# Patient Record
Sex: FEMALE | Race: WHITE | NOT HISPANIC OR LATINO | Employment: OTHER | URBAN - METROPOLITAN AREA
[De-identification: names, ages, dates, MRNs, and addresses within clinical notes are randomized per-mention and may not be internally consistent; named-entity substitution may affect disease eponyms.]

---

## 2017-01-19 ENCOUNTER — HOSPITAL ENCOUNTER (OUTPATIENT)
Dept: MAMMOGRAPHY | Facility: CLINIC | Age: 57
Discharge: HOME | End: 2017-01-19
Attending: FAMILY MEDICINE | Admitting: FAMILY MEDICINE
Payer: COMMERCIAL

## 2017-01-19 ENCOUNTER — OFFICE VISIT (OUTPATIENT)
Dept: FAMILY MEDICINE | Facility: CLINIC | Age: 57
End: 2017-01-19
Payer: COMMERCIAL

## 2017-01-19 VITALS
WEIGHT: 143 LBS | OXYGEN SATURATION: 100 % | SYSTOLIC BLOOD PRESSURE: 118 MMHG | TEMPERATURE: 96.9 F | HEIGHT: 61 IN | DIASTOLIC BLOOD PRESSURE: 65 MMHG | BODY MASS INDEX: 27 KG/M2 | HEART RATE: 100 BPM

## 2017-01-19 DIAGNOSIS — Z12.11 COLON CANCER SCREENING: Primary | ICD-10-CM

## 2017-01-19 DIAGNOSIS — Z12.31 ENCOUNTER FOR SCREENING MAMMOGRAM FOR BREAST CANCER: ICD-10-CM

## 2017-01-19 DIAGNOSIS — M54.2 CERVICALGIA: ICD-10-CM

## 2017-01-19 DIAGNOSIS — S09.90XD: ICD-10-CM

## 2017-01-19 DIAGNOSIS — F41.1 GENERALIZED ANXIETY DISORDER: ICD-10-CM

## 2017-01-19 DIAGNOSIS — J45.30 MILD PERSISTENT ASTHMA, UNCOMPLICATED: ICD-10-CM

## 2017-01-19 PROCEDURE — 99214 OFFICE O/P EST MOD 30 MIN: CPT | Performed by: FAMILY MEDICINE

## 2017-01-19 PROCEDURE — 77063 BREAST TOMOSYNTHESIS BI: CPT

## 2017-01-19 RX ORDER — PAROXETINE 20 MG/1
20 TABLET, FILM COATED ORAL AT BEDTIME
Qty: 90 TABLET | Refills: 3 | Status: SHIPPED | OUTPATIENT
Start: 2017-01-19 | End: 2018-01-04

## 2017-01-19 RX ORDER — FLUTICASONE PROPIONATE 50 MCG
1-2 SPRAY, SUSPENSION (ML) NASAL DAILY
Qty: 1 BOTTLE | Refills: 3 | Status: SHIPPED | OUTPATIENT
Start: 2017-01-19 | End: 2018-12-11

## 2017-01-19 RX ORDER — AMITRIPTYLINE HYDROCHLORIDE 10 MG/1
TABLET ORAL
Qty: 252 TABLET | Refills: 3 | Status: CANCELLED | OUTPATIENT
Start: 2017-01-19

## 2017-01-19 RX ORDER — CYCLOBENZAPRINE HCL 10 MG
10 TABLET ORAL
Qty: 14 TABLET | Refills: 1 | Status: SHIPPED | OUTPATIENT
Start: 2017-01-19 | End: 2018-03-08

## 2017-01-19 RX ORDER — IPRATROPIUM BROMIDE AND ALBUTEROL SULFATE 2.5; .5 MG/3ML; MG/3ML
3 SOLUTION RESPIRATORY (INHALATION) EVERY 6 HOURS PRN
Qty: 1 BOX | Refills: 2 | Status: SHIPPED | OUTPATIENT
Start: 2017-01-19 | End: 2018-12-03

## 2017-01-19 RX ORDER — ALBUTEROL SULFATE 90 UG/1
2 AEROSOL, METERED RESPIRATORY (INHALATION) EVERY 6 HOURS PRN
Qty: 2 INHALER | Refills: 6 | Status: SHIPPED | OUTPATIENT
Start: 2017-01-19 | End: 2018-12-03

## 2017-01-19 RX ORDER — AMITRIPTYLINE HYDROCHLORIDE 10 MG/1
30 TABLET ORAL AT BEDTIME
Qty: 90 TABLET | Refills: 6 | Status: SHIPPED | OUTPATIENT
Start: 2017-01-19 | End: 2017-09-30

## 2017-01-19 NOTE — NURSING NOTE
"Chief Complaint   Patient presents with     ER F/U     Head Injury     and neck injury       Initial /65 mmHg  Pulse 100  Temp(Src) 96.9  F (36.1  C) (Tympanic)  Ht 5' 1\" (1.549 m)  Wt 143 lb (64.864 kg)  BMI 27.03 kg/m2  SpO2 100%  LMP 10/17/2008 Estimated body mass index is 27.03 kg/(m^2) as calculated from the following:    Height as of this encounter: 5' 1\" (1.549 m).    Weight as of this encounter: 143 lb (64.864 kg).  BP completed using cuff size: regular  "

## 2017-01-19 NOTE — PROGRESS NOTES
SUBJECTIVE:                                                    Brittany Barajas is a 56 year old female who presents to clinic today for the following health issues:      ED/UC Followup:    Facility:  [facility] [place]  Date of visit: 12/28/16  Reason for visit: patient fell hitting her head   Current Status: still having dull ache on head and pinched nerves  in neck and wavy vision      Concern - f/u after fall hitting her head      Onset: 12-26-16     Description:   Patient fell hitting her head, has had trouble with vision head and neck pain     Intensity: moderate    Progression of Symptoms: slightly improving    Accompanying Signs & Symptoms:  Injury from a fall       Previous history of similar problem:   no    Precipitating factors:   Worsened by: vision head and neck  Are worse with movement      Alleviating factors:  Improved by: patient has been using warm pack        Therapies Tried and outcome: Patient was seen in the ER they did Ct of neck and x-ray of pelvis       Medication Followup of all medication listed in      Taking Medication as prescribed: yes    Side Effects:  None    Medication Helping Symptoms:  yes       Depression and Anxiety Follow-Up    Status since last visit: Improved with medication    Other associated symptoms:None    Complicating factors:     Significant life event: No     Current substance abuse: None    PHQ-9 SCORE 11/30/2015 12/9/2015 10/4/2016   Total Score - - -   Total Score 11 11 16     CLAY-7 SCORE 9/23/2015 12/9/2015 10/4/2016   Total Score - - -   Total Score 3 10 13        PHQ-9  English      PHQ-9   Any Language     GAD7       Asthma Follow-Up    Was ACT completed today?    Yes    ACT Total Scores 1/19/2017   ACT TOTAL SCORE -   ASTHMA ER VISITS -   ASTHMA HOSPITALIZATIONS -   ACT TOTAL SCORE (Goal Greater than or Equal to 20) 20   In the past 12 months, how many times did you visit the emergency room for your asthma without being admitted to the hospital? 0   In  the past 12 months, how many times were you hospitalized overnight because of your asthma? 0     Patient is a 56 yr old female here for a follow up from an ER visit. She was seen for a fall and head injury. CT of her neck was negative for fracture. She still has some mild concussion symptoms. She reports some mild headaches and blurry vision. Patient is also requesting refills on her medication     Problem list and histories reviewed & adjusted, as indicated.  Additional history: as documented    Patient Active Problem List   Diagnosis     Mitral valve disorder     Allergic rhinitis     Convulsions (H)     Esophageal reflux     Mild major depression (H)     Plantar fascial fibromatosis     Other affections of shoulder region, not elsewhere classified     Neoplasm of digestive system     Anxiety state     Breast cancer (H)     CARDIOVASCULAR SCREENING; LDL GOAL LESS THAN 160     Transaminase or LDH elevation     H/O: hysterectomy     Colon polyp     Drug reaction     Mild persistent asthma     Urgency incontinence     Advanced directives, counseling/discussion     Past Surgical History   Procedure Laterality Date     Surgical history of -   10/7/1998     Right parotidectomy-mass     Surgical history of -        Laparoscopy     Surgical history of -   10/1996     Fibroid removal     Leep tx, cervical  10/06     CECILIO 1     Colonoscopy  1/28/2011     COLONOSCOPY performed by ELOY BRIONES at [facility]     Colonoscopy  1/20/2012     Procedure:COLONOSCOPY; Surgeon:ELOY BRIONES; Location:[facility]     Inject epidural lumbar  3/9/2012     Procedure:INJECT EPIDURAL LUMBAR; JAMIL Renteria; Surgeon:GENERIC ANESTHESIA PROVIDER; Location:[facility]     Inject epidural lumbar  5/25/2012     Procedure:INJECT EPIDURAL LUMBAR; Evans; Surgeon:GENERIC ANESTHESIA PROVIDER; Location:[facility]     Hysterectomy, pap no longer indicated  12/1/2008     for fibroids       Social History   Substance Use Topics     Smoking status: Never  Smoker      Smokeless tobacco: Never Used     Alcohol Use: Yes      Comment: rarely     Family History   Problem Relation Age of Onset     C.A.D. Mother      DIABETES Mother      Hypertension Mother      CEREBROVASCULAR DISEASE Mother      Breast Cancer Mother      Allergies Mother      Arthritis Mother      CANCER Mother      Eye Disorder Mother      GASTROINTESTINAL DISEASE Mother      Gynecology Mother      Lipids Mother      HEART DISEASE Mother      OSTEOPOROSIS Mother      Obesity Mother      Thyroid Disease Mother      Respiratory Mother      Alcohol/Drug Father      CANCER Father      Alcohol/Drug Brother      Musculoskeletal Disorder Brother      spina bifida-recent dx     C.A.D. Brother      Thyroid Disease Sister      Lipids Sister      Gynecology Sister      fibroids/ s/p hysterectomy     Other - See Comments Sister      hysterectomy     Thyroid Disease Brother      HEART DISEASE Brother      DOUBLE BYPASS     Lipids Brother      Thyroid Disease Sister      Lipids Sister      Other - See Comments Sister      hysterectomy     GASTROINTESTINAL DISEASE Sister      liver function off     Thyroid Disease Sister      Thyroid Disease Sister      HEART DISEASE Sister      CANCER Other      thyroid cancer     Cancer - colorectal No family hx of          Current Outpatient Prescriptions   Medication Sig Dispense Refill     PARoxetine (PAXIL) 20 MG tablet Take 1 tablet (20 mg) by mouth At Bedtime 90 tablet 3     albuterol (PROAIR HFA/PROVENTIL HFA/VENTOLIN HFA) 108 (90 BASE) MCG/ACT Inhaler Inhale 2 puffs into the lungs every 6 hours as needed for shortness of breath / dyspnea 2 Inhaler 6     fluticasone (FLONASE) 50 MCG/ACT spray Spray 1-2 sprays into both nostrils daily 1 Bottle 3     ipratropium - albuterol 0.5 mg/2.5 mg/3 mL (DUONEB) 0.5-2.5 (3) MG/3ML neb solution Take 1 vial (3 mLs) by nebulization every 6 hours as needed for shortness of breath / dyspnea 1 Box 2     cyclobenzaprine (FLEXERIL) 10 MG tablet  Take 1 tablet (10 mg) by mouth nightly as needed for muscle spasms 14 tablet 1     amitriptyline (ELAVIL) 10 MG tablet Take 3 tablets (30 mg) by mouth At Bedtime 90 tablet 6     amitriptyline (ELAVIL) 10 MG tablet 3 tabs at bedtime.  Plan to increase dose to 50-75 mg at bedtime after 1 month 90 tablet 0     fluticasone (FLOVENT HFA) 110 MCG/ACT Inhaler Inhale 2 puffs into the lungs 2 times daily (Patient taking differently: Inhale 2 puffs into the lungs daily ) 1 Inhaler 3     omeprazole (PRILOSEC) 10 MG capsule Take 2 capsules (20 mg) by mouth daily Take by mouth 30-60 minutes before a meal. 60 capsule 3     Bioflavonoid Products (BIOFLEX PO) Take 1 tablet by mouth daily.       ferrous sulfate (SLOW FE) 142 (45 FE) MG TBCR Take 142 mg by mouth every 48 hours.       aspirin 81 MG tablet Take 1 tablet by mouth daily.       Multiple Vitamin (MULTIVITAMIN OR) Take 1 tablet by mouth daily.       B Complex-C (SUPER B COMPLEX PO) Take 1 tablet by mouth daily.       ascorbic acid (VITAMIN C) 500 MG tablet Take 500 mg by mouth daily.       Calcium-Vitamin D-Vitamin K (CALCIUM + D + K PO) Take 1 capsule by mouth daily.       IBUPROFEN PO Take 800 mg by mouth daily as needed for moderate pain       Acetaminophen (TYLENOL PO) Take 500 mg by mouth daily as needed for mild pain or fever       oxyCODONE-acetaminophen (PERCOCET) 5-325 MG per tablet Take 1-2 tablets by mouth every 4 hours as needed for pain 12 tablet 0     [DISCONTINUED] PARoxetine (PAXIL) 20 MG tablet Take 1 tablet (20 mg) by mouth At Bedtime 90 tablet 3     [DISCONTINUED] albuterol (PROAIR HFA, PROVENTIL HFA, VENTOLIN HFA) 108 (90 BASE) MCG/ACT inhaler Inhale 2 puffs into the lungs every 6 hours as needed for shortness of breath / dyspnea 2 Inhaler 6     [DISCONTINUED] ipratropium - albuterol 0.5 mg/2.5 mg/3 mL (DUONEB) 0.5-2.5 (3) MG/3ML nebulization Take 1 vial (3 mLs) by nebulization every 6 hours as needed for shortness of breath / dyspnea 1 Box 2  "    Allergies   Allergen Reactions     Taxotere Other (See Comments)     Chest tightness, black out     BP Readings from Last 3 Encounters:   01/19/17 118/65   12/28/16 140/89   10/04/16 115/59    Wt Readings from Last 3 Encounters:   01/19/17 143 lb (64.864 kg)   10/04/16 143 lb (64.864 kg)   07/14/16 140 lb (63.504 kg)                  Labs reviewed in EPIC  Problem list, Medication list, Allergies, and Medical/Social/Surgical histories reviewed in [facility] and updated as appropriate.    ROS:  Constitutional, HEENT, cardiovascular, pulmonary, gi and gu systems are negative, except as otherwise noted.    OBJECTIVE:                                                    /65 mmHg  Pulse 100  Temp(Src) 96.9  F (36.1  C) (Tympanic)  Ht 5' 1\" (1.549 m)  Wt 143 lb (64.864 kg)  BMI 27.03 kg/m2  SpO2 100%  LMP 10/17/2008  Body mass index is 27.03 kg/(m^2).  GENERAL: healthy, alert and no distress  EYES: Eyes grossly normal to inspection, PERRL and conjunctivae and sclerae normal  HENT: ear canals and TM's normal, nose and mouth without ulcers or lesions  NECK: no adenopathy, no asymmetry, masses, or scars and thyroid normal to palpation  RESP: lungs clear to auscultation - no rales, rhonchi or wheezes  CV: regular rate and rhythm, normal S1 S2, no S3 or S4, no murmur, click or rub, no peripheral edema and peripheral pulses strong  MS: normal range of motion and no tenderness to palpation     Diagnostic Test Results:  none      ASSESSMENT/PLAN:                                                          ICD-10-CM    1. Generalized anxiety disorder F41.1 PARoxetine (PAXIL) 20 MG tablet     amitriptyline (ELAVIL) 10 MG tablet   2. Mild persistent asthma, uncomplicated J45.30 albuterol (PROAIR HFA/PROVENTIL HFA/VENTOLIN HFA) 108 (90 BASE) MCG/ACT Inhaler     fluticasone (FLONASE) 50 MCG/ACT spray     ipratropium - albuterol 0.5 mg/2.5 mg/3 mL (DUONEB) 0.5-2.5 (3) MG/3ML neb solution   3. Cervicalgia M54.2 cyclobenzaprine " (FLEXERIL) 10 MG tablet   4. Encounter for screening mammogram for breast cancer Z12.31 *MA Screening Digital Bilateral   5. Head injury, acute, without loss of consciousness, subsequent encounter S09.90XD        FUTURE APPOINTMENTS:       - Follow-up visit as needed.    Alejandrina Duran MD  [facility]

## 2017-01-19 NOTE — MR AVS SNAPSHOT
After Visit Summary   1/19/2017    Brittany Barajas    MRN: [id number]           Patient Information     Date Of Birth          1960        Visit Information        Provider Department      1/19/2017 10:00 AM Alejandrina Duran MD [facility]        Today's Diagnoses     Anxiety    -  1     Generalized anxiety disorder         Mild persistent asthma, uncomplicated         Cervicalgia         Encounter for screening mammogram for breast cancer            Follow-ups after your visit        Future tests that were ordered for you today     Open Future Orders        Priority Expected Expires Ordered    *MA Screening Digital Bilateral Routine  1/19/2018 1/19/2017            Who to contact     If you have questions or need follow up information about today's clinic visit or your schedule please contact [facility] directly at [phone].  Normal or non-critical lab and imaging results will be communicated to you by MyChart, letter or phone within 4 business days after the clinic has received the results. If you do not hear from us within 7 days, please contact the clinic through MyChart or phone. If you have a critical or abnormal lab result, we will notify you by phone as soon as possible.  Submit refill requests through [facility] or call your pharmacy and they will forward the refill request to us. Please allow 3 business days for your refill to be completed.          Additional Information About Your Visit        MyChart Information     [employer] gives you secure access to your electronic health record. If you see a primary care provider, you can also send messages to your care team and make appointments. If you have questions, please call your primary care clinic.  If you do not have a primary care provider, please call [phone] and they will assist you.        Care EveryWhere ID     This is your Care EveryWhere ID. This could be used by other organizations to  "access your [facility] medical records  [id number]        Your Vitals Were     Pulse Temperature Height BMI (Body Mass Index) Pulse Oximetry Last Period    100 96.9  F (36.1  C) (Tympanic) 5' 1\" (1.549 m) 27.03 kg/m2 100% 10/17/2008       Blood Pressure from Last 3 Encounters:   01/19/17 118/65   12/28/16 140/89   10/04/16 115/59    Weight from Last 3 Encounters:   01/19/17 143 lb (64.864 kg)   10/04/16 143 lb (64.864 kg)   07/14/16 140 lb (63.504 kg)                 Today's Medication Changes          These changes are accurate as of: 1/19/17 10:25 AM.  If you have any questions, ask your nurse or doctor.               Start taking these medicines.        Dose/Directions    cyclobenzaprine 10 MG tablet   Commonly known as:  FLEXERIL   Used for:  Cervicalgia   Started by:  Alejandrina Duran MD        Dose:  10 mg   Take 1 tablet (10 mg) by mouth nightly as needed for muscle spasms   Quantity:  14 tablet   Refills:  1         These medicines have changed or have updated prescriptions.        Dose/Directions    * amitriptyline 10 MG tablet   Commonly known as:  ELAVIL   This may have changed:  Another medication with the same name was added. Make sure you understand how and when to take each.   Used for:  Anxiety   Changed by:  Alejandrina Duran MD        3 tabs at bedtime.  Plan to increase dose to 50-75 mg at bedtime after 1 month   Quantity:  90 tablet   Refills:  0       * amitriptyline 10 MG tablet   Commonly known as:  ELAVIL   This may have changed:  You were already taking a medication with the same name, and this prescription was added. Make sure you understand how and when to take each.   Used for:  Generalized anxiety disorder   Changed by:  Alejandrina Duran MD        Dose:  30 mg   Take 3 tablets (30 mg) by mouth At Bedtime   Quantity:  90 tablet   Refills:  6       fluticasone 110 MCG/ACT Inhaler   Commonly known as:  FLOVENT HFA   This may have changed:  when to take this "   Used for:  Mild persistent asthma        Dose:  2 puff   Inhale 2 puffs into the lungs 2 times daily   Quantity:  1 Inhaler   Refills:  3       * Notice:  This list has 2 medication(s) that are the same as other medications prescribed for you. Read the directions carefully, and ask your doctor or other care provider to review them with you.         Where to get your medicines      These medications were sent to [facility] [address] [facility]  [address] [place] [address]     Phone:  [phone]    - albuterol 108 (90 BASE) MCG/ACT Inhaler  - amitriptyline 10 MG tablet  - cyclobenzaprine 10 MG tablet  - fluticasone 50 MCG/ACT spray  - ipratropium - albuterol 0.5 mg/2.5 mg/3 mL 0.5-2.5 (3) MG/3ML neb solution  - PARoxetine 20 MG tablet             Primary Care Provider Office Phone # Fax #    Alejandrina Duran [phone]       [facility] [address] [facility] [address]        Thank you!     Thank you for choosing [facility]  for your care. Our goal is always to provide you with excellent care. Hearing back from our patients is one way we can continue to improve our services. Please take a few minutes to complete the written survey that you may receive in the mail after your visit with us. Thank you!             Your Updated Medication List - Protect others around you: Learn how to safely use, store and throw away your medicines at www.disposemymeds.org.          This list is accurate as of: 1/19/17 10:25 AM.  Always use your most recent med list.                   Brand Name Dispense Instructions for use    albuterol 108 (90 BASE) MCG/ACT Inhaler    PROAIR HFA/PROVENTIL HFA/VENTOLIN HFA    2 Inhaler    Inhale 2 puffs into the lungs every 6 hours as needed for shortness of breath / dyspnea       * amitriptyline 10 MG tablet    ELAVIL    90 tablet    3 tabs at bedtime.  Plan to increase dose to 50-75 mg at bedtime after 1  month       * amitriptyline 10 MG tablet    ELAVIL    90 tablet    Take 3 tablets (30 mg) by mouth At Bedtime       ascorbic acid 500 MG tablet    VITAMIN C     Take 500 mg by mouth daily.       aspirin 81 MG tablet      Take 1 tablet by mouth daily.       BIOFLEX PO      Take 1 tablet by mouth daily.       CALCIUM + D + K PO      Take 1 capsule by mouth daily.       cyclobenzaprine 10 MG tablet    FLEXERIL    14 tablet    Take 1 tablet (10 mg) by mouth nightly as needed for muscle spasms       fluticasone 110 MCG/ACT Inhaler    FLOVENT HFA    1 Inhaler    Inhale 2 puffs into the lungs 2 times daily       fluticasone 50 MCG/ACT spray    FLONASE    1 Bottle    Spray 1-2 sprays into both nostrils daily       IBUPROFEN PO      Take 800 mg by mouth daily as needed for moderate pain       ipratropium - albuterol 0.5 mg/2.5 mg/3 mL 0.5-2.5 (3) MG/3ML neb solution    DUONEB    1 Box    Take 1 vial (3 mLs) by nebulization every 6 hours as needed for shortness of breath / dyspnea       MULTIVITAMIN PO      Take 1 tablet by mouth daily.       omeprazole 10 MG CR capsule    priLOSEC    60 capsule    Take 2 capsules (20 mg) by mouth daily Take by mouth 30-60 minutes before a meal.       oxyCODONE-acetaminophen 5-325 MG per tablet    PERCOCET    12 tablet    Take 1-2 tablets by mouth every 4 hours as needed for pain       PARoxetine 20 MG tablet    PAXIL    90 tablet    Take 1 tablet (20 mg) by mouth At Bedtime       SLOW  (45 FE) MG Tbcr   Generic drug:  ferrous sulfate      Take 142 mg by mouth every 48 hours.       SUPER B COMPLEX PO      Take 1 tablet by mouth daily.       TYLENOL PO      Take 500 mg by mouth daily as needed for mild pain or fever       * Notice:  This list has 2 medication(s) that are the same as other medications prescribed for you. Read the directions carefully, and ask your doctor or other care provider to review them with you.

## 2017-01-20 ASSESSMENT — ASTHMA QUESTIONNAIRES: ACT_TOTALSCORE: 20

## 2017-02-02 ENCOUNTER — HOSPITAL ENCOUNTER (OUTPATIENT)
Dept: PHYSICAL THERAPY | Facility: CLINIC | Age: 57
Setting detail: THERAPIES SERIES
End: 2017-02-02
Attending: FAMILY MEDICINE
Payer: COMMERCIAL

## 2017-02-02 DIAGNOSIS — M54.2 CERVICALGIA: ICD-10-CM

## 2017-02-02 DIAGNOSIS — S06.0X0D CONCUSSION WITHOUT LOSS OF CONSCIOUSNESS, SUBSEQUENT ENCOUNTER: Primary | ICD-10-CM

## 2017-02-02 PROCEDURE — 40000718 ZZHC STATISTIC PT DEPARTMENT ORTHO VISIT: Performed by: PHYSICAL THERAPIST

## 2017-02-02 PROCEDURE — 97012 MECHANICAL TRACTION THERAPY: CPT | Mod: GP | Performed by: PHYSICAL THERAPIST

## 2017-02-02 PROCEDURE — 97162 PT EVAL MOD COMPLEX 30 MIN: CPT | Mod: GP | Performed by: PHYSICAL THERAPIST

## 2017-02-02 PROCEDURE — 97140 MANUAL THERAPY 1/> REGIONS: CPT | Mod: GP | Performed by: PHYSICAL THERAPIST

## 2017-02-02 NOTE — PROGRESS NOTES
"  Date/Exercise  2/2/17         UBE          SC ROM \"No, Yes, I Dont know\"  x 5 throughout day         Cat backs  C Rotation          Lev, UTrap, Scalene stretch          Doorway stretch          TBand Sh Retraction, Latis                                                                               "

## 2017-02-02 NOTE — PROGRESS NOTES
02/02/17 0800   General Information   Type of Visit Initial OP Ortho PT Evaluation   Start of Care Date 02/02/17   Referring Physician Renee    Patient/Family Goals Statement Knit more freqyently, P w/ keyboarding at work, Less freqyent HA's.    Orders Evaluate and Treat   Insurance Type [employer]   Insurance Comments/Visits Authorized Auth'd    Medical Diagnosis Neck Pain    Surgical/Medical history reviewed (Fx'd R foot, Asthma, Depre, Heart, OA, Seizures when young )   Precautions/Limitations no known precautions/limitations   Special Instructions Discussed possibly seeing concussion therapists for further work up.    General Information Comments Flexeral    Body Part(s)   Body Part(s) Cervical Spine   Presentation and Etiology   Pertinent history of current problem (include personal factors and/or comorbidities that impact the POC) Fell day after [place], landed w/bag of rock salt on chest. Hit L Lat/post head. Still hurts inside. Waited a few days to see if it would get better, but went to ER on 12/28/16. X'Ray and CT scan, Has become more stiff at base of L skull and into L arm.  Saw MD in January and was sent for PT. Sleeps on L side.    Impairments A. Pain;E. Decreased flexibility;G. Impaired balance;J. Burning;N. Headaches;O. Blurred vision;Q. Dizziness   Functional Limitations perform required work activities   Symptom Location Stiff at base of L skull and into L arm. P from L shoulder down into the arm anteriorly to elbow, Burning and sharp Pain into fingers # 1,2,3. Sometimes wake up with this sharp pain.    How/Where did it occur With a fall   Onset date of current episode/exacerbation 12/26/16   Chronicity New   Pain rating (0-10 point scale) (2-7/10 )   Pain quality B. Dull;C. Aching;D. Burning;E. Shooting   Frequency of pain/symptoms C. With activity   Pain/symptoms are: The same all the time   Pain/symptoms exacerbated by G. Certain positions;H. Overhead reach;L. Work tasks   Pain/symptoms  "eased by C. Rest;G. Heat   Progression of symptoms since onset: Improved   Current / Previous Interventions   Diagnostic Tests: X-ray;CT scan   X-ray Results (Scoliosis, )   CT Results (Degenerative changes of C.Spine. )   Prior Level of Function   Functional Level Prior Comment Independent w/ADL''s   Current Level of Function   Current Community Support Other  (Alone )   Patient role/employment history A. Employed  (LPN )   [facility]   Home/community accessibility Stairs no problem    Fall Risk Screen   Fall screen completed by PT   Per patient - Fall 2 or more times in past year? Yes   Per patient - Fall with injury in past year? Yes   Timed Up and Go score (seconds) 11   Is patient a fall risk? No   System Outcome Measures   Outcome Measures (NDI  28)   Functional Scales   Functional Scales Patient Specific Functional Scale   Patient Specific Functional Scale Q1:;Q2:;Q3:   Q1: Knitting 3.5/5    Q2: Keyboarding at work 3.5/5    Q3: HA's 2x/week.   Cervical Spine   Observation No acute distress.    Integumentary  Normal    Posture Head forward, Dowager's, Flat thoracic spine.    Cervical Flexion ROM 1\"    Cervical Extension ROM 50% w/ Pinch R subocc area, upper cervical.    Cervical Right Side Bending ROM 37   Cervical Left Side Bending ROM 27 w/ contralateral pulling    Cervical Right Rotation ROM 55   Cervical Left Rotation ROM 57 w/ pinch under L subocc area.    Shoulder Abd (C5) Strength L 4+   Elbow Flexion (C5, C6) Strength L 4+   Upper Trapezius Flexibility L>R Tight    Levator Scapula Flexibility L>R Tight    Scalene Flexibility Tight b    Alar Ligament Test Negative    Transverse Ligament Test Negative    Spurling Test L Post Quadrant get P near L nape of neck.    Cervical Distraction Test Relief, Comp w/ extension was worst for central neck pain, w/ flex/neutral had L Upper Back P.    Cervical Rotation/Lateral Flexion Test L 1st Rib tight.    Segmental Mobility-Thoracic L 3rd Rib " POsterior.    Dermatome/Sensory Testing L Thumb and inner lower arm decreased.    Biceps Reflexes Normal    Brachioradialis Reflexes Normal    Triceps Reflexes Decreased L    Thoracic Outlet Syndrome TOS Tests  (Pressure over scalenes-Hand sx's; Pressure -r Pec minor-Elbo)   Planned Therapy Interventions   Planned Therapy Interventions Comment MT, STM, Jt mobs, MET, Develop HEP.  May benefit from referral to concussion therapist.    Planned Modality Interventions   Planned Modality Interventions Comments C.Tx, E.Stim, US.    Clinical Impression   Criteria for Skilled Therapeutic Interventions Met yes, treatment indicated   PT Diagnosis Radicular sx's, MF Pain, Thoracic dysfunctions.    Influenced by the following impairments Pain, Radicular sx's, stiffness, HA's, Blurred vision, Dizziness.    Functional limitations due to impairments Sx's w/ knitting, keyboarding, reaching at work, reaching overhead, HH duties.    Clinical Presentation Evolving/Changing   Clinical Presentation Rationale NDI, CROM, Weakness, Decreased sensation, Hx of Depression, Spinal arthritis per imaging.    Clinical Decision Making (Complexity) Moderate complexity   Therapy Frequency 2 times/Week   Predicted Duration of Therapy Intervention (days/wks) 1 month, then reassess, 9 visits.    Risk & Benefits of therapy have been explained Yes   Patient, Family & other staff in agreement with plan of care Yes   Clinical Impression Comments Radicular sx's, MF Pain, Thoracic dysfunctions.    Education Assessment   Preferred Learning Style Listening;Demonstration;Pictures/video   Barriers to Learning Cognitive  (d/t Concussion. )   ORTHO GOALS   PT Ortho Eval Goals 1;2;3;4;5   Ortho Goal 1   Goal Identifier 1   Goal Description STG:  Pt able to knit more frequently and 2/5 on OPtimal Scale.    Target Date 03/03/17   Ortho Goal 2   Goal Identifier 2   Goal Description STG: Pt able to do ekyboarding at work in standing position 2/5 on Optimal Scale.     Target Date 03/03/17   Ortho Goal 3   Goal Identifier 3   Goal Description STG: Pt will report decrease in HA's to once/week or less.    Target Date 03/03/17   Ortho Goal 4   Goal Identifier 4   Goal Description STG:  Pt able to read as much as she wants w/slight P.    Target Date 03/09/17   Ortho Goal 5   Goal Identifier 5   Goal Description LTG: Pt will be independent w/ HEP and Self cares to manage P.    Target Date 04/07/17   Total Evaluation Time   Total Evaluation Time 65 Total (Eval x 35, MT x 5, Tx x 15, MT x 15)    Lizeth Cruz, PT, MTC (#[id number])  [facility]           [phone]  Fax          [phone]  Appt #      [phone]

## 2017-04-05 ENCOUNTER — HOSPITAL ENCOUNTER (EMERGENCY)
Facility: CLINIC | Age: 57
Discharge: HOME | End: 2017-04-05
Attending: PHYSICIAN ASSISTANT | Admitting: PHYSICIAN ASSISTANT
Payer: COMMERCIAL

## 2017-04-05 VITALS
RESPIRATION RATE: 18 BRPM | SYSTOLIC BLOOD PRESSURE: 130 MMHG | DIASTOLIC BLOOD PRESSURE: 83 MMHG | OXYGEN SATURATION: 99 % | TEMPERATURE: 98.5 F

## 2017-04-05 DIAGNOSIS — R82.81 PYURIA: ICD-10-CM

## 2017-04-05 LAB
ALBUMIN UR-MCNC: 10 MG/DL
APPEARANCE UR: ABNORMAL
BACTERIA #/AREA URNS HPF: ABNORMAL /HPF
BILIRUB UR QL STRIP: NEGATIVE
COLOR UR AUTO: YELLOW
GLUCOSE UR STRIP-MCNC: NEGATIVE MG/DL
HGB UR QL STRIP: ABNORMAL
KETONES UR STRIP-MCNC: NEGATIVE MG/DL
LEUKOCYTE ESTERASE UR QL STRIP: ABNORMAL
MUCOUS THREADS #/AREA URNS LPF: PRESENT /LPF
NITRATE UR QL: POSITIVE
PH UR STRIP: 8 PH (ref 5–7)
RBC #/AREA URNS AUTO: 16 /HPF (ref 0–2)
SP GR UR STRIP: 1.01 (ref 1–1.03)
SQUAMOUS #/AREA URNS AUTO: 3 /HPF (ref 0–1)
URN SPEC COLLECT METH UR: ABNORMAL
UROBILINOGEN UR STRIP-MCNC: NORMAL MG/DL (ref 0–2)
WBC #/AREA URNS AUTO: 182 /HPF (ref 0–2)
WBC CLUMPS #/AREA URNS HPF: PRESENT /HPF

## 2017-04-05 PROCEDURE — 99213 OFFICE O/P EST LOW 20 MIN: CPT | Mod: Z6 | Performed by: PHYSICIAN ASSISTANT

## 2017-04-05 PROCEDURE — 99213 OFFICE O/P EST LOW 20 MIN: CPT

## 2017-04-05 PROCEDURE — 81001 URINALYSIS AUTO W/SCOPE: CPT | Performed by: EMERGENCY MEDICINE

## 2017-04-05 RX ORDER — PHENAZOPYRIDINE HYDROCHLORIDE 100 MG/1
200 TABLET, FILM COATED ORAL 3 TIMES DAILY PRN
Qty: 12 TABLET | Refills: 0 | Status: SHIPPED | OUTPATIENT
Start: 2017-04-05 | End: 2017-04-07

## 2017-04-05 RX ORDER — CEPHALEXIN 500 MG/1
500 CAPSULE ORAL 3 TIMES DAILY
Qty: 30 CAPSULE | Refills: 0 | Status: SHIPPED | OUTPATIENT
Start: 2017-04-05 | End: 2017-04-15

## 2017-04-05 NOTE — DISCHARGE INSTRUCTIONS
There might be a possible kidney infection given the vague flank pain complaints, so I will cover for a kidney infection as well as a UTI.  Return if worse.  Sunil Oconnor PA-C

## 2017-04-05 NOTE — ED PROVIDER NOTES
History     Chief Complaint   Patient presents with     Dysuria     HPI  Brittany Barajas is a 56 year old female who is here with urinary frequency, urgency, and dysuria.  The onset has been 2 days.  She has associated lower abdominal pain.  The patient describes discolored urine and almost pyuria, but not hematuria.  She has some aches and pains otherwise in the bilateral flank but not clearly in the CVA.  She does not feel that her kidneys are tender.  She describes no obvious retention symptoms.     I have reviewed the Medications, Allergies, Past Medical and Surgical History, and Social History in the Epic system.    Review of Systems  ROS:  General:  No night sweats reported  ENT: No epistaxis  Skin: No petechiae  Psychiatric: Not combative  : No hematuria reported    Physical Exam   BP: 130/83  Heart Rate: 93  Temp: 98.5  F (36.9  C)  Resp: 18  SpO2: 99 %  Physical Exam  Gen: 56 year old female appears stated age  Eyes: Equal and round  Head: NC, AT, GCS 15  ENT: Canal and nares patent  Extremity: MAEW  Skin: Warm and dry  Back: No CVAT  Psych: Mood and affect normal  Neuro: CN II-XII grossly in tact  Abd: soft NTND BS+    ED Course     ED Course     Procedures        Ddx: UTI, cystitis, unlikely urolithiasis, unlikely acute abdomen given the exam         Labs Ordered and Resulted from Time of ED Arrival Up to the Time of Departure from the ED   ROUTINE UA WITH MICROSCOPIC - Abnormal; Notable for the following:        Result Value    Blood Urine Trace (*)     pH Urine 8.0 (*)     Protein Albumin Urine 10 (*)     Nitrite Urine Positive (*)     Leukocyte Esterase Urine Large (*)     WBC Urine 182 (*)     RBC Urine 16 (*)     WBC Clumps Present (*)     Bacteria Urine Many (*)     Squamous Epithelial /HPF Urine 3 (*)     Mucous Urine Present (*)     All other components within normal limits     UTI given pyuria    Assessments & Plan (with Medical Decision Making)     I have reviewed the nursing  notes.    I have reviewed the findings, diagnosis, plan and need for follow up with the patient.  We had a long discussion that she may need to return for a hsu bag if retention occurs  We discussed that I covered for pyelo and she should check urine culture.    New Prescriptions    CEPHALEXIN (KEFLEX) 500 MG CAPSULE    Take 1 capsule (500 mg) by mouth 3 times daily for 10 days    PHENAZOPYRIDINE (PYRIDIUM) 100 MG TABLET    Take 2 tablets (200 mg) by mouth 3 times daily as needed for urinary tract discomfort       Final diagnoses:   Pyuria - with possible pyelonephritis suspected       4/5/2017   [facility] EMERGENCY DEPARTMENT     Bari Oconnor PA-C  04/05/17 1255       Bari Oconnor PA-C  04/05/17 1258       Bari Oconnor PA-C  04/05/17 1550       Bari Oconnor PA-C  04/05/17 1551    4/6 I called to check on Brittany and was unable to leave a message     Bari Oconnor PA-C  04/06/17 1017

## 2017-04-05 NOTE — ED AVS SNAPSHOT
[facility]    [address] [address]    Phone:  [phone]    Fax:  [phone]                                       Brittany Barajas   MRN: [id number]    Department:  [facility]   Date of Visit:  4/5/2017           After Visit Summary Signature Page     I have received my discharge instructions, and my questions have been answered. I have discussed any challenges I see with this plan with the nurse or doctor.    ..........................................................................................................................................  Patient/Patient Representative Signature      ..........................................................................................................................................  Patient Representative Print Name and Relationship to Patient    ..................................................               ................................................  Date                                            Time    ..........................................................................................................................................  Reviewed by Signature/Title    ...................................................              ..............................................  Date                                                            Time

## 2017-04-05 NOTE — ED AVS SNAPSHOT
[facility]    [address] [address]    Phone:  [phone]    Fax:  [phone]                                       Brittany Barajas   MRN: [id number]    Department:  [facility]   Date of Visit:  4/5/2017           Patient Information     Date Of Birth          1960        Your diagnoses for this visit were:     Pyuria with possible pyelonephritis suspected       You were seen by Bari Oconnor PA-C.      Follow-up Information     Call Alejandrina Duran MD.    Specialty:  Family Practice    Why:  you should call your doctor to check and make sure our antibiotic selection works agains the urine culture    Contact information:    [facility]  [address] [address]  [phone]          Discharge Instructions       There might be a possible kidney infection given the vague flank pain complaints, so I will cover for a kidney infection as well as a UTI.  Return if worse.  Sunil Oconnor PA-C    24 Hour Appointment Hotline       To make an appointment at any [facility], call [phone] ([phone]). If you don't have a family doctor or clinic, we will help you find one. [facility] are conveniently located to serve the needs of you and your family.             Review of your medicines      START taking        Dose / Directions Last dose taken    cephALEXin 500 MG capsule   Commonly known as:  KEFLEX   Dose:  500 mg   Quantity:  30 capsule        Take 1 capsule (500 mg) by mouth 3 times daily for 10 days   Refills:  0        phenazopyridine 100 MG tablet   Commonly known as:  PYRIDIUM   Dose:  200 mg   Quantity:  12 tablet        Take 2 tablets (200 mg) by mouth 3 times daily as needed for urinary tract discomfort   Refills:  0          Our records show that you are taking the medicines listed below. If these are incorrect, please call your family doctor or clinic.        Dose /  Directions Last dose taken    albuterol 108 (90 BASE) MCG/ACT Inhaler   Commonly known as:  PROAIR HFA/PROVENTIL HFA/VENTOLIN HFA   Dose:  2 puff   Quantity:  2 Inhaler        Inhale 2 puffs into the lungs every 6 hours as needed for shortness of breath / dyspnea   Refills:  6        * amitriptyline 10 MG tablet   Commonly known as:  ELAVIL   Quantity:  90 tablet        3 tabs at bedtime.  Plan to increase dose to 50-75 mg at bedtime after 1 month   Refills:  0        * amitriptyline 10 MG tablet   Commonly known as:  ELAVIL   Dose:  30 mg   Quantity:  90 tablet        Take 3 tablets (30 mg) by mouth At Bedtime   Refills:  6        ascorbic acid 500 MG tablet   Commonly known as:  VITAMIN C   Dose:  500 mg        Take 500 mg by mouth daily.   Refills:  0        aspirin 81 MG tablet   Dose:  1 tablet        Take 1 tablet by mouth daily.   Refills:  0        BIOFLEX PO   Dose:  1 tablet        Take 1 tablet by mouth daily.   Refills:  0        CALCIUM + D + K PO   Dose:  1 capsule        Take 1 capsule by mouth daily.   Refills:  0        cyclobenzaprine 10 MG tablet   Commonly known as:  FLEXERIL   Dose:  10 mg   Quantity:  14 tablet        Take 1 tablet (10 mg) by mouth nightly as needed for muscle spasms   Refills:  1        fluticasone 110 MCG/ACT Inhaler   Commonly known as:  FLOVENT HFA   Dose:  2 puff   Quantity:  1 Inhaler        Inhale 2 puffs into the lungs 2 times daily   Refills:  3        fluticasone 50 MCG/ACT spray   Commonly known as:  FLONASE   Dose:  1-2 spray   Quantity:  1 Bottle        Spray 1-2 sprays into both nostrils daily   Refills:  3        IBUPROFEN PO   Dose:  800 mg        Take 800 mg by mouth daily as needed for moderate pain   Refills:  0        ipratropium - albuterol 0.5 mg/2.5 mg/3 mL 0.5-2.5 (3) MG/3ML neb solution   Commonly known as:  DUONEB   Dose:  3 mL   Quantity:  1 Box        Take 1 vial (3 mLs) by nebulization every 6 hours as needed for shortness of breath / dyspnea    Refills:  2        MULTIVITAMIN PO   Dose:  1 tablet        Take 1 tablet by mouth daily.   Refills:  0        omeprazole 10 MG CR capsule   Commonly known as:  priLOSEC   Dose:  20 mg   Quantity:  60 capsule        Take 2 capsules (20 mg) by mouth daily Take by mouth 30-60 minutes before a meal.   Refills:  3        oxyCODONE-acetaminophen 5-325 MG per tablet   Commonly known as:  PERCOCET   Dose:  1-2 tablet   Quantity:  12 tablet        Take 1-2 tablets by mouth every 4 hours as needed for pain   Refills:  0        PARoxetine 20 MG tablet   Commonly known as:  PAXIL   Dose:  20 mg   Quantity:  90 tablet        Take 1 tablet (20 mg) by mouth At Bedtime   Refills:  3        SLOW  (45 FE) MG Tbcr   Dose:  142 mg   Generic drug:  ferrous sulfate        Take 142 mg by mouth every 48 hours.   Refills:  0        SUPER B COMPLEX PO   Dose:  1 tablet        Take 1 tablet by mouth daily.   Refills:  0        TYLENOL PO   Dose:  500 mg        Take 500 mg by mouth daily as needed for mild pain or fever   Refills:  0        * Notice:  This list has 2 medication(s) that are the same as other medications prescribed for you. Read the directions carefully, and ask your doctor or other care provider to review them with you.            Prescriptions were sent or printed at these locations (2 Prescriptions)                   Other Prescriptions                Printed at Department/Unit printer (2 of 2)         cephALEXin (KEFLEX) 500 MG capsule               phenazopyridine (PYRIDIUM) 100 MG tablet                Procedures and tests performed during your visit     UA with Microscopic      Orders Needing Specimen Collection     None      Pending Results     No orders found from 4/3/2017 to 4/6/2017.            Pending Culture Results     No orders found from 4/3/2017 to 4/6/2017.            Test Results From Your Hospital Stay        4/5/2017 12:41 PM      Component Results     Component Value Ref Range & Units Status     Color Urine Yellow  Final    Appearance Urine Slightly Cloudy  Final    Glucose Urine Negative NEG mg/dL Final    Bilirubin Urine Negative NEG Final    Ketones Urine Negative NEG mg/dL Final    Specific Gravity Urine 1.010 1.003 - 1.035 Final    Blood Urine Trace (A) NEG Final    pH Urine 8.0 (H) 5.0 - 7.0 pH Final    Protein Albumin Urine 10 (A) NEG mg/dL Final    Urobilinogen mg/dL Normal 0.0 - 2.0 mg/dL Final    Nitrite Urine Positive (A) NEG Final    Leukocyte Esterase Urine Large (A) NEG Final    Source Midstream Urine  Final    WBC Urine 182 (H) 0 - 2 /HPF Final    RBC Urine 16 (H) 0 - 2 /HPF Final    WBC Clumps Present (A) NEG /HPF Final    Bacteria Urine Many (A) NEG /HPF Final    Squamous Epithelial /HPF Urine 3 (H) 0 - 1 /HPF Final    Mucous Urine Present (A) NEG /LPF Final                Thank you for choosing [facility]       Thank you for choosing [facility] for your care. Our goal is always to provide you with excellent care. Hearing back from our patients is one way we can continue to improve our services. Please take a few minutes to complete the written survey that you may receive in the mail after you visit with us. Thank you!        [facility] Information     [facility] gives you secure access to your electronic health record. If you see a primary care provider, you can also send messages to your care team and make appointments. If you have questions, please call your primary care clinic.  If you do not have a primary care provider, please call [phone] and they will assist you.        Care EveryWhere ID     This is your Care EveryWhere ID. This could be used by other organizations to access your [facility] medical records  [id number]        After Visit Summary       This is your record. Keep this with you and show to your community pharmacist(s) and doctor(s) at your next visit.

## 2017-04-07 NOTE — ADDENDUM NOTE
Encounter addended by: Lizeth Cruz, PT on: 4/7/2017  9:31 AM<BR>     Actions taken: Sign clinical note, Episode resolved

## 2017-04-07 NOTE — PROGRESS NOTES
"Outpatient Physical Therapy Discharge Note     Patient: Brittany Barajas  : 1960    Beginning/End Dates of Reporting Period:  17 to 17 when initially seen. D/C written on 2017.  Total # of Rx sessions: 1    Referring Provider: [place]     Therapy Diagnosis: Neck Pain      Client Self Report: Stiff base of L skull and into L arm. P from L shoulder down into the arm anteriorly to elbow, burning and sharp P into fingers L 1,2,3. Sometimes wake up with this sharp pain.  P Scale: 2-7/10     Objective Measurements:  Objective Measure: NDI   Details: 28    Objective Measure: CROM   Details: Felx 1\", Ext 50% w/ Pinch R subocc; SB R 37, L 27 w/ contral pull; Rot R 55, L 57 w/ pinch L subocc area  .   Objective Measure: MMT:  Details: L Sh Abd 4+, Elbow Flexion 4+.     Objective Measure: Spec tests  Details: L Post !uad +Pressure over scalenes produces Hand sx's, L Pec minor elbow sx's     Objective Measure: Alignment  Details: L 1st Rib elevated, L 3rd Rib Post.      Goals:  Goal Identifier 1   Goal Description STG:  Pt able to knit more frequently and 2/5 on OPtimal Scale.    Target Date 17   Date Met      Progress:     Goal Identifier 2   Goal Description STG: Pt able to do ekyboarding at work in standing position 2/5 on Optimal Scale.    Target Date 17   Date Met      Progress:     Goal Identifier 3   Goal Description STG: Pt will report decrease in HA's to once/week or less.    Target Date 17   Date Met      Progress:     Goal Identifier 4   Goal Description STG:  Pt able to read as much as she wants w/slight P.    Target Date 17   Date Met      Progress:     Goal Identifier 5   Goal Description LTG: Pt will be independent w/ HEP and Self cares to manage P.    Target Date 17   Date Met      Progress:     Progress Toward Goals:   Not assessed this period.    Plan:  Discharge from therapy.    Discharge:    Reason for Discharge: Patient has failed to schedule further " appointments.    Equipment Issued:      Discharge Plan: Patient to continue home program. Pt to follow up w/MD as appropriate.   Lizeth Cruz, PT, [facility] (#[id number])  [facility]           [phone]  Fax          [phone]  Appt #      [phone]

## 2017-04-10 ENCOUNTER — APPOINTMENT (OUTPATIENT)
Dept: GENERAL RADIOLOGY | Facility: CLINIC | Age: 57
End: 2017-04-10
Attending: PHYSICIAN ASSISTANT
Payer: COMMERCIAL

## 2017-04-10 ENCOUNTER — HOSPITAL ENCOUNTER (EMERGENCY)
Facility: CLINIC | Age: 57
Discharge: HOME | End: 2017-04-10
Attending: PHYSICIAN ASSISTANT | Admitting: PHYSICIAN ASSISTANT
Payer: COMMERCIAL

## 2017-04-10 VITALS
OXYGEN SATURATION: 96 % | SYSTOLIC BLOOD PRESSURE: 143 MMHG | HEIGHT: 61 IN | TEMPERATURE: 98.4 F | RESPIRATION RATE: 16 BRPM | DIASTOLIC BLOOD PRESSURE: 73 MMHG | BODY MASS INDEX: 27 KG/M2 | HEART RATE: 87 BPM | WEIGHT: 143 LBS

## 2017-04-10 DIAGNOSIS — R05.9 COUGH: ICD-10-CM

## 2017-04-10 DIAGNOSIS — J10.1 INFLUENZA A: ICD-10-CM

## 2017-04-10 LAB
FLUAV+FLUBV AG SPEC QL: ABNORMAL
FLUAV+FLUBV AG SPEC QL: POSITIVE
SPECIMEN SOURCE: ABNORMAL

## 2017-04-10 PROCEDURE — 87804 INFLUENZA ASSAY W/OPTIC: CPT | Mod: 91 | Performed by: PHYSICIAN ASSISTANT

## 2017-04-10 PROCEDURE — 71020 XR CHEST 2 VW: CPT

## 2017-04-10 PROCEDURE — 99214 OFFICE O/P EST MOD 30 MIN: CPT

## 2017-04-10 PROCEDURE — 99213 OFFICE O/P EST LOW 20 MIN: CPT | Performed by: PHYSICIAN ASSISTANT

## 2017-04-10 RX ORDER — PREDNISONE 20 MG/1
20 TABLET ORAL 2 TIMES DAILY
Qty: 10 TABLET | Refills: 0 | Status: SHIPPED | OUTPATIENT
Start: 2017-04-10 | End: 2017-04-15

## 2017-04-10 RX ORDER — OSELTAMIVIR PHOSPHATE 75 MG/1
75 CAPSULE ORAL 2 TIMES DAILY
Qty: 10 CAPSULE | Refills: 0 | Status: SHIPPED | OUTPATIENT
Start: 2017-04-10 | End: 2017-04-15

## 2017-04-10 RX ORDER — BENZONATATE 200 MG/1
200 CAPSULE ORAL 3 TIMES DAILY PRN
Qty: 21 CAPSULE | Refills: 0 | Status: SHIPPED | OUTPATIENT
Start: 2017-04-10 | End: 2018-03-08

## 2017-04-10 NOTE — ED PROVIDER NOTES
"  History     Chief Complaint   Patient presents with     Cough     WANTS TO BE CHECKED FOR INFLUENZA     HPI    Problem list, Medication list, Allergies, and Medical/Social/Surgical histories reviewed in [facility] and updated as appropriate.    Brittany Barajas is a 56 year old female who presents to the clinic today with a chief complaint of cough  and wheezing. for 2 day(s).  Her cough is described as nonproductive, slightly productive.    The patient's symptoms are mild and worsening.  Associated symptoms include congestion, fever, rhinorrhea, malaise and myalgias. The patient's symptoms are exacerbated by lying down  Patient has been using inhaler  to improve symptoms.  Patient states she has been exposed to influenza at work recently. Patient states she has been more prone to lung issues since her radiation and chemo treatments for breast CA years ago.     Review of Systems     CONSTITUTIONAL:POSITIVE  for fever Tmax 100.8F, malaise and myalgias  INTEGUMENTARY/SKIN: NEGATIVE for worrisome rashes, moles or lesions  ENT/MOUTH: POSITIVE for rhinorrhea-clear  RESP:POSITIVE for cough-non productive, wheezing   CV: NEGATIVE for chest pain, palpitations or peripheral edema  GI: NEGATIVE for nausea, abdominal pain, heartburn, or change in bowel habits      Physical Exam   BP: 143/73  Pulse: 87  Temp: 98.4  F (36.9  C)  Resp: 16  Height: 154.9 cm (5' 1\")  Weight: 64.9 kg (143 lb)  SpO2: 96 %  Physical Exam     /73  Pulse 87  Temp 98.4  F (36.9  C)  Resp 16  Ht 1.549 m (5' 1\")  Wt 64.9 kg (143 lb)  LMP 10/17/2008  SpO2 96%  BMI 27.02 kg/m2  GENERAL APPEARANCE: healthy, alert and no distress  EYES: EOMI,  PERRL, conjunctiva clear  HENT: ear canals and TM's normal.  Nose and mouth without ulcers, erythema or lesions  NECK: supple, nontender, no lymphadenopathy  RESP: transmitted sounds right middle and right lower lobes. No retractions, rhonchi, or accessory muscle use.   CV: regular rates and rhythm, normal " S1 S2, no murmur noted  ABDOMEN:  soft, nontender, no HSM or masses and bowel sounds normal  NEURO: Normal strength and tone, sensory exam grossly normal,  normal speech and mentation  SKIN: no suspicious lesions or rashes    No results found for this or any previous visit (from the past 24 hour(s)).    X-RAY: normal chest X-ray    ED Course     ED Course     Procedures            Critical Care time:  none               Labs Ordered and Resulted from Time of ED Arrival Up to the Time of Departure from the ED   INFLUENZA A/B ANTIGEN - Abnormal; Notable for the following:        Result Value    Influenza A Positive (*)     All other components within normal limits       Assessments & Plan (with Medical Decision Making)     I have reviewed the nursing notes.    I have reviewed the findings, diagnosis, plan and need for follow up with the patient.    New Prescriptions    BENZONATATE (TESSALON) 200 MG CAPSULE    Take 1 capsule (200 mg) by mouth 3 times daily as needed for cough    OSELTAMIVIR (TAMIFLU) 75 MG CAPSULE    Take 1 capsule (75 mg) by mouth 2 times daily for 5 days    PREDNISONE (DELTASONE) 20 MG TABLET    Take 1 tablet (20 mg) by mouth 2 times daily for 5 days       Final diagnoses:   Influenza A   Cough       4/10/2017   [facility] EMERGENCY DEPARTMENT     Dior Hidalgo PA-C  04/10/17 [id number]

## 2017-04-10 NOTE — ED AVS SNAPSHOT
[facility] Emergency Department    [address] [address]    Phone:  [phone]    Fax:  [phone]                                       Brittany Barajas   MRN: [id number]    Department:  [facility] Emergency Department   Date of Visit:  4/10/2017           Patient Information     Date Of Birth          1960        Your diagnoses for this visit were:     Influenza A     Cough        You were seen by Dior Hidalgo PA-C.      Follow-up Information     Please follow up.    Why:  As needed, If symptoms worsen        Discharge Instructions       Use Medication as directed    Hydrate with fluids, rest, cool humidifier.  May use acetaminophen, ibuprofen prn for fevers.    Contagious until fever free for 24 hrs.     For your Cough   The Buckwheat Honey Dose: Given   hour Prior to Bedtime (as long as no allergies)   For children age under 1 year -Do not use due to botulism risk     2-5 years -  tsp (2.5 mL)    6-11 years -1 tsp (5 mL)    12-18 years -2 tsp (10 mL)     Guaifenesin     Adult dose -Not to exceed 2.4 g (2400mg) per day    Child age 6-12 years -100 mg every 4 hr, not to exceed 1.2 g (1200mg) per day     Pediatric, 2-6 years -50 mg every 4 hr as needed, not to exceed 600 mg per day      Cough medications is not recommended in children under 2 years.  With use of cough medications have combination medications be aware of products in the cough medication you are using to avoid overdose    Go to Emergency Room if sx worsen or change, Shortness of breath, chest pain, persistent fevers, or painful breathing occur.     Patient voiced understanding of instructions given.              24 Hour Appointment Hotline       To make an appointment at any [facility], call [phone] ([phone]). If you don't have a family doctor or clinic, we will help you find one. [facility] clinics are conveniently located to serve the needs of you and your family.              Review of your medicines      START taking        Dose / Directions Last dose taken    benzonatate 200 MG capsule   Commonly known as:  TESSALON   Dose:  200 mg   Quantity:  21 capsule        Take 1 capsule (200 mg) by mouth 3 times daily as needed for cough   Refills:  0        oseltamivir 75 MG capsule   Commonly known as:  TAMIFLU   Dose:  75 mg   Quantity:  10 capsule        Take 1 capsule (75 mg) by mouth 2 times daily for 5 days   Refills:  0        predniSONE 20 MG tablet   Commonly known as:  DELTASONE   Dose:  20 mg   Quantity:  10 tablet        Take 1 tablet (20 mg) by mouth 2 times daily for 5 days   Refills:  0          Our records show that you are taking the medicines listed below. If these are incorrect, please call your family doctor or clinic.        Dose / Directions Last dose taken    albuterol 108 (90 BASE) MCG/ACT Inhaler   Commonly known as:  PROAIR HFA/PROVENTIL HFA/VENTOLIN HFA   Dose:  2 puff   Quantity:  2 Inhaler        Inhale 2 puffs into the lungs every 6 hours as needed for shortness of breath / dyspnea   Refills:  6        * amitriptyline 10 MG tablet   Commonly known as:  ELAVIL   Quantity:  90 tablet        3 tabs at bedtime.  Plan to increase dose to 50-75 mg at bedtime after 1 month   Refills:  0        * amitriptyline 10 MG tablet   Commonly known as:  ELAVIL   Dose:  30 mg   Quantity:  90 tablet        Take 3 tablets (30 mg) by mouth At Bedtime   Refills:  6        ascorbic acid 500 MG tablet   Commonly known as:  VITAMIN C   Dose:  500 mg        Take 500 mg by mouth daily.   Refills:  0        aspirin 81 MG tablet   Dose:  1 tablet        Take 1 tablet by mouth daily.   Refills:  0        BIOFLEX PO   Dose:  1 tablet        Take 1 tablet by mouth daily.   Refills:  0        CALCIUM + D + K PO   Dose:  1 capsule        Take 1 capsule by mouth daily.   Refills:  0        cephALEXin 500 MG capsule   Commonly known as:  KEFLEX   Dose:  500 mg   Quantity:  30 capsule        Take 1  capsule (500 mg) by mouth 3 times daily for 10 days   Refills:  0        cyclobenzaprine 10 MG tablet   Commonly known as:  FLEXERIL   Dose:  10 mg   Quantity:  14 tablet        Take 1 tablet (10 mg) by mouth nightly as needed for muscle spasms   Refills:  1        fluticasone 110 MCG/ACT Inhaler   Commonly known as:  FLOVENT HFA   Dose:  2 puff   Quantity:  1 Inhaler        Inhale 2 puffs into the lungs 2 times daily   Refills:  3        fluticasone 50 MCG/ACT spray   Commonly known as:  FLONASE   Dose:  1-2 spray   Quantity:  1 Bottle        Spray 1-2 sprays into both nostrils daily   Refills:  3        IBUPROFEN PO   Dose:  800 mg        Take 800 mg by mouth daily as needed for moderate pain   Refills:  0        ipratropium - albuterol 0.5 mg/2.5 mg/3 mL 0.5-2.5 (3) MG/3ML neb solution   Commonly known as:  DUONEB   Dose:  3 mL   Quantity:  1 Box        Take 1 vial (3 mLs) by nebulization every 6 hours as needed for shortness of breath / dyspnea   Refills:  2        MULTIVITAMIN PO   Dose:  1 tablet        Take 1 tablet by mouth daily.   Refills:  0        omeprazole 10 MG CR capsule   Commonly known as:  priLOSEC   Dose:  20 mg   Quantity:  60 capsule        Take 2 capsules (20 mg) by mouth daily Take by mouth 30-60 minutes before a meal.   Refills:  3        oxyCODONE-acetaminophen 5-325 MG per tablet   Commonly known as:  PERCOCET   Dose:  1-2 tablet   Quantity:  12 tablet        Take 1-2 tablets by mouth every 4 hours as needed for pain   Refills:  0        PARoxetine 20 MG tablet   Commonly known as:  PAXIL   Dose:  20 mg   Quantity:  90 tablet        Take 1 tablet (20 mg) by mouth At Bedtime   Refills:  3        SLOW  (45 FE) MG Tbcr   Dose:  142 mg   Generic drug:  ferrous sulfate        Take 142 mg by mouth every 48 hours.   Refills:  0        SUPER B COMPLEX PO   Dose:  1 tablet        Take 1 tablet by mouth daily.   Refills:  0        TYLENOL PO   Dose:  500 mg        Take 500 mg by mouth daily as  needed for mild pain or fever   Refills:  0        * Notice:  This list has 2 medication(s) that are the same as other medications prescribed for you. Read the directions carefully, and ask your doctor or other care provider to review them with you.            Prescriptions were sent or printed at these locations (3 Prescriptions)                   [facility] [place], [place] - [address]   [address] [address]    Telephone:  [phone]   Fax:  [phone]   Hours:                  E-Prescribed (3 of 3)         predniSONE (DELTASONE) 20 MG tablet               oseltamivir (TAMIFLU) 75 MG capsule               benzonatate (TESSALON) 200 MG capsule                Procedures and tests performed during your visit     Chest XR,  PA & LAT    Influenza A/B antigen      Orders Needing Specimen Collection     None      Pending Results     No orders found from 4/8/2017 to 4/11/2017.            Pending Culture Results     No orders found from 4/8/2017 to 4/11/2017.            Test Results From Your Hospital Stay        4/10/2017  3:53 PM      Narrative     XR CHEST 2 VW 4/10/2017 3:47 PM    COMPARISON: 11/5/2015    HISTORY: Cough, fever        Impression     IMPRESSION: Cardiac silhouette and pulmonary vasculature are within  normal limits. No focal airspace disease, pleural effusion or  pneumothorax.    ABILIO SEGURA         4/10/2017  4:04 PM      Component Results     Component Value Ref Range & Units Status    Influenza A/B Agn Specimen Nasal  Final    Influenza A Positive (A) NEG Final    Influenza B  NEG Final    Negative   Test results must be correlated with clinical data. If necessary, results   should be confirmed by a molecular assay or viral culture.                  Thank you for choosing [facility]       Thank you for choosing [facility] for your care. Our goal is always to provide you with excellent care. Hearing back from our patients is one way we can continue to improve our  services. Please take a few minutes to complete the written survey that you may receive in the mail after you visit with us. Thank you!        [facility] Information     [facility] gives you secure access to your electronic health record. If you see a primary care provider, you can also send messages to your care team and make appointments. If you have questions, please call your primary care clinic.  If you do not have a primary care provider, please call [phone] and they will assist you.        Care EveryWhere ID     This is your Care EveryWhere ID. This could be used by other organizations to access your [facility] medical records  [id number]        After Visit Summary       This is your record. Keep this with you and show to your community pharmacist(s) and doctor(s) at your next visit.

## 2017-04-10 NOTE — LETTER
[facility] EMERGENCY DEPARTMENT  [address] [address]  [phone]  Dept: [phone]      4/10/2017    Re: Brittany Sandoval Vivianabrent      TO WHOM IT MAY CONCERN:    Brittany Barajas  was seen on 4/10/17.  Please excuse her from work until she is fever free for 24 hours due to illness.    Cordially    Dior Hidalgo PA-C   [facility] EMERGENCY DEPARTMENT

## 2017-04-10 NOTE — DISCHARGE INSTRUCTIONS
Use Medication as directed    Hydrate with fluids, rest, cool humidifier.  May use acetaminophen, ibuprofen prn for fevers.    Contagious until fever free for 24 hrs.     For your Cough   The Buckwheat Honey Dose: Given   hour Prior to Bedtime (as long as no allergies)   For children age under 1 year -Do not use due to botulism risk     2-5 years -  tsp (2.5 mL)    6-11 years -1 tsp (5 mL)    12-18 years -2 tsp (10 mL)     Guaifenesin     Adult dose -Not to exceed 2.4 g (2400mg) per day    Child age 6-12 years -100 mg every 4 hr, not to exceed 1.2 g (1200mg) per day     Pediatric, 2-6 years -50 mg every 4 hr as needed, not to exceed 600 mg per day      Cough medications is not recommended in children under 2 years.  With use of cough medications have combination medications be aware of products in the cough medication you are using to avoid overdose    Go to Emergency Room if sx worsen or change, Shortness of breath, chest pain, persistent fevers, or painful breathing occur.     Patient voiced understanding of instructions given.

## 2017-04-10 NOTE — ED AVS SNAPSHOT
[facility] Emergency Department    [address] [address]    Phone:  [phone]    Fax:  [phone]                                       Brittany Barajas   MRN: [id number]    Department:  [facility] Emergency Department   Date of Visit:  4/10/2017           After Visit Summary Signature Page     I have received my discharge instructions, and my questions have been answered. I have discussed any challenges I see with this plan with the nurse or doctor.    ..........................................................................................................................................  Patient/Patient Representative Signature      ..........................................................................................................................................  Patient Representative Print Name and Relationship to Patient    ..................................................               ................................................  Date                                            Time    ..........................................................................................................................................  Reviewed by Signature/Title    ...................................................              ..............................................  Date                                                            Time

## 2017-04-17 ENCOUNTER — APPOINTMENT (OUTPATIENT)
Dept: GENERAL RADIOLOGY | Facility: CLINIC | Age: 57
End: 2017-04-17
Attending: NURSE PRACTITIONER
Payer: COMMERCIAL

## 2017-04-17 ENCOUNTER — HOSPITAL ENCOUNTER (EMERGENCY)
Facility: CLINIC | Age: 57
Discharge: HOME | End: 2017-04-17
Attending: NURSE PRACTITIONER | Admitting: NURSE PRACTITIONER
Payer: COMMERCIAL

## 2017-04-17 VITALS
BODY MASS INDEX: 27.02 KG/M2 | WEIGHT: 143 LBS | RESPIRATION RATE: 18 BRPM | TEMPERATURE: 98.6 F | DIASTOLIC BLOOD PRESSURE: 70 MMHG | SYSTOLIC BLOOD PRESSURE: 122 MMHG | OXYGEN SATURATION: 99 %

## 2017-04-17 DIAGNOSIS — J10.1 INFLUENZA A: ICD-10-CM

## 2017-04-17 DIAGNOSIS — R50.9 FEVER, UNSPECIFIED: ICD-10-CM

## 2017-04-17 PROCEDURE — 99213 OFFICE O/P EST LOW 20 MIN: CPT

## 2017-04-17 PROCEDURE — 71020 XR CHEST 2 VW: CPT

## 2017-04-17 PROCEDURE — 99213 OFFICE O/P EST LOW 20 MIN: CPT | Performed by: NURSE PRACTITIONER

## 2017-04-17 NOTE — LETTER
[facility] EMERGENCY DEPARTMENT  [address] [address]  [phone]          April 17, 2017    RE:  Brittany Barajas                                                                                                                                                       [address] [address]            To whom it may concern:    Brittany Barajas was under my professional care today in Urgent Care. Please excuse her from work today.    Sincerely,         EDEL Mcfadden CNP

## 2017-04-17 NOTE — ED AVS SNAPSHOT
[facility] Emergency Department    [address] [address]    Phone:  [phone]    Fax:  [phone]                                       Brittany Barajas   MRN: [id number]    Department:  [facility] Emergency Department   Date of Visit:  4/17/2017           After Visit Summary Signature Page     I have received my discharge instructions, and my questions have been answered. I have discussed any challenges I see with this plan with the nurse or doctor.    ..........................................................................................................................................  Patient/Patient Representative Signature      ..........................................................................................................................................  Patient Representative Print Name and Relationship to Patient    ..................................................               ................................................  Date                                            Time    ..........................................................................................................................................  Reviewed by Signature/Title    ...................................................              ..............................................  Date                                                            Time

## 2017-04-17 NOTE — ED AVS SNAPSHOT
[facility] Emergency Department    [address] [address]    Phone:  [phone]    Fax:  [phone]                                       Brittany Barajas   MRN: [id number]    Department:  [facility] Emergency Department   Date of Visit:  4/17/2017           Patient Information     Date Of Birth          1960        Your diagnoses for this visit were:     Influenza A     Fever, unspecified        You were seen by Yadira Douglas APRN CNP.      Follow-up Information     Follow up with Alejandrina Duran MD.    Specialty:  Family Practice    Why:  If symptoms worsen    Contact information:    [facility]  [address] [address]  [phone]          Discharge Instructions       Discharge Instructions  Influenza    You were diagnosed today with influenza or influenza like illness.  Influenza is a respiratory illness caused by influenza A or B viruses.  Influenza causes fever, headache, muscle aches, and fatigue.  These symptoms start one to four days after you have been around a person with this illness.  People with influenza commonly have a dry cough, sore throat, and a runny nose.   Influenza is spread through sneezing and coughing and can live on surfaces for several days.  It is usually contagious for 5 days but in some cases up to 10 days and often affects several family members. If you have a family member who is less than 2 years old, older than 65 years old, pregnant or has a serious medical condition, they should be seen right away by a physician to decide if they should take preventative medications.      Return to the Emergency Department if:    You have trouble breathing.    You develop pain in your chest.    You have signs of being dehydrated, such as dizziness or unable to urinate at least three times daily.    You feel confused.    You cannot stop vomiting or you cannot drink enough fluids.    In children, you should  seek help if the child has any of the above or if child:    Has blue or purplish skin color.    Is so irritable that he or she does not want to be held.    Does not have tears when crying (in infants) or does not urinate at least three times daily.    Does not wake up easily.    Follow-up with your doctor if you are not improving after 5 days.    What can I do to help myself?    Rest.    Fluids -- Drink hydrating solutions such as Gatorade  or Pedialyte  as often as you can. If you are drinking enough, you should pass urine at least every eight hours.    Tylenol  (acetaminophen) and Advil  (ibuprofen) can relieve fever, headache, and muscle aches. Do not give aspirin to children under 18 years old.     Antiviral treatment -- Antiviral medicines do not make the flu symptoms go away immediately.  They have only been shown to make the symptoms go away 12 to 24 hours sooner than they would without treatment.       Antibiotics -- Antibiotics are NOT useful for treating viral illnesses such as influenza. Antibiotics should only be used if there is a bacterial complication of the flu such as bacterial pneumonia, ear infection, or sinusitis.    Because you were diagnosed with a flu like illness you are very contagious.  This means you cannot work, attend school or  for at least 24 hours or until you no longer have a fever.  If you were given a prescription for medicine here today, be sure to read all of the information (including the package insert) that comes with your prescription.  This will include important information about the medicine, its side effects, and any warnings that you need to know about.  The pharmacist who fills the prescription can provide more information and answer questions you may have about the medicine.  If you have questions or concerns that the pharmacist cannot address, please call or return to the Emergency Department.     Remember that you can always come back to the Emergency  Department if you are not able to see your regular doctor in the amount of time listed above, if you get any new symptoms, or if there is anything that worries you.        24 Hour Appointment Hotline       To make an appointment at any [facility], call [phone] ([phone]). If you don't have a family doctor or clinic, we will help you find one. [facility] clinics are conveniently located to serve the needs of you and your family.             Review of your medicines      Our records show that you are taking the medicines listed below. If these are incorrect, please call your family doctor or clinic.        Dose / Directions Last dose taken    albuterol 108 (90 BASE) MCG/ACT Inhaler   Commonly known as:  PROAIR HFA/PROVENTIL HFA/VENTOLIN HFA   Dose:  2 puff   Quantity:  2 Inhaler        Inhale 2 puffs into the lungs every 6 hours as needed for shortness of breath / dyspnea   Refills:  6        * amitriptyline 10 MG tablet   Commonly known as:  ELAVIL   Quantity:  90 tablet        3 tabs at bedtime.  Plan to increase dose to 50-75 mg at bedtime after 1 month   Refills:  0        * amitriptyline 10 MG tablet   Commonly known as:  ELAVIL   Dose:  30 mg   Quantity:  90 tablet        Take 3 tablets (30 mg) by mouth At Bedtime   Refills:  6        ascorbic acid 500 MG tablet   Commonly known as:  VITAMIN C   Dose:  500 mg        Take 500 mg by mouth daily.   Refills:  0        aspirin 81 MG tablet   Dose:  1 tablet        Take 1 tablet by mouth daily.   Refills:  0        benzonatate 200 MG capsule   Commonly known as:  TESSALON   Dose:  200 mg   Quantity:  21 capsule        Take 1 capsule (200 mg) by mouth 3 times daily as needed for cough   Refills:  0        BIOFLEX PO   Dose:  1 tablet        Take 1 tablet by mouth daily.   Refills:  0        CALCIUM + D + K PO   Dose:  1 capsule        Take 1 capsule by mouth daily.   Refills:  0        cyclobenzaprine 10 MG tablet   Commonly known as:  FLEXERIL   Dose:   10 mg   Quantity:  14 tablet        Take 1 tablet (10 mg) by mouth nightly as needed for muscle spasms   Refills:  1        fluticasone 110 MCG/ACT Inhaler   Commonly known as:  FLOVENT HFA   Dose:  2 puff   Quantity:  1 Inhaler        Inhale 2 puffs into the lungs 2 times daily   Refills:  3        fluticasone 50 MCG/ACT spray   Commonly known as:  FLONASE   Dose:  1-2 spray   Quantity:  1 Bottle        Spray 1-2 sprays into both nostrils daily   Refills:  3        IBUPROFEN PO   Dose:  800 mg        Take 800 mg by mouth daily as needed for moderate pain   Refills:  0        ipratropium - albuterol 0.5 mg/2.5 mg/3 mL 0.5-2.5 (3) MG/3ML neb solution   Commonly known as:  DUONEB   Dose:  3 mL   Quantity:  1 Box        Take 1 vial (3 mLs) by nebulization every 6 hours as needed for shortness of breath / dyspnea   Refills:  2        MULTIVITAMIN PO   Dose:  1 tablet        Take 1 tablet by mouth daily.   Refills:  0        omeprazole 10 MG CR capsule   Commonly known as:  priLOSEC   Dose:  20 mg   Quantity:  60 capsule        Take 2 capsules (20 mg) by mouth daily Take by mouth 30-60 minutes before a meal.   Refills:  3        oxyCODONE-acetaminophen 5-325 MG per tablet   Commonly known as:  PERCOCET   Dose:  1-2 tablet   Quantity:  12 tablet        Take 1-2 tablets by mouth every 4 hours as needed for pain   Refills:  0        PARoxetine 20 MG tablet   Commonly known as:  PAXIL   Dose:  20 mg   Quantity:  90 tablet        Take 1 tablet (20 mg) by mouth At Bedtime   Refills:  3        SLOW  (45 FE) MG Tbcr   Dose:  142 mg   Generic drug:  ferrous sulfate        Take 142 mg by mouth every 48 hours.   Refills:  0        SUPER B COMPLEX PO   Dose:  1 tablet        Take 1 tablet by mouth daily.   Refills:  0        TYLENOL PO   Dose:  500 mg        Take 500 mg by mouth daily as needed for mild pain or fever   Refills:  0        * Notice:  This list has 2 medication(s) that are the same as other medications prescribed  for you. Read the directions carefully, and ask your doctor or other care provider to review them with you.            Procedures and tests performed during your visit     XR Chest 2 Views      Orders Needing Specimen Collection     None      Pending Results     No orders found from 4/15/2017 to 4/18/2017.            Pending Culture Results     No orders found from 4/15/2017 to 4/18/2017.            Test Results From Your Hospital Stay        4/17/2017  4:08 PM      Narrative     XR CHEST 2 VW 4/17/2017 3:52 PM    HISTORY: Fever. Cough.    COMPARISON: Several prior studies, the most recent one being dated  4/10/2017.        Impression     IMPRESSION: 2 views of the chest show no acute cardiopulmonary disease  and no significant interval change.     MENG GAN MD                Thank you for choosing [facility]       Thank you for choosing [facility] for your care. Our goal is always to provide you with excellent care. Hearing back from our patients is one way we can continue to improve our services. Please take a few minutes to complete the written survey that you may receive in the mail after you visit with us. Thank you!        [employer] Information     [employer] gives you secure access to your electronic health record. If you see a primary care provider, you can also send messages to your care team and make appointments. If you have questions, please call your primary care clinic.  If you do not have a primary care provider, please call [phone] and they will assist you.        Care EveryWhere ID     This is your Care EveryWhere ID. This could be used by other organizations to access your [facility] medical records  [id number]        After Visit Summary       This is your record. Keep this with you and show to your community pharmacist(s) and doctor(s) at your next visit.

## 2017-04-17 NOTE — ED PROVIDER NOTES
History     Chief Complaint   Patient presents with     Fever     DX with influenza A a week ago, completed Tamiflu last Monday and this am had fever again and body aches      HPI  Brittany Barajas is a 56 year old female with history of mitral valve disorder, allergic rhinitis, convulsions, esophageal reflux, depression, anxiety, and mild persistent asthma who presents to urgent care for evaluation of cough and fever.  Patient was diagnosed with influenza A one week ago and treated with 5 days of Tamiflu.  Fever had resolved over the weekend but returned this morning.  She also complains of right sided back pain with coughing.  Patient Active Problem List   Diagnosis     Mitral valve disorder     Allergic rhinitis     Convulsions (H)     Esophageal reflux     Mild major depression (H)     Plantar fascial fibromatosis     Other affections of shoulder region, not elsewhere classified     Neoplasm of digestive system     Anxiety state     Breast cancer (H)     CARDIOVASCULAR SCREENING; LDL GOAL LESS THAN 160     Transaminase or LDH elevation     H/O: hysterectomy     Colon polyp     Drug reaction     Mild persistent asthma     Urgency incontinence     Advanced directives, counseling/discussion       No current facility-administered medications on file prior to encounter.   Current Outpatient Prescriptions on File Prior to Encounter:  benzonatate (TESSALON) 200 MG capsule Take 1 capsule (200 mg) by mouth 3 times daily as needed for cough   PARoxetine (PAXIL) 20 MG tablet Take 1 tablet (20 mg) by mouth At Bedtime   albuterol (PROAIR HFA/PROVENTIL HFA/VENTOLIN HFA) 108 (90 BASE) MCG/ACT Inhaler Inhale 2 puffs into the lungs every 6 hours as needed for shortness of breath / dyspnea   fluticasone (FLONASE) 50 MCG/ACT spray Spray 1-2 sprays into both nostrils daily   ipratropium - albuterol 0.5 mg/2.5 mg/3 mL (DUONEB) 0.5-2.5 (3) MG/3ML neb solution Take 1 vial (3 mLs) by nebulization every 6 hours as needed for  shortness of breath / dyspnea   cyclobenzaprine (FLEXERIL) 10 MG tablet Take 1 tablet (10 mg) by mouth nightly as needed for muscle spasms   amitriptyline (ELAVIL) 10 MG tablet Take 3 tablets (30 mg) by mouth At Bedtime   IBUPROFEN PO Take 800 mg by mouth daily as needed for moderate pain   Acetaminophen (TYLENOL PO) Take 500 mg by mouth daily as needed for mild pain or fever   oxyCODONE-acetaminophen (PERCOCET) 5-325 MG per tablet Take 1-2 tablets by mouth every 4 hours as needed for pain   amitriptyline (ELAVIL) 10 MG tablet 3 tabs at bedtime.  Plan to increase dose to 50-75 mg at bedtime after 1 month   fluticasone (FLOVENT HFA) 110 MCG/ACT Inhaler Inhale 2 puffs into the lungs 2 times daily (Patient taking differently: Inhale 2 puffs into the lungs daily )   omeprazole (PRILOSEC) 10 MG capsule Take 2 capsules (20 mg) by mouth daily Take by mouth 30-60 minutes before a meal.   Bioflavonoid Products (BIOFLEX PO) Take 1 tablet by mouth daily.   ferrous sulfate (SLOW FE) 142 (45 FE) MG TBCR Take 142 mg by mouth every 48 hours.   aspirin 81 MG tablet Take 1 tablet by mouth daily.   Multiple Vitamin (MULTIVITAMIN OR) Take 1 tablet by mouth daily.   B Complex-C (SUPER B COMPLEX PO) Take 1 tablet by mouth daily.   ascorbic acid (VITAMIN C) 500 MG tablet Take 500 mg by mouth daily.   Calcium-Vitamin D-Vitamin K (CALCIUM + D + K PO) Take 1 capsule by mouth daily.         I have reviewed the Medications, Allergies, Past Medical and Surgical History, and Social History in the Epic system.    Review of Systems  As mentioned above in the history present illness. All other systems were reviewed and are negative.    Physical Exam   BP: 122/70  Heart Rate: 94  Temp: 98.6  F (37  C)  Resp: 18  Weight: 64.9 kg (143 lb)  SpO2: 99 %  Physical Exam    GENERAL APPEARANCE: healthy, alert and no distress  EYES: EOMI,  PERRL, conjunctiva clear  HENT: ear canals and TM's normal.  Nose and mouth without ulcers, erythema or lesions  NECK:  supple, nontender, no lymphadenopathy  RESP: lungs clear to auscultation - no rales, rhonchi or wheezes  CV: regular rates and rhythm, normal S1 S2, no murmur noted    ED Course     ED Course     Procedures         Results for orders placed or performed during the hospital encounter of 04/17/17 (from the past 48 hour(s))   XR Chest 2 Views    Narrative    XR CHEST 2 VW 4/17/2017 3:52 PM    HISTORY: Fever. Cough.    COMPARISON: Several prior studies, the most recent one being dated  4/10/2017.      Impression    IMPRESSION: 2 views of the chest show no acute cardiopulmonary disease  and no significant interval change.     MENG GAN MD       Labs Ordered and Resulted from Time of ED Arrival Up to the Time of Departure from the ED - No data to display    Assessments & Plan (with Medical Decision Making)   -X-ray negative for pneumonia  -Symptomatic treatment discussed  -Return to the emergency department for chest pain, shortness of breath, or worse in any way.    I have reviewed the nursing notes.    I have reviewed the findings, diagnosis, plan and need for follow up with the patient.    New Prescriptions    No medications on file       Final diagnoses:   Influenza A   Fever, unspecified       4/17/2017   [facility] EMERGENCY DEPARTMENT     Yadira Douglas APRN CNP  04/17/17 8301

## 2017-04-21 ENCOUNTER — HOSPITAL ENCOUNTER (EMERGENCY)
Facility: CLINIC | Age: 57
Discharge: HOME | End: 2017-04-21
Attending: FAMILY MEDICINE | Admitting: FAMILY MEDICINE
Payer: COMMERCIAL

## 2017-04-21 VITALS
RESPIRATION RATE: 20 BRPM | HEART RATE: 87 BPM | OXYGEN SATURATION: 100 % | DIASTOLIC BLOOD PRESSURE: 81 MMHG | TEMPERATURE: 98.4 F | SYSTOLIC BLOOD PRESSURE: 124 MMHG | WEIGHT: 143 LBS | BODY MASS INDEX: 27 KG/M2 | HEIGHT: 61 IN

## 2017-04-21 DIAGNOSIS — J18.9 PNEUMONIA OF RIGHT LOWER LOBE DUE TO INFECTIOUS ORGANISM: ICD-10-CM

## 2017-04-21 PROCEDURE — 99213 OFFICE O/P EST LOW 20 MIN: CPT | Performed by: FAMILY MEDICINE

## 2017-04-21 PROCEDURE — 99212 OFFICE O/P EST SF 10 MIN: CPT

## 2017-04-21 ASSESSMENT — ENCOUNTER SYMPTOMS
SHORTNESS OF BREATH: 1
ACTIVITY CHANGE: 0
COUGH: 1
CHEST TIGHTNESS: 1
DIAPHORESIS: 0
STRIDOR: 0
FEVER: 0
FATIGUE: 1
CHILLS: 0
WHEEZING: 0

## 2017-04-21 NOTE — DISCHARGE INSTRUCTIONS
Pneumonia (Adult)  Pneumonia is an infection deep within the lungs. It is in the small air sacs (alveoli). Pneumonia may be caused by a virus or bacteria. Pneumonia caused by bacteria is usually treated with an antibiotic. Severe cases may need to be treated in the hospital. Milder cases can be treated at home. Symptoms usually start to get better during the first 2 days of treatment.    Home care  Follow these guidelines when caring for yourself at home:    Rest at home for the first 2 to 3 days, or until you feel stronger. Don t let yourself get overly tired when you go back to your activities.    Stay away from cigarette smoke - yours or other people s.    You may use acetaminophen or ibuprofen to control fever or pain, unless another medicine was prescribed. If you have chronic liver or kidney disease, talk with your health care provider before using these medicines. Also talk with your provider if you ve had a stomach ulcer or GI bleeding. Don t give aspirin to anyone younger than 18 years of age who is ill with a fever. It may cause severe liver damage.    Your appetite may be poor, so a light diet is fine.    Drink 6 to 8 glasses of fluids every day to make sure you are getting enough fluids. Beverages can include water, sport drinks, sodas without caffeine, juices, tea, or soup. Fluids will help loosen secretions in the lung. This will make it easier for you to cough up the phlegm (sputum). If you also have heart or kidney disease, check with your health care provider before you drink extra fluids.    Take antibiotic medicine prescribed until it is all gone, even if you are feeling better after a few days.  Follow-up care  Follow up with your health care provider in the next 2 to 3 days, or as advised. This is to be sure the medicine is helping you get better.  If you are 65 or older, you should get a pneumococcal vaccine and a yearly flu (influenza) shot. You should also get these vaccines if you have  chronic lung disease like asthma, emphysema, or COPD. Ask your provider about this.  When to seek medical advice  Call your health care provider right away if any of these occur:    You don t get better within the first 48 hours of treatment    Shortness of breath gets worse    Rapid breathing (more than 25 breaths per minute)    Coughing up blood    Chest pain gets worse with breathing    Fever of 102 F (38 C) or higher that doesn t get better with fever medicine    Weakness, dizziness, or fainting that gets worse    Thirst or dry mouth that gets worse    Sinus pain, headache, or a stiff neck    Chest pain not caused by coughing    8569-0231 [employer]. [address] [place], [place] [id number]. All rights reserved. This information is not intended as a substitute for professional medical care. Always follow your healthcare professional's instructions.      ALSO - NO NO DAIRY!!!!!!!!!!!!!!!!!!!!!!!!!!! [place] milk, cheese, yogurt, replace all cows milk with alternative. No alcohol due to breast cancer history.   TAKE PROBIOTICS DAILY - 30 Billion units with at least 20 different types, replenish good bacteria in the gut

## 2017-04-21 NOTE — ED AVS SNAPSHOT
[facility] Emergency Department    [address] [facility] [address]    Phone:  [phone]    Fax:  [phone]                                       Brittany Barajas   MRN: [id number]    Department:  [facility] Emergency Department   Date of Visit:  4/21/2017           Patient Information     Date Of Birth          1960        Your diagnoses for this visit were:     Pneumonia of right lower lobe due to infectious organism        You were seen by Deanna Irving MD.      Follow-up Information     Follow up with Alejandrina Duran MD In 1 week.    Specialty:  Family Practice    Why:  As needed    Contact information:    [facility]  [address] [facility] [address]  [phone]          Discharge Instructions         Pneumonia (Adult)  Pneumonia is an infection deep within the lungs. It is in the small air sacs (alveoli). Pneumonia may be caused by a virus or bacteria. Pneumonia caused by bacteria is usually treated with an antibiotic. Severe cases may need to be treated in the hospital. Milder cases can be treated at home. Symptoms usually start to get better during the first 2 days of treatment.    Home care  Follow these guidelines when caring for yourself at home:    Rest at home for the first 2 to 3 days, or until you feel stronger. Don t let yourself get overly tired when you go back to your activities.    Stay away from cigarette smoke - yours or other people s.    You may use acetaminophen or ibuprofen to control fever or pain, unless another medicine was prescribed. If you have chronic liver or kidney disease, talk with your health care provider before using these medicines. Also talk with your provider if you ve had a stomach ulcer or GI bleeding. Don t give aspirin to anyone younger than 18 years of age who is ill with a fever. It may cause severe liver damage.    Your appetite may be poor, so a light diet is fine.    Drink 6 to 8 glasses of  fluids every day to make sure you are getting enough fluids. Beverages can include water, sport drinks, sodas without caffeine, juices, tea, or soup. Fluids will help loosen secretions in the lung. This will make it easier for you to cough up the phlegm (sputum). If you also have heart or kidney disease, check with your health care provider before you drink extra fluids.    Take antibiotic medicine prescribed until it is all gone, even if you are feeling better after a few days.  Follow-up care  Follow up with your health care provider in the next 2 to 3 days, or as advised. This is to be sure the medicine is helping you get better.  If you are 65 or older, you should get a pneumococcal vaccine and a yearly flu (influenza) shot. You should also get these vaccines if you have chronic lung disease like asthma, emphysema, or COPD. Ask your provider about this.  When to seek medical advice  Call your health care provider right away if any of these occur:    You don t get better within the first 48 hours of treatment    Shortness of breath gets worse    Rapid breathing (more than 25 breaths per minute)    Coughing up blood    Chest pain gets worse with breathing    Fever of 102 F (38 C) or higher that doesn t get better with fever medicine    Weakness, dizziness, or fainting that gets worse    Thirst or dry mouth that gets worse    Sinus pain, headache, or a stiff neck    Chest pain not caused by coughing    1500-9218 [employer]. [address] [place] [address]. All rights reserved. This information is not intended as a substitute for professional medical care. Always follow your healthcare professional's instructions.      ALSO - NO NO DAIRY!!!!!!!!!!!!!!!!!!!!!!!!!!! [place] milk, cheese, yogurt, replace all cows milk with alternative. No alcohol due to breast cancer history.   TAKE PROBIOTICS DAILY - 30 Billion units with at least 20 different types, replenish good bacteria in the gut       24  Hour Appointment Hotline       To make an appointment at any [facility] [place], call [phone] ([phone]). If you don't have a family doctor or clinic, we will help you find one. [facility] clinics are conveniently located to serve the needs of you and your family.             Review of your medicines      START taking        Dose / Directions Last dose taken    amoxicillin-clavulanate 875-125 MG per tablet   Commonly known as:  AUGMENTIN   Dose:  1 tablet   Quantity:  14 tablet        Take 1 tablet by mouth 2 times daily for 7 days   Refills:  0          Our records show that you are taking the medicines listed below. If these are incorrect, please call your family doctor or clinic.        Dose / Directions Last dose taken    albuterol 108 (90 BASE) MCG/ACT Inhaler   Commonly known as:  PROAIR HFA/PROVENTIL HFA/VENTOLIN HFA   Dose:  2 puff   Quantity:  2 Inhaler        Inhale 2 puffs into the lungs every 6 hours as needed for shortness of breath / dyspnea   Refills:  6        * amitriptyline 10 MG tablet   Commonly known as:  ELAVIL   Quantity:  90 tablet        3 tabs at bedtime.  Plan to increase dose to 50-75 mg at bedtime after 1 month   Refills:  0        * amitriptyline 10 MG tablet   Commonly known as:  ELAVIL   Dose:  30 mg   Quantity:  90 tablet        Take 3 tablets (30 mg) by mouth At Bedtime   Refills:  6        ascorbic acid 500 MG tablet   Commonly known as:  VITAMIN C   Dose:  500 mg        Take 500 mg by mouth daily.   Refills:  0        aspirin 81 MG tablet   Dose:  1 tablet        Take 1 tablet by mouth daily.   Refills:  0        benzonatate 200 MG capsule   Commonly known as:  TESSALON   Dose:  200 mg   Quantity:  21 capsule        Take 1 capsule (200 mg) by mouth 3 times daily as needed for cough   Refills:  0        BIOFLEX PO   Dose:  1 tablet        Take 1 tablet by mouth daily.   Refills:  0        CALCIUM + D + K PO   Dose:  1 capsule        Take 1 capsule by mouth daily.   Refills:   0        cyclobenzaprine 10 MG tablet   Commonly known as:  FLEXERIL   Dose:  10 mg   Quantity:  14 tablet        Take 1 tablet (10 mg) by mouth nightly as needed for muscle spasms   Refills:  1        fluticasone 110 MCG/ACT Inhaler   Commonly known as:  FLOVENT HFA   Dose:  2 puff   Quantity:  1 Inhaler        Inhale 2 puffs into the lungs 2 times daily   Refills:  3        fluticasone 50 MCG/ACT spray   Commonly known as:  FLONASE   Dose:  1-2 spray   Quantity:  1 Bottle        Spray 1-2 sprays into both nostrils daily   Refills:  3        IBUPROFEN PO   Dose:  800 mg        Take 800 mg by mouth daily as needed for moderate pain   Refills:  0        ipratropium - albuterol 0.5 mg/2.5 mg/3 mL 0.5-2.5 (3) MG/3ML neb solution   Commonly known as:  DUONEB   Dose:  3 mL   Quantity:  1 Box        Take 1 vial (3 mLs) by nebulization every 6 hours as needed for shortness of breath / dyspnea   Refills:  2        MULTIVITAMIN PO   Dose:  1 tablet        Take 1 tablet by mouth daily.   Refills:  0        omeprazole 10 MG CR capsule   Commonly known as:  priLOSEC   Dose:  20 mg   Quantity:  60 capsule        Take 2 capsules (20 mg) by mouth daily Take by mouth 30-60 minutes before a meal.   Refills:  3        oxyCODONE-acetaminophen 5-325 MG per tablet   Commonly known as:  PERCOCET   Dose:  1-2 tablet   Quantity:  12 tablet        Take 1-2 tablets by mouth every 4 hours as needed for pain   Refills:  0        PARoxetine 20 MG tablet   Commonly known as:  PAXIL   Dose:  20 mg   Quantity:  90 tablet        Take 1 tablet (20 mg) by mouth At Bedtime   Refills:  3        SLOW  (45 FE) MG Tbcr   Dose:  142 mg   Generic drug:  ferrous sulfate        Take 142 mg by mouth every 48 hours.   Refills:  0        SUPER B COMPLEX PO   Dose:  1 tablet        Take 1 tablet by mouth daily.   Refills:  0        TYLENOL PO   Dose:  500 mg        Take 500 mg by mouth daily as needed for mild pain or fever   Refills:  0        * Notice:   This list has 2 medication(s) that are the same as other medications prescribed for you. Read the directions carefully, and ask your doctor or other care provider to review them with you.            Prescriptions were sent or printed at these locations (1 Prescription)                   [facility] - [place], [place] - [address] [facility] [address]   [address] [facility] [address]    Telephone:  [phone]   Fax:  [phone]   Hours:                  E-Prescribed (1 of 1)         amoxicillin-clavulanate (AUGMENTIN) 875-125 MG per tablet                Orders Needing Specimen Collection     None      Pending Results     No orders found from 4/19/2017 to 4/22/2017.            Pending Culture Results     No orders found from 4/19/2017 to 4/22/2017.            Test Results From Your Hospital Stay               Thank you for choosing [facility]       Thank you for choosing [facility] for your care. Our goal is always to provide you with excellent care. Hearing back from our patients is one way we can continue to improve our services. Please take a few minutes to complete the written survey that you may receive in the mail after you visit with us. Thank you!        [employer] Information     [employer] gives you secure access to your electronic health record. If you see a primary care provider, you can also send messages to your care team and make appointments. If you have questions, please call your primary care clinic.  If you do not have a primary care provider, please call [phone] and they will assist you.        Care EveryWhere ID     This is your Care EveryWhere ID. This could be used by other organizations to access your [facility] medical records  [id number]        After Visit Summary       This is your record. Keep this with you and show to your community pharmacist(s) and doctor(s) at your next visit.

## 2017-04-21 NOTE — ED AVS SNAPSHOT
[facility] Emergency Department    [address] [facility] [address]    Phone:  [phone]    Fax:  [phone]                                       Brittany Barajas   MRN: [id number]    Department:  [facility] Emergency Department   Date of Visit:  4/21/2017           After Visit Summary Signature Page     I have received my discharge instructions, and my questions have been answered. I have discussed any challenges I see with this plan with the nurse or doctor.    ..........................................................................................................................................  Patient/Patient Representative Signature      ..........................................................................................................................................  Patient Representative Print Name and Relationship to Patient    ..................................................               ................................................  Date                                            Time    ..........................................................................................................................................  Reviewed by Signature/Title    ...................................................              ..............................................  Date                                                            Time

## 2017-04-21 NOTE — ED PROVIDER NOTES
"  History     Chief Complaint   Patient presents with     Cough     had influenza, now concerned with possible pneumonia     The history is provided by the patient.     Brittany Barajas is a 56 year old female who Patient presents to the clinic today with a persistent, productive yellow and green without blood,  cough for 2 weeks.  Associated symptoms include chest pain, hoarseness and malaise.  The patient's symptoms are improving.  The patient's symptoms are exacerbated by moving around better with rest but unable to rest due to cough at night.  Patient has been using albuterol nebs and her usual asthma meds to improve symptoms. No improvement. Had influenza 2 weeks ago, came back one week ago with fever and cough, negative chest xray and told to continue symptomatic care. NOT BETTER       I have reviewed the Medications, Allergies, Past Medical and Surgical History, and Social History in the Epic system.    Review of Systems   Constitutional: Positive for fatigue. Negative for activity change, chills, diaphoresis and fever.   HENT: Negative for congestion.    Respiratory: Positive for cough, chest tightness and shortness of breath. Negative for wheezing and stridor.         Musculoskeletal chest pain on the right        Physical Exam   BP: 124/81  Pulse: 87  Heart Rate: 87  Temp: 98.4  F (36.9  C)  Resp: 20  Height: 154.9 cm (5' 1\")  Weight: 64.9 kg (143 lb)  SpO2: 100 %  Physical Exam   Constitutional: She appears well-developed and well-nourished.   HENT:   Right Ear: External ear normal.   Left Ear: External ear normal.   Mouth/Throat: No oropharyngeal exudate.   Neck: Neck supple.   Cardiovascular: Normal rate and regular rhythm.    Pulmonary/Chest: Effort normal and breath sounds normal. No respiratory distress. She has no wheezes. She has no rales. She exhibits no tenderness.   Psychiatric: She has a normal mood and affect. Her behavior is normal. Judgment and thought content normal.       ED Course "     ED Course     Procedures             Critical Care time:  none               Labs Ordered and Resulted from Time of ED Arrival Up to the Time of Departure from the ED - No data to display    Assessments & Plan (with Medical Decision Making)     I have reviewed the nursing notes.    I have reviewed the findings, diagnosis, plan and need for follow up with the patient.    New Prescriptions    AMOXICILLIN-CLAVULANATE (AUGMENTIN) 875-125 MG PER TABLET    Take 1 tablet by mouth 2 times daily for 7 days       Final diagnoses:   Pneumonia of right lower lobe due to infectious organism    Likely secondary to recent influenza, history of asthma, failing outpatient care and 2 weeks of symptoms  Discussed options and wishes to try a course of antibiotics   See AVS     4/21/2017   [facility] EMERGENCY DEPARTMENT     Deanna Irving MD  04/21/17 7318

## 2017-04-24 ENCOUNTER — TELEPHONE (OUTPATIENT)
Dept: FAMILY MEDICINE | Facility: CLINIC | Age: 57
End: 2017-04-24

## 2017-04-24 NOTE — TELEPHONE ENCOUNTER
"I called her, \"I need a note, even one that says I was seen, I haven't had a fever for three days now. \"    \"I can't stay home, I have no more sick time left and I am on the verge of having to leave here, so I need a note, \"    Dr Duran, she says if you have an opening later she would come in to see you today (I dont' see openings) otherwise, she needs a note for her employer.   What to recommend?   I did start a letter you can edit and print/ sign if ok, thanks,   Leonie Alva RNC    "

## 2017-04-24 NOTE — LETTER
[facility]  [address] [facility] [address]  Phone: [phone]    April 24, 2017    Brittany Barajas  [address] [address]          To Whom It May Concern:   Fax number [phone]    Brittany is a patient of mine. She has been seen in our emergency department on 4/10/17, 4/17/17 and 4/21/17 for influenza A and a resulting pneumonia.   She reports she has been afebrile now for three days.     Please allow her to return to work.       Sincerely,      Alejandrina Duran MD

## 2017-04-24 NOTE — TELEPHONE ENCOUNTER
Reason for Call:  Other letter     Detailed comments: pt is wanting a letter to return to work after urgent care visit   Fax:[phone]  Attn:Brittany   Pt knows that since she was not seen here in clinic the letter would be very generic   Seen in urgent care on 4/21/17     Phone Number Patient can be reached at: Other phone number:  [phone]    Best Time: any     Can we leave a detailed message on this number? YES    Call taken on 4/24/2017 at 10:32 AM by Fabby Tse

## 2017-04-24 NOTE — TELEPHONE ENCOUNTER
"Letter faxed to number below. Tried to notify pt, but her voicemail says \"I have reached a number where the voice mail box has not been set up\".    [facility] [place]    "

## 2017-04-29 ENCOUNTER — HOSPITAL ENCOUNTER (EMERGENCY)
Facility: CLINIC | Age: 57
Discharge: HOME | End: 2017-04-29
Attending: FAMILY MEDICINE | Admitting: FAMILY MEDICINE
Payer: COMMERCIAL

## 2017-04-29 VITALS
SYSTOLIC BLOOD PRESSURE: 155 MMHG | OXYGEN SATURATION: 97 % | RESPIRATION RATE: 20 BRPM | DIASTOLIC BLOOD PRESSURE: 82 MMHG | TEMPERATURE: 98.7 F

## 2017-04-29 DIAGNOSIS — J45.901 ASTHMA EXACERBATION: ICD-10-CM

## 2017-04-29 PROCEDURE — 99213 OFFICE O/P EST LOW 20 MIN: CPT | Performed by: FAMILY MEDICINE

## 2017-04-29 PROCEDURE — 99213 OFFICE O/P EST LOW 20 MIN: CPT

## 2017-04-29 RX ORDER — PREDNISONE 20 MG/1
TABLET ORAL
Qty: 14 TABLET | Refills: 0 | Status: SHIPPED | OUTPATIENT
Start: 2017-04-29 | End: 2018-03-08

## 2017-04-29 RX ORDER — CODEINE PHOSPHATE AND GUAIFENESIN 10; 100 MG/5ML; MG/5ML
2-3 SOLUTION ORAL EVERY 4 HOURS PRN
Qty: 240 ML | Refills: 1 | Status: SHIPPED | OUTPATIENT
Start: 2017-04-29 | End: 2018-03-08

## 2017-04-29 NOTE — ED PROVIDER NOTES
History     Chief Complaint   Patient presents with     Cough     Pt having asthma episodes during the night.  Coughing for 8-10 weeks.  Pt feels like asthma is under control, needs a note for missing work today, possibly something to help with cough.     HPI  Brittany Barajas is a 56 year old female who has persistent asthma SX.    She has been treated for asthma, pneumonia. She has had 2 neg CXR. Now has persistent cough and SOB. She is on 2 inhalers. Recently completed Augmentin.    Patient Active Problem List   Diagnosis     Mitral valve disorder     Allergic rhinitis     Convulsions (H)     Esophageal reflux     Mild major depression (H)     Plantar fascial fibromatosis     Other affections of shoulder region, not elsewhere classified     Neoplasm of digestive system     Anxiety state     Breast cancer (H)     CARDIOVASCULAR SCREENING; LDL GOAL LESS THAN 160     Transaminase or LDH elevation     H/O: hysterectomy     Colon polyp     Drug reaction     Mild persistent asthma     Urgency incontinence     Advanced directives, counseling/discussion         I have reviewed the Medications, Allergies, Past Medical and Surgical History, and Social History in the Epic system.    Review of Systems    No fever  No SP  No CP      Physical Exam   BP: 155/82  Heart Rate: 89  Temp: 98.7  F (37.1  C)  Resp: 20  SpO2: 97 %  Physical Exam    Phar: neg  Neck: no adenopathy or stridor.  Chest: sl prolonged expiration, no rales or consolidation      ED Course     ED Course     Procedures             Critical Care time:  none               Labs Ordered and Resulted from Time of ED Arrival Up to the Time of Departure from the ED - No data to display    Assessments & Plan (with Medical Decision Making)     She seems to be having prolonged asthma SX which will be treated more aggressively.      I have reviewed the nursing notes.    I have reviewed the findings, diagnosis, plan and need for follow up with the patient.    New  Prescriptions    GUAIFENESIN-CODEINE (ROBITUSSIN AC) 100-10 MG/5ML SOLN SOLUTION    Take 10-15 mLs by mouth every 4 hours as needed for cough    PREDNISONE (DELTASONE) 20 MG TABLET    2 daily for 4 days, then 1 daily for 4 days, then 1/2 tab daily for 4 days.       Final diagnoses:   Asthma exacerbation       4/29/2017   [facility] EMERGENCY DEPARTMENT     Scot Staley MD  04/29/17 5841

## 2017-04-29 NOTE — ED AVS SNAPSHOT
[facility] Emergency Department    [address] [address]    Phone:  [phone]    Fax:  [phone]                                       Brittany Barajas   MRN: [id number]    Department:  [facility] Emergency Department   Date of Visit:  4/29/2017           After Visit Summary Signature Page     I have received my discharge instructions, and my questions have been answered. I have discussed any challenges I see with this plan with the nurse or doctor.    ..........................................................................................................................................  Patient/Patient Representative Signature      ..........................................................................................................................................  Patient Representative Print Name and Relationship to Patient    ..................................................               ................................................  Date                                            Time    ..........................................................................................................................................  Reviewed by Signature/Title    ...................................................              ..............................................  Date                                                            Time

## 2017-04-29 NOTE — ED AVS SNAPSHOT
[facility] Emergency Department    [address] [address]    Phone:  [phone]    Fax:  [phone]                                       Brittany Barajas   MRN: [id number]    Department:  [facility] Emergency Department   Date of Visit:  4/29/2017           Patient Information     Date Of Birth          1960        Your diagnoses for this visit were:     Asthma exacerbation        You were seen by Scot Staley MD.        Discharge Instructions       Take prescribed medication as directed.    Use Albuterol Inhaler or Neb 4 times daily.        24 Hour Appointment Hotline       To make an appointment at any [facility], call [phone] ([phone]). If you don't have a family doctor or clinic, we will help you find one. [facility] clinics are conveniently located to serve the needs of you and your family.             Review of your medicines      START taking        Dose / Directions Last dose taken    guaiFENesin-codeine 100-10 MG/5ML Soln solution   Commonly known as:  ROBITUSSIN AC   Dose:  2-3 tsp.   Quantity:  240 mL        Take 10-15 mLs by mouth every 4 hours as needed for cough   Refills:  1        predniSONE 20 MG tablet   Commonly known as:  DELTASONE   Quantity:  14 tablet        2 daily for 4 days, then 1 daily for 4 days, then 1/2 tab daily for 4 days.   Refills:  0          Our records show that you are taking the medicines listed below. If these are incorrect, please call your family doctor or clinic.        Dose / Directions Last dose taken    albuterol 108 (90 BASE) MCG/ACT Inhaler   Commonly known as:  PROAIR HFA/PROVENTIL HFA/VENTOLIN HFA   Dose:  2 puff   Quantity:  2 Inhaler        Inhale 2 puffs into the lungs every 6 hours as needed for shortness of breath / dyspnea   Refills:  6        * amitriptyline 10 MG tablet   Commonly known as:  ELAVIL   Quantity:  90 tablet        3 tabs at bedtime.  Plan to increase dose to 50-75 mg at bedtime  after 1 month   Refills:  0        * amitriptyline 10 MG tablet   Commonly known as:  ELAVIL   Dose:  30 mg   Quantity:  90 tablet        Take 3 tablets (30 mg) by mouth At Bedtime   Refills:  6        ascorbic acid 500 MG tablet   Commonly known as:  VITAMIN C   Dose:  500 mg        Take 500 mg by mouth daily.   Refills:  0        aspirin 81 MG tablet   Dose:  1 tablet        Take 1 tablet by mouth daily.   Refills:  0        benzonatate 200 MG capsule   Commonly known as:  TESSALON   Dose:  200 mg   Quantity:  21 capsule        Take 1 capsule (200 mg) by mouth 3 times daily as needed for cough   Refills:  0        BIOFLEX PO   Dose:  1 tablet        Take 1 tablet by mouth daily.   Refills:  0        CALCIUM + D + K PO   Dose:  1 capsule        Take 1 capsule by mouth daily.   Refills:  0        cyclobenzaprine 10 MG tablet   Commonly known as:  FLEXERIL   Dose:  10 mg   Quantity:  14 tablet        Take 1 tablet (10 mg) by mouth nightly as needed for muscle spasms   Refills:  1        fluticasone 110 MCG/ACT Inhaler   Commonly known as:  FLOVENT HFA   Dose:  2 puff   Quantity:  1 Inhaler        Inhale 2 puffs into the lungs 2 times daily   Refills:  3        fluticasone 50 MCG/ACT spray   Commonly known as:  FLONASE   Dose:  1-2 spray   Quantity:  1 Bottle        Spray 1-2 sprays into both nostrils daily   Refills:  3        IBUPROFEN PO   Dose:  800 mg        Take 800 mg by mouth daily as needed for moderate pain   Refills:  0        ipratropium - albuterol 0.5 mg/2.5 mg/3 mL 0.5-2.5 (3) MG/3ML neb solution   Commonly known as:  DUONEB   Dose:  3 mL   Quantity:  1 Box        Take 1 vial (3 mLs) by nebulization every 6 hours as needed for shortness of breath / dyspnea   Refills:  2        MULTIVITAMIN PO   Dose:  1 tablet        Take 1 tablet by mouth daily.   Refills:  0        omeprazole 10 MG CR capsule   Commonly known as:  priLOSEC   Dose:  20 mg   Quantity:  60 capsule        Take 2 capsules (20 mg) by mouth  daily Take by mouth 30-60 minutes before a meal.   Refills:  3        oxyCODONE-acetaminophen 5-325 MG per tablet   Commonly known as:  PERCOCET   Dose:  1-2 tablet   Quantity:  12 tablet        Take 1-2 tablets by mouth every 4 hours as needed for pain   Refills:  0        PARoxetine 20 MG tablet   Commonly known as:  PAXIL   Dose:  20 mg   Quantity:  90 tablet        Take 1 tablet (20 mg) by mouth At Bedtime   Refills:  3        SLOW  (45 FE) MG Tbcr   Dose:  142 mg   Generic drug:  ferrous sulfate        Take 142 mg by mouth every 48 hours.   Refills:  0        SUPER B COMPLEX PO   Dose:  1 tablet        Take 1 tablet by mouth daily.   Refills:  0        TYLENOL PO   Dose:  500 mg        Take 500 mg by mouth daily as needed for mild pain or fever   Refills:  0        * Notice:  This list has 2 medication(s) that are the same as other medications prescribed for you. Read the directions carefully, and ask your doctor or other care provider to review them with you.      ASK your doctor about these medications        Dose / Directions Last dose taken    amoxicillin-clavulanate 875-125 MG per tablet   Commonly known as:  AUGMENTIN   Dose:  1 tablet   Quantity:  14 tablet   Ask about: Should I take this medication?        Take 1 tablet by mouth 2 times daily for 7 days   Refills:  0                Prescriptions were sent or printed at these locations (2 Prescriptions)                   [facility] [address] [address]    Telephone:  [phone]   Fax:  [phone]   Hours:                  E-Prescribed (1 of 2)         predniSONE (DELTASONE) 20 MG tablet                 Printed at Department/Unit printer (1 of 2)         guaiFENesin-codeine (ROBITUSSIN AC) 100-10 MG/5ML SOLN solution                Orders Needing Specimen Collection     None      Pending Results     No orders found from 4/27/2017 to 4/30/2017.            Pending Culture Results      No orders found from 4/27/2017 to 4/30/2017.            Test Results From Your Hospital Stay               Thank you for choosing [facility]       Thank you for choosing [facility] for your care. Our goal is always to provide you with excellent care. Hearing back from our patients is one way we can continue to improve our services. Please take a few minutes to complete the written survey that you may receive in the mail after you visit with us. Thank you!        [employer] Information     [employer] gives you secure access to your electronic health record. If you see a primary care provider, you can also send messages to your care team and make appointments. If you have questions, please call your primary care clinic.  If you do not have a primary care provider, please call [phone] and they will assist you.        Care EveryWhere ID     This is your Care EveryWhere ID. This could be used by other organizations to access your [facility] medical records  [id number]        After Visit Summary       This is your record. Keep this with you and show to your community pharmacist(s) and doctor(s) at your next visit.

## 2017-04-29 NOTE — LETTER
[facility] EMERGENCY DEPARTMENT  [address] [address]  [phone]      April 29, 2017      Brittany Barajas  [address] [address]        Dear Brittany,      You were seen today for a persistent medical condition.      Sincerely,        Scot Staley MD

## 2017-05-22 ENCOUNTER — HOSPITAL ENCOUNTER (EMERGENCY)
Facility: CLINIC | Age: 57
Discharge: HOME | End: 2017-05-22
Attending: EMERGENCY MEDICINE | Admitting: EMERGENCY MEDICINE
Payer: COMMERCIAL

## 2017-05-22 VITALS
SYSTOLIC BLOOD PRESSURE: 139 MMHG | OXYGEN SATURATION: 99 % | RESPIRATION RATE: 16 BRPM | HEART RATE: 99 BPM | WEIGHT: 142 LBS | HEIGHT: 61 IN | DIASTOLIC BLOOD PRESSURE: 86 MMHG | TEMPERATURE: 97.9 F | BODY MASS INDEX: 26.81 KG/M2

## 2017-05-22 DIAGNOSIS — M62.830 BACK SPASM: ICD-10-CM

## 2017-05-22 PROCEDURE — 99284 EMERGENCY DEPT VISIT MOD MDM: CPT

## 2017-05-22 PROCEDURE — 99283 EMERGENCY DEPT VISIT LOW MDM: CPT | Performed by: EMERGENCY MEDICINE

## 2017-05-22 RX ORDER — PREDNISONE 10 MG/1
TABLET ORAL
Qty: 32 TABLET | Refills: 0 | Status: SHIPPED | OUTPATIENT
Start: 2017-05-22 | End: 2017-06-01

## 2017-05-22 RX ORDER — CYCLOBENZAPRINE HCL 10 MG
10 TABLET ORAL 3 TIMES DAILY PRN
Qty: 30 TABLET | Refills: 0 | Status: SHIPPED | OUTPATIENT
Start: 2017-05-22 | End: 2017-05-28

## 2017-05-22 ASSESSMENT — ENCOUNTER SYMPTOMS: BACK PAIN: 1

## 2017-05-22 NOTE — ED AVS SNAPSHOT
[facility] Emergency Department    [address] [facility] [address]    Phone:  [phone]    Fax:  [phone]                                       Brittany Barajas   MRN: [id number]    Department:  [facility] Emergency Department   Date of Visit:  5/22/2017           After Visit Summary Signature Page     I have received my discharge instructions, and my questions have been answered. I have discussed any challenges I see with this plan with the nurse or doctor.    ..........................................................................................................................................  Patient/Patient Representative Signature      ..........................................................................................................................................  Patient Representative Print Name and Relationship to Patient    ..................................................               ................................................  Date                                            Time    ..........................................................................................................................................  Reviewed by Signature/Title    ...................................................              ..............................................  Date                                                            Time

## 2017-05-22 NOTE — ED AVS SNAPSHOT
[facility] Emergency Department    [address] [facility] [address]    Phone:  [phone]    Fax:  [phone]                                       Brittany Barajas   MRN: [id number]    Department:  [facility] Emergency Department   Date of Visit:  5/22/2017           Patient Information     Date Of Birth          1960        Your diagnoses for this visit were:     Back spasm        You were seen by Aguila Fernández MD.      Follow-up Information     Follow up with Alejandrina Duran MD.    Specialty:  Family Practice    Why:  As needed    Contact information:    [facility]  [address] [facility] [address]  [phone]          Discharge Instructions         Possible Causes of Low Back or Leg Pain    The symptoms in your back or leg may be due to pressure on a nerve. This pressure may be caused by a damaged disk or by abnormal bone growth. Either way, you may feel pain, burning, tingling, or numbness. If you have pressure on a nerve that connects to the sciatic nerve, pain may shoot down your leg.    Pressure from the disk  Constant wear and tear can weaken a disk over time and cause back pain. The disk can then be damaged by a sudden movement or injury. If its soft center begins to bulge, the disk may press on a nerve. Or the outside of the disk may tear, and the soft center may squeeze through and pinch a nerve.    Pressure from bone  As a disk wears out, the vertebrae right above and below the disk begin to touch. This can put pressure on a nerve. Often, abnormal bone (called bone spurs) grows where the vertebrae rub against each other. This can cause the foramen or the spinal canal to narrow (called stenosis) and press against a nerve.    7935-1961 [employer]. [address] [place] [address], [place] [address]. All rights reserved. This information is not intended as a substitute for professional medical care. Always follow your healthcare  professional's instructions.          24 Hour Appointment Hotline       To make an appointment at any [facility], call [phone] If you don't have a family doctor or clinic, we will help you find one. [facility] clinics are conveniently located to serve the needs of you and your family.             Review of your medicines      Our records show that you are taking the medicines listed below. If these are incorrect, please call your family doctor or clinic.        Dose / Directions Last dose taken    albuterol 108 (90 BASE) MCG/ACT Inhaler   Commonly known as:  PROAIR HFA/PROVENTIL HFA/VENTOLIN HFA   Dose:  2 puff   Quantity:  2 Inhaler        Inhale 2 puffs into the lungs every 6 hours as needed for shortness of breath / dyspnea   Refills:  6        * amitriptyline 10 MG tablet   Commonly known as:  ELAVIL   Quantity:  90 tablet        3 tabs at bedtime.  Plan to increase dose to 50-75 mg at bedtime after 1 month   Refills:  0        * amitriptyline 10 MG tablet   Commonly known as:  ELAVIL   Dose:  30 mg   Quantity:  90 tablet        Take 3 tablets (30 mg) by mouth At Bedtime   Refills:  6        ascorbic acid 500 MG tablet   Commonly known as:  VITAMIN C   Dose:  500 mg        Take 500 mg by mouth daily.   Refills:  0        aspirin 81 MG tablet   Dose:  1 tablet        Take 1 tablet by mouth daily.   Refills:  0        benzonatate 200 MG capsule   Commonly known as:  TESSALON   Dose:  200 mg   Quantity:  21 capsule        Take 1 capsule (200 mg) by mouth 3 times daily as needed for cough   Refills:  0        BIOFLEX PO   Dose:  1 tablet        Take 1 tablet by mouth daily.   Refills:  0        CALCIUM + D + K PO   Dose:  1 capsule        Take 1 capsule by mouth daily.   Refills:  0        fluticasone 110 MCG/ACT Inhaler   Commonly known as:  FLOVENT HFA   Dose:  2 puff   Quantity:  1 Inhaler        Inhale 2 puffs into the lungs 2 times daily   Refills:  3        fluticasone 50 MCG/ACT  spray   Commonly known as:  FLONASE   Dose:  1-2 spray   Quantity:  1 Bottle        Spray 1-2 sprays into both nostrils daily   Refills:  3        guaiFENesin-codeine 100-10 MG/5ML Soln solution   Commonly known as:  ROBITUSSIN AC   Dose:  2-3 tsp.   Quantity:  240 mL        Take 10-15 mLs by mouth every 4 hours as needed for cough   Refills:  1        IBUPROFEN PO   Dose:  800 mg        Take 800 mg by mouth daily as needed for moderate pain   Refills:  0        ipratropium - albuterol 0.5 mg/2.5 mg/3 mL 0.5-2.5 (3) MG/3ML neb solution   Commonly known as:  DUONEB   Dose:  3 mL   Quantity:  1 Box        Take 1 vial (3 mLs) by nebulization every 6 hours as needed for shortness of breath / dyspnea   Refills:  2        MULTIVITAMIN PO   Dose:  1 tablet        Take 1 tablet by mouth daily.   Refills:  0        omeprazole 10 MG CR capsule   Commonly known as:  priLOSEC   Dose:  20 mg   Quantity:  60 capsule        Take 2 capsules (20 mg) by mouth daily Take by mouth 30-60 minutes before a meal.   Refills:  3        oxyCODONE-acetaminophen 5-325 MG per tablet   Commonly known as:  PERCOCET   Dose:  1-2 tablet   Quantity:  12 tablet        Take 1-2 tablets by mouth every 4 hours as needed for pain   Refills:  0        PARoxetine 20 MG tablet   Commonly known as:  PAXIL   Dose:  20 mg   Quantity:  90 tablet        Take 1 tablet (20 mg) by mouth At Bedtime   Refills:  3        SLOW  (45 FE) MG Tbcr   Dose:  142 mg   Generic drug:  ferrous sulfate        Take 142 mg by mouth every 48 hours.   Refills:  0        SUPER B COMPLEX PO   Dose:  1 tablet        Take 1 tablet by mouth daily.   Refills:  0        TYLENOL PO   Dose:  500 mg        Take 500 mg by mouth daily as needed for mild pain or fever   Refills:  0        * Notice:  This list has 2 medication(s) that are the same as other medications prescribed for you. Read the directions carefully, and ask your doctor or other care provider to review them with you.       ASK your doctor about these medications        Dose / Directions Last dose taken    * cyclobenzaprine 10 MG tablet   Commonly known as:  FLEXERIL   Dose:  10 mg   What changed:  Another medication with the same name was added. Make sure you understand how and when to take each.   Quantity:  14 tablet   Ask about: Which instructions should I use?        Take 1 tablet (10 mg) by mouth nightly as needed for muscle spasms   Refills:  1        * cyclobenzaprine 10 MG tablet   Commonly known as:  FLEXERIL   Dose:  10 mg   What changed:  You were already taking a medication with the same name, and this prescription was added. Make sure you understand how and when to take each.   Quantity:  30 tablet   Ask about: Which instructions should I use?        Take 1 tablet (10 mg) by mouth 3 times daily as needed for muscle spasms   Refills:  0        * predniSONE 20 MG tablet   Commonly known as:  DELTASONE   What changed:  Another medication with the same name was added. Make sure you understand how and when to take each.   Quantity:  14 tablet   Ask about: Which instructions should I use?        2 daily for 4 days, then 1 daily for 4 days, then 1/2 tab daily for 4 days.   Refills:  0        * predniSONE 10 MG tablet   Commonly known as:  DELTASONE   What changed:  You were already taking a medication with the same name, and this prescription was added. Make sure you understand how and when to take each.   Quantity:  32 tablet   Ask about: Which instructions should I use?        Take 4 tablets daily for 5 days,  take 2 tablets daily for 3 days, take 1 tablet daily for 3 days, take half a tablet for 3 days.   Refills:  0        * Notice:  This list has 4 medication(s) that are the same as other medications prescribed for you. Read the directions carefully, and ask your doctor or other care provider to review them with you.            Prescriptions were sent or printed at these locations (2 Prescriptions)                    [facility] [place], [place] - [address]   [address] [facility] [address]    Telephone:  [phone]   Fax:  [phone]   Hours:                  E-Prescribed (2 of 2)         cyclobenzaprine (FLEXERIL) 10 MG tablet               predniSONE (DELTASONE) 10 MG tablet                Orders Needing Specimen Collection     None      Pending Results     No orders found from 5/20/2017 to 5/23/2017.            Pending Culture Results     No orders found from 5/20/2017 to 5/23/2017.            Pending Results Instructions     If you had any lab results that were not finalized at the time of your Discharge, you can call the ED Lab Result RN at [phone]. You will be contacted by this team for any positive Lab results or changes in treatment. The nurses are available 7 days a week from 10A to 6:30P.  You can leave a message 24 hours per day and they will return your call.        Test Results From Your Hospital Stay               Thank you for choosing [facility]       Thank you for choosing [facility] for your care. Our goal is always to provide you with excellent care. Hearing back from our patients is one way we can continue to improve our services. Please take a few minutes to complete the written survey that you may receive in the mail after you visit with us. Thank you!        [employer] Information     [employer] gives you secure access to your electronic health record. If you see a primary care provider, you can also send messages to your care team and make appointments. If you have questions, please call your primary care clinic.  If you do not have a primary care provider, please call [phone] and they will assist you.        Care EveryWhere ID     This is your Care EveryWhere ID. This could be used by other organizations to access your [facility] medical records  [id number]        After Visit Summary       This is your record. Keep this with you and show to your community pharmacist(s) and  doctor(s) at your next visit.

## 2017-05-22 NOTE — ED NOTES
"Pt c/o pain in rt SI joint since Wednesday.  Pt has hx of problems with SI joint.  Pt lifting books on Wednesday and \"felt it pop out of place.\"  Same incident occurred yesterday.  No numbness or tingling.    "

## 2017-05-22 NOTE — LETTER
[facility] EMERGENCY DEPARTMENT  [address] [facility] [address] [address]  [phone]    May 22, 2017    Brittany Barajas  [address] [address]  [phone] (home) [phone] (work)    : 1960      To Whom it may concern:    Brittany Barajas was seen in our Emergency Department today, May 22, 2017.  I expect her condition to improve over the next 1-2 days.  She may return to work when improved.    Sincerely,        Aguilabijal Fernández

## 2017-05-22 NOTE — DISCHARGE INSTRUCTIONS
Possible Causes of Low Back or Leg Pain    The symptoms in your back or leg may be due to pressure on a nerve. This pressure may be caused by a damaged disk or by abnormal bone growth. Either way, you may feel pain, burning, tingling, or numbness. If you have pressure on a nerve that connects to the sciatic nerve, pain may shoot down your leg.    Pressure from the disk  Constant wear and tear can weaken a disk over time and cause back pain. The disk can then be damaged by a sudden movement or injury. If its soft center begins to bulge, the disk may press on a nerve. Or the outside of the disk may tear, and the soft center may squeeze through and pinch a nerve.    Pressure from bone  As a disk wears out, the vertebrae right above and below the disk begin to touch. This can put pressure on a nerve. Often, abnormal bone (called bone spurs) grows where the vertebrae rub against each other. This can cause the foramen or the spinal canal to narrow (called stenosis) and press against a nerve.    2667-6463 [employer]. [address] [place] [address], [place] [address]. All rights reserved. This information is not intended as a substitute for professional medical care. Always follow your healthcare professional's instructions.

## 2017-05-22 NOTE — ED PROVIDER NOTES
"  History     Chief Complaint   Patient presents with     Back Pain     lifting and had pain  and radiates down her leg     HPI  Brittany Barajas is a 56 year old female who presents with back pain. The patient developed lower back pain after her SI joint \"locked up on her\" five days ago when she bent over and set down a box of books \"It feels like hot rebar is stabbing me.\" She states she could feel her back \"pop\" when it happened. The patient says she has had sciatica in the past. The patient denies numbness or tingling down her legs. She has undergone physical therapy for her back problems.  Patient has exercises where she is able to reset the joint.  She was able to do this initially but then reinjured it filling her salt conditioner.  Patient had hemorrhaging in 2012 showing right-sided disc protrusions at the L4 5 and L5-S1 levels.      Past Medical History:   Diagnosis Date     Allergic rhinitis, cause unspecified      Depressive disorder, not elsewhere classified      Esophageal reflux      Intramural leiomyoma of uterus      Mitral valve disorders     MVD     Other affections of shoulder region, not elsewhere classified     Left shoulder impingement     Other convulsions     Seizures     Other diseases of trachea and bronchus, not elsewhere classified      Papanicolaou smear of cervix with low grade squamous intraepithelial lesion (LGSIL) 11/09    Pt refused colpo or further f/u     Plantar fascial fibromatosis      Patient Active Problem List   Diagnosis     Mitral valve disorder     Allergic rhinitis     Convulsions (H)     Esophageal reflux     Mild major depression (H)     Plantar fascial fibromatosis     Other affections of shoulder region, not elsewhere classified     Neoplasm of digestive system     Anxiety state     Breast cancer (H)     CARDIOVASCULAR SCREENING; LDL GOAL LESS THAN 160     Transaminase or LDH elevation     H/O: hysterectomy     Colon polyp     Drug reaction     Mild persistent " asthma     Urgency incontinence     Advanced directives, counseling/discussion     No current facility-administered medications for this encounter.      Current Outpatient Prescriptions   Medication     cyclobenzaprine (FLEXERIL) 10 MG tablet     predniSONE (DELTASONE) 10 MG tablet     predniSONE (DELTASONE) 20 MG tablet     guaiFENesin-codeine (ROBITUSSIN AC) 100-10 MG/5ML SOLN solution     benzonatate (TESSALON) 200 MG capsule     PARoxetine (PAXIL) 20 MG tablet     albuterol (PROAIR HFA/PROVENTIL HFA/VENTOLIN HFA) 108 (90 BASE) MCG/ACT Inhaler     fluticasone (FLONASE) 50 MCG/ACT spray     ipratropium - albuterol 0.5 mg/2.5 mg/3 mL (DUONEB) 0.5-2.5 (3) MG/3ML neb solution     cyclobenzaprine (FLEXERIL) 10 MG tablet     amitriptyline (ELAVIL) 10 MG tablet     IBUPROFEN PO     Acetaminophen (TYLENOL PO)     oxyCODONE-acetaminophen (PERCOCET) 5-325 MG per tablet     fluticasone (FLOVENT HFA) 110 MCG/ACT Inhaler     omeprazole (PRILOSEC) 10 MG capsule     Bioflavonoid Products (BIOFLEX PO)     ferrous sulfate (SLOW FE) 142 (45 FE) MG TBCR     aspirin 81 MG tablet     Multiple Vitamin (MULTIVITAMIN OR)     B Complex-C (SUPER B COMPLEX PO)     ascorbic acid (VITAMIN C) 500 MG tablet     Calcium-Vitamin D-Vitamin K (CALCIUM + D + K PO)     [DISCONTINUED] amitriptyline (ELAVIL) 10 MG tablet        Allergies   Allergen Reactions     Taxotere Other (See Comments)     Chest tightness, black out     Social History     Social History     Marital status: Single     Spouse name: N/A     Number of children: N/A     Years of education: N/A     Occupational History     Not on file.     Social History Main Topics     Smoking status: Never Smoker     Smokeless tobacco: Never Used     Alcohol use Yes      Comment: rarely     Drug use: No     Sexual activity: Not Currently     Other Topics Concern     Parent/Sibling W/ Cabg, Mi Or Angioplasty Before 65f 55m? Yes     brother triple CABG in his 50s      Service No     Blood  "Transfusions No     Caffeine Concern No     Occupational Exposure Yes     maybe     Hobby Hazards No     Sleep Concern No     Stress Concern No     Weight Concern No     Special Diet Yes     less meat     Back Care Yes     low back  S-I joint     Exercise Yes     floor exercises     Bike Helmet Yes     Seat Belt Yes     Self-Exams Yes     Social History Narrative     Family History   Problem Relation Age of Onset     C.A.D. Mother      DIABETES Mother      Hypertension Mother      CEREBROVASCULAR DISEASE Mother      Breast Cancer Mother      Allergies Mother      Arthritis Mother      CANCER Mother      Eye Disorder Mother      GASTROINTESTINAL DISEASE Mother      Gynecology Mother      Lipids Mother      HEART DISEASE Mother      OSTEOPOROSIS Mother      Obesity Mother      Thyroid Disease Mother      Respiratory Mother      Alcohol/Drug Father      CANCER Father      Alcohol/Drug Brother      Musculoskeletal Disorder Brother      spina bifida-recent dx     C.A.D. Brother      Thyroid Disease Sister      Lipids Sister      Gynecology Sister      fibroids/ s/p hysterectomy     Other - See Comments Sister      hysterectomy     Thyroid Disease Brother      HEART DISEASE Brother      DOUBLE BYPASS     Lipids Brother      Thyroid Disease Sister      Lipids Sister      Other - See Comments Sister      hysterectomy     GASTROINTESTINAL DISEASE Sister      liver function off     Thyroid Disease Sister      Thyroid Disease Sister      HEART DISEASE Sister      CANCER Other      thyroid cancer     Cancer - colorectal No family hx of      I have reviewed the Medications, Allergies, Past Medical and Surgical History, and Social History in the Epic system.    Review of Systems   Musculoskeletal: Positive for back pain.     All other systems reviewed and are negative.    Physical Exam   BP: 139/86  Pulse: 99  Temp: 97.9  F (36.6  C)  Resp: 16  Height: 154.9 cm (5' 1\")  Weight: 64.4 kg (142 lb)  SpO2: 99 %  Physical Exam Gen. " "alert cooperative female in mild to moderate distress.  On examination of her back she has tenderness in the lower lumbar region on the right SI area.  No crepitus or bony abnormalities noted.  She does have muscular spasm in the right paraspinous musculature.  No skin rashes over this area.  Pelvic rocking is negative.  With straight leg raising she has right-sided muscle spasm but no worsening radicular pain.  DTRs were equal and brisk bilaterally.  Gait is slow and shuffling.    ED Course     ED Course     Procedures             Critical Care time:  none               Labs Ordered and Resulted from Time of ED Arrival Up to the Time of Departure from the ED - No data to display    No results found for this or any previous visit (from the past 24 hour(s)).    Medications - No data to display    12:18 PM patient assessed.     Assessments & Plan (with Medical Decision Making)   Patient is a 56-year-old female presents with complaints of right lower back pain.  She's had similar symptoms in the past and describes problems with her SI joint.  She is able to do home exercises to \"reduce the SI joint\".  After initial injury of lifting she was able to reduce the joint but reinjured it well lifting bags of salt for her water conditioner.  She has had previous sciatica.  Noted to have disc disease on imaging in 2012.  Currently no loss of bowel or bladder  or saddle paresthesias.  On exam the patient's vitals were stable.  She had tenderness in the right SI and paraspinous musculature.  Straight leg raising did not worsen leg pain but did cause increased spasm in the low back.  She had brisk equal reflexes.  Gait was somewhat shuffling due to the back pain.  No imaging was done today.  Patient is given a handout on low back pain.  She's been on steroids for this previously so this was provided.  She is also given muscle relaxants.  She'll continue her exercises pain allows.  Reasons to return for reassessment discussed.  I " have reviewed the nursing notes.    I have reviewed the findings, diagnosis, plan and need for follow up with the patient.    Discharge Medication List as of 5/22/2017 12:26 PM      START taking these medications    Details   !! cyclobenzaprine (FLEXERIL) 10 MG tablet Take 1 tablet (10 mg) by mouth 3 times daily as needed for muscle spasms, Disp-30 tablet, R-0, E-Prescribe      !! predniSONE (DELTASONE) 10 MG tablet Take 4 tablets daily for 5 days,  take 2 tablets daily for 3 days, take 1 tablet daily for 3 days, take half a tablet for 3 days., Disp-32 tablet, R-0, E-Prescribe       !! - Potential duplicate medications found. Please discuss with provider.          Final diagnoses:   Back spasm     This document serves as a record of the services and decisions personally performed and made by Aguila Fernández MD. It was created on HIS/HER behalf by Yanira Marsh, a trained medical scribe. The creation of this document is based the provider's statements to the medical scribe.  Yanira Marsh 12:17 PM 5/22/2017    Provider:   The information in this document, created by the medical scribe for me, accurately reflects the services I personally performed and the decisions made by me. I have reviewed and approved this document for accuracy prior to leaving the patient care area.  Aguila Fernández MD 12:17 PM 5/22/2017 5/22/2017   [facility] EMERGENCY DEPARTMENT     Aguila Fernández MD  05/22/17 8940

## 2017-05-23 ENCOUNTER — CARE COORDINATION (OUTPATIENT)
Dept: CARE COORDINATION | Facility: CLINIC | Age: 57
End: 2017-05-23

## 2017-05-23 NOTE — LETTER
[facility] CARE COORDINATION  [address] [place] [place] [address]            May 30, 2017      Brittany Barajas  [address] [address]    Dear Brittany,  I am a clinic care coordinator who works with [facility]. I have been trying to reach you to introduce clinic care coordination and to see if you have any health care needs I can assist you with. Below is a description of clinic care coordination and how I can further assist you.     The clinic care coordinator is a registered nurse and/or  who understand the health care system. The goal of clinic care coordination is to help you manage your health and improve access to the [facility] system in the most efficient manner.  The registered nurse can assist you in meeting your health care goals by providing education, coordinating services, and strengthening the communication among your providers. The  can assist you with financial, behavioral, psychosocial, and chemical dependency and counseling/psychiatric resources.    Please feel free to contact me at [phone], with any questions or concerns you may have. We at [facility] are focused on providing you with the highest-quality healthcare experience possible and that all starts with you.       Sincerely,     Dennis FINNEY,RN- BC  Clinic Care Coordinator  [facility]  Phone: [phone]      Enclosed: I have enclosed a copy of a 24 Hour Access Plan. This has helpful phone numbers for you to call when needed. Please keep this in an easy to access place to use as needed.

## 2017-05-23 NOTE — LETTER
Health Care Home - Access Care Plan  About Me  Patient Name:  Brittany Barajas  YOB: 1960  Age:                            56 year old   Brenda MRN:         [id number] Telephone Information:     Home Phone [phone]   Mobile [phone]       Address:    [address] [address] Email address:  [email or web address]      Emergency Contact(s)  Name Relationship Lgl Grd Work Phone Home Phone Mobile Phone   1. MIA HSIEH Sister  none [phone]   2. NONE PER PT             Health Maintenance:    My Access Plan  Medical Emergency 911   Questions or concerns during clinic hours Primary Clinic Line, I will call the clinic directly: Primary Clinic: [facility] [phone]   24 Hour Appointment Line [phone] or  [phone] [place] [phone])  (toll free)   24 Hour Nurse Line [phone] (toll free)   Questions or concerns outside clinic hours 24 Hour Appointment Line, I will call the after-hours on-call line:   [facility] [phone] or [phone]) (toll-free)   Preferred Urgent Care Preferred Urgent Care: [facility], [phone]   Preferred Hospital Preferred Hospital: [place], [place]  [phone]   Preferred Pharmacy [facility] [address]     Behavioral Health Crisis Line [facility], [phone] or [phone]

## 2017-05-24 NOTE — PROGRESS NOTES
Clinic Care Coordination Contact  [facility]/No Voicemail    Referral Source: ED Follow-Up  Clinical Data: Care Coordinator Outreach  Outreach attempted x 2. Unable to leave message on voicemail as VM box not set up to take VM..  Plan: Care Coordinator will try to reach patient again in 3-5 business days.  Dennis FINNEY,RN- BC  Clinic Care Coordinator  [facility]  Phone: [phone]

## 2017-05-30 NOTE — PROGRESS NOTES
Clinic Care Coordination Contact  [facility]/ No Voicemail    Referral Source: ED Follow-Up  Clinical Data: Care Coordinator Outreach  Outreach attempted x 3. Unable to leave message on voicemail as VM box not set up to take VM..  Plan: Care Coordinator mailed out care coordination introduction letter on 5/30/17. Care Coordinator will do no further outreaches at this time.  Dennis HERNÁNDEZN,RN- BC  Clinic Care Coordinator  [facility]  Phone: [phone]

## 2017-06-10 ENCOUNTER — HOSPITAL ENCOUNTER (EMERGENCY)
Facility: CLINIC | Age: 57
Discharge: HOME | End: 2017-06-10
Attending: EMERGENCY MEDICINE | Admitting: EMERGENCY MEDICINE

## 2017-06-10 ENCOUNTER — APPOINTMENT (OUTPATIENT)
Dept: GENERAL RADIOLOGY | Facility: CLINIC | Age: 57
End: 2017-06-10
Attending: EMERGENCY MEDICINE

## 2017-06-10 VITALS
WEIGHT: 142 LBS | BODY MASS INDEX: 26.83 KG/M2 | TEMPERATURE: 98.5 F | OXYGEN SATURATION: 100 % | SYSTOLIC BLOOD PRESSURE: 134 MMHG | RESPIRATION RATE: 16 BRPM | DIASTOLIC BLOOD PRESSURE: 61 MMHG

## 2017-06-10 DIAGNOSIS — S52.121A CLOSED FRACTURE OF HEAD OF RIGHT RADIUS, INITIAL ENCOUNTER: ICD-10-CM

## 2017-06-10 PROCEDURE — 99284 EMERGENCY DEPT VISIT MOD MDM: CPT | Mod: 25

## 2017-06-10 PROCEDURE — 96372 THER/PROPH/DIAG INJ SC/IM: CPT

## 2017-06-10 PROCEDURE — 73080 X-RAY EXAM OF ELBOW: CPT | Mod: RT

## 2017-06-10 PROCEDURE — 99284 EMERGENCY DEPT VISIT MOD MDM: CPT | Performed by: EMERGENCY MEDICINE

## 2017-06-10 PROCEDURE — 25000128 H RX IP 250 OP 636: Performed by: EMERGENCY MEDICINE

## 2017-06-10 RX ORDER — KETOROLAC TROMETHAMINE 30 MG/ML
60 INJECTION, SOLUTION INTRAMUSCULAR; INTRAVENOUS ONCE
Status: COMPLETED | OUTPATIENT
Start: 2017-06-10 | End: 2017-06-10

## 2017-06-10 RX ADMIN — KETOROLAC TROMETHAMINE 60 MG: 30 INJECTION, SOLUTION INTRAMUSCULAR at 10:08

## 2017-06-10 ASSESSMENT — ENCOUNTER SYMPTOMS
ARTHRALGIAS: 1
NEUROLOGICAL NEGATIVE: 1

## 2017-06-10 NOTE — ED NOTES
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 Last night was carrying food to grill and fell to right, landing on arm again. Now has increased pain. Cms intact. Arm in sling. Pt takes nsaid for pain

## 2017-06-10 NOTE — ED AVS SNAPSHOT
[facility] Emergency Department    [address] [address]    Phone:  [phone]    Fax:  [phone]                                       Brittany Barajas   MRN: [id number]    Department:  [facility] Emergency Department   Date of Visit:  6/10/2017           After Visit Summary Signature Page     I have received my discharge instructions, and my questions have been answered. I have discussed any challenges I see with this plan with the nurse or doctor.    ..........................................................................................................................................  Patient/Patient Representative Signature      ..........................................................................................................................................  Patient Representative Print Name and Relationship to Patient    ..................................................               ................................................  Date                                            Time    ..........................................................................................................................................  Reviewed by Signature/Title    ...................................................              ..............................................  Date                                                            Time

## 2017-06-10 NOTE — DISCHARGE INSTRUCTIONS
Elbow Fracture    You have a break (fracture) of one or more bones of the elbow joint. This may be a small crack in the bone. Or it may be a major break, with the broken parts pushed out of position.  This fracture usually takes 4 to 12 weeks to heal, depending on the type. The first step in treatment is with a splint or cast. Severe fractures may need surgery to put the bone fragments back into place.  Home care  Follow these guidelines when caring for yourself at home:    Keep your arm elevated to reduce pain and swelling. When sitting or lying down keep your arm above the level of your heart. You can do this by placing your arm on a pillow that rests on your chest or on a pillow at your side. This is most important during the first 2 days (48 hours) after the injury.    Put an ice pack on the injured area. Do this for 20 minutes every 1 to 2 hours the first day. You can make an ice pack by wrapping a plastic bag of ice cubes in a thin towel. As the ice melts, be careful that the cast or splint doesn t get wet. You can place the ice pack inside the sling and directly over the splint or cast. Continue to use the ice pack 3 to 4 times a day for the next 2 days. Then use the ice pack as needed to ease pain and swelling.    Keep the splint or cast completely dry at all times. Bathe with your splint or cast out of the water. Protect it with a large plastic bag, rubber-banded at the top end. If a fiberglass splint or cast gets wet, you can dry it with a hair dryer.    You may use acetaminophen or ibuprofen to control pain, unless another pain medicine was prescribed. If you have chronic liver or kidney disease, talk with your health care provider before using these medicines. Also talk with your provider if you ve had a stomach ulcer or GI bleeding.    Don t put creams or objects under the cast if you have itching.  Follow-up care  Follow up with your health care provider in 1 week, or as advised. This is to make sure  the bone is healing the way it should. If a splint was put on, it will be changed to a cast during your follow-up visit.  If X-rays were taken, a radiologist will look at them. You will be told of any new findings that may affect your care.  When to seek medical advice  Call your health care provider right away if any of these occur:    The cast cracks    The plaster cast or splint becomes wet or soft    The fiberglass cast or splint stays wet for more than 24 hours    Tightness or pain under the cast or splint gets worse    Bad odor from the cast or wound fluid stains the cast    Fingers become swollen, cold, blue, numb, or tingly    You can t move your fingers    Skin around cast becomes red    Fever of 101 F (38.3 C) or higher, or as directed by your health care provider     1426-0737 [employer]. [address] [place] [address]. All rights reserved. This information is not intended as a substitute for professional medical care. Always follow your healthcare professional's instructions.

## 2017-06-10 NOTE — ED AVS SNAPSHOT
[facility] Emergency Department    [address] [address]    Phone:  [phone]    Fax:  [phone]                                       Brittany Barajas   MRN: [id number]    Department:  [facility] Emergency Department   Date of Visit:  6/10/2017           Patient Information     Date Of Birth          1960        Your diagnoses for this visit were:     Closed fracture of head of right radius, initial encounter        You were seen by Heron Swift MD.      Follow-up Information     Follow up with Orthopedic sports medicine clinic.    Why:  As previously directed        Discharge Instructions         Elbow Fracture    You have a break (fracture) of one or more bones of the elbow joint. This may be a small crack in the bone. Or it may be a major break, with the broken parts pushed out of position.  This fracture usually takes 4 to 12 weeks to heal, depending on the type. The first step in treatment is with a splint or cast. Severe fractures may need surgery to put the bone fragments back into place.  Home care  Follow these guidelines when caring for yourself at home:    Keep your arm elevated to reduce pain and swelling. When sitting or lying down keep your arm above the level of your heart. You can do this by placing your arm on a pillow that rests on your chest or on a pillow at your side. This is most important during the first 2 days (48 hours) after the injury.    Put an ice pack on the injured area. Do this for 20 minutes every 1 to 2 hours the first day. You can make an ice pack by wrapping a plastic bag of ice cubes in a thin towel. As the ice melts, be careful that the cast or splint doesn t get wet. You can place the ice pack inside the sling and directly over the splint or cast. Continue to use the ice pack 3 to 4 times a day for the next 2 days. Then use the ice pack as needed to ease pain and swelling.    Keep the splint or cast completely dry at all  times. Bathe with your splint or cast out of the water. Protect it with a large plastic bag, rubber-banded at the top end. If a fiberglass splint or cast gets wet, you can dry it with a hair dryer.    You may use acetaminophen or ibuprofen to control pain, unless another pain medicine was prescribed. If you have chronic liver or kidney disease, talk with your health care provider before using these medicines. Also talk with your provider if you ve had a stomach ulcer or GI bleeding.    Don t put creams or objects under the cast if you have itching.  Follow-up care  Follow up with your health care provider in 1 week, or as advised. This is to make sure the bone is healing the way it should. If a splint was put on, it will be changed to a cast during your follow-up visit.  If X-rays were taken, a radiologist will look at them. You will be told of any new findings that may affect your care.  When to seek medical advice  Call your health care provider right away if any of these occur:    The cast cracks    The plaster cast or splint becomes wet or soft    The fiberglass cast or splint stays wet for more than 24 hours    Tightness or pain under the cast or splint gets worse    Bad odor from the cast or wound fluid stains the cast    Fingers become swollen, cold, blue, numb, or tingly    You can t move your fingers    Skin around cast becomes red    Fever of 101 F (38.3 C) or higher, or as directed by your health care provider     7026-2067 [employer]. [address] [place] [address]. All rights reserved. This information is not intended as a substitute for professional medical care. Always follow your healthcare professional's instructions.          24 Hour Appointment Hotline       To make an appointment at any [facility], call [phone]). If you don't have a family doctor or clinic, we will help you find one. [facility] are conveniently located to serve the needs  of you and your family.             Review of your medicines      Our records show that you are taking the medicines listed below. If these are incorrect, please call your family doctor or clinic.        Dose / Directions Last dose taken    albuterol 108 (90 BASE) MCG/ACT Inhaler   Commonly known as:  PROAIR HFA/PROVENTIL HFA/VENTOLIN HFA   Dose:  2 puff   Quantity:  2 Inhaler        Inhale 2 puffs into the lungs every 6 hours as needed for shortness of breath / dyspnea   Refills:  6        amitriptyline 10 MG tablet   Commonly known as:  ELAVIL   Dose:  30 mg   Quantity:  90 tablet        Take 3 tablets (30 mg) by mouth At Bedtime   Refills:  6        ascorbic acid 500 MG tablet   Commonly known as:  VITAMIN C   Dose:  500 mg        Take 500 mg by mouth daily.   Refills:  0        aspirin 81 MG tablet   Dose:  1 tablet        Take 1 tablet by mouth daily.   Refills:  0        benzonatate 200 MG capsule   Commonly known as:  TESSALON   Dose:  200 mg   Quantity:  21 capsule        Take 1 capsule (200 mg) by mouth 3 times daily as needed for cough   Refills:  0        BIOFLEX PO   Dose:  1 tablet        Take 1 tablet by mouth daily.   Refills:  0        CALCIUM + D + K PO   Dose:  1 capsule        Take 1 capsule by mouth daily.   Refills:  0        cyclobenzaprine 10 MG tablet   Commonly known as:  FLEXERIL   Dose:  10 mg   Quantity:  14 tablet        Take 1 tablet (10 mg) by mouth nightly as needed for muscle spasms   Refills:  1        fluticasone 110 MCG/ACT Inhaler   Commonly known as:  FLOVENT HFA   Dose:  2 puff   Quantity:  1 Inhaler        Inhale 2 puffs into the lungs 2 times daily   Refills:  3        fluticasone 50 MCG/ACT spray   Commonly known as:  FLONASE   Dose:  1-2 spray   Quantity:  1 Bottle        Spray 1-2 sprays into both nostrils daily   Refills:  3        guaiFENesin-codeine 100-10 MG/5ML Soln solution   Commonly known as:  ROBITUSSIN AC   Dose:  2-3 tsp.   Quantity:  240 mL        Take 10-15  mLs by mouth every 4 hours as needed for cough   Refills:  1        IBUPROFEN PO   Dose:  800 mg        Take 800 mg by mouth daily as needed for moderate pain   Refills:  0        ipratropium - albuterol 0.5 mg/2.5 mg/3 mL 0.5-2.5 (3) MG/3ML neb solution   Commonly known as:  DUONEB   Dose:  3 mL   Quantity:  1 Box        Take 1 vial (3 mLs) by nebulization every 6 hours as needed for shortness of breath / dyspnea   Refills:  2        MULTIVITAMIN PO   Dose:  1 tablet        Take 1 tablet by mouth daily.   Refills:  0        omeprazole 10 MG CR capsule   Commonly known as:  priLOSEC   Dose:  20 mg   Quantity:  60 capsule        Take 2 capsules (20 mg) by mouth daily Take by mouth 30-60 minutes before a meal.   Refills:  3        oxyCODONE-acetaminophen 5-325 MG per tablet   Commonly known as:  PERCOCET   Dose:  1-2 tablet   Quantity:  12 tablet        Take 1-2 tablets by mouth every 4 hours as needed for pain   Refills:  0        PARoxetine 20 MG tablet   Commonly known as:  PAXIL   Dose:  20 mg   Quantity:  90 tablet        Take 1 tablet (20 mg) by mouth At Bedtime   Refills:  3        predniSONE 20 MG tablet   Commonly known as:  DELTASONE   Quantity:  14 tablet        2 daily for 4 days, then 1 daily for 4 days, then 1/2 tab daily for 4 days.   Refills:  0        SLOW  (45 FE) MG Tbcr   Dose:  142 mg   Generic drug:  ferrous sulfate        Take 142 mg by mouth every 48 hours.   Refills:  0        SUPER B COMPLEX PO   Dose:  1 tablet        Take 1 tablet by mouth daily.   Refills:  0        TYLENOL PO   Dose:  500 mg        Take 500 mg by mouth daily as needed for mild pain or fever   Refills:  0                Procedures and tests performed during your visit     XR Elbow Right G/E 3 Views      Orders Needing Specimen Collection     None      Pending Results     No orders found from 6/8/2017 to 6/11/2017.            Pending Culture Results     No orders found from 6/8/2017 to 6/11/2017.            Pending  Results Instructions     If you had any lab results that were not finalized at the time of your Discharge, you can call the ED Lab Result RN at [phone]. You will be contacted by this team for any positive Lab results or changes in treatment. The nurses are available 7 days a week from 10A to 6:30P.  You can leave a message 24 hours per day and they will return your call.        Test Results From Your Hospital Stay        6/10/2017 10:29 AM      Narrative     XR ELBOW RT G/E 3 VW   6/10/2017 10:23 AM     HISTORY: fall, recent (4 days ago, 6/6/17) radial head fx, fell again,  now with increased pain    COMPARISON: None.        Impression     IMPRESSION: Small avulsion fracture at the radial head. No other  definite fracture.    ADE SALINAS MD                Thank you for choosing [facility]       Thank you for choosing [facility] for your care. Our goal is always to provide you with excellent care. Hearing back from our patients is one way we can continue to improve our services. Please take a few minutes to complete the written survey that you may receive in the mail after you visit with us. Thank you!        [employer] Information     [employer] gives you secure access to your electronic health record. If you see a primary care provider, you can also send messages to your care team and make appointments. If you have questions, please call your primary care clinic.  If you do not have a primary care provider, please call [phone] and they will assist you.        Care EveryWhere ID     This is your Care EveryWhere ID. This could be used by other organizations to access your [facility] medical records  [id number]        After Visit Summary       This is your record. Keep this with you and show to your community pharmacist(s) and doctor(s) at your next visit.

## 2017-06-12 ENCOUNTER — CARE COORDINATION (OUTPATIENT)
Dept: CARE COORDINATION | Facility: CLINIC | Age: 57
End: 2017-06-12

## 2017-06-13 NOTE — PROGRESS NOTES
Clinic Care Coordination Contact  [facility]/ No Voicemail    Referral Source: ED Follow-Up  Clinical Data: Care Coordinator Outreach  Outreach attempted x 1.  Unable to leave message on voicemail-mailbox not set up  Plan: Care Coordinator mailed out care coordination introduction letter on 5/30/17. Care Coordinator will try to reach patient again in 1-2 business days.  Dennis FINNEY,RN- BC  Clinic Care Coordinator  [facility] [place]  Phone: [phone]

## 2017-06-15 NOTE — PROGRESS NOTES
Clinic Care Coordination Contact  [facility]/No Voicemail    Referral Source: ED Follow-Up  Clinical Data: Care Coordinator Outreach  Outreach attempted x 2.  Left message on voicemail with call back information and requested return call.  Plan: Care Coordinator mailed out care coordination introduction letter on 5/30/17. Care Coordinator will do no further outreaches at this time.  Dennis FINNEY,RN- BC  Clinic Care Coordinator  [facility]  Phone: [phone]

## 2017-07-13 ENCOUNTER — HOSPITAL ENCOUNTER (EMERGENCY)
Facility: CLINIC | Age: 57
Discharge: HOME | End: 2017-07-13
Attending: EMERGENCY MEDICINE | Admitting: EMERGENCY MEDICINE
Payer: COMMERCIAL

## 2017-07-13 VITALS
OXYGEN SATURATION: 99 % | HEART RATE: 86 BPM | SYSTOLIC BLOOD PRESSURE: 128 MMHG | TEMPERATURE: 99 F | DIASTOLIC BLOOD PRESSURE: 85 MMHG | RESPIRATION RATE: 20 BRPM

## 2017-07-13 DIAGNOSIS — R10.9 ABDOMINAL PAIN, LEFT LATERAL: ICD-10-CM

## 2017-07-13 DIAGNOSIS — R11.2 NAUSEA AND VOMITING, INTRACTABILITY OF VOMITING NOT SPECIFIED, UNSPECIFIED VOMITING TYPE: ICD-10-CM

## 2017-07-13 DIAGNOSIS — K59.01 SLOW TRANSIT CONSTIPATION: ICD-10-CM

## 2017-07-13 PROCEDURE — 99282 EMERGENCY DEPT VISIT SF MDM: CPT

## 2017-07-13 PROCEDURE — 99284 EMERGENCY DEPT VISIT MOD MDM: CPT | Performed by: EMERGENCY MEDICINE

## 2017-07-13 RX ORDER — HYDROCODONE BITARTRATE AND ACETAMINOPHEN 5; 325 MG/1; MG/1
1-2 TABLET ORAL EVERY 4 HOURS PRN
Qty: 15 TABLET | Refills: 0 | Status: SHIPPED | OUTPATIENT
Start: 2017-07-13 | End: 2018-03-08

## 2017-07-13 RX ORDER — ONDANSETRON 4 MG/1
4 TABLET, ORALLY DISINTEGRATING ORAL EVERY 8 HOURS PRN
Qty: 12 TABLET | Refills: 1 | Status: SHIPPED | OUTPATIENT
Start: 2017-07-13 | End: 2017-07-16

## 2017-07-13 NOTE — ED AVS SNAPSHOT
[facility] Emergency Department    [address] [address]    Phone:  [phone]    Fax:  [phone]                                       Brittany Barajas   MRN: [id number]    Department:  [facility] Emergency Department   Date of Visit:  7/13/2017           After Visit Summary Signature Page     I have received my discharge instructions, and my questions have been answered. I have discussed any challenges I see with this plan with the nurse or doctor.    ..........................................................................................................................................  Patient/Patient Representative Signature      ..........................................................................................................................................  Patient Representative Print Name and Relationship to Patient    ..................................................               ................................................  Date                                            Time    ..........................................................................................................................................  Reviewed by Signature/Title    ...................................................              ..............................................  Date                                                            Time

## 2017-07-13 NOTE — ED AVS SNAPSHOT
[facility] Emergency Department    [address] [address]    Phone:  [phone]    Fax:  [phone]                                       Brittany Barajas   MRN: [id number]    Department:  [facility] Emergency Department   Date of Visit:  7/13/2017           Patient Information     Date Of Birth          1960        Your diagnoses for this visit were:     Slow transit constipation     Abdominal pain, left lateral     Nausea and vomiting, intractability of vomiting not specified, unspecified vomiting type        You were seen by Heron Swift MD.      Follow-up Information     Schedule an appointment as soon as possible for a visit with Alejandrina Duran MD.    Specialty:  Family Practice    Why:  For re-evaluation if symptoms not resolving    Contact information:    [facility]  [address] [address]  [phone]          Discharge Instructions       Use one or more over the counter meds as needed to prevent constipation from opiate analgesic (Vicodin/Norco):     Colace (stool softener)  Fiber supplement  Milk of Magnesia  Miralax powder daily  Dulcolax or Senokot (laxative)    These are available over-the-counter and can be used together or in combination, as needed to prevent constipation.      Discharge References/Attachments     CONSTIPATION (ADULT) (ENGLISH)    CONSTIPATION, TREATING (ENGLISH)    DIET, EATING A HIGH-FIBER  (ENGLISH)    DIGESTIVE SYSTEM, ANATOMY OF THE (ENGLISH)      24 Hour Appointment Hotline       To make an appointment at any [facility], call [phone] ([phone]). If you don't have a family doctor or clinic, we will help you find one. [facility] are conveniently located to serve the needs of you and your family.             Review of your medicines      START taking        Dose / Directions Last dose taken    HYDROcodone-acetaminophen 5-325 MG per tablet   Commonly known as:  NORCO    Dose:  1-2 tablet   Quantity:  15 tablet        Take 1-2 tablets by mouth every 4 hours as needed for moderate to severe pain   Refills:  0        ondansetron 4 MG ODT tab   Commonly known as:  ZOFRAN ODT   Dose:  4 mg   Quantity:  12 tablet        Take 1 tablet (4 mg) by mouth every 8 hours as needed for nausea   Refills:  1          Our records show that you are taking the medicines listed below. If these are incorrect, please call your family doctor or clinic.        Dose / Directions Last dose taken    albuterol 108 (90 BASE) MCG/ACT Inhaler   Commonly known as:  PROAIR HFA/PROVENTIL HFA/VENTOLIN HFA   Dose:  2 puff   Quantity:  2 Inhaler        Inhale 2 puffs into the lungs every 6 hours as needed for shortness of breath / dyspnea   Refills:  6        amitriptyline 10 MG tablet   Commonly known as:  ELAVIL   Dose:  30 mg   Quantity:  90 tablet        Take 3 tablets (30 mg) by mouth At Bedtime   Refills:  6        ascorbic acid 500 MG tablet   Commonly known as:  VITAMIN C   Dose:  500 mg        Take 500 mg by mouth daily.   Refills:  0        aspirin 81 MG tablet   Dose:  1 tablet        Take 1 tablet by mouth daily.   Refills:  0        benzonatate 200 MG capsule   Commonly known as:  TESSALON   Dose:  200 mg   Quantity:  21 capsule        Take 1 capsule (200 mg) by mouth 3 times daily as needed for cough   Refills:  0        BIOFLEX PO   Dose:  1 tablet        Take 1 tablet by mouth daily.   Refills:  0        CALCIUM + D + K PO   Dose:  1 capsule        Take 1 capsule by mouth daily.   Refills:  0        cyclobenzaprine 10 MG tablet   Commonly known as:  FLEXERIL   Dose:  10 mg   Quantity:  14 tablet        Take 1 tablet (10 mg) by mouth nightly as needed for muscle spasms   Refills:  1        fluticasone 110 MCG/ACT Inhaler   Commonly known as:  FLOVENT HFA   Dose:  2 puff   Quantity:  1 Inhaler        Inhale 2 puffs into the lungs 2 times daily   Refills:  3        fluticasone 50 MCG/ACT spray   Commonly  known as:  FLONASE   Dose:  1-2 spray   Quantity:  1 Bottle        Spray 1-2 sprays into both nostrils daily   Refills:  3        guaiFENesin-codeine 100-10 MG/5ML Soln solution   Commonly known as:  ROBITUSSIN AC   Dose:  2-3 tsp.   Quantity:  240 mL        Take 10-15 mLs by mouth every 4 hours as needed for cough   Refills:  1        IBUPROFEN PO   Dose:  800 mg        Take 800 mg by mouth daily as needed for moderate pain   Refills:  0        ipratropium - albuterol 0.5 mg/2.5 mg/3 mL 0.5-2.5 (3) MG/3ML neb solution   Commonly known as:  DUONEB   Dose:  3 mL   Quantity:  1 Box        Take 1 vial (3 mLs) by nebulization every 6 hours as needed for shortness of breath / dyspnea   Refills:  2        MULTIVITAMIN PO   Dose:  1 tablet        Take 1 tablet by mouth daily.   Refills:  0        omeprazole 10 MG CR capsule   Commonly known as:  priLOSEC   Dose:  20 mg   Quantity:  60 capsule        Take 2 capsules (20 mg) by mouth daily Take by mouth 30-60 minutes before a meal.   Refills:  3        oxyCODONE-acetaminophen 5-325 MG per tablet   Commonly known as:  PERCOCET   Dose:  1-2 tablet   Quantity:  12 tablet        Take 1-2 tablets by mouth every 4 hours as needed for pain   Refills:  0        PARoxetine 20 MG tablet   Commonly known as:  PAXIL   Dose:  20 mg   Quantity:  90 tablet        Take 1 tablet (20 mg) by mouth At Bedtime   Refills:  3        predniSONE 20 MG tablet   Commonly known as:  DELTASONE   Quantity:  14 tablet        2 daily for 4 days, then 1 daily for 4 days, then 1/2 tab daily for 4 days.   Refills:  0        SLOW  (45 FE) MG Tbcr   Dose:  142 mg   Generic drug:  ferrous sulfate        Take 142 mg by mouth every 48 hours.   Refills:  0        SUPER B COMPLEX PO   Dose:  1 tablet        Take 1 tablet by mouth daily.   Refills:  0        TYLENOL PO   Dose:  500 mg        Take 500 mg by mouth daily as needed for mild pain or fever   Refills:  0                Prescriptions were sent or  printed at these locations (2 Prescriptions)                   Other Prescriptions                Printed at Department/Unit printer (2 of 2)         HYDROcodone-acetaminophen (NORCO) 5-325 MG per tablet               ondansetron (ZOFRAN ODT) 4 MG ODT tab                Orders Needing Specimen Collection     None      Pending Results     No orders found from 7/11/2017 to 7/14/2017.            Pending Culture Results     No orders found from 7/11/2017 to 7/14/2017.            Pending Results Instructions     If you had any lab results that were not finalized at the time of your Discharge, you can call the ED Lab Result RN at [phone]. You will be contacted by this team for any positive Lab results or changes in treatment. The nurses are available 7 days a week from 10A to 6:30P.  You can leave a message 24 hours per day and they will return your call.        Test Results From Your Hospital Stay               Thank you for choosing [facility]       Thank you for choosing [facility] for your care. Our goal is always to provide you with excellent care. Hearing back from our patients is one way we can continue to improve our services. Please take a few minutes to complete the written survey that you may receive in the mail after you visit with us. Thank you!        [employer] Information     [employer] gives you secure access to your electronic health record. If you see a primary care provider, you can also send messages to your care team and make appointments. If you have questions, please call your primary care clinic.  If you do not have a primary care provider, please call [phone] and they will assist you.        Care EveryWhere ID     This is your Care EveryWhere ID. This could be used by other organizations to access your [facility] medical records  [id number]        Equal Access to Services     JOSÉ SAHNI : Mirta Kelly, ricki crump, hiram haq  la'alonso jeannette. So [facility] [phone].    ATENCIÓN: Si habla español, tiene a johnston disposición servicios gratuitos de asistencia lingüística. Llame al [phone].    We comply with applicable federal civil rights laws and [place] laws. We do not discriminate on the basis of race, color, national origin, age, disability sex, sexual orientation or gender identity.            After Visit Summary       This is your record. Keep this with you and show to your community pharmacist(s) and doctor(s) at your next visit.

## 2017-07-13 NOTE — DISCHARGE INSTRUCTIONS
Use one or more over the counter meds as needed to prevent constipation from opiate analgesic (Vicodin/Norco):     Colace (stool softener)  Fiber supplement  Milk of Magnesia  Miralax powder daily  Dulcolax or Senokot (laxative)    These are available over-the-counter and can be used together or in combination, as needed to prevent constipation.

## 2017-07-13 NOTE — ED NOTES
Pt having problems after taking Tylenol with Codeine at HS after having fractured R elbow.  Pt having problems with constipation.  Took Miralax 2 days ago to help with that.  Pt has also had projectile emesis a couple of times.  Feels like this is all from the Codeine.   Pt appears comfortable right now.

## 2017-07-13 NOTE — ED PROVIDER NOTES
History     Chief Complaint   Patient presents with     Nausea & Vomiting     Constipation     HPI  Brittany Barajas is a 57 year old female who has been using Tylenol # 3 for pain from recent right radial head fracture was developed constipation and left-sided abdominal pain, and acid reflux with nausea and vomiting.  Left upper abdominal pain was insidious in onset and is sharp, cramping, constant, nonradiating and refractory to MiraLax. Took Miralax 2 days ago and yesterday and has had a small normal appearing BM today, but the abdominal pain persists. No signs or symptoms of GI bleeding.  No fever or chills.  No prior abdominal surgeries or history of bowel obstruction.  Reports normal colonoscopy several years ago, no history of diverticulosis/diverticulitis.  No other acute complaints or concerns.  Has follow-up in orthopedics clinic next week.    Previous Records Reviewed:  Colonoscopy Procedure Date: 1/20/2012 8:30:30 AM        _______________________________________________________________________________     Procedure:                Colonoscopy  Indications:              Screening for colorectal malignant neoplasm,                             Personal history of colonic polyps  Impression:               - The entire examined colon is normal.                            - The sigmoid colon and descending colon are normal.  Recommendation:           - Repeat colonoscopy in 3 years for surveillance.                                                                           I have reviewed the Medications, Allergies, Past Medical and Surgical History, and Social History in the Epic system.  Patient Active Problem List   Diagnosis     Mitral valve disorder     Allergic rhinitis     Convulsions (H)     Esophageal reflux     Mild major depression (H)     Plantar fascial fibromatosis     Other affections of shoulder region, not elsewhere classified     Neoplasm of digestive system     Anxiety state      Breast cancer (H)     CARDIOVASCULAR SCREENING; LDL GOAL LESS THAN 160     Transaminase or LDH elevation     H/O: hysterectomy     Colon polyp     Drug reaction     Mild persistent asthma     Urgency incontinence     Advanced directives, counseling/discussion     Past Medical History:   Diagnosis Date     Allergic rhinitis, cause unspecified      Depressive disorder, not elsewhere classified      Esophageal reflux      Intramural leiomyoma of uterus      Mitral valve disorder     MVD     Other affections of shoulder region, not elsewhere classified     Left shoulder impingement     Other convulsions     Seizures     Other diseases of trachea and bronchus, not elsewhere classified      Papanicolaou smear of cervix with low grade squamous intraepithelial lesion (LGSIL) 11/09    Pt refused colpo or further f/u     Plantar fascial fibromatosis      Past Surgical History:   Procedure Laterality Date     COLONOSCOPY  1/28/2011    COLONOSCOPY performed by ELOY BRIONES at [facility]     COLONOSCOPY  1/20/2012    Procedure:COLONOSCOPY; Surgeon:ELOY BRIONES; Location:[facility]     HYSTERECTOMY, PAP NO LONGER INDICATED  12/1/2008    for fibroids     INJECT EPIDURAL LUMBAR  3/9/2012    Procedure:INJECT EPIDURAL LUMBAR; JAMIL Renteria; Surgeon:GENERIC ANESTHESIA PROVIDER; Location:[facility]     INJECT EPIDURAL LUMBAR  5/25/2012    Procedure:INJECT EPIDURAL LUMBAR; Evans; Surgeon:GENERIC ANESTHESIA PROVIDER; Location:[facility]     LEEP TX, CERVICAL  10/06    CECILIO 1     SURGICAL HISTORY OF -   10/7/1998    Right parotidectomy-mass     SURGICAL HISTORY OF -       Laparoscopy     SURGICAL HISTORY OF -   10/1996    Fibroid removal     No current facility-administered medications for this encounter.      Current Outpatient Prescriptions   Medication     HYDROcodone-acetaminophen (NORCO) 5-325 MG per tablet     ondansetron (ZOFRAN ODT) 4 MG ODT tab     predniSONE (DELTASONE) 20 MG tablet     guaiFENesin-codeine (ROBITUSSIN AC)  100-10 MG/5ML SOLN solution     benzonatate (TESSALON) 200 MG capsule     PARoxetine (PAXIL) 20 MG tablet     albuterol (PROAIR HFA/PROVENTIL HFA/VENTOLIN HFA) 108 (90 BASE) MCG/ACT Inhaler     fluticasone (FLONASE) 50 MCG/ACT spray     ipratropium - albuterol 0.5 mg/2.5 mg/3 mL (DUONEB) 0.5-2.5 (3) MG/3ML neb solution     cyclobenzaprine (FLEXERIL) 10 MG tablet     amitriptyline (ELAVIL) 10 MG tablet     IBUPROFEN PO     Acetaminophen (TYLENOL PO)     oxyCODONE-acetaminophen (PERCOCET) 5-325 MG per tablet     fluticasone (FLOVENT HFA) 110 MCG/ACT Inhaler     omeprazole (PRILOSEC) 10 MG capsule     Bioflavonoid Products (BIOFLEX PO)     ferrous sulfate (SLOW FE) 142 (45 FE) MG TBCR     aspirin 81 MG tablet     Multiple Vitamin (MULTIVITAMIN OR)     B Complex-C (SUPER B COMPLEX PO)     ascorbic acid (VITAMIN C) 500 MG tablet     Calcium-Vitamin D-Vitamin K (CALCIUM + D + K PO)     Allergies   Allergen Reactions     Taxotere Other (See Comments)     Chest tightness, black out     Social History   Substance Use Topics     Smoking status: Never Smoker     Smokeless tobacco: Never Used     Alcohol use Yes      Comment: rarely     Family History   Problem Relation Age of Onset     C.A.D. Mother      DIABETES Mother      Hypertension Mother      CEREBROVASCULAR DISEASE Mother      Breast Cancer Mother      Allergies Mother      Arthritis Mother      CANCER Mother      Eye Disorder Mother      GASTROINTESTINAL DISEASE Mother      Gynecology Mother      Lipids Mother      HEART DISEASE Mother      OSTEOPOROSIS Mother      Obesity Mother      Thyroid Disease Mother      Respiratory Mother      Alcohol/Drug Father      CANCER Father      Alcohol/Drug Brother      Musculoskeletal Disorder Brother      spina bifida-recent dx     C.A.D. Brother      Thyroid Disease Sister      Lipids Sister      Gynecology Sister      fibroids/ s/p hysterectomy     Other - See Comments Sister      hysterectomy     Thyroid Disease Brother       HEART DISEASE Brother      DOUBLE BYPASS     Lipids Brother      Thyroid Disease Sister      Lipids Sister      Other - See Comments Sister      hysterectomy     GASTROINTESTINAL DISEASE Sister      liver function off     Thyroid Disease Sister      Thyroid Disease Sister      HEART DISEASE Sister      CANCER Other      thyroid cancer     Cancer - colorectal No family hx of      Review of Systems  As mentioned above in the history present illness.  All other systems were reviewed and are negative.    Physical Exam   BP: 128/85  Pulse: 86  Temp: 99  F (37.2  C)  Resp: 20  SpO2: 99 %    Physical Exam   Constitutional: She is oriented to person, place, and time. She appears well-developed and well-nourished. No distress.   HENT:   Head: Normocephalic and atraumatic.   Eyes: Conjunctivae and EOM are normal. No scleral icterus.   Neck: Normal range of motion. Neck supple. No JVD present. No tracheal deviation present. No thyromegaly present.   Cardiovascular: Normal rate, regular rhythm and normal heart sounds.  Exam reveals no gallop and no friction rub.    No murmur heard.  Pulmonary/Chest: Effort normal and breath sounds normal. No stridor. No respiratory distress. She has no wheezes. She has no rales.   Abdominal: Soft. Bowel sounds are normal. She exhibits no distension, no abdominal bruit, no pulsatile midline mass and no mass. There is tenderness (Left side, as diagrammed). There is no rigidity, no rebound, no guarding, no CVA tenderness, no tenderness at McBurney's point and negative Durán's sign.       Musculoskeletal: Normal range of motion. She exhibits no edema.   Lymphadenopathy:     She has no cervical adenopathy.   Neurological: She is alert and oriented to person, place, and time.   Skin: Skin is warm and dry. No rash noted. She is not diaphoretic. No erythema. No pallor.   Psychiatric: She has a normal mood and affect. Her behavior is normal.   Nursing note and vitals reviewed.      ED Course     ED  Course     Procedures             Labs Ordered and Resulted from Time of ED Arrival Up to the Time of Departure from the ED - No data to display    Discuss performing imaging evaluation, abdominal films versus CT scanning and patient would like to defer these which is felt to be clinically acceptable and appropriate present time, pending trial of treatment for constipation.    Assessments & Plan (with Medical Decision Making)   57-year-old female taking Tylenol #3 prescribed for right radial head fracture was developed constipation, left sided abdominal pain and nausea and vomiting.  Suspect symptoms and pain are due to constipation and side effects of Tylenol #3.  Discussed imaging evaluation with the patient and she wished to defer these.  Previous records revealed that she had an unremarkable colonoscopy in 2012.  She understands that obstruction, diverticulitis or other etiology of her symptoms cannot be definitively ruled out without further evaluation and she is comfortable going home with this understanding, and conservative management and treatment of constipation.  She will return if this fails to resolve her symptoms.  She'll drink a bottle of magnesium citrate when she gets home, continue MiraLax and add other OTC medications as needed.  Doubt bowel obstruction, diverticulitis, AAA/dissection, stone/pyelonephritis, etc.  I prescribed 15 tablets of norco to use if needed for pain and Zofran to use if needed for nausea.  She has not been excluded follow-up appointment next week. Patient was provided instructions for supportive care and will return as needed for worsened condition or worsening symptoms, or new problems or concerns.    I have reviewed the nursing notes.    I have reviewed the findings, diagnosis, plan and need for follow up with the patient.    Discharge Medication List as of 7/13/2017  1:31 PM      START taking these medications    Details   HYDROcodone-acetaminophen (NORCO) 5-325 MG per  tablet Take 1-2 tablets by mouth every 4 hours as needed for moderate to severe pain, Disp-15 tablet, R-0, Local Print      ondansetron (ZOFRAN ODT) 4 MG ODT tab Take 1 tablet (4 mg) by mouth every 8 hours as needed for nausea, Disp-12 tablet, R-1, Local Print             Final diagnoses:   Slow transit constipation   Abdominal pain, left lateral   Nausea and vomiting, intractability of vomiting not specified, unspecified vomiting type       7/13/2017   [facility] EMERGENCY DEPARTMENT     Heron Swift MD  07/13/17 [id number]

## 2017-07-13 NOTE — LETTER
[facility] EMERGENCY DEPARTMENT  [address] [address]  [phone]  Dept: [phone]      7/13/2017    Re: Brittany Barajas      TO WHOM IT MAY CONCERN:    Brittany Lori Barajas  was seen on Thursday 7/13/17.  Please excuse her from work today due to illness/injury.    Cordially,            [facility] EMERGENCY DEPARTMENT

## 2017-07-14 ENCOUNTER — CARE COORDINATION (OUTPATIENT)
Dept: CARE COORDINATION | Facility: CLINIC | Age: 57
End: 2017-07-14

## 2017-07-14 NOTE — LETTER
Health Care Home - Access Care Plan  About Me  Patient Name:  Brittany Barajas  YOB: 1960  Age:                            57 year old   Brenda MRN:         [id number] Telephone Information:     Home Phone [phone]   Mobile [phone]       Address:    [address] [address] Email address:  [email or web address]      Emergency Contact(s)  Name Relationship Lgl Grd Work Phone Home Phone Mobile Phone   1. MIA HSIEH Sister  none [phone]   2. NONE PER PT             Health Maintenance:    My Access Plan  Medical Emergency 911   Questions or concerns during clinic hours Primary Clinic Line, I will call the clinic directly: Primary Clinic: [facility] [phone]   24 Hour Appointment Line [phone] or  [phone])  (toll free)   24 Hour Nurse Line [phone] (toll free)   Questions or concerns outside clinic hours 24 Hour Appointment Line, I will call the after-hours on-call line:   [facility] [phone] or [phone]) (toll-free)   Preferred Urgent Care Preferred Urgent Care: [facility], [phone]   Preferred Hospital Preferred Hospital: [place], [place]  [phone]   Preferred Pharmacy [facility] [address]     Behavioral Health Crisis Line The National Suicide Prevention Lifeline at 1-276.559.3783 or 486

## 2017-07-14 NOTE — PROGRESS NOTES
Clinic Care Coordination Contact  [facility]/ No Voicemail    Referral Source: ED Follow-Up  Clinical Data: Care Coordinator Outreach  Outreach attempted x 1.  Unable to Leave  message on voicemail, VM box is not set up.  Plan: Care Coordinator will mail out care coordination introduction letter with care coordinator contact information and explanation of care coordination services. Care Coordinator will try to reach patient again in 1-2 business days.  Dennis FINNEY,RN- BC  Clinic Care Coordinator  [facility]  Phone: [phone]

## 2017-07-14 NOTE — LETTER
[facility] CARE COORDINATION  [address] [place] [address]      July 14, 2017      Brittany Barajas  [address] [address]    Dear Brittany,  I am a clinic care coordinator who works with [facility]. I have been trying to reach you to introduce you to clinic care coordination. I have eclosed helpful information about care coordination.     The clinic care coordinator is a registered nurse and/or  who understand the health care system. The goal of clinic care coordination is to help you manage your health and improve access to the [facility] in the most efficient manner. The registered nurse can assist you in meeting your health care goals by providing education, coordinating services, and strengthening the communication among your providers. The  can assist you with financial, behavioral, psychosocial, and chemical dependency and counseling/psychiatric resources.    Please feel free to contact me at [phone] with any questions or concerns. We at [facility] are focused on providing you with the highest-quality healthcare experience possible and that all starts with you.       Sincerely,     Dennis FINNEY,RN- BC  Clinic Care Coordinator  [facility]  Phone: [phone]      Enclosed: I have enclosed a copy of a 24 Hour Access Plan. This has helpful phone numbers for you to call when needed. Please keep this in an easy to access place to use as needed. and I have enclosed helpful educational material. Please review and call me with any questions.

## 2017-07-18 NOTE — PROGRESS NOTES
Clinic Care Coordination Contact  [place]/ No Voicemail     Referral Source: ED Follow-Up  Clinical Data: Care Coordinator Outreach  Outreach attempted x 2. Unable to Leave  message on voicemail, VM box is not set up and no other contact number.  Plan: Care Coordinator mailed out care coordination introduction letter with care coordinator contact information and explanation of care coordination services on 7/14. Care Coordinator will try to reach patient again in 3-5 business days.  Dennis FINNEY,RN- BC  Clinic Care Coordinator  [facility]  Phone: [phone]

## 2017-07-21 NOTE — PROGRESS NOTES
Clinic Care Coordination Contact  [facility]    Referral Source: ED Follow-Up  Clinical Data: Care Coordinator Outreach  Outreach attempted x 3. Unable to leave message on voicemail as mailbox not set up.  Plan: Care Coordinator mailed out care coordination introduction letter on 7/14/17. Care Coordinator will do no further outreaches at this time.  Dennis FINNEY,RN- BC  Clinic Care Coordinator  [facility]  Phone: [phone]

## 2017-09-30 DIAGNOSIS — F41.1 GENERALIZED ANXIETY DISORDER: ICD-10-CM

## 2017-10-02 ENCOUNTER — ALLIED HEALTH/NURSE VISIT (OUTPATIENT)
Dept: FAMILY MEDICINE | Facility: CLINIC | Age: 57
End: 2017-10-02

## 2017-10-02 ENCOUNTER — TELEPHONE (OUTPATIENT)
Dept: FAMILY MEDICINE | Facility: CLINIC | Age: 57
End: 2017-10-02

## 2017-10-02 DIAGNOSIS — F41.1 GENERALIZED ANXIETY DISORDER: Primary | ICD-10-CM

## 2017-10-02 PROCEDURE — 99207 ZZC NO CHARGE NURSE ONLY: CPT

## 2017-10-02 RX ORDER — AMITRIPTYLINE HYDROCHLORIDE 10 MG/1
TABLET ORAL
Qty: 90 TABLET | Refills: 1 | Status: SHIPPED | OUTPATIENT
Start: 2017-10-02 | End: 2017-11-29

## 2017-10-02 ASSESSMENT — ANXIETY QUESTIONNAIRES
2. NOT BEING ABLE TO STOP OR CONTROL WORRYING: NEARLY EVERY DAY
1. FEELING NERVOUS, ANXIOUS, OR ON EDGE: MORE THAN HALF THE DAYS
6. BECOMING EASILY ANNOYED OR IRRITABLE: SEVERAL DAYS
IF YOU CHECKED OFF ANY PROBLEMS ON THIS QUESTIONNAIRE, HOW DIFFICULT HAVE THESE PROBLEMS MADE IT FOR YOU TO DO YOUR WORK, TAKE CARE OF THINGS AT HOME, OR GET ALONG WITH OTHER PEOPLE: VERY DIFFICULT
3. WORRYING TOO MUCH ABOUT DIFFERENT THINGS: NEARLY EVERY DAY
7. FEELING AFRAID AS IF SOMETHING AWFUL MIGHT HAPPEN: SEVERAL DAYS
GAD7 TOTAL SCORE: 11
5. BEING SO RESTLESS THAT IT IS HARD TO SIT STILL: NOT AT ALL

## 2017-10-02 ASSESSMENT — PATIENT HEALTH QUESTIONNAIRE - PHQ9
5. POOR APPETITE OR OVEREATING: SEVERAL DAYS
SUM OF ALL RESPONSES TO PHQ QUESTIONS 1-9: 12

## 2017-10-02 NOTE — NURSING NOTE
CLAY-7 SCORE 9/23/2015 12/9/2015 10/4/2016   Total Score - - -   Total Score 3 10 13       PHQ-9 SCORE 11/30/2015 12/9/2015 10/4/2016   Total Score - - -   Total Score 11 11 16

## 2017-10-02 NOTE — NURSING NOTE
Pt stopped amitriptyline recently.  She wishes to resume it.    Pt was taking it at bedtime for anxiety for several years and it was working well.  She went off it after she broke her arm.  Now, she feels that she needs it again.    Please see telephone note for details and follow up instructions.    Batsheva Wagoner RN

## 2017-10-02 NOTE — TELEPHONE ENCOUNTER
Pt asks to resume amitriptyline for anxiety?  She has taken it at bedtime for years but stopped it recently.  She tapered off over 3 weeks.  She was hoping that she could get by without needing it since she lost her job recently and is without insurance.  Since going off it, her anxiety and depression symptoms have increased.  Pt states that she is not suicidal and has no thoughts regarding self harm.  She has noticed that she wants to sleep all the time.    Also, she fell and broke her elbow 6/5/17.  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  She is not upset about the job loss as she feels it is time for a job change.  She has two sisters who are helping her get on her feet again financially.    Pt was last seen 1/19/17.      Routed renewal request to provider as pt's CLAY and phq9 scores are too high for RN approval.  These are stable compared to previous readings and are listed below.    Please advise.  Batsheva Wagoner RN      CLAY-7 SCORE 12/9/2015 10/4/2016 10/2/2017   Total Score - - -   Total Score 10 13 11     PHQ-9 SCORE 12/9/2015 10/4/2016 10/2/2017   Total Score - - -   Total Score 11 16 12

## 2017-10-02 NOTE — MR AVS SNAPSHOT
After Visit Summary   10/2/2017    Brittany Barajas    MRN: [id number]           Patient Information     Date Of Birth          1960        Visit Information        Provider Department      10/2/2017 1:00 PM GUY ALBERT/LUCINDA DO [facility]        Today's Diagnoses     Generalized anxiety disorder    -  1       Follow-ups after your visit        Who to contact     If you have questions or need follow up information about today's clinic visit or your schedule please contact [facility] directly at [phone].  Normal or non-critical lab and imaging results will be communicated to you by [employer]hart, letter or phone within 4 business days after the clinic has received the results. If you do not hear from us within 7 days, please contact the clinic through Perdoot or phone. If you have a critical or abnormal lab result, we will notify you by phone as soon as possible.  Submit refill requests through BuildFax or call your pharmacy and they will forward the refill request to us. Please allow 3 business days for your refill to be completed.          Additional Information About Your Visit        MyChart Information     BuildFax gives you secure access to your electronic health record. If you see a primary care provider, you can also send messages to your care team and make appointments. If you have questions, please call your primary care clinic.  If you do not have a primary care provider, please call [phone] and they will assist you.        Care EveryWhere ID     This is your Care EveryWhere ID. This could be used by other organizations to access your [place] medical records  [id number]        Your Vitals Were     Last Period                   10/17/2008            Blood Pressure from Last 3 Encounters:   07/13/17 128/85   06/10/17 134/61   05/22/17 139/86    Weight from Last 3 Encounters:   06/10/17 142 lb (64.4 kg)   05/22/17 142 lb (64.4 kg)   04/21/17 143 lb (64.9 kg)               Today, you had the following     No orders found for display         Today's Medication Changes          These changes are accurate as of: 10/2/17  3:46 PM.  If you have any questions, ask your nurse or doctor.               These medicines have changed or have updated prescriptions.        Dose/Directions    fluticasone 110 MCG/ACT Inhaler   Commonly known as:  FLOVENT HFA   This may have changed:  when to take this   Used for:  Mild persistent asthma        Dose:  2 puff   Inhale 2 puffs into the lungs 2 times daily   Quantity:  1 Inhaler   Refills:  3                Primary Care Provider Office Phone # Fax #    Moralesconcepcion Sisi Duran [phone] [address] [facility] [address]        Equal Access to Services     JOSÉ SAHNI : Mirta Kelly, wadavid crump, qaybta kaalmada eren, hiram mcintosh . So [facility] [phone].    ATENCIÓN: Si habla español, tiene a johnston disposición servicios gratuitos de asistencia lingüística. Ll[facility] [phone].    We comply with applicable federal civil rights laws and [place] laws. We do not discriminate on the basis of race, color, national origin, age, disability, sex, sexual orientation, or gender identity.            Thank you!     Thank you for choosing [facility]  for your care. Our goal is always to provide you with excellent care. Hearing back from our patients is one way we can continue to improve our services. Please take a few minutes to complete the written survey that you may receive in the mail after your visit with us. Thank you!             Your Updated Medication List - Protect others around you: Learn how to safely use, store and throw away your medicines at www.disposemymeds.org.          This list is accurate as of: 10/2/17  3:46 PM.  Always use your most recent med list.                   Brand Name Dispense Instructions for use Diagnosis    albuterol 108  (90 BASE) MCG/ACT Inhaler    PROAIR HFA/PROVENTIL HFA/VENTOLIN HFA    2 Inhaler    Inhale 2 puffs into the lungs every 6 hours as needed for shortness of breath / dyspnea    Mild persistent asthma, uncomplicated       amitriptyline 10 MG tablet    ELAVIL    90 tablet    TAKE THREE TABLETS BY MOUTH AT BEDTIME    Generalized anxiety disorder       ascorbic acid 500 MG tablet    VITAMIN C     Take 500 mg by mouth daily.        aspirin 81 MG tablet      Take 1 tablet by mouth daily.        benzonatate 200 MG capsule    TESSALON    21 capsule    Take 1 capsule (200 mg) by mouth 3 times daily as needed for cough        BIOFLEX PO      Take 1 tablet by mouth daily.    Routine general medical examination at a health care facility       CALCIUM + D + K PO      Take 1 capsule by mouth daily.        cyclobenzaprine 10 MG tablet    FLEXERIL    14 tablet    Take 1 tablet (10 mg) by mouth nightly as needed for muscle spasms    Cervicalgia       fluticasone 110 MCG/ACT Inhaler    FLOVENT HFA    1 Inhaler    Inhale 2 puffs into the lungs 2 times daily    Mild persistent asthma       fluticasone 50 MCG/ACT spray    FLONASE    1 Bottle    Spray 1-2 sprays into both nostrils daily    Mild persistent asthma, uncomplicated       guaiFENesin-codeine 100-10 MG/5ML Soln solution    ROBITUSSIN AC    240 mL    Take 10-15 mLs by mouth every 4 hours as needed for cough        HYDROcodone-acetaminophen 5-325 MG per tablet    NORCO    15 tablet    Take 1-2 tablets by mouth every 4 hours as needed for moderate to severe pain        IBUPROFEN PO      Take 800 mg by mouth daily as needed for moderate pain        ipratropium - albuterol 0.5 mg/2.5 mg/3 mL 0.5-2.5 (3) MG/3ML neb solution    DUONEB    1 Box    Take 1 vial (3 mLs) by nebulization every 6 hours as needed for shortness of breath / dyspnea    Mild persistent asthma, uncomplicated       MULTIVITAMIN PO      Take 1 tablet by mouth daily.        omeprazole 10 MG CR capsule    priLOSEC    60  capsule    Take 2 capsules (20 mg) by mouth daily Take by mouth 30-60 minutes before a meal.    Gastroesophageal reflux disease without esophagitis       oxyCODONE-acetaminophen 5-325 MG per tablet    PERCOCET    12 tablet    Take 1-2 tablets by mouth every 4 hours as needed for pain        PARoxetine 20 MG tablet    PAXIL    90 tablet    Take 1 tablet (20 mg) by mouth At Bedtime    Generalized anxiety disorder       predniSONE 20 MG tablet    DELTASONE    14 tablet    2 daily for 4 days, then 1 daily for 4 days, then 1/2 tab daily for 4 days.        SLOW  (45 FE) MG Tbcr   Generic drug:  ferrous sulfate      Take 142 mg by mouth every 48 hours.    Routine general medical examination at a health care facility       SUPER B COMPLEX PO      Take 1 tablet by mouth daily.        TYLENOL PO      Take 500 mg by mouth daily as needed for mild pain or fever

## 2017-10-03 ENCOUNTER — HOSPITAL ENCOUNTER (EMERGENCY)
Facility: CLINIC | Age: 57
Discharge: HOME | End: 2017-10-03
Admitting: NURSE PRACTITIONER

## 2017-10-03 VITALS — DIASTOLIC BLOOD PRESSURE: 63 MMHG | OXYGEN SATURATION: 99 % | SYSTOLIC BLOOD PRESSURE: 112 MMHG | HEART RATE: 95 BPM

## 2017-10-03 PROCEDURE — 40000809 ZZH STATISTIC NO DOCUMENTATION TO SUPPORT CHARGE

## 2017-10-03 ASSESSMENT — ANXIETY QUESTIONNAIRES: GAD7 TOTAL SCORE: 11

## 2017-10-03 NOTE — ED NOTES
Patient was unaware that refill for requested medication had been filled by primary, and was at the pharmacy.  Patient was not called back or  seen in UC, cancel appointment, no charge to patient per providers

## 2017-10-03 NOTE — TELEPHONE ENCOUNTER
I will recommend a visit. I am not comfortable with restarting medication through a telephone encounter.

## 2017-10-03 NOTE — TELEPHONE ENCOUNTER
Left message for patient to return call to clinic.  Please help patient schedule an appointment with Dr. Duran when she calls back.    Valeria Young RN

## 2017-10-03 NOTE — TELEPHONE ENCOUNTER
Would this be appropriate for a phone visit or evisit?  Patient was last in clinic in January 2017.    Valeria Young RN

## 2017-10-03 NOTE — TELEPHONE ENCOUNTER
Pt returning call and is wondering what would be a cheaper way to make a visit. Telephone? E Visit? Does she have any options?    M[facility] [place]  [facility] [place]

## 2017-10-04 NOTE — TELEPHONE ENCOUNTER
Message left for the patient with the need for an office visit to restart medication.  Carlie CHIN RN

## 2017-11-29 ENCOUNTER — ALLIED HEALTH/NURSE VISIT (OUTPATIENT)
Dept: FAMILY MEDICINE | Facility: CLINIC | Age: 57
End: 2017-11-29

## 2017-11-29 DIAGNOSIS — F41.1 GENERALIZED ANXIETY DISORDER: ICD-10-CM

## 2017-11-29 PROCEDURE — 99207 ZZC NO CHARGE NURSE ONLY: CPT

## 2017-11-29 RX ORDER — AMITRIPTYLINE HYDROCHLORIDE 10 MG/1
TABLET ORAL
Qty: 90 TABLET | Refills: 1 | Status: SHIPPED | OUTPATIENT
Start: 2017-11-29 | End: 2018-01-30

## 2017-11-29 NOTE — MR AVS SNAPSHOT
After Visit Summary   11/29/2017    Brittany Barajas    MRN: [id number]           Patient Information     Date Of Birth          1960        Visit Information        Provider Department      11/29/2017 1:45 PM GUY ALBERT/LUCINDA DO [facility]        Today's Diagnoses     Generalized anxiety disorder           Follow-ups after your visit        Who to contact     If you have questions or need follow up information about today's clinic visit or your schedule please contact [facility] directly at [phone].  Normal or non-critical lab and imaging results will be communicated to you by [facility], letter or phone within 4 business days after the clinic has received the results. If you do not hear from us within 7 days, please contact the clinic through Enbridget or phone. If you have a critical or abnormal lab result, we will notify you by phone as soon as possible.  Submit refill requests through AgRobotics or call your pharmacy and they will forward the refill request to us. Please allow 3 business days for your refill to be completed.          Additional Information About Your Visit        MyChart Information     [employer] gives you secure access to your electronic health record. If you see a primary care provider, you can also send messages to your care team and make appointments. If you have questions, please call your primary care clinic.  If you do not have a primary care provider, please call [phone] and they will assist you.        Care EveryWhere ID     This is your Care EveryWhere ID. This could be used by other organizations to access your [place] medical records  [id number]        Your Vitals Were     Last Period                   10/17/2008            Blood Pressure from Last 3 Encounters:   10/03/17 112/63   07/13/17 128/85   06/10/17 134/61    Weight from Last 3 Encounters:   06/10/17 142 lb (64.4 kg)   05/22/17 142 lb (64.4 kg)   04/21/17 143 lb (64.9 kg)               Today, you had the following     No orders found for display         Today's Medication Changes          These changes are accurate as of: 11/29/17  1:53 PM.  If you have any questions, ask your nurse or doctor.               These medicines have changed or have updated prescriptions.        Dose/Directions    amitriptyline 10 MG tablet   Commonly known as:  ELAVIL   This may have changed:  See the new instructions.   Used for:  Generalized anxiety disorder        TAKE THREE TABLETS BY MOUTH AT BEDTIME   Quantity:  90 tablet   Refills:  1       fluticasone 110 MCG/ACT Inhaler   Commonly known as:  FLOVENT HFA   This may have changed:  when to take this   Used for:  Mild persistent asthma        Dose:  2 puff   Inhale 2 puffs into the lungs 2 times daily   Quantity:  1 Inhaler   Refills:  3            Where to get your medicines      Call your pharmacy to confirm that your medication is ready for pickup. It may take up to 24 hours for them to receive the prescription. If the prescription is not ready within 3 business days, please contact your clinic or your provider.     We will let you know when these medications are ready. If you don't hear back within 3 business days, please contact us.     amitriptyline 10 MG tablet                Primary Care Provider Office Phone # Fax #    Jrmookie Sisi Duran [phone] [address] [address]        Equal Access to Services     JOSÉ SAHNI : Mirta Kelly, waaxda luqadaha, qaybta kaalmada adeegyada, hiram machado. So [facility] [phone].    ATENCIÓN: Si habla español, tiene a johnston disposición servicios gratuitos de asistencia lingüística. Llame al [phone].    We comply with applicable federal civil rights laws and [place] laws. We do not discriminate on the basis of race, color, national origin, age, disability, sex, sexual orientation, or gender identity.            Thank  you!     Thank you for choosing [facility]  for your care. Our goal is always to provide you with excellent care. Hearing back from our patients is one way we can continue to improve our services. Please take a few minutes to complete the written survey that you may receive in the mail after your visit with us. Thank you!             Your Updated Medication List - Protect others around you: Learn how to safely use, store and throw away your medicines at www.disposemymeds.org.          This list is accurate as of: 11/29/17  1:53 PM.  Always use your most recent med list.                   Brand Name Dispense Instructions for use Diagnosis    albuterol 108 (90 BASE) MCG/ACT Inhaler    PROAIR HFA/PROVENTIL HFA/VENTOLIN HFA    2 Inhaler    Inhale 2 puffs into the lungs every 6 hours as needed for shortness of breath / dyspnea    Mild persistent asthma, uncomplicated       amitriptyline 10 MG tablet    ELAVIL    90 tablet    TAKE THREE TABLETS BY MOUTH AT BEDTIME    Generalized anxiety disorder       ascorbic acid 500 MG tablet    VITAMIN C     Take 500 mg by mouth daily.        aspirin 81 MG tablet      Take 1 tablet by mouth daily.        benzonatate 200 MG capsule    TESSALON    21 capsule    Take 1 capsule (200 mg) by mouth 3 times daily as needed for cough        BIOFLEX PO      Take 1 tablet by mouth daily.    Routine general medical examination at a health care facility       CALCIUM + D + K PO      Take 1 capsule by mouth daily.        cyclobenzaprine 10 MG tablet    FLEXERIL    14 tablet    Take 1 tablet (10 mg) by mouth nightly as needed for muscle spasms    Cervicalgia       fluticasone 110 MCG/ACT Inhaler    FLOVENT HFA    1 Inhaler    Inhale 2 puffs into the lungs 2 times daily    Mild persistent asthma       fluticasone 50 MCG/ACT spray    FLONASE    1 Bottle    Spray 1-2 sprays into both nostrils daily    Mild persistent asthma, uncomplicated       guaiFENesin-codeine 100-10 MG/5ML Soln  solution    ROBITUSSIN AC    240 mL    Take 10-15 mLs by mouth every 4 hours as needed for cough        HYDROcodone-acetaminophen 5-325 MG per tablet    NORCO    15 tablet    Take 1-2 tablets by mouth every 4 hours as needed for moderate to severe pain        IBUPROFEN PO      Take 800 mg by mouth daily as needed for moderate pain        ipratropium - albuterol 0.5 mg/2.5 mg/3 mL 0.5-2.5 (3) MG/3ML neb solution    DUONEB    1 Box    Take 1 vial (3 mLs) by nebulization every 6 hours as needed for shortness of breath / dyspnea    Mild persistent asthma, uncomplicated       MULTIVITAMIN PO      Take 1 tablet by mouth daily.        omeprazole 10 MG CR capsule    priLOSEC    60 capsule    Take 2 capsules (20 mg) by mouth daily Take by mouth 30-60 minutes before a meal.    Gastroesophageal reflux disease without esophagitis       oxyCODONE-acetaminophen 5-325 MG per tablet    PERCOCET    12 tablet    Take 1-2 tablets by mouth every 4 hours as needed for pain        PARoxetine 20 MG tablet    PAXIL    90 tablet    Take 1 tablet (20 mg) by mouth At Bedtime    Generalized anxiety disorder       predniSONE 20 MG tablet    DELTASONE    14 tablet    2 daily for 4 days, then 1 daily for 4 days, then 1/2 tab daily for 4 days.        SLOW  (45 FE) MG Tbcr   Generic drug:  ferrous sulfate      Take 142 mg by mouth every 48 hours.    Routine general medical examination at a health care facility       SUPER B COMPLEX PO      Take 1 tablet by mouth daily.        TYLENOL PO      Take 500 mg by mouth daily as needed for mild pain or fever

## 2017-11-29 NOTE — NURSING NOTE
The pt is here to request a refill. I have reviewed clinic notes. Refill placed for 2 months per [facility] protocol. The pt will be due for an OV in Jan 2018. The pt is aware, she is pursuing assistance for medical coverage.   Maria T Samayoa RN   amitriptyline (ELAVIL) 10 MG tablet     Last Written Prescription Date: 10/2/17  Last Fill Quantity: 90, # refills: 1  Last Office Visit with [facility], P or [facility] prescribing provider: 1/19/17  Next 5 appointments (look out 90 days)     Nov 29, 2017  1:45 PM CST   Nurse Only with [facility]IM RN   [facility]    [address] [facility] [address] [facility] [id number] [phone]                   BP Readings from Last 3 Encounters:   10/03/17 112/63   07/13/17 128/85   06/10/17 134/61

## 2018-01-04 DIAGNOSIS — F41.1 GENERALIZED ANXIETY DISORDER: ICD-10-CM

## 2018-01-05 NOTE — TELEPHONE ENCOUNTER
Requested Prescriptions   Pending Prescriptions Disp Refills     PARoxetine (PAXIL) 20 MG tablet [Pharmacy Med Name: PAROXETINE HCL 20MG TABS]  Last Written Prescription Date:  01/19/2017  Last Fill Quantity: 90,  # refills: 3   Last Office Visit with [facility], P or [facility] prescribing provider:  01/19/2017   Future Office Visit:      90 tablet 3     Sig: TAKE ONE TABLET BY MOUTH AT BEDTIME    SSRIs Protocol Passed    1/4/2018  5:52 PM       Passed - Recent or future visit with authorizing provider    Patient had office visit in the last year or has a visit in the next 30 days with authorizing provider.  See chart review.              Passed - Patient is age 18 or older       Passed - No active pregnancy on record       Passed - No positive pregnancy test in last 12 months        Steffen BECKER (R)

## 2018-01-09 NOTE — TELEPHONE ENCOUNTER
**This refill requires provider completion and is not appropriate for RN review per RN refill guidelines.**  PH-Q9 needs to be less than 5 to approve medication on RN Refill protocol pt's score is 12.  Felicitas Blank RN

## 2018-01-11 RX ORDER — PAROXETINE 20 MG/1
TABLET, FILM COATED ORAL
Qty: 90 TABLET | Refills: 3 | Status: SHIPPED | OUTPATIENT
Start: 2018-01-11 | End: 2019-01-12

## 2018-01-30 DIAGNOSIS — F41.1 GENERALIZED ANXIETY DISORDER: ICD-10-CM

## 2018-01-30 RX ORDER — AMITRIPTYLINE HYDROCHLORIDE 10 MG/1
TABLET ORAL
Qty: 90 TABLET | Refills: 1 | OUTPATIENT
Start: 2018-01-30

## 2018-01-30 NOTE — TELEPHONE ENCOUNTER
Received a denial on refill request for elavil as duplicate. It is not, patient got script 11/29 for #90 with SIG take three at bedtime with one refill. Patient will be out of medication soon. Please send over appropriate refills.  Thanks, Pooja Montesinos  Certified Pharmacy Technician  [facility]  [phone]

## 2018-01-31 RX ORDER — AMITRIPTYLINE HYDROCHLORIDE 10 MG/1
TABLET ORAL
Qty: 90 TABLET | Refills: 0 | Status: SHIPPED | OUTPATIENT
Start: 2018-01-31 | End: 2018-06-11

## 2018-03-05 ENCOUNTER — TELEPHONE (OUTPATIENT)
Dept: FAMILY MEDICINE | Facility: CLINIC | Age: 58
End: 2018-03-05

## 2018-03-05 DIAGNOSIS — F41.1 GENERALIZED ANXIETY DISORDER: ICD-10-CM

## 2018-03-06 NOTE — TELEPHONE ENCOUNTER
"Requested Prescriptions   Pending Prescriptions Disp Refills     amitriptyline (ELAVIL) 10 MG tablet [Pharmacy Med Name: AMITRIPTYLINE HCL 10MG TABS]  Last Written Prescription Date:  01/31/2018  Last Fill Quantity: 90,  # refills: 0   Last office visit: 1/19/2017 with prescribing provider:  Renee   Future Office Visit:     90 tablet 0     Sig: TAKE 3 TABLETS BY MOUTH EACH NIGHT AT BEDTIME (NEED FOLLOW-UP APPOINTMENT FOR THIS MEDICATION)    Tricyclic Antidepressants Protocol Failed    3/5/2018  7:15 PM       Failed - Recent (12 mo) or future (30 days) visit within the authorizing provider's specialty     Patient had office visit in the last year or has a visit in the next 30 days with authorizing provider.  See \"Patient Info\" tab in inbasket, or \"Choose Columns\" in Meds & Orders section of the refill encounter.            Passed - Blood pressure under 140/90 in past 12 months    BP Readings from Last 3 Encounters:   10/03/17 112/63   07/13/17 128/85   06/10/17 134/61                Passed - Patient is age 18 or older       Passed - No active pregnancy on record       Passed - No positive pregnancy test in past 12 months        Steffen Garcia RT (R)    "

## 2018-03-06 NOTE — TELEPHONE ENCOUNTER
Left message on machine to call back    Patient was given robert fill and needs appt.    Alba FRENCH RN

## 2018-03-07 RX ORDER — AMITRIPTYLINE HYDROCHLORIDE 10 MG/1
TABLET ORAL
Qty: 90 TABLET | Refills: 0 | OUTPATIENT
Start: 2018-03-07

## 2018-03-07 NOTE — TELEPHONE ENCOUNTER
Patient is contacted and she has no insurance.  Just started a new job and is having respiratory cough issues.  Good reason to be seen.  Offered financial aid counselor patient refused.  Needs to be seen as it has been 14 months since last office visit. Carlie CHIN RN  Pharmacy notified

## 2018-03-08 ENCOUNTER — OFFICE VISIT (OUTPATIENT)
Dept: FAMILY MEDICINE | Facility: CLINIC | Age: 58
End: 2018-03-08

## 2018-03-08 ENCOUNTER — TELEPHONE (OUTPATIENT)
Dept: FAMILY MEDICINE | Facility: CLINIC | Age: 58
End: 2018-03-08

## 2018-03-08 VITALS
HEIGHT: 61 IN | SYSTOLIC BLOOD PRESSURE: 125 MMHG | DIASTOLIC BLOOD PRESSURE: 88 MMHG | OXYGEN SATURATION: 97 % | BODY MASS INDEX: 28.13 KG/M2 | TEMPERATURE: 96.9 F | WEIGHT: 149 LBS | HEART RATE: 96 BPM

## 2018-03-08 DIAGNOSIS — F41.1 GENERALIZED ANXIETY DISORDER: ICD-10-CM

## 2018-03-08 DIAGNOSIS — J40 BRONCHITIS: ICD-10-CM

## 2018-03-08 DIAGNOSIS — G47.00 INSOMNIA, UNSPECIFIED TYPE: Primary | ICD-10-CM

## 2018-03-08 PROCEDURE — 99214 OFFICE O/P EST MOD 30 MIN: CPT | Performed by: FAMILY MEDICINE

## 2018-03-08 RX ORDER — AMITRIPTYLINE HYDROCHLORIDE 50 MG/1
50 TABLET ORAL AT BEDTIME
Qty: 90 TABLET | Refills: 3 | Status: SHIPPED | OUTPATIENT
Start: 2018-03-08 | End: 2019-03-08

## 2018-03-08 RX ORDER — PREDNISONE 20 MG/1
40 TABLET ORAL DAILY
Qty: 10 TABLET | Refills: 0 | Status: SHIPPED | OUTPATIENT
Start: 2018-03-08 | End: 2018-03-13

## 2018-03-08 ASSESSMENT — PATIENT HEALTH QUESTIONNAIRE - PHQ9: 5. POOR APPETITE OR OVEREATING: NEARLY EVERY DAY

## 2018-03-08 ASSESSMENT — ANXIETY QUESTIONNAIRES
7. FEELING AFRAID AS IF SOMETHING AWFUL MIGHT HAPPEN: NOT AT ALL
GAD7 TOTAL SCORE: 8
5. BEING SO RESTLESS THAT IT IS HARD TO SIT STILL: SEVERAL DAYS
1. FEELING NERVOUS, ANXIOUS, OR ON EDGE: MORE THAN HALF THE DAYS
6. BECOMING EASILY ANNOYED OR IRRITABLE: NOT AT ALL
2. NOT BEING ABLE TO STOP OR CONTROL WORRYING: SEVERAL DAYS
3. WORRYING TOO MUCH ABOUT DIFFERENT THINGS: SEVERAL DAYS

## 2018-03-08 NOTE — TELEPHONE ENCOUNTER
Patient was seen today for URI and failed ACT will call in 3 weeks to recheck ACT was sent home with ACT paper

## 2018-03-08 NOTE — MR AVS SNAPSHOT
After Visit Summary   3/8/2018    Brittany Barajas    MRN: [id number]           Patient Information     Date Of Birth          1960        Visit Information        Provider Department      3/8/2018 8:00 AM Alejandrina Duran MD [facility]        Today's Diagnoses     Insomnia, unspecified type    -  1    Generalized anxiety disorder        Bronchitis          Care Instructions          Thank you for choosing [facility].  You may be receiving a survey in the mail from [facility] regarding your visit today.  Please take a few minutes to complete and return the survey to let us know how we are doing.      If you have questions or concerns, please contact us via [employer] or you can contact your care team at [phone].    Our Clinic hours are:  Monday 6:40 am  to 7:00 pm  Tuesday -Friday 6:40 am to 5:00 pm    The [place] outpatient lab hours are:  Monday - Friday 6:10 am to 4:45 pm  Saturdays 7:00 am to 11:00 am  Appointments are required, call [phone]    If you have clinical questions after hours or would like to schedule an appointment,  call the clinic at [phone].  Bronchitis, Viral (Adult)    You have a viral bronchitis. Bronchitis is inflammation and swelling of the lining of the lungs. This is often caused by an infection. Symptoms include a dry, hacking cough that is worse at night. The cough may bring up yellow-green mucus. You may also feel short of breath or wheeze. Other symptoms may include tiredness, chest discomfort, and chills.  Bronchitis that is caused by a virus is not treated with antibiotics. Instead, medicines may be given to help relieve symptoms. Symptoms can last up to 2 weeks, although the cough may last much longer.  This illness is contagious during the first few days and is spread through the air by coughing and sneezing, or by direct contact (touching the sick person and then touching your own eyes, nose, or mouth).  Most viral  illnesses resolve within 10 to 14 days with rest and simple home remedies, although they may sometimes last for several weeks.  Home care    If symptoms are severe, rest at home for the first 2 to 3 days. When you go back to your usual activities, don't let yourself get too tired.    Do not smoke. Also avoid being exposed to secondhand smoke.    You may use over-the-counter medicine to control fever or pain, unless another pain medicine was prescribed. (Note: If you have chronic liver or kidney disease or have ever had a stomach ulcer or gastrointestinal bleeding, talk with your healthcare provider before using these medicines. Also talk to your provider if you are taking medicine to prevent blood clots.) Aspirin should never be given to anyone younger than 18 years of age who is ill with a viral infection or fever. It may cause severe liver or brain damage.    Your appetite may be poor, so a light diet is fine. Avoid dehydration by drinking 6 to 8 glasses of fluids per day (such as water, soft drinks, sports drinks, juices, tea, or soup). Extra fluids will help loosen secretions in the nose and lungs.    Over-the-counter cough, cold, and sore-throat medicines will not shorten the length of the illness, but they may help to reduce symptoms. (Note: Do not use decongestants if you have high blood pressure.)  Follow-up care  Follow up with your healthcare provider, or as advised. If you had an X-ray or ECG (electrocardiogram), a specialist will review it. You will be notified of any new findings that may affect your care.  Note: If you are age 65 or older, or if you have a chronic lung disease or condition that affects your immune system, or you smoke, talk to your healthcare provider about having pneumococcal vaccinations and a yearly influenza vaccination (flu shot).  When to seek medical advice  Call your healthcare provider right away if any of these occur:    Fever of 100.4 F (38 C) or higher    Coughing up  increased amounts of colored sputum    Weakness, drowsiness, headache, facial pain, ear pain, or a stiff neck  Call 911, or get immediate medical care  Contact emergency services right away if any of these occur:    Coughing up blood    Worsening weakness, drowsiness, headache, or stiff neck    Trouble breathing, wheezing, or pain with breathing  Date Last Reviewed: 9/13/2015 2000-2017 [employer]. [address] [place] [address]. All rights reserved. This information is not intended as a substitute for professional medical care. Always follow your healthcare professional's instructions.                Follow-ups after your visit        Who to contact     If you have questions or need follow up information about today's clinic visit or your schedule please contact [facility] directly at [phone].  Normal or non-critical lab and imaging results will be communicated to you by M[employer]ie[employer], letter or phone within 4 business days after the clinic has received the results. If you do not hear from us within 7 days, please contact the clinic through [employer] or phone. If you have a critical or abnormal lab result, we will notify you by phone as soon as possible.  Submit refill requests through [employer] or call your pharmacy and they will forward the refill request to us. Please allow 3 business days for your refill to be completed.          Additional Information About Your Visit        [employer] Information     [employer] gives you secure access to your electronic health record. If you see a primary care provider, you can also send messages to your care team and make appointments. If you have questions, please call your primary care clinic.  If you do not have a primary care provider, please call [phone] and they will assist you.        Care EveryWhere ID     This is your Care EveryWhere ID. This could be used by other organizations to access your [facility]  "records  [id number]        Your Vitals Were     Pulse Temperature Height Last Period Pulse Oximetry BMI (Body Mass Index)    96 96.9  F (36.1  C) (Tympanic) 5' 1\" (1.549 m) 10/17/2008 97% 28.15 kg/m2       Blood Pressure from Last 3 Encounters:   03/08/18 125/88   10/03/17 112/63   07/13/17 128/85    Weight from Last 3 Encounters:   03/08/18 149 lb (67.6 kg)   06/10/17 142 lb (64.4 kg)   05/22/17 142 lb (64.4 kg)              Today, you had the following     No orders found for display         Today's Medication Changes          These changes are accurate as of 3/8/18  8:19 AM.  If you have any questions, ask your nurse or doctor.               Start taking these medicines.        Dose/Directions    predniSONE 20 MG tablet   Commonly known as:  DELTASONE   Used for:  Bronchitis   Started by:  Alejandrina Duran MD        Dose:  40 mg   Take 2 tablets (40 mg) by mouth daily for 5 days   Quantity:  10 tablet   Refills:  0         These medicines have changed or have updated prescriptions.        Dose/Directions    * amitriptyline 10 MG tablet   Commonly known as:  ELAVIL   This may have changed:  Another medication with the same name was added. Make sure you understand how and when to take each.   Used for:  Generalized anxiety disorder   Changed by:  Alejandrina Duran MD        TAKE THREE TABLETS BY MOUTH AT BEDTIME (Needs follow-up appointment for this medication)   Quantity:  90 tablet   Refills:  0       * amitriptyline 50 MG tablet   Commonly known as:  ELAVIL   This may have changed:  You were already taking a medication with the same name, and this prescription was added. Make sure you understand how and when to take each.   Used for:  Generalized anxiety disorder, Insomnia, unspecified type   Changed by:  Alejandrina Duran MD        Dose:  50 mg   Take 1 tablet (50 mg) by mouth At Bedtime   Quantity:  90 tablet   Refills:  3       fluticasone 110 MCG/ACT Inhaler   Commonly known as: "  FLOVENT HFA   This may have changed:  when to take this   Used for:  Mild persistent asthma        Dose:  2 puff   Inhale 2 puffs into the lungs 2 times daily   Quantity:  1 Inhaler   Refills:  3       * Notice:  This list has 2 medication(s) that are the same as other medications prescribed for you. Read the directions carefully, and ask your doctor or other care provider to review them with you.         Where to get your medicines      These medications were sent to [facility] [address]  [address] [address]     Phone:  [phone]     amitriptyline 50 MG tablet    predniSONE 20 MG tablet                Primary Care Provider Office Phone # Fax #    Alejandrina Duran [phone] [address] [address]        Equal Access to Services     JOSÉ SAHNI : Hadii aad ku hadasho Sotoya, waaxda luqadaha, qaybta kaalmada adegildardoyada, hiram mcintosh . So [facility] [phone].    ATENCIÓN: Si habla español, tiene a johnston disposición servicios gratuitos de asistencia lingüística. Herberth al [phone].    We comply with applicable federal civil rights laws and [place] laws. We do not discriminate on the basis of race, color, national origin, age, disability, sex, sexual orientation, or gender identity.            Thank you!     Thank you for choosing [facility]  for your care. Our goal is always to provide you with excellent care. Hearing back from our patients is one way we can continue to improve our services. Please take a few minutes to complete the written survey that you may receive in the mail after your visit with us. Thank you!             Your Updated Medication List - Protect others around you: Learn how to safely use, store and throw away your medicines at www.disposemymeds.org.          This list is accurate as of 3/8/18  8:19 AM.  Always use your most recent med list.                    Brand Name Dispense Instructions for use Diagnosis    albuterol 108 (90 BASE) MCG/ACT Inhaler    PROAIR HFA/PROVENTIL HFA/VENTOLIN HFA    2 Inhaler    Inhale 2 puffs into the lungs every 6 hours as needed for shortness of breath / dyspnea    Mild persistent asthma, uncomplicated       * amitriptyline 10 MG tablet    ELAVIL    90 tablet    TAKE THREE TABLETS BY MOUTH AT BEDTIME (Needs follow-up appointment for this medication)    Generalized anxiety disorder       * amitriptyline 50 MG tablet    ELAVIL    90 tablet    Take 1 tablet (50 mg) by mouth At Bedtime    Generalized anxiety disorder, Insomnia, unspecified type       ascorbic acid 500 MG tablet    VITAMIN C     Take 500 mg by mouth daily.        aspirin 81 MG tablet      Take 1 tablet by mouth daily.        fluticasone 110 MCG/ACT Inhaler    FLOVENT HFA    1 Inhaler    Inhale 2 puffs into the lungs 2 times daily    Mild persistent asthma       fluticasone 50 MCG/ACT spray    FLONASE    1 Bottle    Spray 1-2 sprays into both nostrils daily    Mild persistent asthma, uncomplicated       IBUPROFEN PO      Take 800 mg by mouth daily as needed for moderate pain        ipratropium - albuterol 0.5 mg/2.5 mg/3 mL 0.5-2.5 (3) MG/3ML neb solution    DUONEB    1 Box    Take 1 vial (3 mLs) by nebulization every 6 hours as needed for shortness of breath / dyspnea    Mild persistent asthma, uncomplicated       MULTIVITAMIN PO      Take 1 tablet by mouth daily.        PARoxetine 20 MG tablet    PAXIL    90 tablet    TAKE ONE TABLET BY MOUTH AT BEDTIME    Generalized anxiety disorder       predniSONE 20 MG tablet    DELTASONE    10 tablet    Take 2 tablets (40 mg) by mouth daily for 5 days    Bronchitis       SUPER B COMPLEX PO      Take 1 tablet by mouth daily.        TYLENOL PO      Take 500 mg by mouth daily as needed for mild pain or fever        * Notice:  This list has 2 medication(s) that are the same as other medications prescribed for you. Read the  directions carefully, and ask your doctor or other care provider to review them with you.

## 2018-03-08 NOTE — LETTER
April 5, 2018      Brittany Barajas  [address] [address]        Dear Brittany,     We have been unable to reach you by phone Your [place] Care Team works hard to make sure that you  receive exceptional care. Enclosed you will find a copy of the Asthma Control Test (ACT) that our clinic uses to monitor and manage your asthma. This test is an assessment tool that we can use to determine how well your asthma is controlled. Please fill out and mail back the enclosed ACT form. Enclosed is a stamped addressed envelope for you to mail the ACT back to the clinic in. Also, please  keep the ACT that was previously mailed to you  for your reference when we call you in the future  to complete this assessment.    Also,  You are due for a screening Mammogram.  Please contact the Diagnostics Registration Department at: [phone] to schedule this appointment.    Dr. Duran  has been reviewing your chart.  You are currently due for a colon cancer screening test.   You may contact the clinic to order the colonoscopy and we can mail the prep info to you .   We are trying to help our patients achieve their health care goals.  If you have had this procedure done elsewhere, please have a copy of your results sent to our clinic so we can update your file.    If you have any questions or concerns please contact the clinic.      Sincerely,        Alejandrina Duran MD/derek

## 2018-03-08 NOTE — PATIENT INSTRUCTIONS
Thank you for choosing [facility].  You may be receiving a survey in the mail from Perl[facility] Archer regarding your visit today.  Please take a few minutes to complete and return the survey to let us know how we are doing.      If you have questions or concerns, please contact us via [employer] or you can contact your care team at [phone].    Our Clinic hours are:  Monday 6:40 am  to 7:00 pm  Tuesday -Friday 6:40 am to 5:00 pm    The [place] outpatient lab hours are:  Monday - Friday 6:10 am to 4:45 pm  Saturdays 7:00 am to 11:00 am  Appointments are required, call [phone]    If you have clinical questions after hours or would like to schedule an appointment,  call the clinic at [phone].  Bronchitis, Viral (Adult)    You have a viral bronchitis. Bronchitis is inflammation and swelling of the lining of the lungs. This is often caused by an infection. Symptoms include a dry, hacking cough that is worse at night. The cough may bring up yellow-green mucus. You may also feel short of breath or wheeze. Other symptoms may include tiredness, chest discomfort, and chills.  Bronchitis that is caused by a virus is not treated with antibiotics. Instead, medicines may be given to help relieve symptoms. Symptoms can last up to 2 weeks, although the cough may last much longer.  This illness is contagious during the first few days and is spread through the air by coughing and sneezing, or by direct contact (touching the sick person and then touching your own eyes, nose, or mouth).  Most viral illnesses resolve within 10 to 14 days with rest and simple home remedies, although they may sometimes last for several weeks.  Home care    If symptoms are severe, rest at home for the first 2 to 3 days. When you go back to your usual activities, don't let yourself get too tired.    Do not smoke. Also avoid being exposed to secondhand smoke.    You may use over-the-counter medicine to control fever or pain, unless another  pain medicine was prescribed. (Note: If you have chronic liver or kidney disease or have ever had a stomach ulcer or gastrointestinal bleeding, talk with your healthcare provider before using these medicines. Also talk to your provider if you are taking medicine to prevent blood clots.) Aspirin should never be given to anyone younger than 18 years of age who is ill with a viral infection or fever. It may cause severe liver or brain damage.    Your appetite may be poor, so a light diet is fine. Avoid dehydration by drinking 6 to 8 glasses of fluids per day (such as water, soft drinks, sports drinks, juices, tea, or soup). Extra fluids will help loosen secretions in the nose and lungs.    Over-the-counter cough, cold, and sore-throat medicines will not shorten the length of the illness, but they may help to reduce symptoms. (Note: Do not use decongestants if you have high blood pressure.)  Follow-up care  Follow up with your healthcare provider, or as advised. If you had an X-ray or ECG (electrocardiogram), a specialist will review it. You will be notified of any new findings that may affect your care.  Note: If you are age 65 or older, or if you have a chronic lung disease or condition that affects your immune system, or you smoke, talk to your healthcare provider about having pneumococcal vaccinations and a yearly influenza vaccination (flu shot).  When to seek medical advice  Call your healthcare provider right away if any of these occur:    Fever of 100.4 F (38 C) or higher    Coughing up increased amounts of colored sputum    Weakness, drowsiness, headache, facial pain, ear pain, or a stiff neck  Call 911, or get immediate medical care  Contact emergency services right away if any of these occur:    Coughing up blood    Worsening weakness, drowsiness, headache, or stiff neck    Trouble breathing, wheezing, or pain with breathing  Date Last Reviewed: 9/13/2015 2000-2017 [employer]. [address] [place] [address]  [place] [address]. All rights reserved. This information is not intended as a substitute for professional medical care. Always follow your healthcare professional's instructions.

## 2018-03-08 NOTE — PROGRESS NOTES
SUBJECTIVE:   Brittany Barajas is a 57 year old female who presents to clinic today for the following health issues:    Patient would like to increased amitriptyline to 40mg  and get 90 days     Medication Followup of Amitriptyline     Taking Medication as prescribed: yes    Side Effects:  None    Medication Helping Symptoms:  NO-would like an increase in med     Acute Illness   Acute illness concerns: COUGH CHEST CONGESTION   Onset: 2.5weeks    Fever: no    Chills/Sweats: YES- sweats    Headache (location?): YES    Sinus Pressure:YES    Conjunctivitis:  no    Ear Pain: fluid     Rhinorrhea: YES    Congestion: YES- chest     Sore Throat: no      Cough: YES-productive of yellow sputum, productive of green sputum, with shortness of breath    Wheeze: YES    Decreased Appetite: no    Nausea: no    Vomiting: no    Diarrhea:  no    Dysuria/Freq.: no    Fatigue/Achiness: YES    Sick/Strep Exposure: no     Therapies Tried and outcome: tylenol robitussin DM inhalers nebs and fluids   Patient is a 57 yr old female here for follow up on refills on amitriptyline. She has been on this medication for a number of years and she uses this for a number of reasons. Patient states that the 30 mg has not been as effective. Will like an increase.    Patient also has a cold and URI symptoms. Ongoing for about two weeks. Has asthma and has been needing her inhaler more often .     Problem list and histories reviewed & adjusted, as indicated.  Additional history: as documented    Patient Active Problem List   Diagnosis     Mitral valve disorder     Allergic rhinitis     Convulsions (H)     Esophageal reflux     Mild major depression (H)     Plantar fascial fibromatosis     Other affections of shoulder region, not elsewhere classified     Neoplasm of digestive system     Anxiety state     Breast cancer (H)     CARDIOVASCULAR SCREENING; LDL GOAL LESS THAN 160     Transaminase or LDH elevation     H/O: hysterectomy     Colon polyp      Drug reaction     Mild persistent asthma     Urgency incontinence     Advanced directives, counseling/discussion     Past Surgical History:   Procedure Laterality Date     COLONOSCOPY  1/28/2011    COLONOSCOPY performed by ELOY BRIONES at [facility]     COLONOSCOPY  1/20/2012    Procedure:COLONOSCOPY; Surgeon:ELOY BRIONES; Location:[facility]     HYSTERECTOMY, PAP NO LONGER INDICATED  12/1/2008    for fibroids     INJECT EPIDURAL LUMBAR  3/9/2012    Procedure:INJECT EPIDURAL LUMBAR; JAMIL - ; Surgeon:GENERIC ANESTHESIA PROVIDER; Location:[facility]     INJECT EPIDURAL LUMBAR  5/25/2012    Procedure:INJECT EPIDURAL LUMBAR; JAMIL-; Surgeon:GENERIC ANESTHESIA PROVIDER; Location:[facility]     LEEP TX, CERVICAL  10/06    CECILIO 1     SURGICAL HISTORY OF -   10/7/1998    Right parotidectomy-mass     SURGICAL HISTORY OF -       Laparoscopy     SURGICAL HISTORY OF -   10/1996    Fibroid removal       Social History   Substance Use Topics     Smoking status: Never Smoker     Smokeless tobacco: Never Used     Alcohol use Yes      Comment: rarely     Family History   Problem Relation Age of Onset     C.A.D. Mother      DIABETES Mother      Hypertension Mother      CEREBROVASCULAR DISEASE Mother      Breast Cancer Mother      Allergies Mother      Arthritis Mother      CANCER Mother      Eye Disorder Mother      GASTROINTESTINAL DISEASE Mother      Gynecology Mother      Lipids Mother      HEART DISEASE Mother      OSTEOPOROSIS Mother      Obesity Mother      Thyroid Disease Mother      Respiratory Mother      Alcohol/Drug Father      CANCER Father      Alcohol/Drug Brother      Musculoskeletal Disorder Brother      spina bifida-recent dx     C.A.D. Brother      Thyroid Disease Sister      Lipids Sister      Gynecology Sister      fibroids/ s/p hysterectomy     Other - See Comments Sister      hysterectomy     Thyroid Disease Brother      HEART DISEASE Brother      DOUBLE BYPASS     Lipids Brother      Thyroid Disease  Sister      Lipids Sister      Other - See Comments Sister      hysterectomy     GASTROINTESTINAL DISEASE Sister      liver function off     Thyroid Disease Sister      Thyroid Disease Sister      HEART DISEASE Sister      CANCER Other      thyroid cancer     Cancer - colorectal No family hx of          Current Outpatient Prescriptions   Medication Sig Dispense Refill     amitriptyline (ELAVIL) 50 MG tablet Take 1 tablet (50 mg) by mouth At Bedtime 90 tablet 3     predniSONE (DELTASONE) 20 MG tablet Take 2 tablets (40 mg) by mouth daily for 5 days 10 tablet 0     amitriptyline (ELAVIL) 10 MG tablet TAKE THREE TABLETS BY MOUTH AT BEDTIME (Needs follow-up appointment for this medication) 90 tablet 0     PARoxetine (PAXIL) 20 MG tablet TAKE ONE TABLET BY MOUTH AT BEDTIME 90 tablet 3     albuterol (PROAIR HFA/PROVENTIL HFA/VENTOLIN HFA) 108 (90 BASE) MCG/ACT Inhaler Inhale 2 puffs into the lungs every 6 hours as needed for shortness of breath / dyspnea 2 Inhaler 6     fluticasone (FLONASE) 50 MCG/ACT spray Spray 1-2 sprays into both nostrils daily 1 Bottle 3     ipratropium - albuterol 0.5 mg/2.5 mg/3 mL (DUONEB) 0.5-2.5 (3) MG/3ML neb solution Take 1 vial (3 mLs) by nebulization every 6 hours as needed for shortness of breath / dyspnea 1 Box 2     aspirin 81 MG tablet Take 1 tablet by mouth daily.       Multiple Vitamin (MULTIVITAMIN OR) Take 1 tablet by mouth daily.       B Complex-C (SUPER B COMPLEX PO) Take 1 tablet by mouth daily.       ascorbic acid (VITAMIN C) 500 MG tablet Take 500 mg by mouth daily.       IBUPROFEN PO Take 800 mg by mouth daily as needed for moderate pain       Acetaminophen (TYLENOL PO) Take 500 mg by mouth daily as needed for mild pain or fever       fluticasone (FLOVENT HFA) 110 MCG/ACT Inhaler Inhale 2 puffs into the lungs 2 times daily (Patient taking differently: Inhale 2 puffs into the lungs daily ) 1 Inhaler 3     Allergies   Allergen Reactions     Taxotere Other (See Comments)      "Chest tightness, black out     BP Readings from Last 3 Encounters:   03/08/18 125/88   10/03/17 112/63   07/13/17 128/85    Wt Readings from Last 3 Encounters:   03/08/18 149 lb (67.6 kg)   06/10/17 142 lb (64.4 kg)   05/22/17 142 lb (64.4 kg)                  Labs reviewed in EPIC    Reviewed and updated as needed this visit by clinical staff       Reviewed and updated as needed this visit by Provider         ROS:  Constitutional, HEENT, cardiovascular, pulmonary, gi and gu systems are negative, except as otherwise noted.    OBJECTIVE:     /88 (BP Location: Right arm, Cuff Size: Adult Regular)  Pulse 96  Temp 96.9  F (36.1  C) (Tympanic)  Ht 5' 1\" (1.549 m)  Wt 149 lb (67.6 kg)  LMP 10/17/2008  SpO2 97%  BMI 28.15 kg/m2  Body mass index is 28.15 kg/(m^2).  GENERAL: healthy, alert and no distress  EYES: Eyes grossly normal to inspection, PERRL and conjunctivae and sclerae normal  HENT: normal cephalic/atraumatic, ear canals and TM's normal, nose and mouth without ulcers or lesions, oropharynx clear, oral mucous membranes moist and sinuses: not tender  NECK: no adenopathy, no asymmetry, masses, or scars and thyroid normal to palpation  RESP: lungs clear to auscultation - no rales, rhonchi or wheezes  CV: regular rate and rhythm, normal S1 S2, no S3 or S4, no murmur, click or rub, no peripheral edema and peripheral pulses strong  MS: no gross musculoskeletal defects noted, no edema    Diagnostic Test Results:  none     ASSESSMENT/PLAN:       1. Generalized anxiety disorder  Increased the amitriptyline to 50 mg daily   - amitriptyline (ELAVIL) 50 MG tablet; Take 1 tablet (50 mg) by mouth At Bedtime  Dispense: 90 tablet; Refill: 3    2. Insomnia, unspecified type  - amitriptyline (ELAVIL) 50 MG tablet; Take 1 tablet (50 mg) by mouth At Bedtime  Dispense: 90 tablet; Refill: 3    3. Bronchitis  Asked that she call early next week if not better  - predniSONE (DELTASONE) 20 MG tablet; Take 2 tablets (40 mg) by " mouth daily for 5 days  Dispense: 10 tablet; Refill: 0    FUTURE APPOINTMENTS:       - Follow-up visit as needed  Patient Instructions         Thank you for choosing [facility].  You may be receiving a survey in the mail from Perlita Archer regarding your visit today.  Please take a few minutes to complete and return the survey to let us know how we are doing.      If you have questions or concerns, please contact us via "[employer] or you can contact your care team at [phone].    Our Clinic hours are:  Monday 6:40 am  to 7:00 pm  Tuesday -Friday 6:40 am to 5:00 pm    The [place] outpatient lab hours are:  Monday - Friday 6:10 am to 4:45 pm  Saturdays 7:00 am to 11:00 am  Appointments are required, call [phone]    If you have clinical questions after hours or would like to schedule an appointment,  call the clinic at [phone].  Bronchitis, Viral (Adult)    You have a viral bronchitis. Bronchitis is inflammation and swelling of the lining of the lungs. This is often caused by an infection. Symptoms include a dry, hacking cough that is worse at night. The cough may bring up yellow-green mucus. You may also feel short of breath or wheeze. Other symptoms may include tiredness, chest discomfort, and chills.  Bronchitis that is caused by a virus is not treated with antibiotics. Instead, medicines may be given to help relieve symptoms. Symptoms can last up to 2 weeks, although the cough may last much longer.  This illness is contagious during the first few days and is spread through the air by coughing and sneezing, or by direct contact (touching the sick person and then touching your own eyes, nose, or mouth).  Most viral illnesses resolve within 10 to 14 days with rest and simple home remedies, although they may sometimes last for several weeks.  Home care    If symptoms are severe, rest at home for the first 2 to 3 days. When you go back to your usual activities, don't let yourself get too tired.    Do not  smoke. Also avoid being exposed to secondhand smoke.    You may use over-the-counter medicine to control fever or pain, unless another pain medicine was prescribed. (Note: If you have chronic liver or kidney disease or have ever had a stomach ulcer or gastrointestinal bleeding, talk with your healthcare provider before using these medicines. Also talk to your provider if you are taking medicine to prevent blood clots.) Aspirin should never be given to anyone younger than 18 years of age who is ill with a viral infection or fever. It may cause severe liver or brain damage.    Your appetite may be poor, so a light diet is fine. Avoid dehydration by drinking 6 to 8 glasses of fluids per day (such as water, soft drinks, sports drinks, juices, tea, or soup). Extra fluids will help loosen secretions in the nose and lungs.    Over-the-counter cough, cold, and sore-throat medicines will not shorten the length of the illness, but they may help to reduce symptoms. (Note: Do not use decongestants if you have high blood pressure.)  Follow-up care  Follow up with your healthcare provider, or as advised. If you had an X-ray or ECG (electrocardiogram), a specialist will review it. You will be notified of any new findings that may affect your care.  Note: If you are age 65 or older, or if you have a chronic lung disease or condition that affects your immune system, or you smoke, talk to your healthcare provider about having pneumococcal vaccinations and a yearly influenza vaccination (flu shot).  When to seek medical advice  Call your healthcare provider right away if any of these occur:    Fever of 100.4 F (38 C) or higher    Coughing up increased amounts of colored sputum    Weakness, drowsiness, headache, facial pain, ear pain, or a stiff neck  Call 911, or get immediate medical care  Contact emergency services right away if any of these occur:    Coughing up blood    Worsening weakness, drowsiness, headache, or stiff  neck    Trouble breathing, wheezing, or pain with breathing  Date Last Reviewed: 9/13/2015 2000-2017 [employer]. [address] [place] [address], [place] [address]. All rights reserved. This information is not intended as a substitute for professional medical care. Always follow your healthcare professional's instructions.            Aljeandrina Duran MD  [facility]

## 2018-03-09 ASSESSMENT — ANXIETY QUESTIONNAIRES: GAD7 TOTAL SCORE: 8

## 2018-03-09 ASSESSMENT — ASTHMA QUESTIONNAIRES: ACT_TOTALSCORE: 8

## 2018-03-09 ASSESSMENT — PATIENT HEALTH QUESTIONNAIRE - PHQ9: SUM OF ALL RESPONSES TO PHQ QUESTIONS 1-9: 9

## 2018-03-13 ENCOUNTER — TELEPHONE (OUTPATIENT)
Dept: FAMILY MEDICINE | Facility: CLINIC | Age: 58
End: 2018-03-13

## 2018-03-13 DIAGNOSIS — J20.9 ACUTE BRONCHITIS, UNSPECIFIED ORGANISM: Primary | ICD-10-CM

## 2018-03-13 RX ORDER — DOXYCYCLINE HYCLATE 100 MG
100 TABLET ORAL 2 TIMES DAILY
Qty: 14 TABLET | Refills: 0 | Status: SHIPPED | OUTPATIENT
Start: 2018-03-13 | End: 2018-06-11

## 2018-03-13 NOTE — TELEPHONE ENCOUNTER
S-(situation): cough    B-(background): evaluated last week    A-(assessment): voice is worse, coughing is getting worse. Upper chest muscle is sore. Nasal drainage is yellow green that she is coughing up. She is not sleeping. No fever. Post nasal drip. Body aches head to toe. No sore throat. Headache- sinus area on the left side. 7/10 for the sinus pain. Rest is helping some. Pushing fluids. Went to work yesterday so very fatigued. Wheezing. Hard time catching her breath once she starts coughing. She states she has a high pitched squeak after she starts coughing. She states this started with influenza 3 weeks ago and is just not getting better. Intercostal pain on the right side from all the coughing. Inhalers are helping short term- using every hour hours and using nebs in between the inhalers when she is home. She is out of robitussin so she is not using that any longer. Flonase is dries out the post nasal drip but not helpful in the cough. Ocean spray is not helpful either.   Prednisone is not helpful at all. She just took the last dose this morning.     R-(recommendations): patient advised at last clinic visit to check in early this week if not feeling better after prednisone for an antibiotic. Will route to Dr. Duran for consideration.       Pharmacy- [facility] pharmacy -

## 2018-03-13 NOTE — TELEPHONE ENCOUNTER
Reason for Call:  Other med request    Detailed comments: Patient states that the prednisone is not helping and she needs an antibiotic.    Phone Number Patient can be reached at: Home number on file [phone] (home)    Best Time: any    Can we leave a detailed message on this number? YES    Call taken on 3/13/2018 at 7:11 AM by Rosie Lozada

## 2018-03-24 ENCOUNTER — HEALTH MAINTENANCE LETTER (OUTPATIENT)
Age: 58
End: 2018-03-24

## 2018-04-05 NOTE — TELEPHONE ENCOUNTER
Panel Management Review      Patient has the following on her problem list:     Depression / Dysthymia review    Measure:  Needs PHQ-9 score of 4 or less during index window.  Administer PHQ-9 and if score is 5 or more, send encounter to provider for next steps.      PHQ-9 SCORE 10/4/2016 10/2/2017 3/8/2018   Total Score - - -   Total Score 16 12 9       If PHQ-9 recheck is 5 or more, route to provider for next steps.    Patient is due for:  None    Asthma review     ACT Total Scores 3/8/2018   ACT TOTAL SCORE -   ASTHMA ER VISITS -   ASTHMA HOSPITALIZATIONS -   ACT TOTAL SCORE (Goal Greater than or Equal to 20) 8   In the past 12 months, how many times did you visit the emergency room for your asthma without being admitted to the hospital? 0   In the past 12 months, how many times were you hospitalized overnight because of your asthma? 0      1. Is Asthma diagnosis on the Problem List? Yes    2. Is Asthma listed on Health Maintenance? Yes    3. Patient is due for:  ACT      Composite cancer screening  Chart review shows that this patient is due/due soon for the following Mammogram and Colonoscopy  Summary:    Patient is due/failing the following:   ACT, COLONOSCOPY and MAMMOGRAM    Action needed:   Patient needs to do ACT. and due for mammogram and colonoscopy     Type of outreach:    Sent letter. with ACT to fill out and mail back. Also due for mammogram and colonoscopy. Reminder letter sent     Questions for provider review:    Kassie BACA CMA

## 2018-04-07 ENCOUNTER — HEALTH MAINTENANCE LETTER (OUTPATIENT)
Age: 58
End: 2018-04-07

## 2018-06-02 ENCOUNTER — HOSPITAL ENCOUNTER (EMERGENCY)
Facility: CLINIC | Age: 58
Discharge: HOME | End: 2018-06-02
Attending: PHYSICIAN ASSISTANT | Admitting: PHYSICIAN ASSISTANT
Payer: MEDICAID

## 2018-06-02 VITALS
TEMPERATURE: 98.2 F | HEIGHT: 61 IN | SYSTOLIC BLOOD PRESSURE: 124 MMHG | WEIGHT: 142 LBS | BODY MASS INDEX: 26.81 KG/M2 | RESPIRATION RATE: 16 BRPM | DIASTOLIC BLOOD PRESSURE: 69 MMHG | OXYGEN SATURATION: 98 %

## 2018-06-02 DIAGNOSIS — J45.901 EXACERBATION OF ASTHMA, UNSPECIFIED ASTHMA SEVERITY, UNSPECIFIED WHETHER PERSISTENT: Primary | ICD-10-CM

## 2018-06-02 DIAGNOSIS — J06.9 UPPER RESPIRATORY TRACT INFECTION, UNSPECIFIED TYPE: ICD-10-CM

## 2018-06-02 PROCEDURE — 99213 OFFICE O/P EST LOW 20 MIN: CPT | Mod: Z6 | Performed by: PHYSICIAN ASSISTANT

## 2018-06-02 PROCEDURE — G0463 HOSPITAL OUTPT CLINIC VISIT: HCPCS | Performed by: PHYSICIAN ASSISTANT

## 2018-06-02 RX ORDER — PREDNISONE 20 MG/1
TABLET ORAL
Qty: 10 TABLET | Refills: 0 | Status: SHIPPED | OUTPATIENT
Start: 2018-06-02 | End: 2018-06-11

## 2018-06-02 ASSESSMENT — ENCOUNTER SYMPTOMS
CONSTITUTIONAL NEGATIVE: 1
CHEST TIGHTNESS: 1
SHORTNESS OF BREATH: 1
COUGH: 1
WHEEZING: 1
MUSCULOSKELETAL NEGATIVE: 1
FEVER: 0
CARDIOVASCULAR NEGATIVE: 1
RHINORRHEA: 1

## 2018-06-02 NOTE — ED AVS SNAPSHOT
[facility] Emergency Department    [address] [facility] [address]    Phone:  [phone]    Fax:  [phone]                                       Brittany Barajas   MRN: [id number]    Department:  [facility] Emergency Department   Date of Visit:  6/2/2018           Patient Information     Date Of Birth          1960        Your diagnoses for this visit were:     Exacerbation of asthma, unspecified asthma severity, unspecified whether persistent     Upper respiratory tract infection, unspecified type        You were seen by Romelia Fraser PA-C.      Follow-up Information     Follow up with Alejandrina Duran MD. Call in 5 days.    Specialty:  Family Practice    Why:  As needed, For persistent symptoms    Contact information:    [address] [place] [address]  [phone]          Follow up with [facility] Emergency [facility].    Specialty:  EMERGENCY MEDICINE    Why:  As needed, If symptoms worsen    Contact information:    [address] [place] [id number] [phone]    Additional information:    The medical center is located at   [address] [place] [address] (between [address] and   [facility] in [place], [place] miles north   of [place]).        Discharge Instructions         Asthma (Adult)  Asthma is a disease where the medium and  small air passages within the lung go into spasm and restrict the flow of air. Inflammation and swelling of the airways cause further blockage. During an acute asthma attack, these factors cause trouble breathing, wheezing, cough and chest tightness.    An asthma attack can be triggered by many things. Common triggers include infections such as the common cold, bronchitis, and pneumonia. Irritants such as smoke or pollutants in the air, very cold air, emotional upset, and exercise can also trigger an attack. In many adults with asthma, allergies to dust, mold, pollen and animal dander can cause an asthma attack. Skipping  "doses of daily asthma medicine can also bring on an asthma attack.  Asthma can be controlled using the proper medicines prescribed by your healthcare provider and avoiding exposure to known triggers including allergens and irritants.  Home care    Take prescribed medicine exactly at the times advised. If you need medicine such as from a hand held inhaler or aerosol breathing machine more than every 4 hours, contact your healthcare provider or seek immediate medical attention. If prescribed an antibiotic or prednisone, take all of the medicine as prescribed, even if you are feeling better after a few days.    Don't smoke. Avoid being exposed to the smoke of others.    Some people with asthma have worsening of their symptoms when they take aspirin and non-steroidal or fever-reducing medicines like ibuprofen and naproxen. Talk to your healthcare provider if you think this may apply to you.  Follow-up care  Follow up with your healthcare provider, or as advised. Always bring all of your current medicines to any appointments with your healthcare provider. Also bring a complete list of medicines even those not taken for asthma. If you don't already have one, talk to your healthcare provider about developing your own \"Asthma Action Plan.\"  A pneumococcal (pneumonia) vaccine and yearly flu shot (every fall) are recommended. Ask your doctor about this.  When to seek medical advice  Call your healthcare provider right away if any of these occur:     Increased wheezing or shortness of breath    Need to use your inhalers more often than usual without relief    Fever of 100.4 F (38 C) or higher, or as directed by your healthcare provider    Coughing up lots of dark-colored or bloody sputum (mucus)    Chest pain with each breath    If you use a peak flow meter as part of an Asthma Action Plan, and you are still in the yellow zone (50% to 80%) 15 minutes after using inhaler medicine.  Call 911  Call 911 if any of the following " occur    Trouble walking or talking because of shortness of breath    If you use a peak flow meter as part of an Asthma Action Plan and you are still in the red zone (less than 50%) 15 minutes after using inhaler medicine    Lips or fingernails turning gray or blue  Date Last Reviewed: 5/1/2017 2000-2017 [employer]. [address] [place] [address]. All rights reserved. This information is not intended as a substitute for professional medical care. Always follow your healthcare professional's instructions.          24 Hour Appointment Hotline       To make an appointment at any [facility] [place], call [phone] ([phone]). If you don't have a family doctor or clinic, we will help you find one. [place] clinics are conveniently located to serve the needs of you and your family.             Review of your medicines      START taking        Dose / Directions Last dose taken    predniSONE 20 MG tablet   Commonly known as:  DELTASONE   Quantity:  10 tablet        Take two tablets (= 40mg) each day for 5 (five) days   Refills:  0          Our records show that you are taking the medicines listed below. If these are incorrect, please call your family doctor or clinic.        Dose / Directions Last dose taken    albuterol 108 (90 Base) MCG/ACT Inhaler   Commonly known as:  PROAIR HFA/PROVENTIL HFA/VENTOLIN HFA   Dose:  2 puff   Quantity:  2 Inhaler        Inhale 2 puffs into the lungs every 6 hours as needed for shortness of breath / dyspnea   Refills:  6        * amitriptyline 10 MG tablet   Commonly known as:  ELAVIL   Quantity:  90 tablet        TAKE THREE TABLETS BY MOUTH AT BEDTIME (Needs follow-up appointment for this medication)   Refills:  0        * amitriptyline 50 MG tablet   Commonly known as:  ELAVIL   Dose:  50 mg   Quantity:  90 tablet        Take 1 tablet (50 mg) by mouth At Bedtime   Refills:  3        ascorbic acid 500 MG tablet   Commonly known as:  VITAMIN C   Dose:   500 mg        Take 500 mg by mouth daily.   Refills:  0        aspirin 81 MG tablet   Dose:  1 tablet        Take 1 tablet by mouth daily.   Refills:  0        doxycycline 100 MG tablet   Commonly known as:  VIBRA-TABS   Dose:  100 mg   Quantity:  14 tablet        Take 1 tablet (100 mg) by mouth 2 times daily   Refills:  0        fluticasone 110 MCG/ACT Inhaler   Commonly known as:  FLOVENT HFA   Dose:  2 puff   Quantity:  1 Inhaler        Inhale 2 puffs into the lungs 2 times daily   Refills:  3        fluticasone 50 MCG/ACT spray   Commonly known as:  FLONASE   Dose:  1-2 spray   Quantity:  1 Bottle        Spray 1-2 sprays into both nostrils daily   Refills:  3        IBUPROFEN PO   Dose:  800 mg        Take 800 mg by mouth daily as needed for moderate pain   Refills:  0        ipratropium - albuterol 0.5 mg/2.5 mg/3 mL 0.5-2.5 (3) MG/3ML neb solution   Commonly known as:  DUONEB   Dose:  3 mL   Quantity:  1 Box        Take 1 vial (3 mLs) by nebulization every 6 hours as needed for shortness of breath / dyspnea   Refills:  2        MULTIVITAMIN PO   Dose:  1 tablet        Take 1 tablet by mouth daily.   Refills:  0        PARoxetine 20 MG tablet   Commonly known as:  PAXIL   Quantity:  90 tablet        TAKE ONE TABLET BY MOUTH AT BEDTIME   Refills:  3        SUPER B COMPLEX PO   Dose:  1 tablet        Take 1 tablet by mouth daily.   Refills:  0        TYLENOL PO   Dose:  500 mg        Take 500 mg by mouth daily as needed for mild pain or fever   Refills:  0        * Notice:  This list has 2 medication(s) that are the same as other medications prescribed for you. Read the directions carefully, and ask your doctor or other care provider to review them with you.            Prescriptions were sent or printed at these locations (1 Prescription)                   [facility] [place], [place] - [address] [facility]   [address] [facility] [address]    Telephone:  [phone]   Fax:  [phone]   Hours:                   E-Prescribed (1 of 1)         predniSONE (DELTASONE) 20 MG tablet                Orders Needing Specimen Collection     None      Pending Results     No orders found from 5/31/2018 to 6/3/2018.            Pending Culture Results     No orders found from 5/31/2018 to 6/3/2018.            Pending Results Instructions     If you had any lab results that were not finalized at the time of your Discharge, you can call the ED Lab Result RN at [phone]. You will be contacted by this team for any positive Lab results or changes in treatment. The nurses are available 7 days a week from 10A to 6:30P.  You can leave a message 24 hours per day and they will return your call.        Test Results From Your Hospital Stay               Thank you for choosing [facility]       Thank you for choosing [facility] for your care. Our goal is always to provide you with excellent care. Hearing back from our patients is one way we can continue to improve our services. Please take a few minutes to complete the written survey that you may receive in the mail after you visit with us. Thank you!        [employer] Information     [employer] gives you secure access to your electronic health record. If you see a primary care provider, you can also send messages to your care team and make appointments. If you have questions, please call your primary care clinic.  If you do not have a primary care provider, please call [phone] and they will assist you.        Care EveryWhere ID     This is your Care EveryWhere ID. This could be used by other organizations to access your [facility] medical records  [id number]        Equal Access to Services     JOSÉ SAHNI AH: Hadii sav Kelly, waaxda luqadaha, qaybta kaalmada eren, hiram machado. So [facility] [phone].    ATENCIÓN: Si habla español, tiene a johnston disposición servicios gratuitos de asistencia lingüística. Llame al [phone].    We comply with  applicable federal civil rights laws and [place] laws. We do not discriminate on the basis of race, color, national origin, age, disability, sex, sexual orientation, or gender identity.            After Visit Summary       This is your record. Keep this with you and show to your community pharmacist(s) and doctor(s) at your next visit.

## 2018-06-02 NOTE — ED AVS SNAPSHOT
[facility] Emergency Department    [address] [facility] [address]    Phone:  [phone]    Fax:  [phone]                                       Brittany Barajas   MRN: [id number]    Department:  [facility] Emergency Department   Date of Visit:  6/2/2018           After Visit Summary Signature Page     I have received my discharge instructions, and my questions have been answered. I have discussed any challenges I see with this plan with the nurse or doctor.    ..........................................................................................................................................  Patient/Patient Representative Signature      ..........................................................................................................................................  Patient Representative Print Name and Relationship to Patient    ..................................................               ................................................  Date                                            Time    ..........................................................................................................................................  Reviewed by Signature/Title    ...................................................              ..............................................  Date                                                            Time

## 2018-06-02 NOTE — ED PROVIDER NOTES
History     Chief Complaint   Patient presents with     Asthma     2 day     HPI  Brittany Barajas is a 57 year old female with history of mild persistent asthma, allergic rhinitis who presents with complaints of asthma exacerbation.  Patient states she has been experiencing a productive cough and some mild congestion over the past 2 days.  She states this has since flared her asthma, and she has developed associated wheezing and shortness of breath along with chest tightness.  Pt states the symptoms feel consistent with her asthma flares in the past and knows when she gets like that she needs to get on Prednisone.  Patient has been using her inhaler and nebulizer at home with minimal improvement.  She denies fevers, chills, rash, neck pain/stiffness, or chest pain.      Problem List:    Patient Active Problem List    Diagnosis Date Noted     Advanced directives, counseling/discussion 11/04/2015     Priority: Medium     Advance Care Planning 11/4/2015: ACP Review and Resources Provided:  Reviewed chart for advance care plan.  Brittany Barajas has no plan or code status on file. Discussed available resources and provided with information. Confirmed code status reflects current choices pending further ACP discussions.  Confirmed/documented legally designated decision maker(s). Added by Leonie Alav             Urgency incontinence 01/02/2014     Priority: Medium     Mild persistent asthma 07/02/2013     Priority: Medium     Drug reaction 05/16/2012     Priority: Medium     sideeffects from donald  See May 16 2012 note       H/O: hysterectomy 11/29/2011     Priority: Medium     No pap needed       Colon polyp 11/29/2011     Priority: Medium     2011 colonoscopy --unable to get polyp out due to technical issues-repeat suggested  2012-colonoscopy normal--suggested repeat three years       Transaminase or LDH elevation 11/28/2010     Priority: Medium     Mild and stable in 2010       CARDIOVASCULAR SCREENING; LDL  GOAL LESS THAN 160 10/31/2010     Priority: Medium     Breast cancer (H) 11/17/2009     Priority: Medium     09       Anxiety state 12/08/2007     Priority: Medium     Problem list name updated by automated process. Provider to review       Mitral valve disorder 12/01/2005     Priority: Medium     MVP  2010 echo--Mild mitral valve prolapse, posterior leaflet.  There is mild to moderate (1-2+) mitral regurgitation  Problem list name updated by automated process. Provider to review       Allergic rhinitis 12/01/2005     Priority: Medium     Problem list name updated by automated process. 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 Provider to review          Past Medical History:    Past Medical History:   Diagnosis Date     Allergic rhinitis, cause unspecified      Depressive disorder, not elsewhere classified      Esophageal reflux      Intramural leiomyoma of uterus      Mitral valve disorders(424.0)      Other affections of shoulder region, not elsewhere classified      Other convulsions      Other diseases of trachea and bronchus, not elsewhere classified      Papanicolaou smear of cervix with low grade squamous intraepithelial lesion (LGSIL) 11/09     Plantar fascial fibromatosis        Past Surgical History:    Past Surgical History:   Procedure Laterality Date     COLONOSCOPY  1/28/2011    COLONOSCOPY performed  by ELOY BRIONES at [facility]     COLONOSCOPY  1/20/2012    Procedure:COLONOSCOPY; Surgeon:ELOY BRIONES; Location:[facility]     HYSTERECTOMY, PAP NO LONGER INDICATED  12/1/2008    for fibroids     INJECT EPIDURAL LUMBAR  3/9/2012    Procedure:INJECT EPIDURAL LUMBAR; JAMIL - ; Surgeon:GENERIC ANESTHESIA PROVIDER; Location:[facility]     INJECT EPIDURAL LUMBAR  5/25/2012    Procedure:INJECT EPIDURAL LUMBAR; JAMIL-; Surgeon:GENERIC ANESTHESIA PROVIDER; Location:[facility]     LEEP TX, CERVICAL  10/06    CECILIO 1     SURGICAL HISTORY OF -   10/7/1998    Right parotidectomy-mass     SURGICAL HISTORY OF -       Laparoscopy     SURGICAL HISTORY OF -   10/1996    Fibroid removal       Family History:    Family History   Problem Relation Age of Onset     C.A.D. Mother      DIABETES Mother      Hypertension Mother      CEREBROVASCULAR DISEASE Mother      Breast Cancer Mother      Allergies Mother      Arthritis Mother      CANCER Mother      Eye Disorder Mother      GASTROINTESTINAL DISEASE Mother      Gynecology Mother      Lipids Mother      HEART DISEASE Mother      OSTEOPOROSIS Mother      Obesity Mother      Thyroid Disease Mother      Respiratory Mother      Alcohol/Drug Father      CANCER Father      Alcohol/Drug Brother      Musculoskeletal Disorder Brother      spina bifida-recent dx     C.A.D. Brother      Thyroid Disease Sister      Lipids Sister      Gynecology Sister      fibroids/ s/p hysterectomy     Other - See Comments Sister      hysterectomy     Thyroid Disease Brother      HEART DISEASE Brother      DOUBLE BYPASS     Lipids Brother      Thyroid Disease Sister      Lipids Sister      Other - See Comments Sister      hysterectomy     GASTROINTESTINAL DISEASE Sister      liver function off     Thyroid Disease Sister      Thyroid Disease Sister      HEART DISEASE Sister      CANCER Other      thyroid cancer     Cancer - colorectal No family hx of        Social History:  Marital Status:  Single [1]  Social  "History   Substance Use Topics     Smoking status: Never Smoker     Smokeless tobacco: Never Used     Alcohol use Yes      Comment: rarely        Medications:      predniSONE (DELTASONE) 20 MG tablet   Acetaminophen (TYLENOL PO)   albuterol (PROAIR HFA/PROVENTIL HFA/VENTOLIN HFA) 108 (90 BASE) MCG/ACT Inhaler   amitriptyline (ELAVIL) 10 MG tablet   amitriptyline (ELAVIL) 50 MG tablet   ascorbic acid (VITAMIN C) 500 MG tablet   aspirin 81 MG tablet   B Complex-C (SUPER B COMPLEX PO)   doxycycline (VIBRA-TABS) 100 MG tablet   fluticasone (FLONASE) 50 MCG/ACT spray   fluticasone (FLOVENT HFA) 110 MCG/ACT Inhaler   IBUPROFEN PO   ipratropium - albuterol 0.5 mg/2.5 mg/3 mL (DUONEB) 0.5-2.5 (3) MG/3ML neb solution   Multiple Vitamin (MULTIVITAMIN OR)   PARoxetine (PAXIL) 20 MG tablet         Review of Systems   Constitutional: Negative.  Negative for fever.   HENT: Positive for congestion and rhinorrhea.    Respiratory: Positive for cough, chest tightness, shortness of breath and wheezing.    Cardiovascular: Negative.    Musculoskeletal: Negative.    Skin: Negative.    All other systems reviewed and are negative.      Physical Exam   BP: 124/69  Heart Rate: 94  Temp: 98.2  F (36.8  C)  Resp: 16  Height: 154.9 cm (5' 1\")  Weight: 64.4 kg (142 lb)  SpO2: 98 %      Physical Exam   Constitutional: She is oriented to person, place, and time. She appears well-developed and well-nourished.  Non-toxic appearance. No distress.   HENT:   Head: Normocephalic and atraumatic.   Right Ear: Tympanic membrane, external ear and ear canal normal.   Left Ear: Tympanic membrane, external ear and ear canal normal.   Nose: Mucosal edema and rhinorrhea present.   Mouth/Throat: Uvula is midline, oropharynx is clear and moist and mucous membranes are normal. No uvula swelling. No oropharyngeal exudate, posterior oropharyngeal edema, posterior oropharyngeal erythema or tonsillar abscesses.   Eyes: Conjunctivae and EOM are normal. Pupils are equal, " round, and reactive to light.   Neck: Normal range of motion and full passive range of motion without pain. Neck supple. No rigidity. Normal range of motion present.   Cardiovascular: Normal rate, regular rhythm and normal heart sounds.    Pulmonary/Chest: Effort normal and breath sounds normal. No accessory muscle usage. No respiratory distress. She has no decreased breath sounds. She has no wheezes. She has no rhonchi. She has no rales.   Lymphadenopathy:     She has no cervical adenopathy.   Neurological: She is alert and oriented to person, place, and time.   Skin: Skin is warm and dry. No rash noted.       ED Course     ED Course     Procedures    No results found for this or any previous visit (from the past 24 hour(s)).    Medications - No data to display    Assessments & Plan (with Medical Decision Making)     Pt is a 57 year old female with history of mild persistent asthma, allergic rhinitis who presents with complaints of asthma exacerbation.  Patient states she has been experiencing a productive cough and some mild congestion over the past 2 days.  She states this has since flared her asthma, and she has developed associated wheezing and shortness of breath along with chest tightness.  Pt states the symptoms feel consistent with her asthma flares in the past and knows when she gets like that she needs to get on Prednisone.  Patient has been using her inhaler and nebulizer at home with minimal improvement.  Pt is afebrile on arrival.  Exam as above.  Return precautions were reviewed.  Hand-outs were provided.    Patient was sent with Prednisone burst and was instructed to follow-up with PCP if no improvement in 5-7 days for continued care and management or sooner if new or worsening symptoms.  She is to return to the ED for persistent and/or worsening symptoms.  Patient expressed understanding of the diagnosis and plan and was discharged home in good condition.    I have reviewed the nursing notes.    I  have reviewed the findings, diagnosis, plan and need for follow up with the patient.    Discharge Medication List as of 6/2/2018  2:42 PM      START taking these medications    Details   predniSONE (DELTASONE) 20 MG tablet Take two tablets (= 40mg) each day for 5 (five) days, Disp-10 tablet, R-0, E-Prescribe             Final diagnoses:   Exacerbation of asthma, unspecified asthma severity, unspecified whether persistent   Upper respiratory tract infection, unspecified type       6/2/2018   [facility] EMERGENCY DEPARTMENT      Disclaimer:  This note consists of symbols derived from keyboarding, dictation and/or voice recognition software.  As a result, there may be errors in the script that have gone undetected.  Please consider this when interpreting information found in this chart.     Romelia Fraser PA-C  06/02/18 3440

## 2018-06-02 NOTE — DISCHARGE INSTRUCTIONS
"  Asthma (Adult)  Asthma is a disease where the medium and  small air passages within the lung go into spasm and restrict the flow of air. Inflammation and swelling of the airways cause further blockage. During an acute asthma attack, these factors cause trouble breathing, wheezing, cough and chest tightness.    An asthma attack can be triggered by many things. Common triggers include infections such as the common cold, bronchitis, and pneumonia. Irritants such as smoke or pollutants in the air, very cold air, emotional upset, and exercise can also trigger an attack. In many adults with asthma, allergies to dust, mold, pollen and animal dander can cause an asthma attack. Skipping doses of daily asthma medicine can also bring on an asthma attack.  Asthma can be controlled using the proper medicines prescribed by your healthcare provider and avoiding exposure to known triggers including allergens and irritants.  Home care    Take prescribed medicine exactly at the times advised. If you need medicine such as from a hand held inhaler or aerosol breathing machine more than every 4 hours, contact your healthcare provider or seek immediate medical attention. If prescribed an antibiotic or prednisone, take all of the medicine as prescribed, even if you are feeling better after a few days.    Don't smoke. Avoid being exposed to the smoke of others.    Some people with asthma have worsening of their symptoms when they take aspirin and non-steroidal or fever-reducing medicines like ibuprofen and naproxen. Talk to your healthcare provider if you think this may apply to you.  Follow-up care  Follow up with your healthcare provider, or as advised. Always bring all of your current medicines to any appointments with your healthcare provider. Also bring a complete list of medicines even those not taken for asthma. If you don't already have one, talk to your healthcare provider about developing your own \"Asthma Action Plan.\"  A " pneumococcal (pneumonia) vaccine and yearly flu shot (every fall) are recommended. Ask your doctor about this.  When to seek medical advice  Call your healthcare provider right away if any of these occur:     Increased wheezing or shortness of breath    Need to use your inhalers more often than usual without relief    Fever of 100.4 F (38 C) or higher, or as directed by your healthcare provider    Coughing up lots of dark-colored or bloody sputum (mucus)    Chest pain with each breath    If you use a peak flow meter as part of an Asthma Action Plan, and you are still in the yellow zone (50% to 80%) 15 minutes after using inhaler medicine.  Call 911  Call 911 if any of the following occur    Trouble walking or talking because of shortness of breath    If you use a peak flow meter as part of an Asthma Action Plan and you are still in the red zone (less than 50%) 15 minutes after using inhaler medicine    Lips or fingernails turning gray or blue  Date Last Reviewed: 5/1/2017 2000-2017 [employer]. [address] [place] [address], [place] [address]. All rights reserved. This information is not intended as a substitute for professional medical care. Always follow your healthcare professional's instructions.

## 2018-06-02 NOTE — LETTER
June 2, 2018      To Whom It May Concern:      Brittany Barajas was seen in our Emergency Department today, 06/02/18.  Please excuse from work on 6/1/18-6/3/18.  Patient may return to work on 6/4/18.  Thank you.      Sincerely,        Romelia Fraser PA-C

## 2018-06-11 ENCOUNTER — OFFICE VISIT (OUTPATIENT)
Dept: FAMILY MEDICINE | Facility: CLINIC | Age: 58
End: 2018-06-11

## 2018-06-11 DIAGNOSIS — F32.0 MILD MAJOR DEPRESSION (H): Primary | ICD-10-CM

## 2018-06-11 PROCEDURE — 99207 ZZC NO CHARGE NURSE ONLY: CPT

## 2018-06-11 NOTE — PROGRESS NOTES
S-(situation): patient is walk in to clinic asking for names of counselors    B-(background): patient has lost her last three jobs due to missing too much work.  Lives alone,  15 years ago.  No children.  House is being foreclosed on.  Breast cancer 8 years ago.   Has three cats. Had chemo and radiation.  States still has issues with this.  Is taking Paxil 20 mg and amitriptyline at HS.    Denies being suicidal or at harm to self or anyone else.    Is less teary eyed the more we are talking.     A-(assessment): depression    R-(recommendations): advised to go to the ED if worse or call 911 or call mental health numbers I gave her.  Appointment made for her to see doctor tomorrow.  Isabela Flores RN

## 2018-06-19 ENCOUNTER — TELEPHONE (OUTPATIENT)
Dept: FAMILY MEDICINE | Facility: CLINIC | Age: 58
End: 2018-06-19

## 2018-06-19 ENCOUNTER — OFFICE VISIT (OUTPATIENT)
Dept: FAMILY MEDICINE | Facility: CLINIC | Age: 58
End: 2018-06-19
Payer: MEDICAID

## 2018-06-19 VITALS
OXYGEN SATURATION: 96 % | WEIGHT: 150 LBS | HEART RATE: 110 BPM | TEMPERATURE: 98.2 F | BODY MASS INDEX: 28.34 KG/M2 | SYSTOLIC BLOOD PRESSURE: 120 MMHG | DIASTOLIC BLOOD PRESSURE: 82 MMHG

## 2018-06-19 DIAGNOSIS — Z12.31 VISIT FOR SCREENING MAMMOGRAM: ICD-10-CM

## 2018-06-19 DIAGNOSIS — F33.1 MODERATE RECURRENT MAJOR DEPRESSION (H): Primary | ICD-10-CM

## 2018-06-19 DIAGNOSIS — T30.0 BURN: ICD-10-CM

## 2018-06-19 DIAGNOSIS — Z12.11 SCREEN FOR COLON CANCER: ICD-10-CM

## 2018-06-19 PROCEDURE — 99214 OFFICE O/P EST MOD 30 MIN: CPT | Performed by: FAMILY MEDICINE

## 2018-06-19 RX ORDER — PAROXETINE 10 MG/1
10 TABLET, FILM COATED ORAL AT BEDTIME
Qty: 90 TABLET | Refills: 3 | Status: SHIPPED | OUTPATIENT
Start: 2018-06-19 | End: 2019-05-19

## 2018-06-19 ASSESSMENT — ANXIETY QUESTIONNAIRES
GAD7 TOTAL SCORE: 15
1. FEELING NERVOUS, ANXIOUS, OR ON EDGE: NEARLY EVERY DAY
6. BECOMING EASILY ANNOYED OR IRRITABLE: SEVERAL DAYS
5. BEING SO RESTLESS THAT IT IS HARD TO SIT STILL: NOT AT ALL
2. NOT BEING ABLE TO STOP OR CONTROL WORRYING: NEARLY EVERY DAY
3. WORRYING TOO MUCH ABOUT DIFFERENT THINGS: NEARLY EVERY DAY
7. FEELING AFRAID AS IF SOMETHING AWFUL MIGHT HAPPEN: NEARLY EVERY DAY

## 2018-06-19 ASSESSMENT — PATIENT HEALTH QUESTIONNAIRE - PHQ9: 5. POOR APPETITE OR OVEREATING: MORE THAN HALF THE DAYS

## 2018-06-19 NOTE — MR AVS SNAPSHOT
After Visit Summary   6/19/2018    Brittany Barajas    MRN: [id number]           Patient Information     Date Of Birth          1960        Visit Information        Provider Department      6/19/2018 9:00 AM Alejandrina Duran MD [facility]        Today's Diagnoses     Moderate recurrent major depression (H)    -  1    Screen for colon cancer        Visit for screening mammogram           Follow-ups after your visit        Future tests that were ordered for you today     Open Future Orders        Priority Expected Expires Ordered    MA SCREENING DIGITAL BILAT - Future  (s+30) Routine  6/19/2019 6/19/2018            Who to contact     If you have questions or need follow up information about today's clinic visit or your schedule please contact [facility] directly at [phone].  Normal or non-critical lab and imaging results will be communicated to you by MyChart, letter or phone within 4 business days after the clinic has received the results. If you do not hear from us within 7 days, please contact the clinic through MyChart or phone. If you have a critical or abnormal lab result, we will notify you by phone as soon as possible.  Submit refill requests through [facility] or call your pharmacy and they will forward the refill request to us. Please allow 3 business days for your refill to be completed.          Additional Information About Your Visit        MyChart Information     [facility] gives you secure access to your electronic health record. If you see a primary care provider, you can also send messages to your care team and make appointments. If you have questions, please call your primary care clinic.  If you do not have a primary care provider, please call [phone] and they will assist you.        Care EveryWhere ID     This is your Care EveryWhere ID. This could be used by other organizations to access your [place] medical records  [id number]         Your Vitals Were     Pulse Temperature Last Period Pulse Oximetry Breastfeeding? BMI (Body Mass Index)    110 98.2  F (36.8  C) (Tympanic) 10/17/2008 96% No 28.34 kg/m2       Blood Pressure from Last 3 Encounters:   06/19/18 (!) 127/95   06/02/18 124/69   03/08/18 125/88    Weight from Last 3 Encounters:   06/19/18 150 lb (68 kg)   06/02/18 142 lb (64.4 kg)   03/08/18 149 lb (67.6 kg)              We Performed the Following     Asthma Action Plan (AAP)     DEPRESSION ACTION PLAN (DAP)          Today's Medication Changes          These changes are accurate as of 6/19/18  9:31 AM.  If you have any questions, ask your nurse or doctor.               These medicines have changed or have updated prescriptions.        Dose/Directions    fluticasone 110 MCG/ACT Inhaler   Commonly known as:  FLOVENT HFA   This may have changed:  when to take this   Used for:  Mild persistent asthma        Dose:  2 puff   Inhale 2 puffs into the lungs 2 times daily   Quantity:  1 Inhaler   Refills:  3       * PARoxetine 20 MG tablet   Commonly known as:  PAXIL   This may have changed:  Another medication with the same name was added. Make sure you understand how and when to take each.   Used for:  Generalized anxiety disorder   Changed by:  Alejandrina Duran MD        TAKE ONE TABLET BY MOUTH AT BEDTIME   Quantity:  90 tablet   Refills:  3       * PARoxetine 10 MG tablet   Commonly known as:  PAXIL   This may have changed:  You were already taking a medication with the same name, and this prescription was added. Make sure you understand how and when to take each.   Used for:  Moderate recurrent major depression (H)   Changed by:  Alejandrina Duran MD        Dose:  10 mg   Take 1 tablet (10 mg) by mouth At Bedtime   Quantity:  90 tablet   Refills:  3       * Notice:  This list has 2 medication(s) that are the same as other medications prescribed for you. Read the directions carefully, and ask your doctor or other care  provider to review them with you.      Stop taking these medicines if you haven't already. Please contact your care team if you have questions.     ascorbic acid 500 MG tablet   Commonly known as:  VITAMIN C   Stopped by:  Alejandrina Duran MD           IBUPROFEN PO   Stopped by:  Alejandrina Duran MD           SUPER B COMPLEX PO   Stopped by:  Alejandrina Duran MD                Where to get your medicines      These medications were sent to [facility] [address]  [address] [facility] [address]     Phone:  [phone]     PARoxetine 10 MG tablet                Primary Care Provider Office Phone # Fax #    Alejandrina Duran [phone] [address] [facility] [facility] [address]        Equal Access to Services     JOSÉ SAHNI : Hadii sav mccraryo Sotoya, waaxda luqadaha, qaybta kaalmada adedenisa, hiram mcintosh . So [facility] [phone].    ATENCIÓN: Si habla español, tiene a johnston disposición servicios gratuitos de asistencia lingüística. Herberth al [phone].    We comply with applicable federal civil rights laws and [place] laws. We do not discriminate on the basis of race, color, national origin, age, disability, sex, sexual orientation, or gender identity.            Thank you!     Thank you for choosing [facility]  for your care. Our goal is always to provide you with excellent care. Hearing back from our patients is one way we can continue to improve our services. Please take a few minutes to complete the written survey that you may receive in the mail after your visit with us. Thank you!             Your Updated Medication List - Protect others around you: Learn how to safely use, store and throw away your medicines at www.disposemymeds.org.          This list is accurate as of 6/19/18  9:31 AM.  Always use your most recent med list.                   Brand Name Dispense  Instructions for use Diagnosis    albuterol 108 (90 Base) MCG/ACT Inhaler    PROAIR HFA/PROVENTIL HFA/VENTOLIN HFA    2 Inhaler    Inhale 2 puffs into the lungs every 6 hours as needed for shortness of breath / dyspnea    Mild persistent asthma, uncomplicated       amitriptyline 50 MG tablet    ELAVIL    90 tablet    Take 1 tablet (50 mg) by mouth At Bedtime    Generalized anxiety disorder, Insomnia, unspecified type       aspirin 81 MG tablet      Take 1 tablet by mouth daily.        fluticasone 110 MCG/ACT Inhaler    FLOVENT HFA    1 Inhaler    Inhale 2 puffs into the lungs 2 times daily    Mild persistent asthma       fluticasone 50 MCG/ACT spray    FLONASE    1 Bottle    Spray 1-2 sprays into both nostrils daily    Mild persistent asthma, uncomplicated       ipratropium - albuterol 0.5 mg/2.5 mg/3 mL 0.5-2.5 (3) MG/3ML neb solution    DUONEB    1 Box    Take 1 vial (3 mLs) by nebulization every 6 hours as needed for shortness of breath / dyspnea    Mild persistent asthma, uncomplicated       MULTIVITAMIN PO      Take 1 tablet by mouth daily.        * PARoxetine 20 MG tablet    PAXIL    90 tablet    TAKE ONE TABLET BY MOUTH AT BEDTIME    Generalized anxiety disorder       * PARoxetine 10 MG tablet    PAXIL    90 tablet    Take 1 tablet (10 mg) by mouth At Bedtime    Moderate recurrent major depression (H)       * Notice:  This list has 2 medication(s) that are the same as other medications prescribed for you. Read the directions carefully, and ask your doctor or other care provider to review them with you.

## 2018-06-19 NOTE — PROGRESS NOTES
SUBJECTIVE:   Brittany Barajas is a 58 year old female who presents to clinic today for the following health issues:      Depression Followup    Status since last visit: Worsened - was let go for her job    See PHQ-9 for current symptoms.  Other associated symptoms: None    Complicating factors:   Significant life event:  Yes-  Lost her job   Current substance abuse:  None  Anxiety or Panic symptoms:  Yes-      PHQ-9 10/2/2017 3/8/2018 6/19/2018   Total Score 12 9 16   Q9: Suicide Ideation Not at all Not at all Not at all       PHQ-9  English  PHQ-9   Any Language  Suicide Assessment Five-step Evaluation and Treatment (SAFE-T)    Amount of exercise or physical activity: 6-7 days/week for an average of less than 15 minutes    Problems taking medications regularly: No    Medication side effects: none    Diet: regular (no restrictions)        Joint Pain    Onset: 5 weeks ago    Description:   Location: left foot  Character: Sharp and Dull ache    Intensity: moderate    Progression of Symptoms: same    Accompanying Signs & Symptoms:  Other symptoms: tingling and open wound from step on a piece of coal     History:   Previous similar pain: no       Precipitating factors:   Trauma or overuse: YES    Alleviating factors:  Improved by: nothing    Therapies Tried and outcome: na    Patient here for worsening depression . She reports that the last few weeks she has been struggling a bit. She lost her job and she says she is physically not able to do any work any more.She is thinking of applying for disability .She is requesting that she increase her Paxil .     Also had a wound on her left foot, stepped on hot coal . Says he has been applying bacitracin , wants this looked at         Problem list and histories reviewed & adjusted, as indicated.  Additional history: as documented    Patient Active Problem List   Diagnosis     Mitral valve disorder     Allergic rhinitis     Convulsions (H)     Esophageal reflux     Mild  major depression (H)     Plantar fascial fibromatosis     Other affections of shoulder region, not elsewhere classified     Neoplasm of digestive system     Anxiety state     Breast cancer (H)     CARDIOVASCULAR SCREENING; LDL GOAL LESS THAN 160     Transaminase or LDH elevation     H/O: hysterectomy     Colon polyp     Drug reaction     Mild persistent asthma     Urgency incontinence     Advanced directives, counseling/discussion     Past Surgical History:   Procedure Laterality Date     COLONOSCOPY  1/28/2011    COLONOSCOPY performed by ELOY BRIONES at [facility]     COLONOSCOPY  1/20/2012    Procedure:COLONOSCOPY; Surgeon:ELOY BRIONES; Location:[facility]     HYSTERECTOMY, PAP NO LONGER INDICATED  12/1/2008    for fibroids     INJECT EPIDURAL LUMBAR  3/9/2012    Procedure:INJECT EPIDURAL LUMBAR; JAMIL - ; Surgeon:GENERIC ANESTHESIA PROVIDER; Location:[facility]     INJECT EPIDURAL LUMBAR  5/25/2012    Procedure:INJECT EPIDURAL LUMBAR; JAMIL-; Surgeon:GENERIC ANESTHESIA PROVIDER; Location:[facility]     LEEP TX, CERVICAL  10/06    CECILIO 1     SURGICAL HISTORY OF -   10/7/1998    Right parotidectomy-mass     SURGICAL HISTORY OF -       Laparoscopy     SURGICAL HISTORY OF -   10/1996    Fibroid removal       Social History   Substance Use Topics     Smoking status: Never Smoker     Smokeless tobacco: Never Used     Alcohol use Yes      Comment: rarely     Family History   Problem Relation Age of Onset     C.A.D. Mother      Diabetes Mother      Hypertension Mother      Cerebrovascular Disease Mother      Breast Cancer Mother      Allergies Mother      Arthritis Mother      Cancer Mother      Eye Disorder Mother      GASTROINTESTINAL DISEASE Mother      Gynecology Mother      Lipids Mother      HEART DISEASE Mother      Osteoperosis Mother      Obesity Mother      Thyroid Disease Mother      Respiratory Mother      Alcohol/Drug Father      Cancer Father      Alcohol/Drug Brother      Musculoskeletal Disorder  Brother      spina bifida-recent dx     C.A.D. Brother      Thyroid Disease Sister      Lipids Sister      Gynecology Sister      fibroids/ s/p hysterectomy     Other - See Comments Sister      hysterectomy     Thyroid Disease Brother      HEART DISEASE Brother      DOUBLE BYPASS     Lipids Brother      Thyroid Disease Sister      Lipids Sister      Other - See Comments Sister      hysterectomy     GASTROINTESTINAL DISEASE Sister      liver function off     Thyroid Disease Sister      Thyroid Disease Sister      HEART DISEASE Sister      Cancer Other      thyroid cancer     Cancer - colorectal No family hx of          Current Outpatient Prescriptions   Medication Sig Dispense Refill     amitriptyline (ELAVIL) 50 MG tablet Take 1 tablet (50 mg) by mouth At Bedtime 90 tablet 3     aspirin 81 MG tablet Take 1 tablet by mouth daily.       fluticasone (FLONASE) 50 MCG/ACT spray Spray 1-2 sprays into both nostrils daily 1 Bottle 3     fluticasone (FLOVENT HFA) 110 MCG/ACT Inhaler Inhale 2 puffs into the lungs 2 times daily (Patient taking differently: Inhale 2 puffs into the lungs daily ) 1 Inhaler 3     Multiple Vitamin (MULTIVITAMIN OR) Take 1 tablet by mouth daily.       PARoxetine (PAXIL) 10 MG tablet Take 1 tablet (10 mg) by mouth At Bedtime 90 tablet 3     PARoxetine (PAXIL) 20 MG tablet TAKE ONE TABLET BY MOUTH AT BEDTIME 90 tablet 3     albuterol (PROAIR HFA/PROVENTIL HFA/VENTOLIN HFA) 108 (90 BASE) MCG/ACT Inhaler Inhale 2 puffs into the lungs every 6 hours as needed for shortness of breath / dyspnea (Patient not taking: Reported on 6/19/2018) 2 Inhaler 6     ipratropium - albuterol 0.5 mg/2.5 mg/3 mL (DUONEB) 0.5-2.5 (3) MG/3ML neb solution Take 1 vial (3 mLs) by nebulization every 6 hours as needed for shortness of breath / dyspnea (Patient not taking: Reported on 6/19/2018) 1 Box 2     Allergies   Allergen Reactions     Taxotere Other (See Comments)     Chest tightness, black out     BP Readings from Last 3  Encounters:   06/19/18 120/82   06/02/18 124/69   03/08/18 125/88    Wt Readings from Last 3 Encounters:   06/19/18 150 lb (68 kg)   06/02/18 142 lb (64.4 kg)   03/08/18 149 lb (67.6 kg)                  Labs reviewed in EPIC    Reviewed and updated as needed this visit by clinical staff  Tobacco  Allergies  Meds  Med Hx  Surg Hx  Fam Hx  Soc Hx      Reviewed and updated as needed this visit by Provider         ROS:  Constitutional, HEENT, cardiovascular, pulmonary, gi and gu systems are negative, except as otherwise noted.    OBJECTIVE:     /82  Pulse 110  Temp 98.2  F (36.8  C) (Tympanic)  Wt 150 lb (68 kg)  LMP 10/17/2008  SpO2 96%  Breastfeeding? No  BMI 28.34 kg/m2  Body mass index is 28.34 kg/(m^2).  GENERAL: healthy, alert and no distress  EYES: Eyes grossly normal to inspection, PERRL and conjunctivae and sclerae normal  HENT: ear canals and TM's normal, nose and mouth without ulcers or lesions  NECK: no adenopathy, no asymmetry, masses, or scars and thyroid normal to palpation  RESP: lungs clear to auscultation - no rales, rhonchi or wheezes  CV: regular rate and rhythm, normal S1 S2, no S3 or S4, no murmur, click or rub, no peripheral edema and peripheral pulses strong  ABDOMEN: soft, nontender, no hepatosplenomegaly, no masses and bowel sounds normal  MS: no gross musculoskeletal defects noted, no edema  PSYCH: mentation appears normal, affect flat, judgement and insight intact and appearance well groomed  Skin - Plantar surface of left foot, area is circular. almot sdried out .   Diagnostic Test Results:  none     ASSESSMENT/PLAN:         ICD-10-CM    1. Moderate recurrent major depression (H) F33.1 PARoxetine (PAXIL) 10 MG tablet   2. Screen for colon cancer Z12.11    3. Visit for screening mammogram Z12.31 MA SCREENING DIGITAL BILAT - Future  (s+30)   4. Burn T30.0    Patient encouraged to find a disability physician that will be able to assess her physical ability to work.   For the  burn continue bacitracin.   Increased her Paxil to 30 mg daily   FUTURE APPOINTMENTS:       - Follow-up visit as needed.    Alejandrina Duran MD  [facility]

## 2018-06-19 NOTE — TELEPHONE ENCOUNTER
Panel Management Review      Patient has the following on her problem list:     Asthma review     ACT Total Scores 6/19/2018   ACT TOTAL SCORE -   ASTHMA ER VISITS -   ASTHMA HOSPITALIZATIONS -   ACT TOTAL SCORE (Goal Greater than or Equal to 20) 10   In the past 12 months, how many times did you visit the emergency room for your asthma without being admitted to the hospital? 0   In the past 12 months, how many times were you hospitalized overnight because of your asthma? 0      1. Is Asthma diagnosis on the Problem List? Yes    2. Is Asthma listed on Health Maintenance? Yes    3. Patient is due for:  ACT      Composite cancer screening  Chart review shows that this patient is due/due soon for the following   Summary:    Patient is due/failing the following:   ACT    Action needed:   Patient needs to do ACT.    Type of outreach:    Copy of ACT mailed to patient, will reach out in 5 days.4 weeks     Questions for provider review:    None                                                                                                                                    Deanna Arteaga CMA     Chart routed to Care Team .

## 2018-06-19 NOTE — LETTER
August 28, 2018      Brittany Barajas  [address] [address]        Dear Brittany,     Your [facility] Team works hard to make sure that you and your family receive exceptional care. Enclosed you will find a copy of the Asthma Control Test (ACT) that our clinic uses to monitor and manage your asthma. This test is an assessment tool that we use to determine how well your asthma is controlled.  Please complete the enclosed form and return in the provided envelope.    Also a reminder you are due for health maintenance including a mammogram and colon cancer screening.  Please contact the clinic with any questions or assistance in scheduling these exams.    Thank you for trusting us with your health care.    Sincerely,        Your [facility] Care Team/nik

## 2018-06-20 ASSESSMENT — PATIENT HEALTH QUESTIONNAIRE - PHQ9: SUM OF ALL RESPONSES TO PHQ QUESTIONS 1-9: 16

## 2018-06-20 ASSESSMENT — ASTHMA QUESTIONNAIRES: ACT_TOTALSCORE: 10

## 2018-06-20 ASSESSMENT — ANXIETY QUESTIONNAIRES: GAD7 TOTAL SCORE: 15

## 2018-07-08 ENCOUNTER — TELEPHONE (OUTPATIENT)
Dept: FAMILY MEDICINE | Facility: CLINIC | Age: 58
End: 2018-07-08

## 2018-07-09 NOTE — TELEPHONE ENCOUNTER
[place] unemployment insurance medical opinion form started and routed to DR. Duran for review and completion.

## 2018-07-10 ENCOUNTER — TELEPHONE (OUTPATIENT)
Dept: FAMILY MEDICINE | Facility: CLINIC | Age: 58
End: 2018-07-10

## 2018-07-10 NOTE — TELEPHONE ENCOUNTER
Dr. Duran,    Patient has a slightly dry productive cough.  Using nebs, and MDI without success.  No fevers.  Would like a Rx for robitussin AC called in.  Told of controlled substance policy but patient does not have insurance now.  Please see further information in note below.  Carlie CHIN RN

## 2018-07-10 NOTE — TELEPHONE ENCOUNTER
Pt calling back and given the message. She was able to find a counselor and has an appt on Friday.     [facility]

## 2018-07-10 NOTE — TELEPHONE ENCOUNTER
Called patient, she is currently having issues with dry cough at night and shortness of breath, was requesting rx for robitussin with bonnie.   Sent a telephone encounter to provider pool with request.  Did not complete ACT due to symptoms, will call back to review in 4 weeks.

## 2018-07-18 NOTE — TELEPHONE ENCOUNTER
Pt calling stating she needs this asap. She states she is looking at foreclosure on her house and hasn't had any money coming in. She is worried. I put a note on paperwork for Dr Duran.    [facility]

## 2018-07-26 NOTE — TELEPHONE ENCOUNTER
Physician's office was locked, unable to check to see if it's on her desk. We can check tomorrow.    [place] [place]  [facility] [place]

## 2018-07-27 NOTE — TELEPHONE ENCOUNTER
Physician's office was locked, unable to check her desk. We can check Monday  Vanda Jin on 7/27/2018 at 9:02 AM

## 2018-07-30 NOTE — TELEPHONE ENCOUNTER
Dr Boone told this patient at last visit she will not complete these forms. I tried to call patient and got JOLLY Jin on 7/30/2018 at 2:44 PM

## 2018-08-13 NOTE — TELEPHONE ENCOUNTER
Left message for patient to call back to update ACT, Also , please remind her she is due for Mammo,Pappe,FIT. IF she does not have insurance, please giver her info on JOSSELINE program. Thank You  Everett MELO

## 2018-08-23 ENCOUNTER — OFFICE VISIT (OUTPATIENT)
Dept: FAMILY MEDICINE | Facility: CLINIC | Age: 58
End: 2018-08-23
Payer: MEDICAID

## 2018-08-23 VITALS
TEMPERATURE: 98.2 F | HEART RATE: 97 BPM | HEIGHT: 61 IN | DIASTOLIC BLOOD PRESSURE: 82 MMHG | WEIGHT: 152 LBS | OXYGEN SATURATION: 99 % | SYSTOLIC BLOOD PRESSURE: 124 MMHG | BODY MASS INDEX: 28.7 KG/M2

## 2018-08-23 DIAGNOSIS — R13.19 OTHER DYSPHAGIA: ICD-10-CM

## 2018-08-23 DIAGNOSIS — F32.0 MILD MAJOR DEPRESSION (H): ICD-10-CM

## 2018-08-23 DIAGNOSIS — M72.2 PLANTAR FASCIAL FIBROMATOSIS: ICD-10-CM

## 2018-08-23 DIAGNOSIS — M51.369 DDD (DEGENERATIVE DISC DISEASE), LUMBAR: ICD-10-CM

## 2018-08-23 DIAGNOSIS — R53.83 FATIGUE, UNSPECIFIED TYPE: ICD-10-CM

## 2018-08-23 DIAGNOSIS — M50.30 DDD (DEGENERATIVE DISC DISEASE), CERVICAL: ICD-10-CM

## 2018-08-23 DIAGNOSIS — S06.9X0D TRAUMATIC BRAIN INJURY, WITHOUT LOSS OF CONSCIOUSNESS, SUBSEQUENT ENCOUNTER: ICD-10-CM

## 2018-08-23 DIAGNOSIS — F41.1 ANXIETY STATE: ICD-10-CM

## 2018-08-23 DIAGNOSIS — R49.0 DYSPHONIA: ICD-10-CM

## 2018-08-23 DIAGNOSIS — K21.00 GASTROESOPHAGEAL REFLUX DISEASE WITH ESOPHAGITIS: Primary | ICD-10-CM

## 2018-08-23 LAB
T4 FREE SERPL-MCNC: 0.78 NG/DL (ref 0.76–1.46)
TSH SERPL DL<=0.005 MIU/L-ACNC: 1.29 MU/L (ref 0.4–4)

## 2018-08-23 PROCEDURE — 99214 OFFICE O/P EST MOD 30 MIN: CPT | Performed by: FAMILY MEDICINE

## 2018-08-23 PROCEDURE — 84439 ASSAY OF FREE THYROXINE: CPT | Performed by: FAMILY MEDICINE

## 2018-08-23 PROCEDURE — 84443 ASSAY THYROID STIM HORMONE: CPT | Performed by: FAMILY MEDICINE

## 2018-08-23 PROCEDURE — 36415 COLL VENOUS BLD VENIPUNCTURE: CPT | Performed by: FAMILY MEDICINE

## 2018-08-23 NOTE — PROGRESS NOTES
SUBJECTIVE:   Brittany Barajas is a 58 year old female who presents to clinic today for the following health issues:      Chief Complaint   Patient presents with     Fatigue     Here to discuss about fatigue and getting a second opinion on not being able to work.  She is wondering if her thyroid could be an issue as there are siblings who have thyroid problems.  It feels like there is something in the throat when swallowing.  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  History of issues with her neck and lower back.  Has injured her neck twice.      Her issues are:     Closed head injury in the winter of 2017-18. She had an x ray of the neck and she has DJD.   She has numbness of the left hand and has decreased ability to do fine motions of the left hand.   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     Memory issues: this started after the head injury. No imaging of the brain was done after this.     She has lower back pain. Previous evaluations showed two disc herniations. She has radiculopathy. She has SI joint dysfunction.  She has had PT and traction and had two epidural steroid injections that helped for one month. She still has pain on the right lateral thigh to the knee.     She has a cough and hoarseness for about one year.       Current Outpatient Prescriptions:      albuterol (PROAIR HFA/PROVENTIL HFA/VENTOLIN HFA) 108 (90 BASE) MCG/ACT Inhaler, Inhale 2 puffs into the lungs every 6 hours as needed for shortness of breath / dyspnea, Disp: 2 Inhaler, Rfl: 6     amitriptyline (ELAVIL) 50 MG tablet, Take 1 tablet (50 mg) by mouth At Bedtime, Disp: 90 tablet, Rfl: 3     aspirin 81 MG tablet, Take 1 tablet by mouth daily., Disp: , Rfl:      fluticasone (FLONASE) 50 MCG/ACT spray, Spray 1-2 sprays into both nostrils daily, Disp: 1 Bottle, Rfl: 3     fluticasone (FLOVENT HFA) 110 MCG/ACT Inhaler, Inhale 2 puffs into the lungs 2 times daily (Patient taking differently: Inhale 2 puffs into the lungs daily ), Disp: 1  "Inhaler, Rfl: 3     ipratropium - albuterol 0.5 mg/2.5 mg/3 mL (DUONEB) 0.5-2.5 (3) MG/3ML neb solution, Take 1 vial (3 mLs) by nebulization every 6 hours as needed for shortness of breath / dyspnea, Disp: 1 Box, Rfl: 2     Multiple Vitamin (MULTIVITAMIN OR), Take 1 tablet by mouth daily., Disp: , Rfl:      PARoxetine (PAXIL) 10 MG tablet, Take 1 tablet (10 mg) by mouth At Bedtime, Disp: 90 tablet, Rfl: 3     PARoxetine (PAXIL) 20 MG tablet, TAKE ONE TABLET BY MOUTH AT BEDTIME, Disp: 90 tablet, Rfl: 3    Patient Active Problem List   Diagnosis     Mitral valve disorder     Allergic rhinitis     Convulsions (H)     Esophageal reflux     Mild major depression (H)     Plantar fascial fibromatosis     Other affections of shoulder region, not elsewhere classified     Neoplasm of digestive system     Anxiety state     Breast cancer (H)     CARDIOVASCULAR SCREENING; LDL GOAL LESS THAN 160     Transaminase or LDH elevation     H/O: hysterectomy     Colon polyp     Drug reaction     Mild persistent asthma     Urgency incontinence     Advanced directives, counseling/discussion     DDD (degenerative disc disease), lumbar     DDD (degenerative disc disease), cervical     Dysphonia     Other dysphagia     Traumatic brain injury, without loss of consciousness, subsequent encounter     Fatigue, unspecified type       Blood pressure 124/82, pulse 97, temperature 98.2  F (36.8  C), temperature source Tympanic, height 5' 1\" (1.549 m), weight 152 lb (68.9 kg), last menstrual period 10/17/2008, SpO2 99 %, not currently breastfeeding.    Exam:  GENERAL APPEARANCE: healthy, alert and no distress  EYES: EOMI,  PERRL  HENT: her voice is dysphonic.   NECK:  the thyroid is enlarged. It is not nodular  RESP: lungs clear to auscultation - no rales, rhonchi or wheezes  CV: regular rates and rhythm, normal S1 S2, no S3 or S4 and no murmur, click or rub -  ABDOMEN:  soft, nontender, no HSM or masses and bowel sounds normal  PSYCH: mentation " appears normal and anxious      (K21.0) Gastroesophageal reflux disease with esophagitis  (primary encounter diagnosis)  Comment:   Plan: use the dietary and mechanical instructions and the Prilosec at 40 mg daily. The max dose is 40 mg twice daily.   The goal for the symptoms is to be zero.     (R49.0) Dysphonia  Comment:   Plan: see above for the reflux therapies and instructions. We have ENT and if this is desired, then call our RN at [phone].     (R13.19) Other dysphagia  Comment:   Plan: see above for the reflux instructions. Try soft food for several days. If this does not improve then a video swallow study and possible an upper endoscopy would be needed.     (F32.0) Mild major depression (H)  Comment:   Plan: stay on the Paxil and the Elavil. Use the non drug therapies. Continue the counseling.     (F41.1) Anxiety state  Comment:   Plan: see above. Avoid stimulants. Exercise is recommended.     (M72.2) Plantar fascial fibromatosis  Comment:   Plan: Instructions given on diagnoses and avoid barefoot and jumping.     (M50.30) DDD (degenerative disc disease), cervical  Comment:   Plan: modify activities and avoid repetitive bending and twisting and lifting.   You have done PT and traction and steroid injections. You may  Need a spine surgery referral.   If you get insurance then call and this referral could be done.     (M51.36) DDD (degenerative disc disease), lumbar  Comment:   Plan: see above.     (S06.9X0D) Traumatic brain injury, without loss of consciousness, subsequent encounter  Comment:   Plan: we discussed that a Neurology visit will likely be needed and call for this when insured.       We will do the disability discussion at a future appt.       Juan Iyer

## 2018-08-23 NOTE — MR AVS SNAPSHOT
After Visit Summary   8/23/2018    Brittany Barajas    MRN: [id number]           Patient Information     Date Of Birth          1960        Visit Information        Provider Department      8/23/2018 3:00 PM Juan Iyer MD [facility]        Today's Diagnoses     Gastroesophageal reflux disease with esophagitis    -  1    Mild major depression (H)        Plantar fascial fibromatosis        Anxiety state        DDD (degenerative disc disease), cervical        DDD (degenerative disc disease), lumbar        Dysphonia        Other dysphagia        Traumatic brain injury, without loss of consciousness, subsequent encounter        Fatigue, unspecified type          Care Instructions          Thank you for choosing [facility].  You may be receiving a survey in the mail from [employer] regarding your visit today.  Please take a few minutes to complete and return the survey to let us know how we are doing.      If you have questions or concerns, please contact us via [facility] or you can contact your care team at [phone].    Our Clinic hours are:  Monday 6:40 am  to 7:00 pm  Tuesday -Friday 6:40 am to 5:00 pm    The [place] outpatient lab hours are:  Monday - Friday 6:10 am to 4:45 pm  Saturdays 7:00 am to 11:00 am  Appointments are required, call [phone]    If you have clinical questions after hours or would like to schedule an appointment,  call the clinic at [phone].    (K21.0) Gastroesophageal reflux disease with esophagitis  (primary encounter diagnosis)  Comment:   Plan: use the dietary and mechanical instructions and the Prilosec at 40 mg daily. The max dose is 40 mg twice daily.   The goal for the symptoms is to be zero.     (R49.0) Dysphonia  Comment:   Plan: see above for the reflux therapies and instructions. We have ENT and if this is desired, then call our RN at [phone].     (R13.19) Other dysphagia  Comment:   Plan: see above for the reflux  instructions. Try soft food for several days. If this does not improve then a video swallow study and possible an upper endoscopy would be needed.     (F32.0) Mild major depression (H)  Comment:   Plan: stay on the Paxil and the Elavil. Use the non drug therapies. Continue the counseling.     (F41.1) Anxiety state  Comment:   Plan: see above. Avoid stimulants. Exercise is recommended.     (M72.2) Plantar fascial fibromatosis  Comment:   Plan: Instructions given on diagnoses and avoid barefoot and jumping.     (M50.30) DDD (degenerative disc disease), cervical  Comment:   Plan: modify activities and avoid repetitive bending and twisting and lifting.   You have done PT and traction and steroid injections. You may  Need a spine surgery referral.   If you get insurance then call and this referral could be done.     (M51.36) DDD (degenerative disc disease), lumbar  Comment:   Plan: see above.     (S06.9X0D) Traumatic brain injury, without loss of consciousness, subsequent encounter  Comment:   Plan: we discussed that a Neurology visit will likely be needed and call for this when insured.       We will do the disability discussion at a future appt.             Follow-ups after your visit        Who to contact     If you have questions or need follow up information about today's clinic visit or your schedule please contact [facility] directly at [phone].  Normal or non-critical lab and imaging results will be communicated to you by MyChart, letter or phone within 4 business days after the clinic has received the results. If you do not hear from us within 7 days, please contact the clinic through MyChart or phone. If you have a critical or abnormal lab result, we will notify you by phone as soon as possible.  Submit refill requests through [employer] or call your pharmacy and they will forward the refill request to us. Please allow 3 business days for your refill to be completed.          Additional  "Information About Your Visit        MyChart Information     [employer] gives you secure access to your electronic health record. If you see a primary care provider, you can also send messages to your care team and make appointments. If you have questions, please call your primary care clinic.  If you do not have a primary care provider, please call [phone] and they will assist you.        Care EveryWhere ID     This is your Care EveryWhere ID. This could be used by other organizations to access your [place] medical records  [id number]        Your Vitals Were     Pulse Temperature Height Last Period Pulse Oximetry BMI (Body Mass Index)    97 98.2  F (36.8  C) (Tympanic) 5' 1\" (1.549 m) 10/17/2008 99% 28.72 kg/m2       Blood Pressure from Last 3 Encounters:   08/23/18 124/82   06/19/18 120/82   06/02/18 124/69    Weight from Last 3 Encounters:   08/23/18 152 lb (68.9 kg)   06/19/18 150 lb (68 kg)   06/02/18 142 lb (64.4 kg)              We Performed the Following     T4 FREE     TSH          Today's Medication Changes          These changes are accurate as of 8/23/18  4:27 PM.  If you have any questions, ask your nurse or doctor.               These medicines have changed or have updated prescriptions.        Dose/Directions    fluticasone 110 MCG/ACT Inhaler   Commonly known as:  FLOVENT HFA   This may have changed:  when to take this   Used for:  Mild persistent asthma        Dose:  2 puff   Inhale 2 puffs into the lungs 2 times daily   Quantity:  1 Inhaler   Refills:  3                Primary Care Provider Office Phone # Fax #    Alejandrina Duran [phone] [address] [address]        Equal Access to Services     JOSÉ SAHNI AH: Mirta Kelly, wadavid crump, ellen kaalmada eren, hiram machado. So [facility] [phone].    ATENCIÓN: Si habla español, tiene a johnston disposición servicios gratuitos de asistencia " lingüísticaJeison Kevin al [phone].    We comply with applicable federal civil rights laws and [place] laws. We do not discriminate on the basis of race, color, national origin, age, disability, sex, sexual orientation, or gender identity.            Thank you!     Thank you for choosing [facility]  for your care. Our goal is always to provide you with excellent care. Hearing back from our patients is one way we can continue to improve our services. Please take a few minutes to complete the written survey that you may receive in the mail after your visit with us. Thank you!             Your Updated Medication List - Protect others around you: Learn how to safely use, store and throw away your medicines at www.disposemymeds.org.          This list is accurate as of 8/23/18  4:27 PM.  Always use your most recent med list.                   Brand Name Dispense Instructions for use Diagnosis    albuterol 108 (90 Base) MCG/ACT inhaler    PROAIR HFA/PROVENTIL HFA/VENTOLIN HFA    2 Inhaler    Inhale 2 puffs into the lungs every 6 hours as needed for shortness of breath / dyspnea    Mild persistent asthma, uncomplicated       amitriptyline 50 MG tablet    ELAVIL    90 tablet    Take 1 tablet (50 mg) by mouth At Bedtime    Generalized anxiety disorder, Insomnia, unspecified type       aspirin 81 MG tablet      Take 1 tablet by mouth daily.        fluticasone 110 MCG/ACT Inhaler    FLOVENT HFA    1 Inhaler    Inhale 2 puffs into the lungs 2 times daily    Mild persistent asthma       fluticasone 50 MCG/ACT spray    FLONASE    1 Bottle    Spray 1-2 sprays into both nostrils daily    Mild persistent asthma, uncomplicated       ipratropium - albuterol 0.5 mg/2.5 mg/3 mL 0.5-2.5 (3) MG/3ML neb solution    DUONEB    1 Box    Take 1 vial (3 mLs) by nebulization every 6 hours as needed for shortness of breath / dyspnea    Mild persistent asthma, uncomplicated       MULTIVITAMIN PO      Take 1 tablet by mouth daily.         * PARoxetine 20 MG tablet    PAXIL    90 tablet    TAKE ONE TABLET BY MOUTH AT BEDTIME    Generalized anxiety disorder       * PARoxetine 10 MG tablet    PAXIL    90 tablet    Take 1 tablet (10 mg) by mouth At Bedtime    Moderate recurrent major depression (H)       * Notice:  This list has 2 medication(s) that are the same as other medications prescribed for you. Read the directions carefully, and ask your doctor or other care provider to review them with you.

## 2018-08-24 ASSESSMENT — ASTHMA QUESTIONNAIRES: ACT_TOTALSCORE: 17

## 2018-08-28 NOTE — TELEPHONE ENCOUNTER
Panel Management Review      Patient has the following on her problem list:     Asthma review     ACT Total Scores 8/23/2018   ACT TOTAL SCORE -   ASTHMA ER VISITS -   ASTHMA HOSPITALIZATIONS -   ACT TOTAL SCORE (Goal Greater than or Equal to 20) 17   In the past 12 months, how many times did you visit the emergency room for your asthma without being admitted to the hospital? 3   In the past 12 months, how many times were you hospitalized overnight because of your asthma? 0      1. Is Asthma diagnosis on the Problem List? Yes    2. Is Asthma listed on Health Maintenance? Yes    3. Patient is due for:  ACT      Composite cancer screening  Chart review shows that this patient is due/due soon for the following Mammogram, Colonoscopy and Fecal Colorectal (FIT)  Summary:    Patient is due/failing the following:   ACT    Action needed:   Patient needs to do ACT.    Type of outreach:    No response to phone calls, letter with ACT mailed for patient to complete and return.  Will also remind due for mammogram and colon cancer screening.    Questions for provider review:    None                                                                                                                                    MARGARITO Long MA

## 2018-09-29 ENCOUNTER — HOSPITAL ENCOUNTER (EMERGENCY)
Facility: CLINIC | Age: 58
Discharge: HOME | End: 2018-09-29
Attending: PHYSICIAN ASSISTANT | Admitting: PHYSICIAN ASSISTANT
Payer: MEDICAID

## 2018-09-29 ENCOUNTER — APPOINTMENT (OUTPATIENT)
Dept: GENERAL RADIOLOGY | Facility: CLINIC | Age: 58
End: 2018-09-29
Attending: PHYSICIAN ASSISTANT
Payer: MEDICAID

## 2018-09-29 VITALS
RESPIRATION RATE: 18 BRPM | TEMPERATURE: 98 F | DIASTOLIC BLOOD PRESSURE: 85 MMHG | OXYGEN SATURATION: 97 % | SYSTOLIC BLOOD PRESSURE: 136 MMHG | BODY MASS INDEX: 29.45 KG/M2 | HEART RATE: 114 BPM | WEIGHT: 156 LBS | HEIGHT: 61 IN

## 2018-09-29 DIAGNOSIS — M79.671 RIGHT FOOT PAIN: ICD-10-CM

## 2018-09-29 PROCEDURE — 99213 OFFICE O/P EST LOW 20 MIN: CPT | Mod: Z6 | Performed by: PHYSICIAN ASSISTANT

## 2018-09-29 PROCEDURE — 73630 X-RAY EXAM OF FOOT: CPT | Mod: RT

## 2018-09-29 PROCEDURE — G0463 HOSPITAL OUTPT CLINIC VISIT: HCPCS | Performed by: PHYSICIAN ASSISTANT

## 2018-09-29 NOTE — ED PROVIDER NOTES
History     Chief Complaint   Patient presents with     Ankle Pain     pt rolled right ankle while walking down steps, has been able to bear weight tenatively. injury occured at 0700 this AM.     HPI  Brittany Barajas is a 58 year old female who presents in urgent care with concern over right foot pain after injury earlier this morning.  Patient states she was walking on the steps when she inverted her foot and sustained a fall.  She complains of pain primarily in the arch of her foot.  She has also noted some swelling, ecchymosis.  She denies any distal numbness or paresthesias.  She has been able to bear weight minimally however does have discomfort.  She has attempted to treat with Ace wrap, ibuprofen, CAM walker without relief.      Problem List:    Patient Active Problem List    Diagnosis Date Noted     DDD (degenerative disc disease), lumbar 08/23/2018     Priority: Medium     DDD (degenerative disc disease), cervical 08/23/2018     Priority: Medium     Dysphonia 08/23/2018     Priority: Medium     Other dysphagia 08/23/2018     Priority: Medium     Traumatic brain injury, without loss of consciousness, subsequent encounter 08/23/2018     Priority: Medium     Fatigue, unspecified type 08/23/2018     Priority: Medium     Advanced directives, counseling/discussion 11/04/2015     Priority: Medium     Advance Care Planning 11/4/2015: ACP Review and Resources Provided:  Reviewed chart for advance care plan.  Brittany Barajas has no plan or code status on file. Discussed available resources and provided with information. Confirmed code status reflects current choices pending further ACP discussions.  Confirmed/documented legally designated decision maker(s). Added by Leonie Alva             Urgency incontinence 01/02/2014     Priority: Medium     Mild persistent asthma 07/02/2013     Priority: Medium     Drug reaction 05/16/2012     Priority: Medium     sideeffects from donald  See May 16 2012 note        H/O: hysterectomy 11/29/2011     Priority: Medium     No pap needed       Colon polyp 11/29/2011     Priority: Medium     2011 colonoscopy --unable to get polyp out due to technical issues-repeat suggested  2012-colonoscopy normal--suggested repeat three years       Transaminase or LDH elevation 11/28/2010     Priority: Medium     Mild and stable in 2010       CARDIOVASCULAR SCREENING; LDL GOAL LESS THAN 160 10/31/2010     Priority: Medium     Breast cancer (H) 11/17/2009     Priority: Medium     09       Anxiety state 12/08/2007     Priority: Medium     Problem list name updated by automated process. Provider to review       Mitral valve disorder 12/01/2005     Priority: Medium     MVP  2010 echo--Mild mitral valve prolapse, posterior leaflet.  There is mild to moderate (1-2+) mitral regurgitation  Problem list name updated by automated process. Provider to review       Allergic rhinitis 12/01/2005     Priority: Medium     Problem list name updated by automated process. 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 Provider to review        Past Medical History:    Past Medical History:   Diagnosis Date     Allergic rhinitis, cause unspecified      Depressive disorder, not elsewhere classified      Esophageal reflux      Intramural leiomyoma of uterus      Mitral valve disorders(424.0)      Other affections of  shoulder region, not elsewhere classified      Other convulsions      Other diseases of trachea and bronchus, not elsewhere classified      Papanicolaou smear of cervix with low grade squamous intraepithelial lesion (LGSIL) 11/09     Plantar fascial fibromatosis      Past Surgical History:    Past Surgical History:   Procedure Laterality Date     COLONOSCOPY  1/28/2011    COLONOSCOPY performed by ELOY BRIONES at [facility]     COLONOSCOPY  1/20/2012    Procedure:COLONOSCOPY; Surgeon:ELOY BRIOENS; Location:[facility]     HYSTERECTOMY, PAP NO LONGER INDICATED  12/1/2008    for fibroids     INJECT EPIDURAL LUMBAR  3/9/2012    Procedure:INJECT EPIDURAL LUMBAR; JAMIL - ; Surgeon:GENERIC ANESTHESIA PROVIDER; Location:[facility]     INJECT EPIDURAL LUMBAR  5/25/2012    Procedure:INJECT EPIDURAL LUMBAR; JAMIL-; Surgeon:GENERIC ANESTHESIA PROVIDER; Location:[facility]     LEEP TX, CERVICAL  10/06    CECILIO 1     SURGICAL HISTORY OF -   10/7/1998    Right parotidectomy-mass     SURGICAL HISTORY OF -       Laparoscopy     SURGICAL HISTORY OF -   10/1996    Fibroid removal     Family History:    Family History   Problem Relation Age of Onset     C.A.D. Mother      Diabetes Mother      Hypertension Mother      Cerebrovascular Disease Mother      Breast Cancer Mother      Allergies Mother      Arthritis Mother      Cancer Mother      Eye Disorder Mother      GASTROINTESTINAL DISEASE Mother      Gynecology Mother      Lipids Mother      HEART DISEASE Mother      Osteoporosis Mother      Obesity Mother      Thyroid Disease Mother      Respiratory Mother      Alcohol/Drug Father      Cancer Father      Alcohol/Drug Brother      Musculoskeletal Disorder Brother      spina bifida-recent dx     C.A.D. Brother      Thyroid Disease Sister      Lipids Sister      Gynecology Sister      fibroids/ s/p hysterectomy     Other - See Comments Sister      hysterectomy     Thyroid Disease Brother      HEART DISEASE Brother      DOUBLE BYPASS  "    Lipids Brother      Thyroid Disease Sister      Lipids Sister      Other - See Comments Sister      hysterectomy     GASTROINTESTINAL DISEASE Sister      liver function off     Thyroid Disease Sister      Thyroid Disease Sister      HEART DISEASE Sister      Cancer Other      thyroid cancer     Cancer - colorectal No family hx of      Social History:  Marital Status:  Single [1]  Social History   Substance Use Topics     Smoking status: Never Smoker     Smokeless tobacco: Never Used     Alcohol use Yes      Comment: rarely      Medications:      albuterol (PROAIR HFA/PROVENTIL HFA/VENTOLIN HFA) 108 (90 BASE) MCG/ACT Inhaler   amitriptyline (ELAVIL) 50 MG tablet   aspirin 81 MG tablet   fluticasone (FLONASE) 50 MCG/ACT spray   fluticasone (FLOVENT HFA) 110 MCG/ACT Inhaler   ipratropium - albuterol 0.5 mg/2.5 mg/3 mL (DUONEB) 0.5-2.5 (3) MG/3ML neb solution   Multiple Vitamin (MULTIVITAMIN OR)   PARoxetine (PAXIL) 10 MG tablet   PARoxetine (PAXIL) 20 MG tablet     Review of Systems  INTEGUMENTARY/SKIN: POSITIVE for ecchymosis EGATIVE for lacerations, abrasions, worrisome rashes   MUSCULOSKELETAL: POSITIVE  for right foot pain and NEGATIVE for other concerning arthralgias or myalgias   NEURO: NEGATIVE for numbness, paresthesias   Physical Exam   BP: 136/85  Pulse: 114  Temp: 98  F (36.7  C)  Resp: 18  Height: 154.9 cm (5' 1\")  Weight: 70.8 kg (156 lb)  SpO2: 97 %  Physical Exam   Constitutional: She is oriented to person, place, and time. She appears well-developed and well-nourished. No distress.   Cardiovascular:   Pulses:       Dorsalis pedis pulses are 2+ on the right side        Posterior tibial pulses are 2+ on the right side   Musculoskeletal:        Right ankle: She exhibits swelling and ecchymosis. She exhibits normal range of motion, no deformity, no laceration and normal pulse. No tenderness.        Right foot: There is decreased range of motion (actively secondary to pain), tenderness, bony tenderness " and swelling. There is normal capillary refill, no crepitus, no deformity and no laceration.        Feet:    Tenderness to palpation of dorsum of distal foot.     Neurological: She is alert and oriented to person, place, and time. No sensory deficit.   Skin: Skin is warm and dry. Ecchymosis noted. No abrasion, no laceration and no rash noted. No erythema.     ED Course     ED Course     Procedures        Critical Care time:  none        Results for orders placed or performed during the hospital encounter of 09/29/18   Foot  XR, G/E 3 views, right    Narrative    XR FOOT RT G/E 3 VW 9/29/2018 4:00 PM    HISTORY: Pain.    COMPARISON: None.      Impression    IMPRESSION: No evidence of acute fracture or malalignment. Calcaneal  bone spur.     RUSSELL TORO MD       Medications - No data to display    Assessments & Plan (with Medical Decision Making)     I have reviewed the nursing notes.    I have reviewed the findings, diagnosis, plan and need for follow up with the patient.       Discharge Medication List as of 9/29/2018  4:18 PM        Final diagnoses:   Right foot pain     58-year-old female presents to the urgent care with concern of a right foot pain after injury earlier today when she fell while walking on the steps.  She had stable vital signs upon arrival.  Physical exam findings as described above.  As part of evaluation she had x-ray of her right foot which was negative for acute fracture, dislocation.  Symptoms most consistent with right foot pain suspect sprain.   differential would also include contusion.  Low suspicion for occult fracture.  Symptomatic treatment as needed with rest, ice, ibuprofen/Tylenol.  Patient does have CAM walker at home from prior injury that she may use if needed.  Follow up with podiatry if no improvement in 5-7 days.  Worrisome reasons to return to ER/UC sooner discussed.     Disclaimer: This note consists of symbols derived from keyboarding, dictation, and/or voice recognition  software. As a result, there may be errors in the script that have gone undetected.  Please consider this when interpreting information found in the chart.      9/29/2018   [facility] EMERGENCY DEPARTMENT     Marguerite Ocampo PA-C  10/01/18 121

## 2018-09-29 NOTE — ED AVS SNAPSHOT
[facility] Emergency Department    [address] [facility] [address]    Phone:  [phone]    Fax:  [phone]                                       Brittany Barajas   MRN: [id number]    Department:  [facility] Emergency Department   Date of Visit:  9/29/2018           Patient Information     Date Of Birth          1960        Your diagnoses for this visit were:     Right foot pain        You were seen by Marguerite Ocampo PA-C.      Follow-up Information     Follow up with Morgan Banerjee DPM In 1 week.    Specialty:  Podiatry    Why:  As needed, If symptoms worsen    Contact information:    [facility]  [address] [address] [phone]          Follow up with Carlos Lentz DPM In 1 week.    Specialty:  Podiatry    Contact information:    [facility]  [address]  [facility] [place] [address] [phone]        24 Hour Appointment Hotline       To make an appointment at any [facility] clinic, call [phone] ([phone]). If you don't have a family doctor or clinic, we will help you find one. [facility] clinics are conveniently located to serve the needs of you and your family.             Review of your medicines      Our records show that you are taking the medicines listed below. If these are incorrect, please call your family doctor or clinic.        Dose / Directions Last dose taken    albuterol 108 (90 Base) MCG/ACT inhaler   Commonly known as:  PROAIR HFA/PROVENTIL HFA/VENTOLIN HFA   Dose:  2 puff   Quantity:  2 Inhaler        Inhale 2 puffs into the lungs every 6 hours as needed for shortness of breath / dyspnea   Refills:  6        amitriptyline 50 MG tablet   Commonly known as:  ELAVIL   Dose:  50 mg   Quantity:  90 tablet        Take 1 tablet (50 mg) by mouth At Bedtime   Refills:  3        aspirin 81 MG tablet   Dose:  1 tablet        Take 1 tablet by mouth daily.   Refills:  0        fluticasone 110  MCG/ACT Inhaler   Commonly known as:  FLOVENT HFA   Dose:  2 puff   Quantity:  1 Inhaler        Inhale 2 puffs into the lungs 2 times daily   Refills:  3        fluticasone 50 MCG/ACT spray   Commonly known as:  FLONASE   Dose:  1-2 spray   Quantity:  1 Bottle        Spray 1-2 sprays into both nostrils daily   Refills:  3        ipratropium - albuterol 0.5 mg/2.5 mg/3 mL 0.5-2.5 (3) MG/3ML neb solution   Commonly known as:  DUONEB   Dose:  3 mL   Quantity:  1 Box        Take 1 vial (3 mLs) by nebulization every 6 hours as needed for shortness of breath / dyspnea   Refills:  2        MULTIVITAMIN PO   Dose:  1 tablet        Take 1 tablet by mouth daily.   Refills:  0        * PARoxetine 20 MG tablet   Commonly known as:  PAXIL   Quantity:  90 tablet        TAKE ONE TABLET BY MOUTH AT BEDTIME   Refills:  3        * PARoxetine 10 MG tablet   Commonly known as:  PAXIL   Dose:  10 mg   Quantity:  90 tablet        Take 1 tablet (10 mg) by mouth At Bedtime   Refills:  3        * Notice:  This list has 2 medication(s) that are the same as other medications prescribed for you. Read the directions carefully, and ask your doctor or other care provider to review them with you.            Procedures and tests performed during your visit     Foot  XR, G/E 3 views, right      Orders Needing Specimen Collection     None      Pending Results     No orders found from 9/27/2018 to 9/30/2018.            Pending Culture Results     No orders found from 9/27/2018 to 9/30/2018.            Pending Results Instructions     If you had any lab results that were not finalized at the time of your Discharge, you can call the ED Lab Result RN at [phone]. You will be contacted by this team for any positive Lab results or changes in treatment. The nurses are available 7 days a week from 10A to 6:30P.  You can leave a message 24 hours per day and they will return your call.        Test Results From Your Hospital Stay        9/29/2018  4:03 PM       Narrative     XR FOOT RT G/E 3 VW 9/29/2018 4:00 PM    HISTORY: Pain.    COMPARISON: None.        Impression     IMPRESSION: No evidence of acute fracture or malalignment. Calcaneal  bone spur.     RUSSELL TORO MD                Thank you for choosing [facility]       Thank you for choosing [facility] for your care. Our goal is always to provide you with excellent care. Hearing back from our patients is one way we can continue to improve our services. Please take a few minutes to complete the written survey that you may receive in the mail after you visit with us. Thank you!        [email or web address] Information     [facility] gives you secure access to your electronic health record. If you see a primary care provider, you can also send messages to your care team and make appointments. If you have questions, please call your primary care clinic.  If you do not have a primary care provider, please call [phone] and they will assist you.        Care EveryWhere ID     This is your Care EveryWhere ID. This could be used by other organizations to access your [facility] medical records  [id number]        Equal Access to Services     JOSÉ SAHNI : Hadii aad ku hadasho Soshirinali, waaxda luqadaha, qaybta kaalmada adedenisa, hiram mcintosh . So [facility] [phone].    ATENCIÓN: Si habla español, tiene a johnston disposición servicios gratuitos de asistencia lingüística. Llame al [phone].    We comply with applicable federal civil rights laws and [place] laws. We do not discriminate on the basis of race, color, national origin, age, disability, sex, sexual orientation, or gender identity.            After Visit Summary       This is your record. Keep this with you and show to your community pharmacist(s) and doctor(s) at your next visit.

## 2018-09-29 NOTE — ED AVS SNAPSHOT
[facility] Emergency Department    [address] [address]    Phone:  [phone]    Fax:  [phone]                                       Brittany Barajas   MRN: [id number]    Department:  [facility] Emergency Department   Date of Visit:  9/29/2018           After Visit Summary Signature Page     I have received my discharge instructions, and my questions have been answered. I have discussed any challenges I see with this plan with the nurse or doctor.    ..........................................................................................................................................  Patient/Patient Representative Signature      ..........................................................................................................................................  Patient Representative Print Name and Relationship to Patient    ..................................................               ................................................  Date                                   Time    ..........................................................................................................................................  Reviewed by Signature/Title    ...................................................              ..............................................  Date                                               Time          22EPIC Rev 08/18

## 2018-10-10 ENCOUNTER — TELEPHONE (OUTPATIENT)
Dept: FAMILY MEDICINE | Facility: CLINIC | Age: 58
End: 2018-10-10

## 2018-10-10 DIAGNOSIS — R13.19 OTHER DYSPHAGIA: ICD-10-CM

## 2018-10-10 DIAGNOSIS — K21.00 GASTROESOPHAGEAL REFLUX DISEASE WITH ESOPHAGITIS: ICD-10-CM

## 2018-10-10 DIAGNOSIS — S06.9X0D TRAUMATIC BRAIN INJURY, WITHOUT LOSS OF CONSCIOUSNESS, SUBSEQUENT ENCOUNTER: ICD-10-CM

## 2018-10-10 DIAGNOSIS — R49.0 DYSPHONIA: Primary | ICD-10-CM

## 2018-10-10 NOTE — TELEPHONE ENCOUNTER
Does she only want referrals for video swallow and Upper endo? Or would she also like neuro, and spine surgery specialist referrals as well?    Left message for patient to return call to clinic.     (K21.0) Gastroesophageal reflux disease with esophagitis  (primary encounter diagnosis)   Plan: use the dietary and mechanical instructions and the Prilosec at 40 mg daily. The max dose is 40 mg twice daily.   The goal for the symptoms is to be zero.   (R49.0) Dysphonia  Plan: see above for the reflux therapies and instructions. We have ENT and if this is desired, then call our RN at [phone].   (R13.19) Other dysphagia   Plan: see above for the reflux instructions. Try soft food for several days. If this does not improve then a video swallow study and possible an upper endoscopy would be needed.   (M50.30) DDD (degenerative disc disease), cervical   Plan: modify activities and avoid repetitive bending and twisting and lifting.   You have done PT and traction and steroid injections. You may  Need a spine surgery referral.   If you get insurance then call and this referral could be done.   (S06.9X0D) Traumatic brain injury, without loss of consciousness, subsequent encounter  Plan: we discussed that a Neurology visit will likely be needed and call for this when insured.       Tiny Bloom RN

## 2018-10-10 NOTE — TELEPHONE ENCOUNTER
Patient states she now has insurance. I have placed the orders as written from Dr. Iyer's note from 08/23/18.     Patient to call:   Neurology: [phone]  ENT: [phone]  Endoscopy: [phone]  Swallow Study: [phone]    Please give scheduling information. I have sent the prep information for Endoscopy.  Left message for patient to return call to clinic.   Tiny Bloom RN

## 2018-10-10 NOTE — TELEPHONE ENCOUNTER
Reason for call:  Patient reporting a symptom    Symptom or request: Pt saw Dr. Iyer 8/23 and was told to try to get on medical assistance.  She did get on M.A. and now she would like Dr. Iyer to order tests, due to her ongoing cough and esophagus issues.  Pt was also told, by Dr. Iyer to call back if she is having thyroid symptoms - Hair loss and bloating.  Please call patient and advise.      Duration (how long have symptoms been present): ongoing    Have you been treated for this before? Yes    Additional comments:     Phone Number patient can be reached at:  Home number on file [phone] (home)    Best Time:  any    Can we leave a detailed message on this number:  YES    Call taken on 10/10/2018 at 12:35 PM by Davina Nobles

## 2018-10-11 NOTE — TELEPHONE ENCOUNTER
Left non-detailed message for patient to return a call to the clinic RN.   MARGARITO Wagoner RN

## 2018-10-11 NOTE — TELEPHONE ENCOUNTER
Message left for patient to return call to clinic.  CSS - ok to deliver message below.    Manasa BACA RN

## 2018-10-12 NOTE — TELEPHONE ENCOUNTER
Patient to call:   Neurology: [phone]  ENT: [phone]  Endoscopy: [phone]  Swallow Study: [phone]     Please give scheduling information. I have sent the prep information for Endoscopy.  Left message for patient to return call to clinic.      Tiny HOFFMAN RN

## 2018-10-18 ENCOUNTER — TELEPHONE (OUTPATIENT)
Dept: FAMILY MEDICINE | Facility: CLINIC | Age: 58
End: 2018-10-18

## 2018-10-18 ENCOUNTER — ANESTHESIA EVENT (OUTPATIENT)
Dept: GASTROENTEROLOGY | Facility: CLINIC | Age: 58
End: 2018-10-18
Payer: MEDICAID

## 2018-10-18 ASSESSMENT — ENCOUNTER SYMPTOMS: SEIZURES: 1

## 2018-10-18 NOTE — ANESTHESIA PREPROCEDURE EVALUATION
Anesthesia Evaluation     . Pt has had prior anesthetic. Type: General, MAC and Regional           ROS/MED HX    ENT/Pulmonary:     (+)allergic rhinitis, Mild Persistent asthma , . .    Neurologic:     (+)seizures other neuro TBI    Cardiovascular:     (+) ----. : . . . :. valvular problems/murmurs . Previous cardiac testing Echodate:3/12/15results:Interpretation Summary  Mild mitral valve prolapse, bileaflet. There is trace to mild mitral   regurgitation. The visual ejection fraction is estimated at 60-65%.  PatientHeight: 61 in  PatientWeight: 139 lbs  SystolicPressure: 140 mmHg  DiastolicPressure: 84 mmHg  HeartRate: 88 bpm  BSA 1.6 m^2        Left Ventricle  The left ventricle is normal in size.  Left ventricular systolic function is normal.  The visual ejection fraction is estimated at 60-65%.  Grade I left ventricular diastolic dysfunction is noted.  No regional wall motion abnormalities noted.     Right Ventricle  The right ventricle is normal in structure, function and size.     Atria  Normal left atrial size.  Right atrial size is normal.  There is no color Doppler evidence of an atrial shunt.     Mitral Valve  Mild mitral valve prolapse, bileaflet.  There is trace to mild mitral regurgitation.  There is no mitral valve stenosis.     Tricuspid Valve  The tricuspid valve is not well visualized.  Right ventricular systolic pressure could not be approximated due to   inadequate tricuspid regurgitation.     Aortic Valve  The aortic valve is trileaflet.  No aortic regurgitation is present.  No aortic stenosis is present.     Pulmonic Valve  The pulmonic valve is not well seen, but is grossly normal.  Normal pulmonic valve velocity.     Vessels  Normal size aorta.  The IVC is normal in size and reactivity with respiration, suggesting normal   central venous pressure.     Pericardium  There is no pericardial effusion.date: results:ECG reviewed date:11/5/15 results: date: results:          METS/Exercise  Tolerance:     Hematologic:         Musculoskeletal:   (+) , , other musculoskeletal- Spinal DDD      GI/Hepatic:     (+) GERD Other GI/Hepatic Hx colon polyps      Renal/Genitourinary:         Endo:         Psychiatric:     (+) psychiatric history depression and anxiety      Infectious Disease:         Malignancy:   (+) Malignancy History of Breast          Other: Comment: Dysphagia  Dysphonia                    Physical Exam  Normal systems: cardiovascular, pulmonary and dental    Airway   Mallampati: I  TM distance: >3 FB    Dental     Cardiovascular   Rhythm and rate: regular and normal      Pulmonary    breath sounds clear to auscultation                    Anesthesia Plan      History & Physical Review  History and physical reviewed and following examination; no interval change.    ASA Status:  3 .    NPO Status:  > 6 hours    Plan for MAC Reason for MAC:  Deep or markedly invasive procedure (G8)         Postoperative Care      Consents  Anesthetic plan, risks, benefits and alternatives discussed with:  Patient..                          .

## 2018-10-19 ENCOUNTER — TELEPHONE (OUTPATIENT)
Dept: FAMILY MEDICINE | Facility: CLINIC | Age: 58
End: 2018-10-19

## 2018-10-19 NOTE — TELEPHONE ENCOUNTER
Patient reports:  She has an oximeter at home she uses, she was not advised by a provider to monitor pulse or oxygen saturation.  She has been coughing consistently, she has an esophagus scope on 10/22/18  She denies illness, SOB, chest pain  Pulse returns to 90 after coughing  Advised patient that the body's vital sign such as the pulse respond in value with changes in feelings or distress like coughing.  Pulse can elevate when coughing is present.  If pulse remains elevated >100, SOB, chest pain presents, go to ED to be evaluated    Imelda BACA Rn

## 2018-10-19 NOTE — TELEPHONE ENCOUNTER
[facility], [place], operated by [facility] Medical Opinion form started and routed to Dr. Iyer for completion and signature.

## 2018-10-19 NOTE — TELEPHONE ENCOUNTER
Reason for Call:  Other pulse    Detailed comments: Patient states that her pulse goes to 160 when she coughs.    Phone Number Patient can be reached at: Home number on file [phone] (home)    Best Time: any    Can we leave a detailed message on this number? YES    Call taken on 10/19/2018 at 12:47 PM by Rosie Lozada

## 2018-10-22 ENCOUNTER — SURGERY (OUTPATIENT)
Age: 58
End: 2018-10-22

## 2018-10-22 ENCOUNTER — HOSPITAL ENCOUNTER (OUTPATIENT)
Facility: CLINIC | Age: 58
Discharge: HOME | End: 2018-10-22
Attending: SURGERY | Admitting: SURGERY
Payer: MEDICAID

## 2018-10-22 ENCOUNTER — ANESTHESIA (OUTPATIENT)
Dept: GASTROENTEROLOGY | Facility: CLINIC | Age: 58
End: 2018-10-22
Payer: MEDICAID

## 2018-10-22 VITALS
OXYGEN SATURATION: 100 % | DIASTOLIC BLOOD PRESSURE: 56 MMHG | SYSTOLIC BLOOD PRESSURE: 102 MMHG | HEIGHT: 61 IN | WEIGHT: 152 LBS | RESPIRATION RATE: 16 BRPM | HEART RATE: 88 BPM | BODY MASS INDEX: 28.7 KG/M2 | TEMPERATURE: 98.5 F

## 2018-10-22 DIAGNOSIS — R05.9 COUGH: Primary | ICD-10-CM

## 2018-10-22 LAB — UPPER GI ENDOSCOPY: NORMAL

## 2018-10-22 PROCEDURE — 43235 EGD DIAGNOSTIC BRUSH WASH: CPT | Performed by: SURGERY

## 2018-10-22 PROCEDURE — 25000125 ZZHC RX 250: Performed by: SURGERY

## 2018-10-22 PROCEDURE — 25000132 ZZH RX MED GY IP 250 OP 250 PS 637: Performed by: SURGERY

## 2018-10-22 PROCEDURE — 25000128 H RX IP 250 OP 636: Performed by: SURGERY

## 2018-10-22 PROCEDURE — 37000008 ZZH ANESTHESIA TECHNICAL FEE, 1ST 30 MIN: Performed by: SURGERY

## 2018-10-22 PROCEDURE — 25000128 H RX IP 250 OP 636: Performed by: NURSE ANESTHETIST, CERTIFIED REGISTERED

## 2018-10-22 RX ORDER — SIMETHICONE 40MG/0.6ML
SUSPENSION, DROPS(FINAL DOSAGE FORM)(ML) ORAL PRN
Status: DISCONTINUED | OUTPATIENT
Start: 2018-10-22 | End: 2018-10-22 | Stop reason: HOSPADM

## 2018-10-22 RX ORDER — SODIUM CHLORIDE, SODIUM LACTATE, POTASSIUM CHLORIDE, CALCIUM CHLORIDE 600; 310; 30; 20 MG/100ML; MG/100ML; MG/100ML; MG/100ML
INJECTION, SOLUTION INTRAVENOUS CONTINUOUS
Status: DISCONTINUED | OUTPATIENT
Start: 2018-10-22 | End: 2018-10-22 | Stop reason: HOSPADM

## 2018-10-22 RX ORDER — LIDOCAINE 40 MG/G
CREAM TOPICAL
Status: DISCONTINUED | OUTPATIENT
Start: 2018-10-22 | End: 2018-10-22 | Stop reason: HOSPADM

## 2018-10-22 RX ORDER — CODEINE PHOSPHATE AND GUAIFENESIN 10; 100 MG/5ML; MG/5ML
1-2 SOLUTION ORAL EVERY 4 HOURS PRN
Qty: 240 ML | Refills: 0 | Status: SHIPPED | OUTPATIENT
Start: 2018-10-22 | End: 2018-12-11

## 2018-10-22 RX ORDER — ONDANSETRON 2 MG/ML
4 INJECTION INTRAMUSCULAR; INTRAVENOUS
Status: DISCONTINUED | OUTPATIENT
Start: 2018-10-22 | End: 2018-10-22 | Stop reason: HOSPADM

## 2018-10-22 RX ORDER — PROPOFOL 10 MG/ML
INJECTION, EMULSION INTRAVENOUS PRN
Status: DISCONTINUED | OUTPATIENT
Start: 2018-10-22 | End: 2018-10-22

## 2018-10-22 RX ADMIN — TOPICAL ANESTHETIC 2 SPRAY: 200 SPRAY DENTAL; PERIODONTAL at 09:33

## 2018-10-22 RX ADMIN — PROPOFOL 100 MG: 10 INJECTION, EMULSION INTRAVENOUS at 09:40

## 2018-10-22 RX ADMIN — PROPOFOL 100 MG: 10 INJECTION, EMULSION INTRAVENOUS at 09:37

## 2018-10-22 RX ADMIN — SODIUM CHLORIDE, POTASSIUM CHLORIDE, SODIUM LACTATE AND CALCIUM CHLORIDE: 600; 310; 30; 20 INJECTION, SOLUTION INTRAVENOUS at 09:04

## 2018-10-22 RX ADMIN — LIDOCAINE HYDROCHLORIDE 5 ML: 10 INJECTION, SOLUTION EPIDURAL; INFILTRATION; INTRACAUDAL; PERINEURAL at 09:46

## 2018-10-22 RX ADMIN — SIMETHICONE 40 MG: 20 SUSPENSION/ DROPS ORAL at 09:33

## 2018-10-22 RX ADMIN — LIDOCAINE HYDROCHLORIDE 0.2 ML: 10 INJECTION, SOLUTION EPIDURAL; INFILTRATION; INTRACAUDAL; PERINEURAL at 09:04

## 2018-10-22 NOTE — ANESTHESIA POSTPROCEDURE EVALUATION
Patient: Brittany Barajas    Procedure(s):  gastroscopy    Diagnosis:dysphagia    diagnostic  Diagnosis Additional Information: No value filed.    Anesthesia Type:  No value filed.    Note:  Anesthesia Post Evaluation    Patient location during evaluation: Bedside  Patient participation: Able to fully participate in evaluation  Level of consciousness: awake and alert  Pain management: adequate  Airway patency: patent  Cardiovascular status: acceptable  Respiratory status: acceptable  Hydration status: acceptable  PONV: none     Anesthetic complications: None          Last vitals:  Vitals:    10/22/18 0953 10/22/18 1006 10/22/18 1019   BP: 110/60 102/52 102/56   Pulse:      Resp: 16 16 16   Temp:      SpO2: 94% 99% 100%         Electronically Signed By: EDEL Hooper CRNA  October 22, 2018  10:32 AM

## 2018-10-22 NOTE — H&P
58 year old year old female here for upper endoscopy for dyaphagia.        Patient Active Problem List   Diagnosis     Mitral valve disorder     Allergic rhinitis     Convulsions (H)     Esophageal reflux     Mild major depression (H)     Plantar fascial fibromatosis     Other affections of shoulder region, not elsewhere classified     Neoplasm of digestive system     Anxiety state     Breast cancer (H)     CARDIOVASCULAR SCREENING; LDL GOAL LESS THAN 160     Transaminase or LDH elevation     H/O: hysterectomy     Colon polyp     Drug reaction     Mild persistent asthma     Urgency incontinence     Advanced directives, counseling/discussion     DDD (degenerative disc disease), lumbar     DDD (degenerative disc disease), cervical     Dysphonia     Other dysphagia     Traumatic brain injury, without loss of consciousness, subsequent encounter     Fatigue, unspecified type       Past Medical History:   Diagnosis Date     Allergic rhinitis, cause unspecified      Depressive disorder, not elsewhere classified      Esophageal reflux      Intramural leiomyoma of uterus      Mitral valve disorders(424.0)     MVD     Other affections of shoulder region, not elsewhere classified     Left shoulder impingement     Other convulsions     Seizures     Other diseases of trachea and bronchus, not elsewhere classified      Papanicolaou smear of cervix with low grade squamous intraepithelial lesion (LGSIL) 11/09    Pt refused colpo or further f/u     Plantar fascial fibromatosis        Past Surgical History:   Procedure Laterality Date     COLONOSCOPY  1/28/2011    COLONOSCOPY performed by ELOY BRIONES at [facility]     COLONOSCOPY  1/20/2012    Procedure:COLONOSCOPY; Surgeon:ELOY BRIONES; Location:[facility]     HYSTERECTOMY, PAP NO LONGER INDICATED  12/1/2008    for fibroids     INJECT EPIDURAL LUMBAR  3/9/2012    Procedure:INJECT EPIDURAL LUMBAR; JAMIL - ; Surgeon:GENERIC ANESTHESIA PROVIDER; Location:[facility]     INJECT  EPIDURAL LUMBAR  5/25/2012    Procedure:INJECT EPIDURAL LUMBAR; JAMIL-; Surgeon:ELISABETH ANESTHESIA PROVIDER; Location:[facility]     LEEP TX, CERVICAL  10/06    CECILIO 1     SURGICAL HISTORY OF -   10/7/1998    Right parotidectomy-mass     SURGICAL HISTORY OF -       Laparoscopy     SURGICAL HISTORY OF -   10/1996    Fibroid removal       Family History   Problem Relation Age of Onset     C.A.D. Mother      Diabetes Mother      Hypertension Mother      Cerebrovascular Disease Mother      Breast Cancer Mother      Allergies Mother      Arthritis Mother      Cancer Mother      Eye Disorder Mother      GASTROINTESTINAL DISEASE Mother      Gynecology Mother      Lipids Mother      HEART DISEASE Mother      Osteoporosis Mother      Obesity Mother      Thyroid Disease Mother      Respiratory Mother      Alcohol/Drug Father      Cancer Father      Alcohol/Drug Brother      Musculoskeletal Disorder Brother      spina bifida-recent dx     C.A.D. Brother      Thyroid Disease Sister      Lipids Sister      Gynecology Sister      fibroids/ s/p hysterectomy     Other - See Comments Sister      hysterectomy     Thyroid Disease Brother      HEART DISEASE Brother      DOUBLE BYPASS     Lipids Brother      Thyroid Disease Sister      Lipids Sister      Other - See Comments Sister      hysterectomy     GASTROINTESTINAL DISEASE Sister      liver function off     Thyroid Disease Sister      Thyroid Disease Sister      HEART DISEASE Sister      Cancer Other      thyroid cancer     Cancer - colorectal No family hx of        No current outpatient prescriptions on file.       Allergies   Allergen Reactions     Docetaxel Other (See Comments)     Chest tightness, black out     Taxotere Other (See Comments)     Chest tightness, black out       Pt reports that she has never smoked. She has never used smokeless tobacco. She reports that she drinks alcohol. She reports that she does not use illicit drugs.    Exam:    Awake, Alert OX3  Lungs - CTA  bilaterally  CV - RRR, no murmurs, distal pulses intact  Abd - soft, non-distended, non-tender, +BS  Extr - No cyanosis or edema    A/P 58 year old year old female in need of upper endoscopy for dysphagia. Risks, benefits, alternatives, and complications were discussed including the possibility of perforation and the patient agreed to proceed.    Seth Berman MD

## 2018-10-26 NOTE — TELEPHONE ENCOUNTER
Pt calling about forms. Gave her the message from Dr Iyer and we scheduled an appt for 11-14-18 at 2 pm.    Form left with Vanda Gamble [facility]

## 2018-11-09 ENCOUNTER — HOSPITAL ENCOUNTER (OUTPATIENT)
Dept: SPEECH THERAPY | Facility: CLINIC | Age: 58
Discharge: HOME | End: 2018-11-09
Attending: FAMILY MEDICINE | Admitting: FAMILY MEDICINE
Payer: MEDICAID

## 2018-11-09 ENCOUNTER — HOSPITAL ENCOUNTER (OUTPATIENT)
Dept: GENERAL RADIOLOGY | Facility: CLINIC | Age: 58
End: 2018-11-09
Attending: FAMILY MEDICINE
Payer: MEDICAID

## 2018-11-09 DIAGNOSIS — R13.10 DYSPHAGIA, UNSPECIFIED TYPE: ICD-10-CM

## 2018-11-09 PROCEDURE — 74230 X-RAY XM SWLNG FUNCJ C+: CPT

## 2018-11-09 PROCEDURE — 40000211 ZZHC STATISTIC SLP  DEPARTMENT VISIT: Performed by: SPEECH-LANGUAGE PATHOLOGIST

## 2018-11-09 PROCEDURE — 92611 MOTION FLUOROSCOPY/SWALLOW: CPT | Mod: GN | Performed by: SPEECH-LANGUAGE PATHOLOGIST

## 2018-11-09 NOTE — PROGRESS NOTES
" Video Swallow Study  11/09/18   General Information   Type Of Visit Initial   Start Of Care Date 11/09/18   Referring Physician Juan Iyer MD   Orders Evaluate And Treat   Medical Diagnosis Dysphagia, Dysphonia   Onset Of Illness/injury Or Date Of Surgery 10/10/18  (order date)   Precautions/limitations No Known Precautions/limitations   Hearing WFL for exam   Pertinent History of Current Problem/OT: Additional Occupational Profile Info Pt reports feeling of tightness in throat with \"laryngitis\" for two years.  Vocal quality is hoarse and tense.  She states she coughs when drinking liquids and that food feels like it sticks on the back of her tongue.  Pt reports normal endoscopy last week.  PMH  of GERD with medication 2x/day.   Respiratory Status Room air   Prior Level Of Function Comment regular diet- tender meats   Living Environment [place]   General Observations Pt alert and pleasant.  Has concerns for throat being tight and possible tyroid issues curently being addressed by primary MD.   Patient/family Goals To be able to swallow easily.   Pain Assessment   Pain Reported No   Fall Risk Screen   Fall screen completed by SLP   Have you fallen 2 or more times in the past year? No   Have you fallen and had an injury in the past year? No   Is patient a fall risk? No   Clinical Swallow Evaluation   Oral Musculature generally intact   Structural Abnormalities none present   Dentition present and adequate   Mucosal Quality good   Mandibular Strength and Mobility intact   Oral Labial Strength and Mobility WFL   Lingual Strength and Mobility WFL   Velar Elevation intact   Buccal Strength and Mobility intact   Laryngeal Function Cough;Throat clear;Swallow;Voicing initiated  (moderate dysphonia present)   VFSS Eval: Radiology   Radiologist Dr. Brown   Views Taken left lateral   Physical Location of Procedure [facility]   VFSS Eval: Thin Liquid Texture Trial   Mode of Presentation, Thin Liquid " cup;self-fed   Order of Presentation 1   Preparatory Phase WFL   Oral Phase, Thin Liquid WFL   Pharyngeal Phase, Thin Liquid WFL   Rosenbek's Penetration Aspiration Scale: Thin Liquid Trial Results 1 - no aspiration, contrast does not enter airway   Diagnostic Statement WNL   VFSS Eval: Nectar Thick Liquid Texture Trial   Mode of Presentation, Nectar cup;self-fed   Order of Presentation 2   Preparatory Phase WFL   Oral Phase, Nectar WFL   Pharyngeal Phase, Nectar WFL;Delayed swallow reflex  (mild)   Rosenbek's Penetration Aspiration Scale: Nectar-Thick Liquid Trial Results 1 - no aspiration, contrast does not enter airway   Diagnostic Statement WFL. Mild swallow delay.  Pharynx clears with swallow.   VFSS Eval: Honey Thick Texture Trial   Mode of Presentation, Honey cup;self-fed   Order of Presentation 3   Preparatory Phase WFL   Oral Phase, Honey WFL   Pharyngeal Phase, Honey WFL   Rosenbek's Penetration Aspiration Scale: Honey Trial Results 1 - no aspiration, contrast does not enter airway   Diagnostic Statement WNL   VFSS Eval: Pudding Thick Liquid Texture Trial   Mode of Presentation, Pudding cup;fed by clinician   Order of Presentation 4   Preparatory Phase WFL   Oral Phase, Pudding WFL   Pharyngeal Phase, Pudding WFL   Rosenbek's Penetration Aspiration Scale: Pudding-Thick Liquid Trial Results 1 - no aspiration, contrast does not enter airway   Diagnostic Statement WNL   VFSS Eval: Solid Food Texture Trial   Mode of Presentation, Solid fed by clinician   Order of Presentation 5,6   Preparatory Phase WFL   Oral Phase, Solid WFL   Pharyngeal Phase, Solid WFL   Rosenbek's Penetration Aspiration Scale: Solid Food Trial Results 1 - no aspiration, contrast does not enter airway   Diagnostic Statement WNL.  Pt viewed in lateral and AP.  Oral and pharyngeal WNL.  During esophageal sweep noted to have bolus moving slowly which present when pt reports mild sticking, however pharynx is clear.   Educational Assessment    Barriers to Learning No barriers   Esophageal Phase of Swallow   Patient reports or presents with symptoms of esophageal dysphagia Yes   Esophageal sweep performed during today s vidofluoroscopic exam  Yes   Esophageal comments Pt takes medication 2x/day for GERD.  Esophageal sweep shows some dysmotility.   Swallow Eval: Clinical Impressions   Skilled Criteria for Therapy Intervention No problems identified which require skilled intervention   Functional Assessment Scale (FAS) 6   Dysphagia Outcome Severity Scale (DORIAN) Level 6 - DORIAN   Diet texture recommendations Regular diet   Clinical Impression Comments Oral and pharyngeal diet WNL for regular diet consistency.  One episode of mild swallow delay with nectar consistency but all other trials are timely.  No penetration, aspiration, or post swallow residue noted.  No treatment indicated.  Please refer to radiologist report:  Possible dysmotility in esophagus.   Total Session Time   Total Session Time 30   Total Evaluation Time 30

## 2018-11-12 ENCOUNTER — TELEPHONE (OUTPATIENT)
Dept: FAMILY MEDICINE | Facility: CLINIC | Age: 58
End: 2018-11-12

## 2018-11-12 NOTE — TELEPHONE ENCOUNTER
PHQ9 Due between: 10/19/18-2/19/18    Please contact patient to complete follow up PHQ9 before their DUE DATE.     Index date 6/19/18, phq9 score 16.    Patient is also due for mammogram and colon cancer screening.  Fit test done last 1/2017.    This is important feedback for your care team to assess your symptoms and treatment plan.    You completed this same questionnaire   PHQ-9 SCORE 6/19/2018   Total Score -   Total Score 16       MA STAFF: If upon calling patient and PHQ9 score is higher that 5 route to the provider. You may also seek an RN for review.

## 2018-11-13 NOTE — TELEPHONE ENCOUNTER
Patient has apt 11/14 with Dr. Iyer. Will add to apt note to update phq-9. Will postpone a few days . Everett BACA CMA

## 2018-11-14 ENCOUNTER — OFFICE VISIT (OUTPATIENT)
Dept: FAMILY MEDICINE | Facility: CLINIC | Age: 58
End: 2018-11-14
Payer: MEDICAID

## 2018-11-14 ENCOUNTER — HOSPITAL ENCOUNTER (OUTPATIENT)
Dept: ULTRASOUND IMAGING | Facility: CLINIC | Age: 58
Discharge: HOME | End: 2018-11-14
Attending: FAMILY MEDICINE | Admitting: FAMILY MEDICINE
Payer: MEDICAID

## 2018-11-14 VITALS
BODY MASS INDEX: 30.4 KG/M2 | HEART RATE: 100 BPM | WEIGHT: 161 LBS | HEIGHT: 61 IN | DIASTOLIC BLOOD PRESSURE: 94 MMHG | TEMPERATURE: 98.5 F | OXYGEN SATURATION: 98 % | SYSTOLIC BLOOD PRESSURE: 136 MMHG

## 2018-11-14 DIAGNOSIS — E01.0 THYROMEGALY: ICD-10-CM

## 2018-11-14 DIAGNOSIS — Z23 NEED FOR PROPHYLACTIC VACCINATION AND INOCULATION AGAINST INFLUENZA: Primary | ICD-10-CM

## 2018-11-14 DIAGNOSIS — R49.0 DYSPHONIA: ICD-10-CM

## 2018-11-14 PROCEDURE — 90686 IIV4 VACC NO PRSV 0.5 ML IM: CPT | Performed by: FAMILY MEDICINE

## 2018-11-14 PROCEDURE — 76536 US EXAM OF HEAD AND NECK: CPT

## 2018-11-14 PROCEDURE — 90471 IMMUNIZATION ADMIN: CPT | Performed by: FAMILY MEDICINE

## 2018-11-14 PROCEDURE — 99214 OFFICE O/P EST MOD 30 MIN: CPT | Mod: 25 | Performed by: FAMILY MEDICINE

## 2018-11-14 ASSESSMENT — PATIENT HEALTH QUESTIONNAIRE - PHQ9
SUM OF ALL RESPONSES TO PHQ QUESTIONS 1-9: 8
5. POOR APPETITE OR OVEREATING: SEVERAL DAYS

## 2018-11-14 ASSESSMENT — ANXIETY QUESTIONNAIRES
6. BECOMING EASILY ANNOYED OR IRRITABLE: NOT AT ALL
1. FEELING NERVOUS, ANXIOUS, OR ON EDGE: SEVERAL DAYS
5. BEING SO RESTLESS THAT IT IS HARD TO SIT STILL: NEARLY EVERY DAY
GAD7 TOTAL SCORE: 7
7. FEELING AFRAID AS IF SOMETHING AWFUL MIGHT HAPPEN: NOT AT ALL
IF YOU CHECKED OFF ANY PROBLEMS ON THIS QUESTIONNAIRE, HOW DIFFICULT HAVE THESE PROBLEMS MADE IT FOR YOU TO DO YOUR WORK, TAKE CARE OF THINGS AT HOME, OR GET ALONG WITH OTHER PEOPLE: VERY DIFFICULT
3. WORRYING TOO MUCH ABOUT DIFFERENT THINGS: SEVERAL DAYS
2. NOT BEING ABLE TO STOP OR CONTROL WORRYING: SEVERAL DAYS

## 2018-11-14 NOTE — PROGRESS NOTES
SUBJECTIVE:   Brittany Barajas is a 58 year old female who presents to clinic today for the following health issues:      Chief Complaint   Patient presents with     Forms     Here to have forms completed today for [facility] Medical Opinion.     Results     Here to follow up on her test results-upper [id number].  Video Swallow done on 11-9-18.  She states she talked with her sister this weekend.  She had to have dilation for her throat.     Flu Shot     Flu shot today.     The video swallow study on 11-9-18 showed:   Impression: No evidence of aspiration. AP projection demonstrated poor  clearing of the esophagus with the primary peristaltic wave. Please  see speech pathology report for further details.         The upper endoscopy on 10-22-18 showed:   Findings:        The nasopharynx and oropharynx were normal.        The examined esophagus was normal.        There is no endoscopic evidence of stenosis, stricture or mass in the        entire esophagus.        The entire examined stomach was normal.        The examined duodenum was normal.                                                                                     Impression:          - Normal nasopharynx and oropharynx.                        - Normal esophagus.                        - Normal stomach.                        - Normal examined duodenum.                        - No specimens collected.   Recommendation:      - Discharge patient to home.                        - High fiber diet.                        - Return to referring physician PRN.                                                                                       Current Outpatient Prescriptions:      albuterol (PROAIR HFA/PROVENTIL HFA/VENTOLIN HFA) 108 (90 BASE) MCG/ACT Inhaler, Inhale 2 puffs into the lungs every 6 hours as needed for shortness of breath / dyspnea, Disp: 2 Inhaler, Rfl: 6     amitriptyline (ELAVIL) 50 MG tablet, Take 1 tablet (50 mg) by mouth At  Bedtime, Disp: 90 tablet, Rfl: 3     aspirin 81 MG tablet, Take 1 tablet by mouth daily., Disp: , Rfl:      fluticasone (FLONASE) 50 MCG/ACT spray, Spray 1-2 sprays into both nostrils daily, Disp: 1 Bottle, Rfl: 3     fluticasone (FLOVENT HFA) 110 MCG/ACT Inhaler, Inhale 2 puffs into the lungs 2 times daily (Patient taking differently: Inhale 2 puffs into the lungs daily ), Disp: 1 Inhaler, Rfl: 3     guaiFENesin-codeine (ROBITUSSIN AC) 100-10 MG/5ML SOLN solution, Take 5-10 mLs by mouth every 4 hours as needed for cough, Disp: 240 mL, Rfl: 0     ipratropium - albuterol 0.5 mg/2.5 mg/3 mL (DUONEB) 0.5-2.5 (3) MG/3ML neb solution, Take 1 vial (3 mLs) by nebulization every 6 hours as needed for shortness of breath / dyspnea, Disp: 1 Box, Rfl: 2     Multiple Vitamin (MULTIVITAMIN OR), Take 1 tablet by mouth daily., Disp: , Rfl:      PARoxetine (PAXIL) 10 MG tablet, Take 1 tablet (10 mg) by mouth At Bedtime, Disp: 90 tablet, Rfl: 3     PARoxetine (PAXIL) 20 MG tablet, TAKE ONE TABLET BY MOUTH AT BEDTIME, Disp: 90 tablet, Rfl: 3    Patient Active Problem List   Diagnosis     Mitral valve disorder     Allergic rhinitis     Convulsions (H)     Esophageal reflux     Mild major depression (H)     Plantar fascial fibromatosis     Other affections of shoulder region, not elsewhere classified     Neoplasm of digestive system     Anxiety state     Breast cancer (H)     CARDIOVASCULAR SCREENING; LDL GOAL LESS THAN 160     Transaminase or LDH elevation     H/O: hysterectomy     Colon polyp     Drug reaction     Mild persistent asthma     Urgency incontinence     Advanced directives, counseling/discussion     DDD (degenerative disc disease), lumbar     DDD (degenerative disc disease), cervical     Dysphonia     Other dysphagia     Traumatic brain injury, without loss of consciousness, subsequent encounter     Fatigue, unspecified type       Reviewed and updated as needed this visit by clinical staff  Tobacco  Allergies  Meds   "Med Hx  Surg Hx  Fam Hx  Soc Hx      Reviewed and updated as needed this visit by Provider    Blood pressure (!) 136/94, pulse 100, temperature 98.5  F (36.9  C), temperature source Tympanic, height 5' 1\" (1.549 m), weight 161 lb (73 kg), last menstrual period 10/17/2008, SpO2 98 %, not currently breastfeeding.    Exam:  GENERAL APPEARANCE: healthy, alert and no distress  EYES: EOMI,  PERRL  NECK: no adenopathy and thyromegaly approximately 2 times normal      (R49.0) Dysphonia  Comment:   Plan: OTOLARYNGOLOGY REFERRAL        We reviewed and discussed the recent endoscopy and that was normal. There was some dysfunction of the swallowing mechanism.   The ENT referral is done for evaluation and possible endoscopy for the vocal cords. Call [phone] for this, or go to [facility]     (E01.0) Thyromegaly  Comment:   Plan: US Thyroid        We ordered the US of the thyroid and call [phone], or go to Diagnostics.   We will call the results.   (Z23) Need for prophylactic vaccination and inoculation against influenza   Comment: done today  Plan: FLU VACCINE, SPLIT VIRUS, IM (QUADRIVALENT)         [90264]- >3 YRS, Vaccine Administration,         Initial [39354]            Juan Iyer                Injectable Influenza Immunization Documentation    1.  Is the person to be vaccinated sick today?   No    2. Does the person to be vaccinated have an allergy to a component   of the vaccine?   No  Egg Allergy Algorithm Link    3. Has the person to be vaccinated ever had a serious reaction   to influenza vaccine in the past?   No    4. Has the person to be vaccinated ever had Guillain-Barré syndrome?   No    Form completed by Maritza Suarez CMA           "

## 2018-11-14 NOTE — NURSING NOTE
Request for medical opinion form was faxed to [phone]. Confirmed sent. Patient was given original. Copy was sent to be scanned into her chart. Everett BACA CMA

## 2018-11-14 NOTE — PATIENT INSTRUCTIONS
Thank you for choosing [facility].  You may be receiving a survey in the mail from Perlita Archer regarding your visit today.  Please take a few minutes to complete and return the survey to let us know how we are doing.      If you have questions or concerns, please contact us via [employer] or you can contact your care team at [phone].    Our Clinic hours are:  Monday 6:40 am  to 7:00 pm  Tuesday -Friday 6:40 am to 5:00 pm    The [place] outpatient lab hours are:  Monday - Friday 6:10 am to 4:45 pm  Saturdays 7:00 am to 11:00 am  Appointments are required, call [phone]    If you have clinical questions after hours or would like to schedule an appointment,  call the clinic at [phone].    (R49.0) Dysphonia  Comment:   Plan: OTOLARYNGOLOGY REFERRAL        We reviewed and discussed the recent endoscopy and that was normal. There was some dysfunction of the swallowing mechanism.   The ENT referral is done for evaluation and possible endoscopy for the vocal cords. Call [phone] for this, or go to [facility]     (E01.0) Thyromegaly  Comment:   Plan: US Thyroid        We ordered the US of the thyroid and call [phone], or go to Diagnostics.   We will call the results.   (Z23) Need for prophylactic vaccination and inoculation against influenza   Comment: done today  Plan: FLU VACCINE, SPLIT VIRUS, IM (QUADRIVALENT)         [81629]- >3 YRS, Vaccine Administration,         Initial [[id number]]

## 2018-11-14 NOTE — MR AVS SNAPSHOT
After Visit Summary   11/14/2018    Brittany Barajas    MRN: [id number]           Patient Information     Date Of Birth          1960        Visit Information        Provider Department      11/14/2018 2:00 PM Juan Iyer MD [facility]        Today's Diagnoses     Need for prophylactic vaccination and inoculation against influenza    -  1    Dysphonia        Thyromegaly          Care Instructions          Thank you for choosing [facility] [place].  You may be receiving a survey in the mail from [employer] regarding your visit today.  Please take a few minutes to complete and return the survey to let us know how we are doing.      If you have questions or concerns, please contact us via [employer] or you can contact your care team at [phone].    Our Clinic hours are:  Monday 6:40 am  to 7:00 pm  Tuesday -Friday 6:40 am to 5:00 pm    The [place] outpatient lab hours are:  Monday - Friday 6:10 am to 4:45 pm  Saturdays 7:00 am to 11:00 am  Appointments are required, call [phone]    If you have clinical questions after hours or would like to schedule an appointment,  call the clinic at [phone].    (R49.0) Dysphonia  Comment:   Plan: OTOLARYNGOLOGY REFERRAL        We reviewed and discussed the recent endoscopy and that was normal. There was some dysfunction of the swallowing mechanism.   The ENT referral is done for evaluation and possible endoscopy for the vocal cords. Call [phone] for this, or go to [facility]     (E01.0) Thyromegaly  Comment:   Plan: US Thyroid        We ordered the US of the thyroid and call [phone], or go to Diagnostics.   We will call the results.   (Z23) Need for prophylactic vaccination and inoculation against influenza   Comment: done today  Plan: FLU VACCINE, SPLIT VIRUS, IM (QUADRIVALENT)         [91648]- >3 YRS, Vaccine Administration,         Initial [57033]          Follow-ups after your visit        Additional Services      OTOLARYNGOLOGY REFERRAL       Your provider has referred you to: [facility]: [facility] [phone]   [email or web address]    Please be aware that coverage of these services is subject to the terms and limitations of your health insurance plan.  Call member services at your health plan with any benefit or coverage questions.      Please bring the following with you to your appointment:    (1) Any X-Rays, CTs or MRIs which have been performed.  Contact the facility where they were done to arrange for  prior to your scheduled appointment.   (2) List of current medications  (3) This referral request   (4) Any documents/labs given to you for this referral                  Your next 10 appointments already scheduled     Dec 03, 2018 10:15 AM CST   [facility]   [facility]    [address] [place] [address]   [phone]              Future tests that were ordered for you today     Open Future Orders        Priority Expected Expires Ordered    US Thyroid Routine  11/14/2019 11/14/2018            Who to contact     If you have questions or need follow up information about today's clinic visit or your schedule please contact [facility] directly at [phone].  Normal or non-critical lab and imaging results will be communicated to you by MyChart, letter or phone within 4 business days after the clinic has received the results. If you do not hear from us within 7 days, please contact the clinic through MyChart or phone. If you have a critical or abnormal lab result, we will notify you by phone as soon as possible.  Submit refill requests through [employer] or call your pharmacy and they will forward the refill request to us. Please allow 3 business days for your refill to be completed.          Additional Information About Your Visit        MyChart Information     MyChart  "gives you secure access to your electronic health record. If you see a primary care provider, you can also send messages to your care team and make appointments. If you have questions, please call your primary care clinic.  If you do not have a primary care provider, please call [phone] and they will assist you.        Care EveryWhere ID     This is your Care EveryWhere ID. This could be used by other organizations to access your [facility] medical records  [id number]        Your Vitals Were     Pulse Temperature Height Last Period Pulse Oximetry BMI (Body Mass Index)    100 98.5  F (36.9  C) (Tympanic) 5' 1\" (1.549 m) 10/17/2008 98% 30.42 kg/m2       Blood Pressure from Last 3 Encounters:   11/14/18 (!) 136/94   10/22/18 102/56   09/29/18 136/85    Weight from Last 3 Encounters:   11/14/18 161 lb (73 kg)   10/22/18 152 lb (68.9 kg)   09/29/18 156 lb (70.8 kg)              We Performed the Following     FLU VACCINE, SPLIT VIRUS, IM (QUADRIVALENT) [40718]- >3 YRS     OTOLARYNGOLOGY REFERRAL     Vaccine Administration, Initial [70643]          Today's Medication Changes          These changes are accurate as of 11/14/18  2:53 PM.  If you have any questions, ask your nurse or doctor.               These medicines have changed or have updated prescriptions.        Dose/Directions    fluticasone 110 MCG/ACT Inhaler   Commonly known as:  FLOVENT HFA   This may have changed:  when to take this   Used for:  Mild persistent asthma        Dose:  2 puff   Inhale 2 puffs into the lungs 2 times daily   Quantity:  1 Inhaler   Refills:  3                Primary Care Provider Office Phone # Fax #    Juan Iyer [phone] [address] [address]        Equal Access to Services     JOSÉ SAHNI : Mirta Kelly, wadavid crump, qaybta kaaljulia jason, hiram machado. So [facility] [phone].    ATENCIÓN: Si habla español, tiene a johnston " disposición servicios gratuitos de asistencia lingüística. Herberth flood [phone].    We comply with applicable federal civil rights laws and [place] laws. We do not discriminate on the basis of race, color, national origin, age, disability, sex, sexual orientation, or gender identity.            Thank you!     Thank you for choosing [facility]  for your care. Our goal is always to provide you with excellent care. Hearing back from our patients is one way we can continue to improve our services. Please take a few minutes to complete the written survey that you may receive in the mail after your visit with us. Thank you!             Your Updated Medication List - Protect others around you: Learn how to safely use, store and throw away your medicines at www.disposemymeds.org.          This list is accurate as of 11/14/18  2:53 PM.  Always use your most recent med list.                   Brand Name Dispense Instructions for use Diagnosis    albuterol 108 (90 Base) MCG/ACT inhaler    PROAIR HFA/PROVENTIL HFA/VENTOLIN HFA    2 Inhaler    Inhale 2 puffs into the lungs every 6 hours as needed for shortness of breath / dyspnea    Mild persistent asthma, uncomplicated       amitriptyline 50 MG tablet    ELAVIL    90 tablet    Take 1 tablet (50 mg) by mouth At Bedtime    Generalized anxiety disorder, Insomnia, unspecified type       aspirin 81 MG tablet      Take 1 tablet by mouth daily.        fluticasone 110 MCG/ACT Inhaler    FLOVENT HFA    1 Inhaler    Inhale 2 puffs into the lungs 2 times daily    Mild persistent asthma       fluticasone 50 MCG/ACT spray    FLONASE    1 Bottle    Spray 1-2 sprays into both nostrils daily    Mild persistent asthma, uncomplicated       guaiFENesin-codeine 100-10 MG/5ML Soln solution    ROBITUSSIN AC    240 mL    Take 5-10 mLs by mouth every 4 hours as needed for cough    Cough       ipratropium - albuterol 0.5 mg/2.5 mg/3 mL 0.5-2.5 (3) MG/3ML neb solution    DUONEB    1 Box     Take 1 vial (3 mLs) by nebulization every 6 hours as needed for shortness of breath / dyspnea    Mild persistent asthma, uncomplicated       MULTIVITAMIN PO      Take 1 tablet by mouth daily.        * PARoxetine 20 MG tablet    PAXIL    90 tablet    TAKE ONE TABLET BY MOUTH AT BEDTIME    Generalized anxiety disorder       * PARoxetine 10 MG tablet    PAXIL    90 tablet    Take 1 tablet (10 mg) by mouth At Bedtime    Moderate recurrent major depression (H)       * Notice:  This list has 2 medication(s) that are the same as other medications prescribed for you. Read the directions carefully, and ask your doctor or other care provider to review them with you.

## 2018-11-15 ASSESSMENT — ANXIETY QUESTIONNAIRES: GAD7 TOTAL SCORE: 7

## 2018-11-15 ASSESSMENT — ASTHMA QUESTIONNAIRES: ACT_TOTALSCORE: 14

## 2018-11-29 ENCOUNTER — TELEPHONE (OUTPATIENT)
Dept: NEUROLOGY | Facility: CLINIC | Age: 58
End: 2018-11-29

## 2018-11-29 NOTE — TELEPHONE ENCOUNTER
FUTURE VISIT INFORMATION        FUTURE VISIT INFORMATION:    Date: 12/3/18    Time:  1015    Location: [facility]   REFERRAL INFORMATION:    Referring provider:  Juan Iyer MD    Referring providers clinic:  [facility]    Reason for visit/diagnosis:  TBI        NOTES (FOR ALL VISITS) STATUS DETAILS   OFFICE NOTE from referring provider Printed 8/23/18   OFFICE NOTE from other specialist Printed  PT 2/27/17   DISCHARGE SUMMARY from hospital      DISCHARGE REPORT from the ER Printed 12/28/16 Danielito   OPERATIVE REPORT      MEDICATION LIST Epic     IMAGING  (FOR ALL VISITS)       EMG      EEG      MRI (HEAD, NECK, SPINE)      CT C-spine Printed/ Images in PACS In ED note of 12/28/16    Thyroid U/S   Swallow Study      Printed  Printed  1/14/18 Danielito    11/9/18 Danielito TELLEZ

## 2018-12-03 ENCOUNTER — OFFICE VISIT (OUTPATIENT)
Dept: OTOLARYNGOLOGY | Facility: CLINIC | Age: 58
End: 2018-12-03
Payer: COMMERCIAL

## 2018-12-03 ENCOUNTER — OFFICE VISIT (OUTPATIENT)
Dept: NEUROLOGY | Facility: CLINIC | Age: 58
End: 2018-12-03
Payer: COMMERCIAL

## 2018-12-03 ENCOUNTER — HOSPITAL ENCOUNTER (OUTPATIENT)
Dept: SPEECH THERAPY | Facility: CLINIC | Age: 58
Setting detail: THERAPIES SERIES
End: 2018-12-03
Attending: OTOLARYNGOLOGY
Payer: COMMERCIAL

## 2018-12-03 VITALS
DIASTOLIC BLOOD PRESSURE: 81 MMHG | TEMPERATURE: 97.3 F | SYSTOLIC BLOOD PRESSURE: 137 MMHG | OXYGEN SATURATION: 99 % | HEART RATE: 84 BPM | RESPIRATION RATE: 12 BRPM | BODY MASS INDEX: 30.42 KG/M2 | WEIGHT: 161 LBS

## 2018-12-03 VITALS
WEIGHT: 161 LBS | TEMPERATURE: 97.3 F | HEIGHT: 61 IN | DIASTOLIC BLOOD PRESSURE: 86 MMHG | HEART RATE: 95 BPM | SYSTOLIC BLOOD PRESSURE: 134 MMHG | BODY MASS INDEX: 30.4 KG/M2

## 2018-12-03 DIAGNOSIS — Z87.820 HISTORY OF CLOSED HEAD INJURY: Primary | ICD-10-CM

## 2018-12-03 DIAGNOSIS — R49.0 DYSPHONIA: Primary | ICD-10-CM

## 2018-12-03 DIAGNOSIS — J45.40 MODERATE PERSISTENT ASTHMA WITHOUT COMPLICATION: Primary | ICD-10-CM

## 2018-12-03 DIAGNOSIS — M54.2 CERVICALGIA: ICD-10-CM

## 2018-12-03 DIAGNOSIS — J38.3 VOCAL FOLD CYST: ICD-10-CM

## 2018-12-03 PROCEDURE — 99244 OFF/OP CNSLTJ NEW/EST MOD 40: CPT | Performed by: PSYCHIATRY & NEUROLOGY

## 2018-12-03 PROCEDURE — 99244 OFF/OP CNSLTJ NEW/EST MOD 40: CPT | Mod: 25 | Performed by: OTOLARYNGOLOGY

## 2018-12-03 PROCEDURE — 31575 DIAGNOSTIC LARYNGOSCOPY: CPT | Performed by: OTOLARYNGOLOGY

## 2018-12-03 PROCEDURE — 40000211 ZZHC STATISTIC SLP  DEPARTMENT VISIT: Performed by: SPEECH-LANGUAGE PATHOLOGIST

## 2018-12-03 PROCEDURE — 92507 TX SP LANG VOICE COMM INDIV: CPT | Mod: GN | Performed by: SPEECH-LANGUAGE PATHOLOGIST

## 2018-12-03 PROCEDURE — 92524 BEHAVRAL QUALIT ANALYS VOICE: CPT | Mod: GN | Performed by: SPEECH-LANGUAGE PATHOLOGIST

## 2018-12-03 RX ORDER — CYCLOBENZAPRINE HCL 10 MG
5-10 TABLET ORAL 3 TIMES DAILY PRN
Qty: 30 TABLET | Refills: 1 | Status: SHIPPED | OUTPATIENT
Start: 2018-12-03 | End: 2019-02-06

## 2018-12-03 ASSESSMENT — PAIN SCALES - GENERAL: PAINLEVEL: NO PAIN (0)

## 2018-12-03 NOTE — PATIENT INSTRUCTIONS
Plan    MRI Brain and Cervical Spine. We will notify you of the results.   Flexeril 5-10mg as needed for pain/muscle tightness.  I recommend you have an updated eye exam.   Return to neurology in 3 months.

## 2018-12-03 NOTE — TELEPHONE ENCOUNTER
Routing refill request to provider for review/approval because:  Patient needs to be seen because:  ACT is below goal of 20.    Batsheva Wagoner RN

## 2018-12-03 NOTE — TELEPHONE ENCOUNTER
"ACT Total Scores 6/19/2018 8/23/2018 11/14/2018   ACT TOTAL SCORE - - -   ASTHMA ER VISITS - - -   ASTHMA HOSPITALIZATIONS - - -   ACT TOTAL SCORE (Goal Greater than or Equal to 20) 10 17 14   In the past 12 months, how many times did you visit the emergency room for your asthma without being admitted to the hospital? 0 3 0   In the past 12 months, how many times were you hospitalized overnight because of your asthma? 0 0 0     Requested Prescriptions   Pending Prescriptions Disp Refills     albuterol (PROAIR HFA/PROVENTIL HFA/VENTOLIN HFA) 108 (90 Base) MCG/ACT inhaler 2 Inhaler 6     Sig: Inhale 2 puffs into the lungs every 6 hours as needed for shortness of breath / dyspnea    Asthma Maintenance Inhalers - Anticholinergics Failed    12/3/2018  4:21 PM       Failed - Asthma control assessment score within normal limits in last 6 months    Please review ACT score.          Passed - Patient is age 12 years or older       Passed - Recent (6 mo) or future (30 days) visit within the authorizing provider's specialty    Patient had office visit in the last 6 months or has a visit in the next 30 days with authorizing provider or within the authorizing provider's specialty.  See \"Patient Info\" tab in inbasket, or \"Choose Columns\" in Meds & Orders section of the refill encounter.            fluticasone (FLOVENT HFA) 110 MCG/ACT inhaler 1 Inhaler 3     Sig: Inhale 2 puffs into the lungs 2 times daily    Inhaled Steroids Protocol Failed    12/3/2018  4:21 PM       Failed - Asthma control assessment score within normal limits in last 6 months    Please review ACT score.          Passed - Patient is age 12 or older       Passed - Recent (6 mo) or future (30 days) visit within the authorizing provider's specialty    Patient had office visit in the last 6 months or has a visit in the next 30 days with authorizing provider or within the authorizing provider's specialty.  See \"Patient Info\" tab in inbasket, or \"Choose Columns\" in " "Meds & Orders section of the refill encounter.            ipratropium - albuterol 0.5 mg/2.5 mg/3 mL (DUONEB) 0.5-2.5 (3) MG/3ML neb solution 1 Box 2     Sig: Take 1 vial (3 mLs) by nebulization every 6 hours as needed for shortness of breath / dyspnea    Short-Acting Beta Agonist Inhalers Protocol  Failed    12/3/2018  4:21 PM       Failed - Asthma control assessment score within normal limits in last 6 months    Please review ACT score.          Passed - Patient is age 12 or older       Passed - Recent (6 mo) or future (30 days) visit within the authorizing provider's specialty    Patient had office visit in the last 6 months or has a visit in the next 30 days with authorizing provider or within the authorizing provider's specialty.  See \"Patient Info\" tab in inbasket, or \"Choose Columns\" in Meds & Orders section of the refill encounter.              "

## 2018-12-03 NOTE — TELEPHONE ENCOUNTER
"Requested Prescriptions   Pending Prescriptions Disp Refills     albuterol (PROAIR HFA/PROVENTIL HFA/VENTOLIN HFA) 108 (90 Base) MCG/ACT inhaler  Last Written Prescription Date:  1/19/2017  Last Fill Quantity: 2 inhalers,  # refills: 6   Last office visit: 11/14/2018 with prescribing provider:  Tosteson    Future Office Visit:   Next 5 appointments (look out 90 days)     Feb 12, 2019 11:00 AM CST   [facility] Gino Hudson [facility]   [facility]    [address] [address]   [phone]                  2 Inhaler 6     Sig: Inhale 2 puffs into the lungs every 6 hours as needed for shortness of breath / dyspnea    Asthma Maintenance Inhalers - Anticholinergics Failed    12/3/2018 11:18 AM       Failed - Asthma control assessment score within normal limits in last 6 months    Please review ACT score.          Passed - Patient is age 12 years or older       Passed - Recent (6 mo) or future (30 days) visit within the authorizing provider's specialty    Patient had office visit in the last 6 months or has a visit in the next 30 days with authorizing provider or within the authorizing provider's specialty.  See \"Patient Info\" tab in inbasket, or \"Choose Columns\" in Meds & Orders section of the refill encounter.            fluticasone (FLOVENT HFA) 110 MCG/ACT inhaler  Last Written Prescription Date:  12/9/2016  Last Fill Quantity: 1 ,  # refills: 3   Last office visit: 11/14/2018 with prescribing provider:  Tosteson    Future Office Visit:   Next 5 appointments (look out 90 days)     Feb 12, 2019 11:00 AM CST   Return Visit with Gino Hudson MD   [facility]    [address] [address]   [phone]                  1 Inhaler 3     Sig: Inhale 2 puffs into the lungs 2 times daily    Inhaled Steroids Protocol Failed    12/3/2018 11:18 AM       Failed - Asthma control assessment score within " "normal limits in last 6 months    Please review ACT score.          Passed - Patient is age 12 or older       Passed - Recent (6 mo) or future (30 days) visit within the authorizing provider's specialty    Patient had office visit in the last 6 months or has a visit in the next 30 days with authorizing provider or within the authorizing provider's specialty.  See \"Patient Info\" tab in inbasket, or \"Choose Columns\" in Meds & Orders section of the refill encounter.            ipratropium - albuterol 0.5 mg/2.5 mg/3 mL (DUONEB) 0.5-2.5 (3) MG/3ML neb solution  Last Written Prescription Date:  1/19/2017  Last Fill Quantity: 1 box,  # refills: 2   Last office visit: 11/14/2018 with prescribing provider:  Jaylon    Future Office Visit:   Next 5 appointments (look out 90 days)     Feb 12, 2019 11:00 AM CST   [facility]    [address] [place] [address]   [phone]                  1 Box 2     Sig: Take 1 vial (3 mLs) by nebulization every 6 hours as needed for shortness of breath / dyspnea    Short-Acting Beta Agonist Inhalers Protocol  Failed    12/3/2018 11:18 AM       Failed - Asthma control assessment score within normal limits in last 6 months    Please review ACT score.          Passed - Patient is age 12 or older       Passed - Recent (6 mo) or future (30 days) visit within the authorizing provider's specialty    Patient had office visit in the last 6 months or has a visit in the next 30 days with authorizing provider or within the authorizing provider's specialty.  See \"Patient Info\" tab in inbasket, or \"Choose Columns\" in Meds & Orders section of the refill encounter.              "

## 2018-12-03 NOTE — NURSING NOTE
"Initial /86 (BP Location: Right arm, Patient Position: Sitting, Cuff Size: Adult Regular)  Pulse 95  Temp 97.3  F (36.3  C) (Tympanic)  Ht 1.549 m (5' 1\")  Wt 73 kg (161 lb)  LMP 10/17/2008  BMI 30.42 kg/m2 Estimated body mass index is 30.42 kg/(m^2) as calculated from the following:    Height as of this encounter: 1.549 m (5' 1\").    Weight as of this encounter: 73 kg (161 lb). .    Fina Myers CMA    "

## 2018-12-03 NOTE — PROGRESS NOTES
History of Present Illness - Brittany Barajas is a 58 year old female seen in consultation at the request of Dr. Iyer for dysphonia. She feels her voice has been off for the past 2 years consistently. She does have a chronic cough, and is currently on 3 inhalers for this. She has had the cough for many years as well, and she still had trouble with her voice even during a period of time when she could not afford to take the inhalers. She enjoys singing, but has not been able to do so due to her voice issue. She also reports coughing and choking frequently on solid and liquid foods. She had a recent swallow study that was negative for aspiration, but did have some peristaltic issues. She had a normal EGD recently as well.     Past Medical History -   Patient Active Problem List   Diagnosis     Mitral valve disorder     Allergic rhinitis     Convulsions (H)     Esophageal reflux     Mild major depression (H)     Plantar fascial fibromatosis     Other affections of shoulder region, not elsewhere classified     Neoplasm of digestive system     Anxiety state     Breast cancer (H)     CARDIOVASCULAR SCREENING; LDL GOAL LESS THAN 160     Transaminase or LDH elevation     H/O: hysterectomy     Colon polyp     Drug reaction     Mild persistent asthma     Urgency incontinence     Advanced directives, counseling/discussion     DDD (degenerative disc disease), lumbar     DDD (degenerative disc disease), cervical     Dysphonia     Other dysphagia     Traumatic brain injury, without loss of consciousness, subsequent encounter     Fatigue, unspecified type       Current Medications -   Current Outpatient Prescriptions:      albuterol (PROAIR HFA/PROVENTIL HFA/VENTOLIN HFA) 108 (90 BASE) MCG/ACT Inhaler, Inhale 2 puffs into the lungs every 6 hours as needed for shortness of breath / dyspnea, Disp: 2 Inhaler, Rfl: 6     amitriptyline (ELAVIL) 50 MG tablet, Take 1 tablet (50 mg) by mouth At Bedtime, Disp: 90 tablet, Rfl:  3     aspirin 81 MG tablet, Take 1 tablet by mouth daily., Disp: , Rfl:      cyclobenzaprine (FLEXERIL) 10 MG tablet, Take 0.5-1 tablets (5-10 mg) by mouth 3 times daily as needed for muscle spasms, Disp: 30 tablet, Rfl: 1     fluticasone (FLOVENT HFA) 110 MCG/ACT Inhaler, Inhale 2 puffs into the lungs 2 times daily (Patient taking differently: Inhale 2 puffs into the lungs daily ), Disp: 1 Inhaler, Rfl: 3     guaiFENesin-codeine (ROBITUSSIN AC) 100-10 MG/5ML SOLN solution, Take 5-10 mLs by mouth every 4 hours as needed for cough, Disp: 240 mL, Rfl: 0     ipratropium - albuterol 0.5 mg/2.5 mg/3 mL (DUONEB) 0.5-2.5 (3) MG/3ML neb solution, Take 1 vial (3 mLs) by nebulization every 6 hours as needed for shortness of breath / dyspnea, Disp: 1 Box, Rfl: 2     Multiple Vitamin (MULTIVITAMIN OR), Take 1 tablet by mouth daily., Disp: , Rfl:      PARoxetine (PAXIL) 10 MG tablet, Take 1 tablet (10 mg) by mouth At Bedtime, Disp: 90 tablet, Rfl: 3     PARoxetine (PAXIL) 20 MG tablet, TAKE ONE TABLET BY MOUTH AT BEDTIME, Disp: 90 tablet, Rfl: 3     fluticasone (FLONASE) 50 MCG/ACT spray, Spray 1-2 sprays into both nostrils daily (Patient not taking: Reported on 12/3/2018), Disp: 1 Bottle, Rfl: 3    Allergies -   Allergies   Allergen Reactions     Docetaxel Other (See Comments)     Chest tightness, black out     Taxotere Other (See Comments)     Chest tightness, black out       Social History -   Social History     Social History     Marital status: Single     Spouse name: N/A     Number of children: N/A     Years of education: N/A     Social History Main Topics     Smoking status: Never Smoker     Smokeless tobacco: Never Used     Alcohol use Yes      Comment: rarely-none for the past 12 weeks for wine, 11-14-18 visit.     Drug use: No     Sexual activity: Not Currently     Other Topics Concern     Parent/Sibling W/ Cabg, Mi Or Angioplasty Before 65f 55m? Yes     brother triple CABG in his 50s      Service No     Blood  "Transfusions No     Caffeine Concern No     Occupational Exposure Yes     maybe     Hobby Hazards No     Sleep Concern No     Stress Concern No     Weight Concern No     Special Diet Yes     less meat     Back Care Yes     low back  S-I joint     Exercise Yes     floor exercises     Bike Helmet Yes     Seat Belt Yes     Self-Exams Yes     Social History Narrative       Family History -   Family History   Problem Relation Age of Onset     C.A.D. Mother      Diabetes Mother      Hypertension Mother      Cerebrovascular Disease Mother      Breast Cancer Mother      Allergies Mother      Arthritis Mother      Cancer Mother      Eye Disorder Mother      GASTROINTESTINAL DISEASE Mother      Gynecology Mother      Lipids Mother      Heart Disease Mother      Osteoporosis Mother      Obesity Mother      Thyroid Disease Mother      Respiratory Mother      Alcohol/Drug Father      Cancer Father      Alcohol/Drug Brother      Musculoskeletal Disorder Brother      spina bifida-recent dx     C.A.D. Brother      Thyroid Disease Sister      Lipids Sister      Gynecology Sister      fibroids/ s/p hysterectomy     Other - See Comments Sister      hysterectomy, dilation of the throat.     Thyroid Disease Brother      Heart Disease Brother      DOUBLE BYPASS     Lipids Brother      Thyroid Disease Sister      Lipids Sister      Other - See Comments Sister      hysterectomy     GASTROINTESTINAL DISEASE Sister      liver function off     Thyroid Disease Sister      Thyroid Disease Sister      Heart Disease Sister      Cancer Other      thyroid cancer     Cancer - colorectal No family hx of        Review of Systems - As per HPI and PMHx, otherwise 7 system review of the head and neck negative. 10+ system review negative.    Physical Exam  /86 (BP Location: Right arm, Patient Position: Sitting, Cuff Size: Adult Regular)  Pulse 95  Temp 97.3  F (36.3  C) (Tympanic)  Ht 1.549 m (5' 1\")  Wt 73 kg (161 lb)  LMP 10/17/2008  BMI " 30.42 kg/m2  General - The patient is well nourished and well developed, and appears to have good nutritional status.  Alert and oriented to person and place, answers questions and cooperates with examination appropriately.   Head and Face - Normocephalic and atraumatic, with no gross asymmetry noted of the contour of the facial features.  The facial nerve is intact, with strong symmetric movements.  Voice and Breathing - The patient was breathing comfortably without the use of accessory muscles. There was no wheezing, stridor, or stertor.  The patients voice was coarse in the lower register, but she had more normal voice during higher phonation. Minimal voice cracking.  Ears - Bilateral pinna and EACs with normal appearing overlying skin. Tympanic membrane intact with good mobility on pneumatic otoscopy bilaterally. Bony landmarks of the ossicular chain are normal. The tympanic membranes are normal in appearance. No retraction, perforation, or masses.  No fluid or purulence was seen in the external canal or the middle ear.   Eyes - Extraocular movements intact.  Sclera were not icteric or injected, conjunctiva were pink and moist.  Mouth - Examination of the oral cavity showed pink, healthy oral mucosa. No lesions or ulcerations noted.  The tongue was mobile and midline, and the dentition were in good condition.    Throat - The walls of the oropharynx were smooth, pink, moist, symmetric, and had no lesions or ulcerations.  The tonsillar pillars and soft palate were symmetric.  The uvula was midline on elevation.  Neck - Normal midline excursion of the laryngotracheal complex during swallowing.  Full range of motion on passive movement.  Palpation of the occipital, submental, submandibular, internal jugular chain, and supraclavicular nodes did not demonstrate any abnormal lymph nodes or masses.  The carotid pulse was palpable bilaterally.  Palpation of the thyroid was soft and smooth, with no nodules or goiter  appreciated.  The trachea was mobile and midline.  Nose - External contour is symmetric, no gross deflection or scars.  Nasal mucosa is pink and moist with no abnormal mucus.  The septum was midline and non-obstructive, turbinates of normal size and position.  No polyps, masses, or purulence noted on examination.  Heart:  Regular rate and rhythm, no murmurs.  Lungs:  Chest clear to auscultation bilaterally    Procedure: Flexible Endoscopy  Indication: dysphonia    Attempts at mirror laryngoscopy were not possible due to gag reflex.  Therefore I proceeded with a fiberoptic examination.  First I sprayed both sides of the nose with a mixture of lidocaine and neosynephrine.  I then passed the scope through the nasal cavity.  The nasal cavity was unremarkable.  The nasopharynx was mucosally covered and symmetric.  The Eustachian tube openings were unobstructed.  Going further down I had a clear view of the base of tongue which had normal appearing lingual tonsillar tissue.  The base of tongue was free of lesions, and the vallecula was open.  The epiglottis was smooth and mucosally covered.  The supraglottic larynx was then clearly visualized.  The vocal cords moved smoothly and symmetrically, but the right vocal fold had a cystic appearing mass in the infraglottic portion of the posterior aspect of the vocal fold.  The pyriform sinuses were open, and the limited view of the postcricoid region did not show any lesions.          Assessment - Brittany Barajas is a 58 year old female with a right infraglottic lesion, most consistent with a vocal cyst. Given its posterior location, and her normalization of voice with high phonation, I think she may do well with voice therapy, as she is averse to undergoing surgery. I asked her to return in 2 months to reconsider microlaryngoscopy and excision of the lesion should she fail to improve sufficiently with voice therapy.       Dr. Gino Hudson MD  Otolaryngology  [facility]

## 2018-12-03 NOTE — NURSING NOTE
This laryngoscopy scope was  used on this patient.    Flexible    Scope Number: Evan Storz Flexible #[id number] Adult

## 2018-12-03 NOTE — LETTER
12/3/2018         RE: Brittany Barajas  [address] [facility] [address]        Dear Colleague,    Thank you for referring your patient, Brittany Barajas, to the [facility]. Please see a copy of my visit note below.    INITIAL NEUROLOGY CONSULTATION    DATE OF VISIT: 12/3/2018  MRN: [id number]  PATIENT NAME: Brittany Barajas  YOB: 1960    REFERRING PROVIDER: No ref. provider found    Chief Complaint   Patient presents with     New Patient     TBI.  Referral by Juan Iyer MD.       SUBJECTIVE:                                                      HPI:   Brittany Barajas is a 58 year old female whom I was asked by Dr. Iyer to see in consultation for historyof traumatic brain injury. Per chart review, the patient presented to the [facility] ED on 12.28.16 after a fall 3 days prior. There was notation of injury to the Left hip and report of head injury without LOC. She was experiencing headaches and neck pain and mentioned Left arm numbness. Cervical spine imaging was unremarkable for acute findings, but did show some moderate foraminal stenosis at multiple levels. She was discharged with pain medications and a muscle relaxant. She reported mild headaches and blurry vision in primary clinic when she followed up a few weeks later. She did see physical therapy for the neck pain in 2.2017. When she saw Dr. Iyer in 8.2018, she was complaining of fatigue and continued Left hand numbness. She had an unremarkable swallow study in the interim.     The patient has additional history of asthma, Left shoulder torn rotator cuff, SI joint dysfunction, MVP / regurgitation.     When asked about the reason for today's visit, the patient tells me that she has a history of epilepsy. Her last seizure was in 1979.    She says she hit the back of her head with the injury in 12.2016. She says that she is mainly concerned about the neck at this point. She says that she has  neck pain radiating down into the shoulder and radiating up into the back of her head. She says that her Left hand tremor started after the injury.  She is numb from the Left elbow down to the pinky. She is Left hand-dominant.     She had another fall this past June, wherein she hit the front of her head. Again, no LOC.     She says that heat and massage help with the pain. She is on amitriptyline for sleep and pain. She mentions that the Flexeril also helped with pain in the past. She has a lot of problems with her lower back as well. No surgeries on the neck or back. She has had injections with minimal relief. 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     Sometimes she feels mentally foggy. The headaches seem to be left-sided and relate mainly to the neck pain. Occasional urinary leakage.     The seizures started with a Whooping cough/fever. She was on medications for a while. She remembers Dilantin. She describes an aura and Left arm motion followed by convulsions.     She drinks minimal alcohol, as a treat, once in a while.     No family history of tremor.       Past Medical History:   Diagnosis Date     Allergic rhinitis, cause unspecified      Depressive disorder, not elsewhere classified      Esophageal reflux      Intramural leiomyoma of uterus      Mitral valve disorders(424.0)     MVD     Other affections of shoulder region, not elsewhere classified     Left shoulder impingement     Other convulsions     Seizures     Other diseases of trachea and bronchus, not elsewhere classified      Papanicolaou smear of cervix with low grade squamous intraepithelial lesion (LGSIL) 11/09    Pt refused colpo or further f/u     Plantar fascial fibromatosis      Past Surgical History:   Procedure Laterality Date     COLONOSCOPY  1/28/2011    COLONOSCOPY performed by ELOY BRIONES at [facility]     COLONOSCOPY  1/20/2012    Procedure:COLONOSCOPY; Surgeon:ELOY BRIONES; Location:[facility]      ESOPHAGOSCOPY, GASTROSCOPY, DUODENOSCOPY (EGD), COMBINED N/A 10/22/2018    Procedure: gastroscopy;  Surgeon: Seth Berman MD;  Location: [facility]     HYSTERECTOMY, PAP NO LONGER INDICATED  12/1/2008    for fibroids     INJECT EPIDURAL LUMBAR  3/9/2012    Procedure:INJECT EPIDURAL LUMBAR; JAMIL - ; Surgeon:GENERIC ANESTHESIA PROVIDER; Location:[facility]     INJECT EPIDURAL LUMBAR  5/25/2012    Procedure:INJECT EPIDURAL LUMBAR; JAMIL-; Surgeon:GENERIC ANESTHESIA PROVIDER; Location:[facility]     LEEP TX, CERVICAL  10/06    CECILIO 1     SURGICAL HISTORY OF -   10/7/1998    Right parotidectomy-mass     SURGICAL HISTORY OF -       Laparoscopy     SURGICAL HISTORY OF -   10/1996    Fibroid removal         Current Outpatient Prescriptions on File Prior to Visit:  albuterol (PROAIR HFA/PROVENTIL HFA/VENTOLIN HFA) 108 (90 BASE) MCG/ACT Inhaler Inhale 2 puffs into the lungs every 6 hours as needed for shortness of breath / dyspnea   amitriptyline (ELAVIL) 50 MG tablet Take 1 tablet (50 mg) by mouth At Bedtime   aspirin 81 MG tablet Take 1 tablet by mouth daily.   fluticasone (FLOVENT HFA) 110 MCG/ACT Inhaler Inhale 2 puffs into the lungs 2 times daily (Patient taking differently: Inhale 2 puffs into the lungs daily )   guaiFENesin-codeine (ROBITUSSIN AC) 100-10 MG/5ML SOLN solution Take 5-10 mLs by mouth every 4 hours as needed for cough   ipratropium - albuterol 0.5 mg/2.5 mg/3 mL (DUONEB) 0.5-2.5 (3) MG/3ML neb solution Take 1 vial (3 mLs) by nebulization every 6 hours as needed for shortness of breath / dyspnea   Multiple Vitamin (MULTIVITAMIN OR) Take 1 tablet by mouth daily.   PARoxetine (PAXIL) 10 MG tablet Take 1 tablet (10 mg) by mouth At Bedtime   PARoxetine (PAXIL) 20 MG tablet TAKE ONE TABLET BY MOUTH AT BEDTIME   fluticasone (FLONASE) 50 MCG/ACT spray Spray 1-2 sprays into both nostrils daily (Patient not taking: Reported on 12/3/2018)     No current facility-administered medications on file prior to visit.    Allergies   Allergen Reactions     Docetaxel Other (See Comments)     Chest tightness, black out     Taxotere Other (See Comments)     Chest tightness, black out        Problem (# of Occurrences) Relation (Name,Age of Onset)    Alcohol/Drug (2) Father, Brother    Allergies (1) Mother    Arthritis (1) Mother    Breast Cancer (1) Mother    C.A.D. (2) Mother, Brother    Cancer (3) Mother, Father, Other (niece): thyroid cancer    Cerebrovascular Disease (1) Mother    Diabetes (1) Mother    Eye Disorder (1) Mother    GASTROINTESTINAL DISEASE (2) Mother, Sister: liver function off    Gynecology (2) Mother, Sister (tom): fibroids/ s/p hysterectomy    Heart Disease (3) Mother, Brother: DOUBLE BYPASS, Sister    Hypertension (1) Mother    Lipids (4) Mother, Sister (tom), Brother, Sister    Musculoskeletal Disorder (1) Brother: spina bifida-recent dx    Obesity (1) Mother    Osteoporosis (1) Mother    Other - See Comments (2) Sister (tom): hysterectomy, dilation of the throat., Sister: hysterectomy    Respiratory (1) Mother    Thyroid Disease (6) Mother, Sister (tom), Brother, Sister, Sister, Sister       Negative family history of: Cancer - colorectal        Social History   Substance Use Topics     Smoking status: Never Smoker     Smokeless tobacco: Never Used     Alcohol use Yes      Comment: rarely-none for the past 12 weeks for wine, 11-14-18 visit.       REVIEW OF SYSTEMS:                                                      10-point review of systems is negative except as mentioned above in HPI.     EXAM:                                                      Physical Exam:   Vitals: /81 (BP Location: Right arm, Patient Position: Sitting, Cuff Size: Adult Regular)  Pulse 84  Temp 97.3  F (36.3  C) (Tympanic)  Resp 12  Wt 73 kg (161 lb)  LMP 10/17/2008  SpO2 99%  BMI 30.42 kg/m2  BMI= Body mass index is 30.42 kg/(m^2).  GENERAL: NAD.   CV: RRR. S1, S2.   NECK: No  bruits.  Neurologic:  MENTAL STATUS: Alert, attentive. Speech is fluent, pressured at times.  Normal comprehension. Normal concentration. Adequate fund of knowledge.   CRANIAL NERVES: Discs technically difficult to visualize due to eye movement. Visual fields intact to confrontation. Pupils equally, round and reactive to light. Facial sensation and movement normal. EOM full. Hearing intact to conversation. Trapezius strength intact. Palate moves symmetrically. Tongue midline.  MOTOR: Some pain with testing around shoulders. 5/5 in proximal and distal muscle groups of upper and lower extremities. Tone and bulk normal.   DTRs: Brisk throughout. Babinski down-going.   SENSATION: Slightly decreased pinprick in ulnar distribution of the Left hand. Otherwise normal light touch and pinprick. Intact proprioception. Vibration: Normal at both ankles.   COORDINATION: Normal finger nose finger. Finger tapping normal.Knee heel shin normal.  STATION AND GAIT: Romberg Negative. Good postural reflexes. Tandem normal,  Mild postural and action tremor in the hands, moderate amplitude and speed (L>R).  Left hand-dominant.     Relevant Data:  CT Cervical Spine (12.28.16):  FINDINGS: Alignment is normal through T2. No evidence for cervical  spine fracture. No paraspinous soft tissue abnormality. Nonspecific 9  mm nodule in the right lobe of the thyroid gland incidentally noted.     Findings by level as follows:     C2-C3: Mild annular bulge. No central or lateral stenosis.     C3-C4: Mild annular bulge. Mild bilateral facet joint disease. No  central or lateral stenosis.     C4-C5: Advanced degenerative narrowing of the interspace. Mild ventral  ridging. Prominent uncinate spur on the left with moderate left-sided  foraminal stenosis.     C5-C6: Moderate degenerative narrowing of the interspace. Uncinate  spur on the right with moderate right-sided foraminal stenosis.     C6-C7: Mild disc space narrowing. Uncinate spur on the right with  only  mild foraminal stenosis.      C7-T1: Negative.         IMPRESSION:  1. No fracture or acute abnormality.  2. Degenerative changes as described.  3. Nonspecific right thyroid nodule.    Imaging reviewed by me. Agree with radiology read.       ASSESSMENT and PLAN:                                                      Assessment and Plan:     ICD-10-CM    1. History of closed head injury Z87.820 MR Brain w/o & w Contrast     MR Cervical Spine w/o Contrast     cyclobenzaprine (FLEXERIL) 10 MG tablet   2. Cervicalgia M54.2 MR Brain w/o & w Contrast     MR Cervical Spine w/o Contrast     cyclobenzaprine (FLEXERIL) 10 MG tablet        Ms. Barajas is a pleasant 57 yo woman here to discuss a history of head injuries in the past. She has several diffuse complaints, but the main issue is neck pain. With her history, I recommend we do MRIs of the brain and cervical spine to get a better sense of any potential structural issue that could be causing her symptoms. Her tremor appears to be most consistent with an asymmetric essential tremor; I explained that this is not likely related to the history of injury. We will monitor this. I may have her see a spine specialist pending the cervical spine results. Reflexes were notably brisk on exam, causing concern for a myelopathic process. We will follow-up again in a few months and I agreed to prescribe Flexeril for the patient in the meantime. She understands and agrees with the plan.    Patient Instructions:  MRI Brain and Cervical Spine. We will notify you of the results.   Flexeril 5-10mg as needed for pain/muscle tightness.  I recommend you have an updated eye exam.   Return to neurology in 3 months.       Total Time: 60 minutes were spent with the patient and in chart review. More than 50% of the time spent on counseling (as described above in Assessment and Plan) /coordinating the care.    Ruthie David MD  Neurology    CC: Juan Iyer MD    Again, thank  you for allowing me to participate in the care of your patient.        Sincerely,        Ruthie David MD

## 2018-12-03 NOTE — NURSING NOTE
Patient prefers to be contacted: [phone]  Okay to leave detailed message on voicemail: yes     Zoë ANDERSONA

## 2018-12-03 NOTE — MR AVS SNAPSHOT
After Visit Summary   12/3/2018    Brittany Barajas    MRN: [id number]           Patient Information     Date Of Birth          1960        Visit Information        Provider Department      12/3/2018 12:00 PM Gino Hudson MD [facility]        Today's Diagnoses     Dysphonia    -  1    Vocal fold cyst          Care Instructions    Per Physician's instructions          Follow-ups after your visit        Additional Services     SPEECH THERAPY REFERRAL       If you have not heard from the scheduling office within 2 business days, please call [phone] for all locations, with the exception of [place], please call [phone] and [facility] [place], please call [phone].    Please be aware that coverage of these services is subject to the terms and limitations of your health insurance plan.  Call member services at your health plan with any benefit or coverage questions.                  Your next 10 appointments already scheduled     Dec 05, 2018 12:15 PM CST   MR BRAIN W/O & W CONTRAST with [address] MRI ([facility])    [address] [facility] [id number] [phone]           How do I prepare for my exam? (Food and drink instructions) **If you will be receiving sedation or general anesthesia, please see special notes below.**  How do I prepare for my exam? (Other instructions) Take your medicines as usual, unless your doctor tells you not to. You may or may not receive intravenous (IV) contrast for this exam pending the discretion of the Radiologist.  You do not need to do anything special to prepare.  **If you will be receiving sedation or general anesthesia, please see special notes below.**  What should I wear: The MRI machine uses a strong magnet. Please wear clothes without metal (snaps, zippers). A sweatsuit works well, or we may give you a hospital gown. Please remove any body piercings and hair extensions before you arrive. You  will also remove watches, jewelry, hairpins, wallets, dentures, partial dental plates and hearing aids. You may wear contact lenses, and you may be able to wear your rings. We have a safe place to keep your personal items, but it is safer to leave them at home.  How long does the exam take: Most tests take 30 to 60 minutes.  HOWEVER, IF YOUR DOCTOR PRESCRIBES ANESTHESIA please plan on spending four to five hours in the recovery room.  What should I bring:  Bring a list of your current medicines to your exam (including vitamins, minerals and over-the-counter drugs).  Do I need a :  **If you will be receiving sedation or general anesthesia, please see special notes below.**  What should I do after the exam: No Restrictions, You may resume normal activities.  What is this test: MRI (magnetic resonance imaging) uses a strong magnet and radio waves to look inside the body. An MRA (magnetic resonance angiogram) does the same thing, but it lets us look at your blood vessels. A computer turns the radio waves into pictures showing cross sections of the body, much like slices of bread. This helps us see any problems more clearly. You may receive fluid (called  contrast ) before or during your scan. The fluid helps us see the pictures better. We give the fluid through an IV (small needle in your arm).  Who should I call with questions:  Please call the Imaging Department at your exam site with any questions. Directions, parking instructions, and other information is available on our website, [email or web address].  How do I prepare if I m having sedation or anesthesia? **IMPORTANT** THE INSTRUCTIONS BELOW ARE ONLY FOR THOSE PATIENTS WHO HAVE BEEN TOLD THEY WILL RECEIVE SEDATION OR GENERAL ANESTHESIA DURING THEIR MRI PROCEDURE:  IF YOU WILL RECEIVE SEDATION (take medicine to help you relax during your exam): You must get the medicine from your doctor before you arrive. Bring the medicine to the exam. Do not take it at  home. Arrive one hour early. Bring someone who can take you home after the test. Your medicine will make you sleepy. After the exam, you may not drive, take a bus or take a taxi by yourself. No eating 8 hours before your exam. You may have clear liquids up until 4 hours before your exam. (Clear liquids include water, clear tea, black coffee and fruit juice without pulp.)  IF YOU WILL RECEIVE ANESTHESIA (be asleep for your exam): Arrive 1 1/2 hours early. Bring someone who can take you home after the test. You may not drive, take a bus or take a taxi by yourself. No eating 8 hours before your exam. You may have clear liquids up until 4 hours before your exam. (Clear liquids include water, clear tea, black coffee and fruit juice without pulp.)            Dec 05, 2018  1:00 PM CST   MR CERVICAL SPINE W/O CONTRAST with [address] MRI ([facility])    [address] [facility] [address]   [phone]           How do I prepare for my exam? (Food and drink instructions) **If you will be receiving sedation or general anesthesia, please see special notes below.**  How do I prepare for my exam? (Other instructions) Take your medicines as usual, unless your doctor tells you not to. Please remove any body piercings and hair extensions before you arrive. Follow your doctor s orders. If you do not, we may have to postpone your exam. You may or may not receive IV contrast for this exam pending the discretion of the Radiologist.  You do not need to do anything special to prepare. **If you will be receiving sedation or general anesthesia, please see special notes below.**  What should I wear:  The MRI machine uses a strong magnet. Please wear clothes without metal (snaps, zippers). A sweatsuit works well, or we may give you a hospital gown.  How long does the exam take: Most tests take 30 to 60 minutes.  HOWEVER, IF YOUR DOCTOR PRESCRIBES ANESTHESIA please plan on spending four to five hours in  the recovery room.  What should I bring: Bring a list of your current medicines to your exam (including vitamins, minerals and over-the-counter drugs). Also bring the results of similar scans you may have had.  Do I need a : **If you will be receiving sedation or general anesthesia, please see special notes below.**  What should I do after the exam? No Restrictions, You may resume normal activities.  What is this test: MRI (magnetic resonance imaging) uses a strong magnet and radio waves to look inside the body. An MRA (magnetic resonance angiogram) does the same thing, but it lets us look at your blood vessels. A computer turns the radio waves into pictures showing cross sections of the body, much like slices of bread. This helps us see any problems more clearly.  Who should I call with questions: Please call the Imaging Department at your exam site with any questions. Directions, parking instructions, and other information is available on our website, "[email or web address].  How do I prepare if I m having sedation or anesthesia? **IMPORTANT** THE INSTRUCTIONS BELOW ARE ONLY FOR THOSE PATIENTS WHO HAVE BEEN TOLD THEY WILL RECEIVE SEDATION OR GENERAL ANESTHESIA DURING THEIR MRI PROCEDURE:  IF YOU WILL RECEIVE SEDATION (take medicine to help you relax during your exam): You must get the medicine from your doctor before you arrive. Bring the medicine to the exam. Do not take it at home. Arrive one hour early. Bring someone who can take you home after the test. Your medicine will make you sleepy. After the exam, you may not drive, take a bus or take a taxi by yourself. No eating 8 hours before your exam. You may have clear liquids up until 4 hours before your exam. (Clear liquids include water, clear tea, black coffee and fruit juice without pulp.)  IF YOU WILL RECEIVE ANESTHESIA (be asleep for your exam): Arrive 1 1/2 hours early. Bring someone who can take you home after the test. You may not drive, take a bus  or take a taxi by yourself. No eating 8 hours before your exam. You may have clear liquids up until 4 hours before your exam. (Clear liquids include water, clear tea, black coffee and fruit juice without pulp.) You will spend four to five hours in the recovery room.            Dec 28, 2018 12:30 PM CST   MA SCREENING DIGITAL BILATERAL with [id number]   [facility] ([facility])    [address] [place]  [facility] [address]   [phone]           How do I prepare for my exam? (Food and drink instructions) No Food and Drink Restrictions.  How do I prepare for my exam? (Other instructions) Do not use any powder, lotion or deodorant under your arms or on your breast. If you do, we will ask you to remove it before your exam.  What should I wear: Wear comfortable, two-piece clothing.  How long does the exam take: Most scans will take 15 minutes.  What should I bring: Bring any previous mammograms from other facilities or have them mailed to the breast center.  Do I need a :  No  is needed.  What do I need to tell my doctor: If you have any allergies, tell your care team.  What should I do after the exam: No restrictions, You may resume normal activities.  What is this test: This test is an x-ray of the breast to look for breast disease. The breast is pressed between two plates to flatten and spread the tissue. An X-ray is taken of the breast from different angles.  Who should I call with questions: If you have any questions, please call the Imaging Department where you will have your exam. Directions, parking instructions, and other information is available on our website, [email or web address].  Other information about my exam Three-dimensional (3D) mammograms are available at [place] locations in [place], [place], [place], [place], [facility], [place], [facility] and [place]. [facility] locations include [place] and the [facility] in [place].   "Benefits of 3D mammograms include * Improved rate of cancer detection * Decreases your chance of having to go back for more tests, which means fewer: * \"False-positive\" results (This means that there is an abnormal area but it isn't cancer.) * Invasive testing procedures, such as a biopsy or surgery * Can provide clearer images of the breast if you have dense breast tissue.  *3D mammography is an optional exam that anyone can have with a 2D mammogram. It doesn't replace or take the place of a 2D mammogram. 2D mammograms remain an effective screening test for all women.  Not all insurance companies cover the cost of a 3D mammogram. Check with your insurance. Three-dimensional (3D) mammograms are available at [place] locations in [facility], and [place]. [facility] locations include [place] and [facility] [place] [facility] in [place]. Benefits of 3D mammograms include: - Improved rate of cancer detection - Decreases your chance of having to go back for more tests, which means fewer: - \"False-positive\" results (This means that there is an abnormal area but it isn't cancer.) - Invasive testing procedures, such as a biopsy or surgery - Can provide clearer images of the breast if you have dense breast tissue. 3D mammography is an optional exam that anyone can have with a 2D mammogram. It doesn't replace or take the place of a 2D mammogram. 2D mammograms remain an effective screening test for all women.  Not all insurance companies cover the cost of a 3D mammogram. Check with your insurance.            Mar 04, 2019 11:00 AM CST   [facility]    [address] [facility] [address]   [phone]              Future tests that were ordered for you today     Open Future Orders        Priority Expected Expires Ordered    SPEECH THERAPY REFERRAL Routine  " "12/3/2019 12/3/2018    MR Brain w/o & w Contrast Routine  12/3/2019 12/3/2018    MR Cervical Spine w/o Contrast Routine  12/3/2019 12/3/2018            Who to contact     If you have questions or need follow up information about today's clinic visit or your schedule please contact [facility] directly at [phone].  Normal or non-critical lab and imaging results will be communicated to you by [employer], letter or phone within 4 business days after the clinic has received the results. If you do not hear from us within 7 days, please contact the clinic through [employer] or phone. If you have a critical or abnormal lab result, we will notify you by phone as soon as possible.  Submit refill requests through [employer] or call your pharmacy and they will forward the refill request to us. Please allow 3 business days for your refill to be completed.          Additional Information About Your Visit        [employer] Information     [employer] gives you secure access to your electronic health record. If you see a primary care provider, you can also send messages to your care team and make appointments. If you have questions, please call your primary care clinic.  If you do not have a primary care provider, please call [phone] and they will assist you.        Care EveryWhere ID     This is your Care EveryWhere ID. This could be used by other organizations to access your [place] medical records  [id number]        Your Vitals Were     Pulse Temperature Height Last Period BMI (Body Mass Index)       95 97.3  F (36.3  C) (Tympanic) 1.549 m (5' 1\") 10/17/2008 30.42 kg/m2        Blood Pressure from Last 3 Encounters:   12/03/18 134/86   12/03/18 137/81   11/14/18 (!) 136/94    Weight from Last 3 Encounters:   12/03/18 73 kg (161 lb)   12/03/18 73 kg (161 lb)   11/14/18 73 kg (161 lb)              We Performed the Following     LARYNGOSCOPY FLEX FIBEROPTIC, DIAGNOSTIC          Today's Medication Changes          These " changes are accurate as of 12/3/18 12:21 PM.  If you have any questions, ask your nurse or doctor.               Start taking these medicines.        Dose/Directions    cyclobenzaprine 10 MG tablet   Commonly known as:  FLEXERIL   Used for:  Cervicalgia, History of closed head injury   Started by:  Ruthie Hernandez MD        Dose:  5-10 mg   Take 0.5-1 tablets (5-10 mg) by mouth 3 times daily as needed for muscle spasms   Quantity:  30 tablet   Refills:  1         These medicines have changed or have updated prescriptions.        Dose/Directions    fluticasone 110 MCG/ACT inhaler   Commonly known as:  FLOVENT HFA   This may have changed:  when to take this   Used for:  Mild persistent asthma        Dose:  2 puff   Inhale 2 puffs into the lungs 2 times daily   Quantity:  1 Inhaler   Refills:  3            Where to get your medicines      These medications were sent to [facility] [address]  [address] [address]     Phone:  [phone]     cyclobenzaprine 10 MG tablet                Primary Care Provider Office Phone # Fax #    Juan Morgan Iyer [phone]       [address] [facility] [address]        Equal Access to Services     JOSÉ SAHNI AH: Mirta trimble hadasho Soshirinali, waaxda luqadaha, qaybta kaalmada adeegyada, hiram machado. So [facility] [phone].    ATENCIÓN: Si habla español, tiene a johnston disposición servicios gratuitos de asistencia lingüística. Llame al [phone].    We comply with applicable federal civil rights laws and [place] laws. We do not discriminate on the basis of race, color, national origin, age, disability, sex, sexual orientation, or gender identity.            Thank you!     Thank you for choosing [facility]  for your care. Our goal is always to provide you with excellent care. Hearing back from our patients is one way we can continue to improve our  services. Please take a few minutes to complete the written survey that you may receive in the mail after your visit with us. Thank you!             Your Updated Medication List - Protect others around you: Learn how to safely use, store and throw away your medicines at www.disposemymeds.org.          This list is accurate as of 12/3/18 12:21 PM.  Always use your most recent med list.                   Brand Name Dispense Instructions for use Diagnosis    albuterol 108 (90 Base) MCG/ACT inhaler    PROAIR HFA/PROVENTIL HFA/VENTOLIN HFA    2 Inhaler    Inhale 2 puffs into the lungs every 6 hours as needed for shortness of breath / dyspnea    Mild persistent asthma, uncomplicated       amitriptyline 50 MG tablet    ELAVIL    90 tablet    Take 1 tablet (50 mg) by mouth At Bedtime    Generalized anxiety disorder, Insomnia, unspecified type       aspirin 81 MG tablet    ASA     Take 1 tablet by mouth daily.        cyclobenzaprine 10 MG tablet    FLEXERIL    30 tablet    Take 0.5-1 tablets (5-10 mg) by mouth 3 times daily as needed for muscle spasms    Cervicalgia, History of closed head injury       fluticasone 110 MCG/ACT inhaler    FLOVENT HFA    1 Inhaler    Inhale 2 puffs into the lungs 2 times daily    Mild persistent asthma       fluticasone 50 MCG/ACT nasal spray    FLONASE    1 Bottle    Spray 1-2 sprays into both nostrils daily    Mild persistent asthma, uncomplicated       guaiFENesin-codeine 100-10 MG/5ML solution    ROBITUSSIN AC    240 mL    Take 5-10 mLs by mouth every 4 hours as needed for cough    Cough       ipratropium - albuterol 0.5 mg/2.5 mg/3 mL 0.5-2.5 (3) MG/3ML neb solution    DUONEB    1 Box    Take 1 vial (3 mLs) by nebulization every 6 hours as needed for shortness of breath / dyspnea    Mild persistent asthma, uncomplicated       MULTIVITAMIN PO      Take 1 tablet by mouth daily.        * PARoxetine 20 MG tablet    PAXIL    90 tablet    TAKE ONE TABLET BY MOUTH AT BEDTIME    Generalized  anxiety disorder       * PARoxetine 10 MG tablet    PAXIL    90 tablet    Take 1 tablet (10 mg) by mouth At Bedtime    Moderate recurrent major depression (H)       * Notice:  This list has 2 medication(s) that are the same as other medications prescribed for you. Read the directions carefully, and ask your doctor or other care provider to review them with you.

## 2018-12-03 NOTE — PROGRESS NOTES
INITIAL NEUROLOGY CONSULTATION    DATE OF VISIT: 12/3/2018  MRN: [id number]  PATIENT NAME: Brittany Barajas  YOB: 1960    REFERRING PROVIDER: No ref. provider found    Chief Complaint   Patient presents with     New Patient     TBI.  Referral by Juan Iyer MD.       SUBJECTIVE:                                                      HPI:   Brittany Barajas is a 58 year old female whom I was asked by Dr. Iyer to see in consultation for historyof traumatic brain injury. Per chart review, the patient presented to the [facility] ED on 12.28.16 after a fall 3 days prior. There was notation of injury to the Left hip and report of head injury without LOC. She was experiencing headaches and neck pain and mentioned Left arm numbness. Cervical spine imaging was unremarkable for acute findings, but did show some moderate foraminal stenosis at multiple levels. She was discharged with pain medications and a muscle relaxant. She reported mild headaches and blurry vision in primary clinic when she followed up a few weeks later. She did see physical therapy for the neck pain in 2.2017. When she saw Dr. Iyer in 8.2018, she was complaining of fatigue and continued Left hand numbness. She had an unremarkable swallow study in the interim.     The patient has additional history of asthma, Left shoulder torn rotator cuff, SI joint dysfunction, MVP / regurgitation.     When asked about the reason for today's visit, the patient tells me that she has a history of epilepsy. Her last seizure was in 1979.    She says she hit the back of her head with the injury in 12.2016. She says that she is mainly concerned about the neck at this point. She says that she has neck pain radiating down into the shoulder and radiating up into the back of her head. She says that her Left hand tremor started after the injury.  She is numb from the Left elbow down to the pinky. She is Left hand-dominant.     She had another fall this  past June, wherein she hit the front of her head. Again, no LOC.     She says that heat and massage help with the pain. She is on amitriptyline for sleep and pain. She mentions that the Flexeril also helped with pain in the past. She has a lot of problems with her lower back as well. No surgeries on the neck or back. She has had injections with minimal relief. 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     Sometimes she feels mentally foggy. The headaches seem to be left-sided and relate mainly to the neck pain. Occasional urinary leakage.     The seizures started with a Whooping cough/fever. She was on medications for a while. She remembers Dilantin. She describes an aura and Left arm motion followed by convulsions.     She drinks minimal alcohol, as a treat, once in a while.     No family history of tremor.       Past Medical History:   Diagnosis Date     Allergic rhinitis, cause unspecified      Depressive disorder, not elsewhere classified      Esophageal reflux      Intramural leiomyoma of uterus      Mitral valve disorders(424.0)     MVD     Other affections of shoulder region, not elsewhere classified     Left shoulder impingement     Other convulsions     Seizures     Other diseases of trachea and bronchus, not elsewhere classified      Papanicolaou smear of cervix with low grade squamous intraepithelial lesion (LGSIL) 11/09    Pt refused colpo or further f/u     Plantar fascial fibromatosis      Past Surgical History:   Procedure Laterality Date     COLONOSCOPY  1/28/2011    COLONOSCOPY performed by ELOY BRIONES at [facility]     COLONOSCOPY  1/20/2012    Procedure:COLONOSCOPY; Surgeon:ELOY BRIONES; Location:[facility]     ESOPHAGOSCOPY, GASTROSCOPY, DUODENOSCOPY (EGD), COMBINED N/A 10/22/2018    Procedure: gastroscopy;  Surgeon: Seth Berman MD;  Location: [facility]     HYSTERECTOMY, PAP NO LONGER INDICATED  12/1/2008    for fibroids     INJECT EPIDURAL LUMBAR  3/9/2012     Procedure:INJECT EPIDURAL LUMBAR; JAMIL Renteria; Surgeon:GENERIC ANESTHESIA PROVIDER; Location:[facility]     INJECT EPIDURAL LUMBAR  5/25/2012    Procedure:INJECT EPIDURAL LUMBAR; JAMIL-; Surgeon:GENERIC ANESTHESIA PROVIDER; Location:[facility]     LEEP TX, CERVICAL  10/06    CECILIO 1     SURGICAL HISTORY OF -   10/7/1998    Right parotidectomy-mass     SURGICAL HISTORY OF -       Laparoscopy     SURGICAL HISTORY OF -   10/1996    Fibroid removal         Current Outpatient Prescriptions on File Prior to Visit:  albuterol (PROAIR HFA/PROVENTIL HFA/VENTOLIN HFA) 108 (90 BASE) MCG/ACT Inhaler Inhale 2 puffs into the lungs every 6 hours as needed for shortness of breath / dyspnea   amitriptyline (ELAVIL) 50 MG tablet Take 1 tablet (50 mg) by mouth At Bedtime   aspirin 81 MG tablet Take 1 tablet by mouth daily.   fluticasone (FLOVENT HFA) 110 MCG/ACT Inhaler Inhale 2 puffs into the lungs 2 times daily (Patient taking differently: Inhale 2 puffs into the lungs daily )   guaiFENesin-codeine (ROBITUSSIN AC) 100-10 MG/5ML SOLN solution Take 5-10 mLs by mouth every 4 hours as needed for cough   ipratropium - albuterol 0.5 mg/2.5 mg/3 mL (DUONEB) 0.5-2.5 (3) MG/3ML neb solution Take 1 vial (3 mLs) by nebulization every 6 hours as needed for shortness of breath / dyspnea   Multiple Vitamin (MULTIVITAMIN OR) Take 1 tablet by mouth daily.   PARoxetine (PAXIL) 10 MG tablet Take 1 tablet (10 mg) by mouth At Bedtime   PARoxetine (PAXIL) 20 MG tablet TAKE ONE TABLET BY MOUTH AT BEDTIME   fluticasone (FLONASE) 50 MCG/ACT spray Spray 1-2 sprays into both nostrils daily (Patient not taking: Reported on 12/3/2018)     No current facility-administered medications on file prior to visit.   Allergies   Allergen Reactions     Docetaxel Other (See Comments)     Chest tightness, black out     Taxotere Other (See Comments)     Chest tightness, black out        Problem (# of Occurrences) Relation (Name,Age of Onset)    Alcohol/Drug (2) Father,  Brother    Allergies (1) Mother    Arthritis (1) Mother    Breast Cancer (1) Mother    C.A.D. (2) Mother, Brother    Cancer (3) Mother, Father, Other (niece): thyroid cancer    Cerebrovascular Disease (1) Mother    Diabetes (1) Mother    Eye Disorder (1) Mother    GASTROINTESTINAL DISEASE (2) Mother, Sister: liver function off    Gynecology (2) Mother, Sister (tom): fibroids/ s/p hysterectomy    Heart Disease (3) Mother, Brother: DOUBLE BYPASS, Sister    Hypertension (1) Mother    Lipids (4) Mother, Sister (tom), Brother, Sister    Musculoskeletal Disorder (1) Brother: spina bifida-recent dx    Obesity (1) Mother    Osteoporosis (1) Mother    Other - See Comments (2) Sister (tom): hysterectomy, dilation of the throat., Sister: hysterectomy    Respiratory (1) Mother    Thyroid Disease (6) Mother, Sister (tom), Brother, Sister, Sister, Sister       Negative family history of: Cancer - colorectal        Social History   Substance Use Topics     Smoking status: Never Smoker     Smokeless tobacco: Never Used     Alcohol use Yes      Comment: rarely-none for the past 12 weeks for wine, 11-14-18 visit.       REVIEW OF SYSTEMS:                                                      10-point review of systems is negative except as mentioned above in HPI.     EXAM:                                                      Physical Exam:   Vitals: /81 (BP Location: Right arm, Patient Position: Sitting, Cuff Size: Adult Regular)  Pulse 84  Temp 97.3  F (36.3  C) (Tympanic)  Resp 12  Wt 73 kg (161 lb)  LMP 10/17/2008  SpO2 99%  BMI 30.42 kg/m2  BMI= Body mass index is 30.42 kg/(m^2).  GENERAL: NAD.   CV: RRR. S1, S2.   NECK: No bruits.  Neurologic:  MENTAL STATUS: Alert, attentive. Speech is fluent, pressured at times.  Normal comprehension. Normal concentration. Adequate fund of knowledge.   CRANIAL NERVES: Discs technically difficult to visualize due to eye movement. Visual fields intact to  confrontation. Pupils equally, round and reactive to light. Facial sensation and movement normal. EOM full. Hearing intact to conversation. Trapezius strength intact. Palate moves symmetrically. Tongue midline.  MOTOR: Some pain with testing around shoulders. 5/5 in proximal and distal muscle groups of upper and lower extremities. Tone and bulk normal.   DTRs: Brisk throughout. Babinski down-going.   SENSATION: Slightly decreased pinprick in ulnar distribution of the Left hand. Otherwise normal light touch and pinprick. Intact proprioception. Vibration: Normal at both ankles.   COORDINATION: Normal finger nose finger. Finger tapping normal.Knee heel shin normal.  STATION AND GAIT: Romberg Negative. Good postural reflexes. Tandem normal,  Mild postural and action tremor in the hands, moderate amplitude and speed (L>R).  Left hand-dominant.     Relevant Data:  CT Cervical Spine (12.28.16):  FINDINGS: Alignment is normal through T2. No evidence for cervical  spine fracture. No paraspinous soft tissue abnormality. Nonspecific 9  mm nodule in the right lobe of the thyroid gland incidentally noted.     Findings by level as follows:     C2-C3: Mild annular bulge. No central or lateral stenosis.     C3-C4: Mild annular bulge. Mild bilateral facet joint disease. No  central or lateral stenosis.     C4-C5: Advanced degenerative narrowing of the interspace. Mild ventral  ridging. Prominent uncinate spur on the left with moderate left-sided  foraminal stenosis.     C5-C6: Moderate degenerative narrowing of the interspace. Uncinate  spur on the right with moderate right-sided foraminal stenosis.     C6-C7: Mild disc space narrowing. Uncinate spur on the right with only  mild foraminal stenosis.      C7-T1: Negative.         IMPRESSION:  1. No fracture or acute abnormality.  2. Degenerative changes as described.  3. Nonspecific right thyroid nodule.    Imaging reviewed by me. Agree with radiology read.       ASSESSMENT and PLAN:                                                       Assessment and Plan:     ICD-10-CM    1. History of closed head injury Z87.820 MR Brain w/o & w Contrast     MR Cervical Spine w/o Contrast     cyclobenzaprine (FLEXERIL) 10 MG tablet   2. Cervicalgia M54.2 MR Brain w/o & w Contrast     MR Cervical Spine w/o Contrast     cyclobenzaprine (FLEXERIL) 10 MG tablet        Ms. Barajas is a pleasant 57 yo woman here to discuss a history of head injuries in the past. She has several diffuse complaints, but the main issue is neck pain. With her history, I recommend we do MRIs of the brain and cervical spine to get a better sense of any potential structural issue that could be causing her symptoms. Her tremor appears to be most consistent with an asymmetric essential tremor; I explained that this is not likely related to the history of injury. We will monitor this. I may have her see a spine specialist pending the cervical spine results. Reflexes were notably brisk on exam, causing concern for a myelopathic process. We will follow-up again in a few months and I agreed to prescribe Flexeril for the patient in the meantime. She understands and agrees with the plan.    Patient Instructions:  MRI Brain and Cervical Spine. We will notify you of the results.   Flexeril 5-10mg as needed for pain/muscle tightness.  I recommend you have an updated eye exam.   Return to neurology in 3 months.       Total Time: 60 minutes were spent with the patient and in chart review. More than 50% of the time spent on counseling (as described above in Assessment and Plan) /coordinating the care.    Ruthie David MD  Neurology    CC: Juan Iyer MD

## 2018-12-03 NOTE — LETTER
12/3/2018         RE: Brittany Barajas  [address] [address]        Dear Colleague,    Thank you for referring your patient, Brittany Barajas, to the [facility]. Please see a copy of my visit note below.        History of Present Illness - Brittany Baraajs is a 58 year old female seen in consultation at the request of Dr. Iyer for dysphonia. She feels her voice has been off for the past 2 years consistently. She does have a chronic cough, and is currently on 3 inhalers for this. She has had the cough for many years as well, and she still had trouble with her voice even during a period of time when she could not afford to take the inhalers. She enjoys singing, but has not been able to do so due to her voice issue. She also reports coughing and choking frequently on solid and liquid foods. She had a recent swallow study that was negative for aspiration, but did have some peristaltic issues. She had a normal EGD recently as well.     Past Medical History -   Patient Active Problem List   Diagnosis     Mitral valve disorder     Allergic rhinitis     Convulsions (H)     Esophageal reflux     Mild major depression (H)     Plantar fascial fibromatosis     Other affections of shoulder region, not elsewhere classified     Neoplasm of digestive system     Anxiety state     Breast cancer (H)     CARDIOVASCULAR SCREENING; LDL GOAL LESS THAN 160     Transaminase or LDH elevation     H/O: hysterectomy     Colon polyp     Drug reaction     Mild persistent asthma     Urgency incontinence     Advanced directives, counseling/discussion     DDD (degenerative disc disease), lumbar     DDD (degenerative disc disease), cervical     Dysphonia     Other dysphagia     Traumatic brain injury, without loss of consciousness, subsequent encounter     Fatigue, unspecified type       Current Medications -   Current Outpatient Prescriptions:      albuterol (PROAIR HFA/PROVENTIL HFA/VENTOLIN  HFA) 108 (90 BASE) MCG/ACT Inhaler, Inhale 2 puffs into the lungs every 6 hours as needed for shortness of breath / dyspnea, Disp: 2 Inhaler, Rfl: 6     amitriptyline (ELAVIL) 50 MG tablet, Take 1 tablet (50 mg) by mouth At Bedtime, Disp: 90 tablet, Rfl: 3     aspirin 81 MG tablet, Take 1 tablet by mouth daily., Disp: , Rfl:      cyclobenzaprine (FLEXERIL) 10 MG tablet, Take 0.5-1 tablets (5-10 mg) by mouth 3 times daily as needed for muscle spasms, Disp: 30 tablet, Rfl: 1     fluticasone (FLOVENT HFA) 110 MCG/ACT Inhaler, Inhale 2 puffs into the lungs 2 times daily (Patient taking differently: Inhale 2 puffs into the lungs daily ), Disp: 1 Inhaler, Rfl: 3     guaiFENesin-codeine (ROBITUSSIN AC) 100-10 MG/5ML SOLN solution, Take 5-10 mLs by mouth every 4 hours as needed for cough, Disp: 240 mL, Rfl: 0     ipratropium - albuterol 0.5 mg/2.5 mg/3 mL (DUONEB) 0.5-2.5 (3) MG/3ML neb solution, Take 1 vial (3 mLs) by nebulization every 6 hours as needed for shortness of breath / dyspnea, Disp: 1 Box, Rfl: 2     Multiple Vitamin (MULTIVITAMIN OR), Take 1 tablet by mouth daily., Disp: , Rfl:      PARoxetine (PAXIL) 10 MG tablet, Take 1 tablet (10 mg) by mouth At Bedtime, Disp: 90 tablet, Rfl: 3     PARoxetine (PAXIL) 20 MG tablet, TAKE ONE TABLET BY MOUTH AT BEDTIME, Disp: 90 tablet, Rfl: 3     fluticasone (FLONASE) 50 MCG/ACT spray, Spray 1-2 sprays into both nostrils daily (Patient not taking: Reported on 12/3/2018), Disp: 1 Bottle, Rfl: 3    Allergies -   Allergies   Allergen Reactions     Docetaxel Other (See Comments)     Chest tightness, black out     Taxotere Other (See Comments)     Chest tightness, black out       Social History -   Social History     Social History     Marital status: Single     Spouse name: N/A     Number of children: N/A     Years of education: N/A     Social History Main Topics     Smoking status: Never Smoker     Smokeless tobacco: Never Used     Alcohol use Yes      Comment: rarely-none for  the past 12 weeks for wine, 11-14-18 visit.     Drug use: No     Sexual activity: Not Currently     Other Topics Concern     Parent/Sibling W/ Cabg, Mi Or Angioplasty Before 65f 55m? Yes     brother triple CABG in his 50s      Service No     Blood Transfusions No     Caffeine Concern No     Occupational Exposure Yes     maybe     Hobby Hazards No     Sleep Concern No     Stress Concern No     Weight Concern No     Special Diet Yes     less meat     Back Care Yes     low back  S-I joint     Exercise Yes     floor exercises     Bike Helmet Yes     Seat Belt Yes     Self-Exams Yes     Social History Narrative       Family History -   Family History   Problem Relation Age of Onset     C.A.D. Mother      Diabetes Mother      Hypertension Mother      Cerebrovascular Disease Mother      Breast Cancer Mother      Allergies Mother      Arthritis Mother      Cancer Mother      Eye Disorder Mother      GASTROINTESTINAL DISEASE Mother      Gynecology Mother      Lipids Mother      Heart Disease Mother      Osteoporosis Mother      Obesity Mother      Thyroid Disease Mother      Respiratory Mother      Alcohol/Drug Father      Cancer Father      Alcohol/Drug Brother      Musculoskeletal Disorder Brother      spina bifida-recent dx     C.A.D. Brother      Thyroid Disease Sister      Lipids Sister      Gynecology Sister      fibroids/ s/p hysterectomy     Other - See Comments Sister      hysterectomy, dilation of the throat.     Thyroid Disease Brother      Heart Disease Brother      DOUBLE BYPASS     Lipids Brother      Thyroid Disease Sister      Lipids Sister      Other - See Comments Sister      hysterectomy     GASTROINTESTINAL DISEASE Sister      liver function off     Thyroid Disease Sister      Thyroid Disease Sister      Heart Disease Sister      Cancer Other      thyroid cancer     Cancer - colorectal No family hx of        Review of Systems - As per HPI and PMHx, otherwise 7 system review of the head and neck  "negative. 10+ system review negative.    Physical Exam  /86 (BP Location: Right arm, Patient Position: Sitting, Cuff Size: Adult Regular)  Pulse 95  Temp 97.3  F (36.3  C) (Tympanic)  Ht 1.549 m (5' 1\")  Wt 73 kg (161 lb)  LMP 10/17/2008  BMI 30.42 kg/m2  General - The patient is well nourished and well developed, and appears to have good nutritional status.  Alert and oriented to person and place, answers questions and cooperates with examination appropriately.   Head and Face - Normocephalic and atraumatic, with no gross asymmetry noted of the contour of the facial features.  The facial nerve is intact, with strong symmetric movements.  Voice and Breathing - The patient was breathing comfortably without the use of accessory muscles. There was no wheezing, stridor, or stertor.  The patients voice was coarse in the lower register, but she had more normal voice during higher phonation. Minimal voice cracking.  Ears - Bilateral pinna and EACs with normal appearing overlying skin. Tympanic membrane intact with good mobility on pneumatic otoscopy bilaterally. Bony landmarks of the ossicular chain are normal. The tympanic membranes are normal in appearance. No retraction, perforation, or masses.  No fluid or purulence was seen in the external canal or the middle ear.   Eyes - Extraocular movements intact.  Sclera were not icteric or injected, conjunctiva were pink and moist.  Mouth - Examination of the oral cavity showed pink, healthy oral mucosa. No lesions or ulcerations noted.  The tongue was mobile and midline, and the dentition were in good condition.    Throat - The walls of the oropharynx were smooth, pink, moist, symmetric, and had no lesions or ulcerations.  The tonsillar pillars and soft palate were symmetric.  The uvula was midline on elevation.  Neck - Normal midline excursion of the laryngotracheal complex during swallowing.  Full range of motion on passive movement.  Palpation of the occipital, " submental, submandibular, internal jugular chain, and supraclavicular nodes did not demonstrate any abnormal lymph nodes or masses.  The carotid pulse was palpable bilaterally.  Palpation of the thyroid was soft and smooth, with no nodules or goiter appreciated.  The trachea was mobile and midline.  Nose - External contour is symmetric, no gross deflection or scars.  Nasal mucosa is pink and moist with no abnormal mucus.  The septum was midline and non-obstructive, turbinates of normal size and position.  No polyps, masses, or purulence noted on examination.  Heart:  Regular rate and rhythm, no murmurs.  Lungs:  Chest clear to auscultation bilaterally    Procedure: Flexible Endoscopy  Indication: dysphonia    Attempts at mirror laryngoscopy were not possible due to gag reflex.  Therefore I proceeded with a fiberoptic examination.  First I sprayed both sides of the nose with a mixture of lidocaine and neosynephrine.  I then passed the scope through the nasal cavity.  The nasal cavity was unremarkable.  The nasopharynx was mucosally covered and symmetric.  The Eustachian tube openings were unobstructed.  Going further down I had a clear view of the base of tongue which had normal appearing lingual tonsillar tissue.  The base of tongue was free of lesions, and the vallecula was open.  The epiglottis was smooth and mucosally covered.  The supraglottic larynx was then clearly visualized.  The vocal cords moved smoothly and symmetrically, but the right vocal fold had a cystic appearing mass in the infraglottic portion of the posterior aspect of the vocal fold.  The pyriform sinuses were open, and the limited view of the postcricoid region did not show any lesions.          Assessment - Brittany Barajas is a 58 year old female with a right infraglottic lesion, most consistent with a vocal cyst. Given its posterior location, and her normalization of voice with high phonation, I think she may do well with voice therapy,  as she is averse to undergoing surgery. I asked her to return in 2 months to reconsider microlaryngoscopy and excision of the lesion should she fail to improve sufficiently with voice therapy.       Dr. Gino Hudson MD  Otolaryngology  [facility]        Again, thank you for allowing me to participate in the care of your patient.        Sincerely,        Gino Hudson MD

## 2018-12-03 NOTE — MR AVS SNAPSHOT
After Visit Summary   12/3/2018    Brittany Barajas    MRN: [id number]           Patient Information     Date Of Birth          1960        Visit Information        Provider Department      12/3/2018 10:15 AM Ruthie Hernandez MD [facility]        Today's Diagnoses     History of closed head injury    -  1    Cervicalgia          Care Instructions    Plan    MRI Brain and Cervical Spine. We will notify you of the results.   Flexeril 5-10mg as needed for pain/muscle tightness.  I recommend you have an updated eye exam.   Return to neurology in 3 months.               Follow-ups after your visit        Your next 10 appointments already scheduled     Dec 03, 2018 12:00 PM CST   New Visit with Gino Hudson MD   [facility]    [address] [address]   [phone]              Future tests that were ordered for you today     Open Future Orders        Priority Expected Expires Ordered    MR Brain w/o & w Contrast Routine  12/3/2019 12/3/2018    MR Cervical Spine w/o Contrast Routine  12/3/2019 12/3/2018            Who to contact     If you have questions or need follow up information about today's clinic visit or your schedule please contact [facility] directly at [phone].  Normal or non-critical lab and imaging results will be communicated to you by MyChart, letter or phone within 4 business days after the clinic has received the results. If you do not hear from us within 7 days, please contact the clinic through MyChart or phone. If you have a critical or abnormal lab result, we will notify you by phone as soon as possible.  Submit refill requests through finalsite or call your pharmacy and they will forward the refill request to us. Please allow 3 business days for your refill to be completed.          Additional Information About Your Visit        MyCharFoxfly Information     finalsite gives  you secure access to your electronic health record. If you see a primary care provider, you can also send messages to your care team and make appointments. If you have questions, please call your primary care clinic.  If you do not have a primary care provider, please call [phone] and they will assist you.        Care EveryWhere ID     This is your Care EveryWhere ID. This could be used by other organizations to access your [place] medical records  [id number]        Your Vitals Were     Pulse Temperature Respirations Last Period Pulse Oximetry BMI (Body Mass Index)    84 97.3  F (36.3  C) (Tympanic) 12 10/17/2008 99% 30.42 kg/m2       Blood Pressure from Last 3 Encounters:   12/03/18 137/81   11/14/18 (!) 136/94   10/22/18 102/56    Weight from Last 3 Encounters:   12/03/18 73 kg (161 lb)   11/14/18 73 kg (161 lb)   10/22/18 68.9 kg (152 lb)                 Today's Medication Changes          These changes are accurate as of 12/3/18 10:48 AM.  If you have any questions, ask your nurse or doctor.               Start taking these medicines.        Dose/Directions    cyclobenzaprine 10 MG tablet   Commonly known as:  FLEXERIL   Used for:  Cervicalgia, History of closed head injury   Started by:  Ruthie Hernandez MD        Dose:  5-10 mg   Take 0.5-1 tablets (5-10 mg) by mouth 3 times daily as needed for muscle spasms   Quantity:  30 tablet   Refills:  1         These medicines have changed or have updated prescriptions.        Dose/Directions    fluticasone 110 MCG/ACT inhaler   Commonly known as:  FLOVENT HFA   This may have changed:  when to take this   Used for:  Mild persistent asthma        Dose:  2 puff   Inhale 2 puffs into the lungs 2 times daily   Quantity:  1 Inhaler   Refills:  3            Where to get your medicines      These medications were sent to [facility] [address] [place] [address]  [address] [facility] [address]     Phone:  [phone]      cyclobenzaprine 10 MG tablet                Primary Care Provider Office Phone # Fax #    Juan Morgan Iyer [phone] [address] [facility] [address]        Equal Access to Services     JOSÉ SAHNI : Mirta trimble elza Kelly, waaxda luqadaha, qaybta kaalmada adegildardoyada, hiram gallo dayna machado. So [facility] [phone].    ATENCIÓN: Si habla español, tiene a johnston disposición servicios gratuitos de asistencia lingüística. Llame al [phone].    We comply with applicable federal civil rights laws and [place] laws. We do not discriminate on the basis of race, color, national origin, age, disability, sex, sexual orientation, or gender identity.            Thank you!     Thank you for choosing [facility]  for your care. Our goal is always to provide you with excellent care. Hearing back from our patients is one way we can continue to improve our services. Please take a few minutes to complete the written survey that you may receive in the mail after your visit with us. Thank you!             Your Updated Medication List - Protect others around you: Learn how to safely use, store and throw away your medicines at www.disposemymeds.org.          This list is accurate as of 12/3/18 10:48 AM.  Always use your most recent med list.                   Brand Name Dispense Instructions for use Diagnosis    albuterol 108 (90 Base) MCG/ACT inhaler    PROAIR HFA/PROVENTIL HFA/VENTOLIN HFA    2 Inhaler    Inhale 2 puffs into the lungs every 6 hours as needed for shortness of breath / dyspnea    Mild persistent asthma, uncomplicated       amitriptyline 50 MG tablet    ELAVIL    90 tablet    Take 1 tablet (50 mg) by mouth At Bedtime    Generalized anxiety disorder, Insomnia, unspecified type       aspirin 81 MG tablet    ASA     Take 1 tablet by mouth daily.        cyclobenzaprine 10 MG tablet    FLEXERIL    30 tablet    Take 0.5-1 tablets (5-10 mg) by mouth 3 times  daily as needed for muscle spasms    Cervicalgia, History of closed head injury       fluticasone 110 MCG/ACT inhaler    FLOVENT HFA    1 Inhaler    Inhale 2 puffs into the lungs 2 times daily    Mild persistent asthma       fluticasone 50 MCG/ACT nasal spray    FLONASE    1 Bottle    Spray 1-2 sprays into both nostrils daily    Mild persistent asthma, uncomplicated       guaiFENesin-codeine 100-10 MG/5ML solution    ROBITUSSIN AC    240 mL    Take 5-10 mLs by mouth every 4 hours as needed for cough    Cough       ipratropium - albuterol 0.5 mg/2.5 mg/3 mL 0.5-2.5 (3) MG/3ML neb solution    DUONEB    1 Box    Take 1 vial (3 mLs) by nebulization every 6 hours as needed for shortness of breath / dyspnea    Mild persistent asthma, uncomplicated       MULTIVITAMIN PO      Take 1 tablet by mouth daily.        * PARoxetine 20 MG tablet    PAXIL    90 tablet    TAKE ONE TABLET BY MOUTH AT BEDTIME    Generalized anxiety disorder       * PARoxetine 10 MG tablet    PAXIL    90 tablet    Take 1 tablet (10 mg) by mouth At Bedtime    Moderate recurrent major depression (H)       * Notice:  This list has 2 medication(s) that are the same as other medications prescribed for you. Read the directions carefully, and ask your doctor or other care provider to review them with you.

## 2018-12-04 NOTE — PROGRESS NOTES
[facility]          OUTPATIENT SPEECH LANGUAGE PATHOLOGY VOICE EVALUATION  PLAN OF TREATMENT FOR OUTPATIENT REHABILITATION  (COMPLETE FOR INITIAL CLAIMS ONLY)    Patient's Last Name, First Name, M.I.  YOB: 1960  Brittany Barajas                        Provider s Name: [facility] Medical Record No.  [id number]     Onset Date:  Order date 12/3/18    Start of Care Date: 12/03/18   Type:     ___PT  __OT   _X_SLP    Medical Diagnosis: Dysphonia;  Vocal fold cyst   Speech Language Pathology Diagnosis:  Dysphonia    Visits from SOC: 1      _________________________________________________________________________________  Plan of Treatment/Functional Goals:  Voice         Goals     1. Goal Identifier: cough       Goal Description: Pt will decrease cough by 50% in 4 weeks to decrease vocal abuse.       Target Date: 01/03/19   2. Goal Identifier: vocal hygiene       Goal Description: Pt will utilize a vocal hygiene program 75% of the time (decrease vocal effort and misuse)       Target Date: 01/03/19   3. Goal Identifier: resonant voice       Goal Description: Pt will use a resonant voice technique in syllables, words, and sentences to decrease laryngeal tension with 75% acc       Target Date: 02/01/19   4. Goal Identifier: vocal quality       Goal Description: Pt will  vocal quality a 7/10  (10=normal) in structured conversation tasks.       Target Date: 02/01/19         Keila Castillo MA, CCC/SLP       I CERTIFY THE NEED FOR THESE SERVICES FURNISHED UNDER        THIS PLAN OF TREATMENT AND WHILE UNDER MY CARE     (Physician co-signature of this document indicates review and certification of the therapy plan).                Certification Date From:  12/04/18  Certification Date To:  02/01/19                 Initial Assessment        See Epic Evaluation Start of Care 12/03/18

## 2018-12-05 ENCOUNTER — TELEPHONE (OUTPATIENT)
Dept: FAMILY MEDICINE | Facility: CLINIC | Age: 58
End: 2018-12-05

## 2018-12-05 ENCOUNTER — HOSPITAL ENCOUNTER (OUTPATIENT)
Dept: MRI IMAGING | Facility: CLINIC | Age: 58
End: 2018-12-05
Attending: PSYCHIATRY & NEUROLOGY
Payer: COMMERCIAL

## 2018-12-05 ENCOUNTER — HOSPITAL ENCOUNTER (OUTPATIENT)
Dept: MRI IMAGING | Facility: CLINIC | Age: 58
Discharge: HOME | End: 2018-12-05
Attending: PSYCHIATRY & NEUROLOGY | Admitting: PSYCHIATRY & NEUROLOGY
Payer: COMMERCIAL

## 2018-12-05 DIAGNOSIS — M54.2 CERVICALGIA: ICD-10-CM

## 2018-12-05 DIAGNOSIS — Z87.820 HISTORY OF CLOSED HEAD INJURY: ICD-10-CM

## 2018-12-05 DIAGNOSIS — R29.6 FALLS FREQUENTLY: Primary | ICD-10-CM

## 2018-12-05 DIAGNOSIS — J45.30 MILD PERSISTENT ASTHMA, UNSPECIFIED WHETHER COMPLICATED: Primary | ICD-10-CM

## 2018-12-05 PROCEDURE — 25500064 ZZH RX 255 OP 636: Performed by: RADIOLOGY

## 2018-12-05 PROCEDURE — A9585 GADOBUTROL INJECTION: HCPCS | Performed by: RADIOLOGY

## 2018-12-05 PROCEDURE — 72141 MRI NECK SPINE W/O DYE: CPT

## 2018-12-05 PROCEDURE — 70553 MRI BRAIN STEM W/O & W/DYE: CPT

## 2018-12-05 RX ORDER — FLUTICASONE PROPIONATE 110 UG/1
2 AEROSOL, METERED RESPIRATORY (INHALATION) DAILY
Qty: 1 INHALER | Refills: 11 | Status: SHIPPED | OUTPATIENT
Start: 2018-12-05 | End: 2018-12-10

## 2018-12-05 RX ORDER — GADOBUTROL 604.72 MG/ML
7 INJECTION INTRAVENOUS ONCE
Status: COMPLETED | OUTPATIENT
Start: 2018-12-05 | End: 2018-12-05

## 2018-12-05 RX ORDER — IPRATROPIUM BROMIDE AND ALBUTEROL SULFATE 2.5; .5 MG/3ML; MG/3ML
3 SOLUTION RESPIRATORY (INHALATION) EVERY 6 HOURS PRN
Qty: 1 BOX | Refills: 2 | Status: SHIPPED | OUTPATIENT
Start: 2018-12-05 | End: 2021-01-21

## 2018-12-05 RX ORDER — ALBUTEROL SULFATE 90 UG/1
2 AEROSOL, METERED RESPIRATORY (INHALATION) EVERY 6 HOURS PRN
Qty: 2 INHALER | Refills: 6 | Status: SHIPPED | OUTPATIENT
Start: 2018-12-05 | End: 2021-01-21

## 2018-12-05 RX ADMIN — GADOBUTROL 7 ML: 604.72 INJECTION INTRAVENOUS at 13:11

## 2018-12-05 NOTE — TELEPHONE ENCOUNTER
----- Message from Juan Iyer MD sent at 12/5/2018  1:07 PM CST -----  Regarding: FW: PT referral  Care Team, Physical therapy for is ordered for the balance evaluation. Please call La[facility]ne with this info and phone. Thanks.   Juan Iyer    ----- Message -----     From: Keila Castillo, SLP     Sent: 12/4/2018  11:26 AM       To: Juan Iyer MD  Subject: PT referral                                      Hi Dr. Iyer,    I saw Brittany this week and she did not pass our Medicare fall screen.  She reports poor balance and has had a fall 2 times in the past 2 weeks.  (states falls when going down steps).  She is open to seeing Physical Therapy.  Please put an order in Epic if you are in agreement.    Thank you,  Keila Castillo  Speech Therapy

## 2018-12-06 ENCOUNTER — TELEPHONE (OUTPATIENT)
Dept: NEUROLOGY | Facility: CLINIC | Age: 58
End: 2018-12-06

## 2018-12-06 NOTE — TELEPHONE ENCOUNTER
Reason for Call:  Request for results:    Name of test or procedure: MRI    Date of test of procedure: 12/5    Location of the test or procedure: [place]    OK to leave the result message on voice mail or with a family member? YES    Phone number Patient can be reached at:  Home number on file [phone] (home)    Additional comments: pt calling looking for results from MRI    Call taken on 12/6/2018 at 11:57 AM by Mi Logan

## 2018-12-07 DIAGNOSIS — M54.2 CERVICALGIA: ICD-10-CM

## 2018-12-07 DIAGNOSIS — R20.0 NUMBNESS OF LEFT HAND: Primary | ICD-10-CM

## 2018-12-07 NOTE — PROGRESS NOTES
Please advise Brittany Lori Karl,  1960, that the cervical spine imaging shows mild degenerative changes but no clear cause for her symptoms. The brain MRI shows a vessel abnormality which is usually an incidental finding, but there is evidence of a tiny old bleed, possibly related to her injury but not likely. I recommend she have nerve conduction studies done for the Left arm symptoms and also see a spinal specialist. I have ordered these referrals.   [phone] (home)     Thank you,  Ruthie David

## 2018-12-07 NOTE — ADDENDUM NOTE
Encounter addended by: Keila Castillo, SLP on: 12/7/2018  2:18 PM<BR>     Actions taken: Flowsheet accepted

## 2018-12-10 NOTE — TELEPHONE ENCOUNTER
DR. Iyer,    Prior Authorization for Flovent HFA has been denied.    Denial Rational: You must try and fail Arnuity Ellipta  
I have attempted to contact this patient by phone with the following results: left message to return my call on answering machine.  CSS may deliver message below.     Zoë SUAREZ RN        
PA submitted to [facility] PA POOL by GÓMEZ Peteror    Prior Authorization Retail Medication Request    Medication/Dose: Flovent HFA 110mcg  ICD code (if different than what is on RX):     Previously Tried and Failed:  Albuterol neb; proair; proventil; ventolin; albuterol inhaler; ipratropium-albuterol  Rationale:  Patient has used since 2013 with success    Insurance Name:  [facility]  Insurance ID:  [id number]      Pharmacy Information (if different than what is on RX)  Name:    Phone:      
PRIOR AUTHORIZATION DENIED    Medication: Flovent HFA 110mcg - DENIED     Denial Date: 12/6/2018    Denial Rational: You must try and fail Arnuity Ellipta          Appeal Information: Please provide a letter of medical necessity           
Please notify prescription sent to pharmacy for the new inhaler, Arnuity. Refills are done,.     Juan Iyer    
This PA request was submitted to the insurance by the [facility] Prior Authorization Team.  If you have any questions in regards to this request, we can be reached at [phone].     Thanks    PA Initiation    Medication: Flovent HFA 110mcg  Insurance Company: [employer] [place] - Phone [phone] Fax [phone]  Pharmacy Filling the Rx: [facility] [place], [place] - [address]  Filling Pharmacy Phone: [phone]  Filling Pharmacy Fax:    Start Date: 12/6/2018        
No

## 2018-12-11 ENCOUNTER — APPOINTMENT (OUTPATIENT)
Dept: GENERAL RADIOLOGY | Facility: CLINIC | Age: 58
End: 2018-12-11
Attending: EMERGENCY MEDICINE
Payer: COMMERCIAL

## 2018-12-11 ENCOUNTER — HOSPITAL ENCOUNTER (EMERGENCY)
Facility: CLINIC | Age: 58
Discharge: HOME | End: 2018-12-11
Attending: EMERGENCY MEDICINE | Admitting: EMERGENCY MEDICINE
Payer: COMMERCIAL

## 2018-12-11 VITALS
BODY MASS INDEX: 30.58 KG/M2 | TEMPERATURE: 97.2 F | HEIGHT: 61 IN | OXYGEN SATURATION: 95 % | WEIGHT: 162 LBS | RESPIRATION RATE: 16 BRPM | SYSTOLIC BLOOD PRESSURE: 132 MMHG | DIASTOLIC BLOOD PRESSURE: 68 MMHG

## 2018-12-11 DIAGNOSIS — R63.5 WEIGHT GAIN: ICD-10-CM

## 2018-12-11 DIAGNOSIS — M79.89 LEG SWELLING: ICD-10-CM

## 2018-12-11 LAB
ALBUMIN SERPL-MCNC: 3.8 G/DL (ref 3.4–5)
ALP SERPL-CCNC: 44 U/L (ref 40–150)
ALT SERPL W P-5'-P-CCNC: 79 U/L (ref 0–50)
ANION GAP SERPL CALCULATED.3IONS-SCNC: 5 MMOL/L (ref 3–14)
AST SERPL W P-5'-P-CCNC: 56 U/L (ref 0–45)
BASOPHILS # BLD AUTO: 0 10E9/L (ref 0–0.2)
BASOPHILS NFR BLD AUTO: 1 %
BILIRUB SERPL-MCNC: 0.4 MG/DL (ref 0.2–1.3)
BUN SERPL-MCNC: 11 MG/DL (ref 7–30)
CALCIUM SERPL-MCNC: 8.5 MG/DL (ref 8.5–10.1)
CHLORIDE SERPL-SCNC: 104 MMOL/L (ref 94–109)
CO2 SERPL-SCNC: 29 MMOL/L (ref 20–32)
CREAT SERPL-MCNC: 0.69 MG/DL (ref 0.52–1.04)
DIFFERENTIAL METHOD BLD: ABNORMAL
EOSINOPHIL # BLD AUTO: 0.1 10E9/L (ref 0–0.7)
EOSINOPHIL NFR BLD AUTO: 2.4 %
ERYTHROCYTE [DISTWIDTH] IN BLOOD BY AUTOMATED COUNT: 12.9 % (ref 10–15)
GFR SERPL CREATININE-BSD FRML MDRD: 87 ML/MIN/1.7M2
GLUCOSE SERPL-MCNC: 95 MG/DL (ref 70–99)
HCT VFR BLD AUTO: 38.7 % (ref 35–47)
HGB BLD-MCNC: 13 G/DL (ref 11.7–15.7)
IMM GRANULOCYTES # BLD: 0 10E9/L (ref 0–0.4)
IMM GRANULOCYTES NFR BLD: 0.2 %
LYMPHOCYTES # BLD AUTO: 2.4 10E9/L (ref 0.8–5.3)
LYMPHOCYTES NFR BLD AUTO: 56 %
MCH RBC QN AUTO: 30.7 PG (ref 26.5–33)
MCHC RBC AUTO-ENTMCNC: 33.6 G/DL (ref 31.5–36.5)
MCV RBC AUTO: 91 FL (ref 78–100)
MONOCYTES # BLD AUTO: 0.5 10E9/L (ref 0–1.3)
MONOCYTES NFR BLD AUTO: 10.7 %
NEUTROPHILS # BLD AUTO: 1.3 10E9/L (ref 1.6–8.3)
NEUTROPHILS NFR BLD AUTO: 29.7 %
NRBC # BLD AUTO: 0 10*3/UL
NRBC BLD AUTO-RTO: 0 /100
NT-PROBNP SERPL-MCNC: 10 PG/ML (ref 0–900)
PLATELET # BLD AUTO: 247 10E9/L (ref 150–450)
POTASSIUM SERPL-SCNC: 3.9 MMOL/L (ref 3.4–5.3)
PROT SERPL-MCNC: 7.4 G/DL (ref 6.8–8.8)
RBC # BLD AUTO: 4.24 10E12/L (ref 3.8–5.2)
SODIUM SERPL-SCNC: 138 MMOL/L (ref 133–144)
TROPONIN I SERPL-MCNC: <0.015 UG/L (ref 0–0.04)
WBC # BLD AUTO: 4.2 10E9/L (ref 4–11)

## 2018-12-11 PROCEDURE — 99284 EMERGENCY DEPT VISIT MOD MDM: CPT | Mod: 25

## 2018-12-11 PROCEDURE — 93010 ELECTROCARDIOGRAM REPORT: CPT | Mod: Z6 | Performed by: EMERGENCY MEDICINE

## 2018-12-11 PROCEDURE — 80053 COMPREHEN METABOLIC PANEL: CPT | Performed by: EMERGENCY MEDICINE

## 2018-12-11 PROCEDURE — 83880 ASSAY OF NATRIURETIC PEPTIDE: CPT | Performed by: EMERGENCY MEDICINE

## 2018-12-11 PROCEDURE — 84484 ASSAY OF TROPONIN QUANT: CPT | Performed by: EMERGENCY MEDICINE

## 2018-12-11 PROCEDURE — 93005 ELECTROCARDIOGRAM TRACING: CPT

## 2018-12-11 PROCEDURE — 85025 COMPLETE CBC W/AUTO DIFF WBC: CPT | Performed by: EMERGENCY MEDICINE

## 2018-12-11 PROCEDURE — 71046 X-RAY EXAM CHEST 2 VIEWS: CPT

## 2018-12-11 PROCEDURE — 99284 EMERGENCY DEPT VISIT MOD MDM: CPT | Mod: 25 | Performed by: EMERGENCY MEDICINE

## 2018-12-11 ASSESSMENT — MIFFLIN-ST. JEOR: SCORE: 1252.21

## 2018-12-11 ASSESSMENT — ENCOUNTER SYMPTOMS
ABDOMINAL PAIN: 0
SHORTNESS OF BREATH: 0
FEVER: 0

## 2018-12-11 NOTE — ED PROVIDER NOTES
History     Chief Complaint   Patient presents with     Leg Swelling     past few days     HPI  Brittany Barajas is a 58 year old female who has past medical history significant for mitral valve disorder, anxiety, history of GERD, history of breast cancer, presenting to the emergency department with concerns regarding weight gain, in addition to bilateral lower extremity swelling.  Patient states her symptoms have been present over the past many weeks, however leg swelling over the past few days.  Denies unilateral leg swelling.  Has had some shortness of breath.  Denies any chest pain.  No lightheadedness, however does have occasional dizziness.  Patient states she has been active.  No significant changes in activity level.  States she normally walks around the block a couple times daily.  No fever, chills, cough.  Denies urinary symptoms.  No recent changes in medications, with no recent traveling, and denies any recent surgical procedures.  She does report 20 pound weight gain since the spring, over the past many months.    Problem List:    Patient Active Problem List    Diagnosis Date Noted     DDD (degenerative disc disease), lumbar 08/23/2018     Priority: Medium     DDD (degenerative disc disease), cervical 08/23/2018     Priority: Medium     Dysphonia 08/23/2018     Priority: Medium     Other dysphagia 08/23/2018     Priority: Medium     Traumatic brain injury, without loss of consciousness, subsequent encounter 08/23/2018     Priority: Medium     Fatigue, unspecified type 08/23/2018     Priority: Medium     Advanced directives, counseling/discussion 11/04/2015     Priority: Medium     Advance Care Planning 11/4/2015: ACP Review and Resources Provided:  Reviewed chart for advance care plan.  Brittany Barajas has no plan or code status on file. Discussed available resources and provided with information. Confirmed code status reflects current choices pending further ACP discussions.   Confirmed/documented legally designated decision maker(s). Added by Leonie Alva             Urgency incontinence 01/02/2014     Priority: Medium     Mild persistent asthma 07/02/2013     Priority: Medium     Drug reaction 05/16/2012     Priority: Medium     sideeffects from donald  See May 16 2012 note       H/O: hysterectomy 11/29/2011     Priority: Medium     No pap needed       Colon polyp 11/29/2011     Priority: Medium     2011 colonoscopy --unable to get polyp out due to technical issues-repeat suggested  2012-colonoscopy normal--suggested repeat three years       Transaminase or LDH elevation 11/28/2010     Priority: Medium     Mild and stable in 2010       CARDIOVASCULAR SCREENING; LDL GOAL LESS THAN 160 10/31/2010     Priority: Medium     Breast cancer (H) 11/17/2009     Priority: Medium     09       Anxiety state 12/08/2007     Priority: Medium     Problem list name updated by automated process. Provider to review       Mitral valve disorder 12/01/2005     Priority: Medium     MVP  2010 echo--Mild mitral valve prolapse, posterior leaflet.  There is mild to moderate (1-2+) mitral regurgitation  Problem list name updated by automated process. Provider to review       Allergic rhinitis 12/01/2005     Priority: Medium     Problem list name updated by automated process. 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  Provider to review          Past Medical History:    Past Medical History:   Diagnosis Date     Allergic rhinitis, cause unspecified      Depressive disorder, not elsewhere classified      Esophageal reflux      Intramural leiomyoma of uterus      Mitral valve disorders(424.0)      Other affections of shoulder region, not elsewhere classified      Other convulsions      Other diseases of trachea and bronchus, not elsewhere classified      Papanicolaou smear of cervix with low grade squamous intraepithelial lesion (LGSIL) 11/09     Plantar fascial fibromatosis        Past Surgical History:    Past Surgical History:   Procedure Laterality Date     COLONOSCOPY  1/28/2011    COLONOSCOPY performed by ELOY BRIONES at [facility]     COLONOSCOPY  1/20/2012    Procedure:COLONOSCOPY; Surgeon:ELOY BRIONES; Location:[facility]     ESOPHAGOSCOPY, GASTROSCOPY, DUODENOSCOPY (EGD), COMBINED N/A 10/22/2018    Procedure: gastroscopy;  Surgeon: Seth Berman MD;  Location: [facility]     HYSTERECTOMY, PAP NO LONGER INDICATED  12/1/2008    for fibroids     INJECT EPIDURAL LUMBAR  3/9/2012    Procedure:INJECT EPIDURAL LUMBAR; JAMIL - ; Surgeon:GENERIC ANESTHESIA PROVIDER; Location:[facility]     INJECT EPIDURAL LUMBAR  5/25/2012    Procedure:INJECT EPIDURAL LUMBAR; JAMIL-; Surgeon:GENERIC ANESTHESIA PROVIDER; Location:[facility]     LEEP TX, CERVICAL  10/06    CECILIO 1     SURGICAL HISTORY OF -   10/7/1998    Right parotidectomy-mass     SURGICAL HISTORY OF -       Laparoscopy     SURGICAL HISTORY OF -   10/1996    Fibroid removal       Family History:    Family History   Problem Relation Age of Onset     C.A.D. Mother      Diabetes Mother      Hypertension Mother      Cerebrovascular Disease Mother      Breast Cancer Mother      Allergies Mother      Arthritis Mother      Cancer Mother      Eye Disorder Mother      Gastrointestinal Disease Mother      Gynecology Mother      Lipids Mother      Heart Disease Mother      Osteoporosis  Mother      Obesity Mother      Thyroid Disease Mother      Respiratory Mother      Alcohol/Drug Father      Cancer Father      Alcohol/Drug Brother      Musculoskeletal Disorder Brother         spina bifida-recent dx     C.A.D. Brother      Thyroid Disease Sister      Lipids Sister      Gynecology Sister         fibroids/ s/p hysterectomy     Other - See Comments Sister         hysterectomy, dilation of the throat.     Thyroid Disease Brother      Heart Disease Brother         DOUBLE BYPASS     Lipids Brother      Thyroid Disease Sister      Lipids Sister      Other - See Comments Sister         hysterectomy     Gastrointestinal Disease Sister         liver function off     Thyroid Disease Sister      Thyroid Disease Sister      Heart Disease Sister      Cancer Other         thyroid cancer     Cancer - colorectal No family hx of        Social History:  Marital Status:  Single [1]  Social History     Tobacco Use     Smoking status: Never Smoker     Smokeless tobacco: Never Used   Substance Use Topics     Alcohol use: Yes     Comment: rarely-none for the past 12 weeks for wine, 11-14-18 visit.     Drug use: No        Medications:      albuterol (PROAIR HFA/PROVENTIL HFA/VENTOLIN HFA) 108 (90 Base) MCG/ACT inhaler   amitriptyline (ELAVIL) 50 MG tablet   aspirin 81 MG tablet   cyclobenzaprine (FLEXERIL) 10 MG tablet   fluticasone (ARNUITY ELLIPTA) 100 MCG/ACT inhaler   ipratropium - albuterol 0.5 mg/2.5 mg/3 mL (DUONEB) 0.5-2.5 (3) MG/3ML neb solution   Multiple Vitamin (MULTIVITAMIN OR)   PARoxetine (PAXIL) 10 MG tablet   PARoxetine (PAXIL) 20 MG tablet         Review of Systems   Constitutional: Negative for fever.   Respiratory: Negative for shortness of breath.    Cardiovascular: Positive for leg swelling. Negative for chest pain.   Gastrointestinal: Negative for abdominal pain.   Musculoskeletal:        Leg pain     All other systems reviewed and are negative.      Physical Exam   BP: 133/84  Heart Rate: 81  Temp:  "97.2  F (36.2  C)  Resp: 16  Height: 154.9 cm (5' 1\")  Weight: 73.5 kg (162 lb)  SpO2: 99 %      Physical Exam  /84   Temp 97.2  F (36.2  C) (Temporal)   Resp 16   Ht 1.549 m (5' 1\")   Wt 73.5 kg (162 lb)   LMP 10/17/2008   SpO2 99%   BMI 30.61 kg/m    General: alert, interactive, in no apparent distress  Head: atraumatic  Nose: no rhinorrhea or epistaxis  Ears: no external auditory canal discharge or bleeding.    Eyes: Sclera nonicteric. Conjunctiva noninjected. PERRL, EOMI  Mouth: no tonsillar erythema, edema, or exudate  Neck: supple, no palp LAD  Lungs: CTAB  CV: RRR, S1/S2; peripheral pulses palpable and symmetric  Abdomen: soft, nt, nd, no guarding or rebound. Positive bowel sounds  Extremities: no cyanosis.  No significant bilateral lower extremity swelling is noted, with no pitting edema  Skin: no rash or diaphoresis  Neuro:  strength 5/5 in UE and LEs bilaterally, sensation intact to light touch in UE and LEs bilaterally;       ED Course        Procedures               EKG Interpretation:      Interpreted by Juan Donahue  Time reviewed: 1000  Symptoms at time of EKG: SOB   Rhythm: normal sinus   Rate: normal  Axis: normal  Ectopy: none  Conduction: normal  ST Segments/ T Waves: Nonspecific ST-T wave changes with T wave inversions in lateral leads    Clinical Impression: normal EKG          Critical Care time:  none               Results for orders placed or performed during the hospital encounter of 12/11/18 (from the past 24 hour(s))   CBC with platelets differential   Result Value Ref Range    WBC 4.2 4.0 - 11.0 10e9/L    RBC Count 4.24 3.8 - 5.2 10e12/L    Hemoglobin 13.0 11.7 - 15.7 g/dL    Hematocrit 38.7 35.0 - 47.0 %    MCV 91 78 - 100 fl    MCH 30.7 26.5 - 33.0 pg    MCHC 33.6 31.5 - 36.5 g/dL    RDW 12.9 10.0 - 15.0 %    Platelet Count 247 150 - 450 10e9/L    Diff Method Automated Method     % Neutrophils 29.7 %    % Lymphocytes 56.0 %    % Monocytes 10.7 %    % Eosinophils 2.4 % "    % Basophils 1.0 %    % Immature Granulocytes 0.2 %    Nucleated RBCs 0 0 /100    Absolute Neutrophil 1.3 (L) 1.6 - 8.3 10e9/L    Absolute Lymphocytes 2.4 0.8 - 5.3 10e9/L    Absolute Monocytes 0.5 0.0 - 1.3 10e9/L    Absolute Eosinophils 0.1 0.0 - 0.7 10e9/L    Absolute Basophils 0.0 0.0 - 0.2 10e9/L    Abs Immature Granulocytes 0.0 0 - 0.4 10e9/L    Absolute Nucleated RBC 0.0    Comprehensive metabolic panel   Result Value Ref Range    Sodium 138 133 - 144 mmol/L    Potassium 3.9 3.4 - 5.3 mmol/L    Chloride 104 94 - 109 mmol/L    Carbon Dioxide 29 20 - 32 mmol/L    Anion Gap 5 3 - 14 mmol/L    Glucose 95 70 - 99 mg/dL    Urea Nitrogen 11 7 - 30 mg/dL    Creatinine 0.69 0.52 - 1.04 mg/dL    GFR Estimate 87 >60 mL/min/1.7m2    GFR Estimate If Black >90 >60 mL/min/1.7m2    Calcium 8.5 8.5 - 10.1 mg/dL    Bilirubin Total 0.4 0.2 - 1.3 mg/dL    Albumin 3.8 3.4 - 5.0 g/dL    Protein Total 7.4 6.8 - 8.8 g/dL    Alkaline Phosphatase 44 40 - 150 U/L    ALT 79 (H) 0 - 50 U/L    AST 56 (H) 0 - 45 U/L   Troponin I   Result Value Ref Range    Troponin I ES <0.015 0.000 - 0.045 ug/L   Nt probnp inpatient (BNP)   Result Value Ref Range    N-Terminal Pro BNP Inpatient 10 0 - 900 pg/mL   XR Chest 2 Views    Narrative    CHEST TWO VIEWS  12/11/2018 10:33 AM     HISTORY: Shortness of breath.    COMPARISON: 4/17/2017 chest x-ray.      Impression    IMPRESSION: Cardiomediastinal silhouette is normal. Lungs are clear.  No pleural fluid. No acute disease. No significant change.       Medications - No data to display    Assessments & Plan (with Medical Decision Making)  58 year old female, with past medical history as reviewed above, presenting to the emergency department with multiple weeks worth of weight gain, as patient states she has gained 20 pounds over the past many months since spring, 2018.  She also reports occasional episodes of shortness of breath.  No significant chest pains.  Patient arrives afebrile, normal vitals upon  arrival.  Given the weight gain, laboratory evaluation is performed to evaluate for heart test such as heart failure, ACS, in addition to kidney test, and BNP for heart failure with chest x-ray.    Laboratory workup reviewed as above.  Troponin is negative, and patient without ischemic symptoms, with no exertional dyspnea.  Denies any chest pain.  Do not feel that ACS is likely.  BNP is normal, and chest x-ray without effusions, and is clear based on my interpretation, confirmed by radiology.  Do not feel that heart failure as a cause.  Additionally, creatinine is normal, with normal kidney test, and remainder of electrolytes which are normal.    At this point, uncertain exact cause of patient's weight gain.  May be activity level change.  No other acute findings on laboratory workup, or concerns on history information given her symptoms over the past many months that would warrant further acute medical workup at this point.  Patient encouraged follow-up with primary care provider in the next couple weeks, and return if worsening symptoms.     I have reviewed the nursing notes.    I have reviewed the findings, diagnosis, plan and need for follow up with the patient.       Current Discharge Medication List          Final diagnoses:   Weight gain   Leg swelling       12/11/2018   [facility] EMERGENCY DEPARTMENT     Juan Donahue MD  12/11/18 1389

## 2018-12-11 NOTE — ED NOTES
Pt has been off work sense spring, has gained 20lb and is noticing edema bilaterally. Has had in the past but has increased this past week. She is a/o x 4. Denies calf pain, SOB, CP, fever/chills. She is a/o x 4.

## 2018-12-11 NOTE — ED AVS SNAPSHOT
[facility] Emergency Department  [address] [address]  Phone:  [phone]  Fax:  [phone]                                    Brittany Barajas   MRN: [id number]    Department:  [facility] Emergency Department   Date of Visit:  12/11/2018           After Visit Summary Signature Page    I have received my discharge instructions, and my questions have been answered. I have discussed any challenges I see with this plan with the nurse or doctor.    ..........................................................................................................................................  Patient/Patient Representative Signature      ..........................................................................................................................................  Patient Representative Print Name and Relationship to Patient    ..................................................               ................................................  Date                                   Time    ..........................................................................................................................................  Reviewed by Signature/Title    ...................................................              ..............................................  Date                                               Time          22EPIC Rev 08/18

## 2019-01-10 ENCOUNTER — HOSPITAL ENCOUNTER (OUTPATIENT)
Dept: SPEECH THERAPY | Facility: CLINIC | Age: 59
Setting detail: THERAPIES SERIES
End: 2019-01-10
Attending: OTOLARYNGOLOGY
Payer: COMMERCIAL

## 2019-01-10 PROCEDURE — 92507 TX SP LANG VOICE COMM INDIV: CPT | Mod: GN | Performed by: SPEECH-LANGUAGE PATHOLOGIST

## 2019-01-12 DIAGNOSIS — F41.1 GENERALIZED ANXIETY DISORDER: ICD-10-CM

## 2019-01-14 RX ORDER — PAROXETINE 20 MG/1
TABLET, FILM COATED ORAL
Qty: 90 TABLET | Refills: 3 | Status: SHIPPED | OUTPATIENT
Start: 2019-01-14 | End: 2020-03-16

## 2019-01-14 NOTE — ADDENDUM NOTE
Encounter addended by: Keila Castillo, SLP on: 1/14/2019 10:24 AM   Actions taken: Sign clinical note, Flowsheet accepted

## 2019-01-14 NOTE — PROGRESS NOTES
Voice Evaluation  12/03/18    General Information   Type Of Visit Initial   Start Of Care Date 12/03/18   Referring Physician Gino Hudson MD   Orders Evaluate And Treat   Medical Diagnosis Dysphonia;  Vocal fold cyst   Onset Of Illness/injury Or Date Of Surgery 12/03/18  (order date)   Precautions/Limitations no known precautions/limitations   Hearing WFL for exam   Avocational voice uses used to enjoy singing when voice was better.   Surgical/Medical history reviewed Yes   Pertinent History Of Current Problem Per ENT note:   She feels her voice has been off for the past 2 years consistently. She does have a chronic cough, and is currently on 3 inhalers for this. She has had the cough for many years as well, and she still had trouble with her voice even during a period of time when she could not afford to take the inhalers. She enjoys singing, but has not been able to do so due to her voice issue. She also reports coughing and choking frequently on solid and liquid foods. She had a recent swallow study that was negative for aspiration, but did have some peristaltic issues. She had a normal EGD recently as well   Prior Level Of Function Comment reports symptoms for the past 2 years.   Patient Role/employment History Disabled   Living environment [place]   Patient/family Goals To have a clear voice and return to singing if able.   General Information Comments Pt reports chronic dysphonia for the past 2 years.  She does cough frequenly during session.  She takes medication for reflux 2x/day (OTC) and has cut out caffeine.     Fall Risk Screen   Fall screen completed by SLP   Have you fallen 2 or more times in the past year? Yes   Have you fallen and had an injury in the past year? Yes   Is patient a fall risk? Yes   Fall screen comments Pt open to having a PT consult.  SLP to seek order.   Pain Assessment   Pain Reported Yes   Pain Location neck   Pain Scale 4/10   Comments from previous fall/concussion    Personal Rating / Voice Use Rating   Describe the amount of voice use required for your job Very little  (at home)   Describe the intensity of voice use required for your job Moderate   Describe the amount of typical non-work voice use Moderate   Describe the intensity of typical non-work voice use Moderate   Comments Pt reports minimal talking at home as she lives alone but does have phone use.   Voice Profile during conversation, 1 min monologue and paragraph reading   Voice Quality Hoarse   Voice quality severity rating continuum (1=Severe, 7=WNL) 4   Breath control WNL   Breath Control comments good abdominal breathing at rest but starts to push in longer sentences.   Breath control severity rating continuum (1=Severe, 7=WNL) 5   Voice Use / Effort Throat push   Voice Use / Effort comments Tension and lack of breath support in sentences and conversation.   Voice use / Effort severity rating continuum (1=Severe, 7=WNL) 4   Pitch / Frequency comments cleared in higher pitch   Pitch / Frequency severity rating continuum (1=Severe, 7=WNL) 6   Volume comments WFL   Neuromuscular Control WNL   Resonance WNL   Comments Pt presents with mildly hoars vocal quality.  Coughs throughout the session.  Quality improves with using less tension/effort with vocal tasks.  Mild diplophonia noted x1.   Unilateral Larynx Function   Alternating head turns to right and left Easier phonation to right   Comments pt states cyst on right vocal cord   General Therapy Interventions   Planned Therapy Interventions Voice   Voice Throat clearing elimination plan;Voice quality/pitch or volume tasks   Impressions and Recommendations   Communication Diagnosis Dysphonia   Prognosis  Good with intervention   Risks and Benefits of Treatment have been explained. Yes   Patient & /or Caregiver  in agreement with plan of care Yes   Patient Education therapy play, decreasing laryngal tension   Educational Assessment   Barriers to Learning  Cognitive  (written information as memory strategy)   Preferred Learning Style Reading   Voice Goals   Voice Goals 1;2;3;4   Voice Goal 1   Goal Identifier cough   Goal Description Pt will decrease cough by 50% in 4 weeks to decrease vocal abuse.   Target Date 01/03/19   Voice Goal 2   Goal Identifier vocal hygiene   Goal Description Pt will utilize a vocal hygiene program 75% of the time (decrease vocal effort and misuse)   Target Date 01/03/19   Voice Goal 3   Goal Identifier resonant voice   Goal Description Pt will use a resonant voice technique in syllables, words, and sentences to decrease laryngeal tension with 75% acc   Target Date 02/01/19   Voice Goal 4   Goal Identifier vocal quality   Goal Description Pt will  vocal quality a 7/10  (10=normal) in structured conversation tasks.   Target Date 02/01/19   Total Session Time   Total Evaluation Time 25   Therapy Certification   Certification date from 12/04/18   Certification date to 02/01/19   Medical Diagnosis Dysphonia;  Vocal fold cyst   Certification I certify the need for these services furnished under this plan of treatment and while under my care.  (Physician co-signature of this document indicates review and certification of the therapy plan).

## 2019-02-05 ENCOUNTER — HOSPITAL ENCOUNTER (OUTPATIENT)
Dept: SPEECH THERAPY | Facility: CLINIC | Age: 59
Setting detail: THERAPIES SERIES
End: 2019-02-05
Attending: OTOLARYNGOLOGY
Payer: COMMERCIAL

## 2019-02-05 ENCOUNTER — HOSPITAL ENCOUNTER (OUTPATIENT)
Dept: PHYSICAL THERAPY | Facility: CLINIC | Age: 59
Setting detail: THERAPIES SERIES
End: 2019-02-05
Attending: FAMILY MEDICINE
Payer: COMMERCIAL

## 2019-02-05 DIAGNOSIS — M54.2 CERVICALGIA: ICD-10-CM

## 2019-02-05 DIAGNOSIS — Z87.820 HISTORY OF CLOSED HEAD INJURY: ICD-10-CM

## 2019-02-05 PROCEDURE — 92507 TX SP LANG VOICE COMM INDIV: CPT | Mod: GN | Performed by: SPEECH-LANGUAGE PATHOLOGIST

## 2019-02-05 PROCEDURE — 97112 NEUROMUSCULAR REEDUCATION: CPT | Mod: GP | Performed by: PHYSICAL THERAPIST

## 2019-02-05 PROCEDURE — 97162 PT EVAL MOD COMPLEX 30 MIN: CPT | Mod: GP | Performed by: PHYSICAL THERAPIST

## 2019-02-05 NOTE — TELEPHONE ENCOUNTER
"12/3/18 office note  \"Patient Instructions:  MRI Brain and Cervical Spine. We will notify you of the results.   Flexeril 5-10mg as needed for pain/muscle tightness.  I recommend you have an updated eye exam.   Return to neurology in 3 months. \"    Med not under RN refill protocol.  Will forward to MD for refill consideration.  Macarena COREAS   Clinic RN  "

## 2019-02-05 NOTE — PROGRESS NOTES
Brittany Barajas   PHYSICAL THERAPY EVALUATION    02/05/19 1000   Quick Adds   Quick Adds Vestibular Eval   Type of Visit Initial OP PT Evaluation   General Information   Start of Care Date 02/05/19   Referring Physician DR. Iyer   Orders Evaluate and Treat as Indicated   Order Date 12/05/18   Medical Diagnosis falls frequently   Onset of illness/injury or Date of Surgery 12/05/18   Pertinent history of current vestibular problem (include personal factors and/or comorbidities that impact the POC)  Motion sickness   Pertinent history of current problem (include personal factors and/or comorbidities that impact the POC) Relates sx back to falling and hitting her head 12/26/16 (approx). Feels off balance walking around objects, turning left, maybe looking up makes her feel some vertigo.  Has fallen going down stairs, felt off balacne, over corrected and then fell forward.  Has felt these sx since she initially fell and hit her head  Also note L neck and arm pain which is newer  Hand tremors L>R.  L hand dominant.  Later on notes that she has some double vision looking to the R or from R eye.  Is going to make eye doctor appt   Pertinent Visual History  wears glasses   Prior level of function comment has not been working since last June 2018, used to work LPN, does light house work   Patient role/Employment history Unemployed   Patient/Family Goals Statement improve balance, decrease falls   Fall Risk Screen   Fall screen completed by PT   Have you fallen 2 or more times in the past year? Yes   Have you fallen and had an injury in the past year? Yes   Is patient a fall risk? No   Fall screen comments see balance tests, TUG not completed   Gait Special Tests   Gait Special Tests DYNAMIC GAIT INDEX   Gait Special Tests Dynamic Gait Index   Score out of 24 23   Comments rail on stairs   Balance   Balance Comments able to stand Ft EO an dFT EC with  horiz and vertical head turns x10 without LOB no dizziness    Balance Special Tests   Balance Special Tests Modified CTSIB Conditions   Balance Special Tests Modified CTSIB Conditions   Condition 1, seconds 30 Seconds   Condition 2, seconds 30 Seconds   Condition 4, seconds 30 Seconds   Condition 5, seconds 27 Seconds  (minor sway)   Cervicogenic Screen   Neck ROM WFL   Vertebral Artery Test Normal   Transverse Ligament Test Normal   Distraction Normal   Distraction Comments felt good, maybe reduced L UE sx   Oculomotor Exam   Smooth Pursuit Abnormal   Smooth Pursuit Comment saccadic motions, nystagmus or would jump ahead, reported feeling  a little dizzy   Saccades Normal   VOR Normal   VOR Comments min to no dizziness   Rapid Head Thrust Comments unable to relax head   Convergence Testing Normal   Convergence Testing Comments 6cm 5cm, 6cm eyes converge equally, R a little slower to come to center   Infrared Goggle Exam or Frenzel Lense Exam   Vestibular Suppressant in Last 24 Hours? No   Spontaneous Nystagmus Negative   Head Shake Horizontal Nystagmus Negative   Head Shake Horizontal Nystagmus comments no sx   [place]-Hallpike (right) Negative   [place]-Hallpike (Left) Negative   HSCC Supine Roll Test (Right) Negative   HSCC Supine Roll Test (Left) Negative   Dynamic Visual Acuity (DVA)   Static Acuity (LogMar) line 8   Horizontal Head Movement at 1 Hz (LogMar) line 5   DVA Comments increased dizziness   Planned Therapy Interventions   Planned Therapy Interventions balance training;neuromuscular re-education;strengthening   Clinical Impression   Criteria for Skilled Therapeutic Interventions Met yes, treatment indicated   PT Diagnosis oculomotor deficits provoking motion sensation, mild high level balance deficits   Functional limitations due to impairments going down stairs, turning head too quickly, visual perception   Clinical Presentation Evolving/Changing   Clinical Presentation Rationale head injury, cervical issues, LBP, anxiety/depression   Clinical Decision Making  (Complexity) Moderate complexity   Therapy Frequency 1 time/week   Predicted Duration of Therapy Intervention (days/wks) 6wks   Risk & Benefits of therapy have been explained Yes   Patient, Family & other staff in agreement with plan of care Yes   Education Assessment   Preferred Learning Style Listening   Barriers to Learning No barriers   GOALS   PT Eval Goals 1;2   Goal 1   Goal Description pt will report no falls in next 6wks   Target Date 03/19/19   Goal 2   Goal Description pt will within 1-2 lines on DVA to demonstrate improved oculomotor control thus reducing moving sensation sx and loss of balance   Target Date 03/19/19   Total Evaluation Time   PT Eval, Moderate Complexity Minutes (03029) 25   Kris Hoenk, PT #[id number]  [facility]

## 2019-02-05 NOTE — TELEPHONE ENCOUNTER
Reason for Call:  Medication or medication refill:    Do you use a [facility]?  Name of the pharmacy and phone number for the current request:  [facility] [phone]    Name of the medication requested: Flexeril    Other request: Pt has appt scheduled for 03/04/2019 - Needs refill until then    Can we leave a detailed message on this number? Not Applicable    Phone number patient can be reached at: Home number on file [phone] (home)    Best Time: NA    Call taken on 2/5/2019 at 11:23 AM by Denise Behrendt

## 2019-02-06 RX ORDER — CYCLOBENZAPRINE HCL 10 MG
5-10 TABLET ORAL 3 TIMES DAILY PRN
Qty: 30 TABLET | Refills: 1 | Status: SHIPPED | OUTPATIENT
Start: 2019-02-06 | End: 2019-06-14

## 2019-03-06 NOTE — PROGRESS NOTES
Discharge Note -Physical Therapy    NAME:  Brittany Barajas  MRN:   [id number]    S:    Pt did not follow up for therapy as recommended.    O:  Objective information is not available as pt has not returned for therapy.  Last objective information or progress note will serve as final entry.    A:   Pt did not return for further treatment.    Status of goals is unknown due to lack of followup by patient.    P:  Discharge from PT this date.      Kris Hoenk, PT #[id number]  [facility]

## 2019-03-06 NOTE — ADDENDUM NOTE
Encounter addended by: Hoenk, Kris, PT on: 3/6/2019 1:23 PM   Actions taken: Sign clinical note, Episode resolved

## 2019-03-08 DIAGNOSIS — G47.00 INSOMNIA, UNSPECIFIED TYPE: ICD-10-CM

## 2019-03-08 DIAGNOSIS — F41.1 GENERALIZED ANXIETY DISORDER: ICD-10-CM

## 2019-03-08 RX ORDER — AMITRIPTYLINE HYDROCHLORIDE 50 MG/1
TABLET ORAL
Qty: 90 TABLET | Refills: 3 | Status: SHIPPED | OUTPATIENT
Start: 2019-03-08 | End: 2020-04-01

## 2019-03-08 NOTE — TELEPHONE ENCOUNTER
"Requested Prescriptions   Pending Prescriptions Disp Refills     amitriptyline (ELAVIL) 50 MG tablet [Pharmacy Med Name: AMITRIPTYLINE HCL 50MG TABS] 90 tablet 3     Sig: TAKE ONE TABLET BY MOUTH AT BEDTIME    Tricyclic Agents ( Annual appt and no PHQ9) Passed - 3/8/2019  3:20 PM       Passed - Blood Pressure under 140/90 in past 12 mos    BP Readings from Last 3 Encounters:   12/11/18 132/68   12/03/18 134/86   12/03/18 137/81                Passed - Recent (12 mo) or future (30 days) visit within authorizing provider's specialty    Patient had office visit in the last 12 months or has a visit in the next 30 days with authorizing provider or within the authorizing provider's specialty.  See \"Patient Info\" tab in inbasket, or \"Choose Columns\" in Meds & Orders section of the refill encounter.             Passed - Medication is active on med list       Passed - Patient is age 18 or older       Passed - Patient is not pregnant       Passed - No positive pregnancy test on record in past 12 mos      Last Written Prescription Date:  3/8/18  Last Fill Quantity: 90,  # refills: 3   Last office visit: 11/14/2018 with prescribing provider:  Jaylon   Future Office Visit:        Routing refill request to provider for review/approval because:  Patient reported 12/11/18 taking 75 mg at Bedtime.         "

## 2019-04-17 ENCOUNTER — TRANSFERRED RECORDS (OUTPATIENT)
Dept: HEALTH INFORMATION MANAGEMENT | Facility: CLINIC | Age: 59
End: 2019-04-17

## 2019-04-26 ENCOUNTER — TELEPHONE (OUTPATIENT)
Dept: NEUROLOGY | Facility: CLINIC | Age: 59
End: 2019-04-26

## 2019-04-26 DIAGNOSIS — G56.02 CARPAL TUNNEL SYNDROME OF LEFT WRIST: Primary | ICD-10-CM

## 2019-04-26 NOTE — TELEPHONE ENCOUNTER
Pt calling to get the EMG results that the MD ordered.    Please call-     Zayra Dowell  Clinic Station

## 2019-04-29 NOTE — TELEPHONE ENCOUNTER
Called [facility] and requested recent EMG results, was informed that they would fax results over. Fax number provided.     Rosalind MINER MA

## 2019-04-29 NOTE — TELEPHONE ENCOUNTER
[facility]     Denise Behrendt  Specialty CSS

## 2019-04-29 NOTE — TELEPHONE ENCOUNTER
Called patient and left VM, informed that we have not received her EMG results. Informed that I did call [facility] and request her results be faxed to us and will call her once Dr David has a chance to review them.     Advised to call with any questions or concerns.     Rosalind MINER MA

## 2019-04-30 NOTE — TELEPHONE ENCOUNTER
Electromyogram shows mild carpal tunnel syndrome at the Left wrist. For this, I recommend she wear a wrist splint every night and see a hand specialist. Orders are in for both.    Thank you,  JUAN MANUEL

## 2019-04-30 NOTE — TELEPHONE ENCOUNTER
MsJeison Karl called back to let me know she doesn't have a splint after all.  Order faxed to our [facility].

## 2019-04-30 NOTE — TELEPHONE ENCOUNTER
Ms. Barajas was notified by phone.  She said she does have a high quality splint that she'll wear.  I advised her our Ortho  group will call her to help her schedule with a hand specialist.    Patient voiced understanding and had no further questions.

## 2019-05-19 DIAGNOSIS — F33.1 MODERATE RECURRENT MAJOR DEPRESSION (H): ICD-10-CM

## 2019-05-20 RX ORDER — PAROXETINE 10 MG/1
TABLET, FILM COATED ORAL
Qty: 90 TABLET | Refills: 2 | Status: SHIPPED | OUTPATIENT
Start: 2019-05-20 | End: 2020-06-25

## 2019-05-20 NOTE — TELEPHONE ENCOUNTER
"Requested Prescriptions   Pending Prescriptions Disp Refills     PARoxetine (PAXIL) 10 MG tablet [Pharmacy Med Name: PAROXETINE HCL 10MG TABS] 90 tablet 3     Sig: TAKE ONE TABLET BY MOUTH AT BEDTIME   Last Written Prescription Date:  1/14/19  Last Fill Quantity: 90 tab,  # refills: 3   Last office visit: 11/14/2018 with prescribing provider:  Juan Iyer     Future Office Visit:        SSRIs Protocol Failed - 5/19/2019 11:12 AM        Failed - PHQ-9 score less than 5 in past 6 months     Please review last PHQ-9 score.           Failed - Recent (6 mo) or future (30 days) visit within the authorizing provider's specialty     Patient had office visit in the last 6 months or has a visit in the next 30 days with authorizing provider or within the authorizing provider's specialty.  See \"Patient Info\" tab in inbasket, or \"Choose Columns\" in Meds & Orders section of the refill encounter.            Passed - Medication is active on med list        Passed - Patient is age 18 or older        Passed - No active pregnancy on record        Passed - No positive pregnancy test in last 12 months          "

## 2019-05-21 ENCOUNTER — TELEPHONE (OUTPATIENT)
Dept: FAMILY MEDICINE | Facility: CLINIC | Age: 59
End: 2019-05-21

## 2019-05-21 NOTE — TELEPHONE ENCOUNTER
Medical opinion form [place] [employer] form started and routed to Dr. Iyer for review.  Patient last OV was 11/14/18

## 2019-05-22 ENCOUNTER — MYC MEDICAL ADVICE (OUTPATIENT)
Dept: FAMILY MEDICINE | Facility: CLINIC | Age: 59
End: 2019-05-22

## 2019-05-31 ENCOUNTER — OFFICE VISIT (OUTPATIENT)
Dept: FAMILY MEDICINE | Facility: CLINIC | Age: 59
End: 2019-05-31
Payer: COMMERCIAL

## 2019-05-31 VITALS
DIASTOLIC BLOOD PRESSURE: 64 MMHG | HEART RATE: 88 BPM | SYSTOLIC BLOOD PRESSURE: 114 MMHG | HEIGHT: 61 IN | WEIGHT: 155.25 LBS | RESPIRATION RATE: 16 BRPM | BODY MASS INDEX: 29.31 KG/M2

## 2019-05-31 DIAGNOSIS — K21.00 GASTROESOPHAGEAL REFLUX DISEASE WITH ESOPHAGITIS: ICD-10-CM

## 2019-05-31 DIAGNOSIS — F32.0 MILD MAJOR DEPRESSION (H): Primary | ICD-10-CM

## 2019-05-31 DIAGNOSIS — F41.1 ANXIETY STATE: ICD-10-CM

## 2019-05-31 DIAGNOSIS — G40.909 SEIZURE DISORDER (H): ICD-10-CM

## 2019-05-31 DIAGNOSIS — M51.369 DDD (DEGENERATIVE DISC DISEASE), LUMBAR: ICD-10-CM

## 2019-05-31 PROCEDURE — 99214 OFFICE O/P EST MOD 30 MIN: CPT | Performed by: FAMILY MEDICINE

## 2019-05-31 ASSESSMENT — PAIN SCALES - GENERAL: PAINLEVEL: MILD PAIN (3)

## 2019-05-31 ASSESSMENT — MIFFLIN-ST. JEOR: SCORE: 1221.59

## 2019-05-31 NOTE — PROGRESS NOTES
Subjective     Brittany Barajas is a 58 year old female who presents to clinic today for the following health issues:    HPI     - Disability forms to complete.    She has a history of seizures and in the last 8 weeks has noted times for up to 10 seconds with decreased ability to interact, although she is awake and aware.   This was witnessed by her sister. It started with childhood encephalitis.       Current Outpatient Medications:      amitriptyline (ELAVIL) 50 MG tablet, TAKE ONE TABLET BY MOUTH AT BEDTIME, Disp: 90 tablet, Rfl: 3     aspirin 81 MG tablet, Take 1 tablet by mouth daily., Disp: , Rfl:      cyclobenzaprine (FLEXERIL) 10 MG tablet, Take 0.5-1 tablets (5-10 mg) by mouth 3 times daily as needed for muscle spasms, Disp: 30 tablet, Rfl: 1     fluticasone (ARNUITY ELLIPTA) 100 MCG/ACT inhaler, Inhale 1 puff into the lungs daily, Disp: 1 each, Rfl: 11     Multiple Vitamin (MULTIVITAMIN OR), Take 1 tablet by mouth daily., Disp: , Rfl:      PARoxetine (PAXIL) 10 MG tablet, TAKE ONE TABLET BY MOUTH AT BEDTIME, Disp: 90 tablet, Rfl: 2     PARoxetine (PAXIL) 20 MG tablet, TAKE ONE TABLET BY MOUTH EVERY NIGHT AT BEDTIME, Disp: 90 tablet, Rfl: 3     albuterol (PROAIR HFA/PROVENTIL HFA/VENTOLIN HFA) 108 (90 Base) MCG/ACT inhaler, Inhale 2 puffs into the lungs every 6 hours as needed for shortness of breath / dyspnea, Disp: 2 Inhaler, Rfl: 6     ipratropium - albuterol 0.5 mg/2.5 mg/3 mL (DUONEB) 0.5-2.5 (3) MG/3ML neb solution, Take 1 vial (3 mLs) by nebulization every 6 hours as needed for shortness of breath / dyspnea, Disp: 1 Box, Rfl: 2     order for DME, Equipment being ordered: Left wrist splint for CTS, Disp: 1 Units, Rfl: 0    Patient Active Problem List   Diagnosis     Mitral valve disorder     Allergic rhinitis     Convulsions (H)     Esophageal reflux     Mild major depression (H)     Plantar fascial fibromatosis     Other affections of shoulder region, not elsewhere classified     Neoplasm of  "digestive system     Anxiety state     Breast cancer (H)     CARDIOVASCULAR SCREENING; LDL GOAL LESS THAN 160     Transaminase or LDH elevation     H/O: hysterectomy     Colon polyp     Drug reaction     Mild persistent asthma     Urgency incontinence     Advanced directives, counseling/discussion     DDD (degenerative disc disease), lumbar     DDD (degenerative disc disease), cervical     Dysphonia     Other dysphagia     Traumatic brain injury, without loss of consciousness, subsequent encounter     Fatigue, unspecified type       Blood pressure 114/64, pulse 88, resp. rate 16, height 1.549 m (5' 1\"), weight 70.4 kg (155 lb 4 oz), last menstrual period 10/17/2008, not currently breastfeeding.    Exam:  GENERAL APPEARANCE: healthy, alert and no distress  EYES: EOMI,  PERRL  NECK: no adenopathy, no asymmetry, masses, or scars and thyroid normal to palpation  RESP: lungs clear to auscultation - no rales, rhonchi or wheezes  CV: regular rates and rhythm, normal S1 S2, no S3 or S4 and no murmur, click or rub -  NEURO: Normal strength and tone, sensory exam grossly normal, mentation intact and speech normal  PSYCH: mentation appears normal and affect normal/bright      (F32.0) Mild major depression (H)  (primary encounter diagnosis)  Comment:   Plan: we discussed the issues and she is stable but unable to work from the multiple, chronic medical issues.   See the form for disability from today.     (F41.1) Anxiety state  Comment:   Plan: see above.     (K21.0) Gastroesophageal reflux disease with esophagitis  Comment:   Plan: Instructions given on diagnoses and the goal for the symptoms is to be zero. Use the med and dietary and mechanical instructions.     (M51.36) DDD (degenerative disc disease), lumbar  Comment:   Plan: you are seeing our Neurologist. The pain from standing more than a few minutes in the lower back and neck, severely restricts her ability to work and stand and lift and bend.     (G40.909) Seizure " disorder (H)  Comment:   Plan: [facility] EEG ROUTINE, NEUROLOGY ADULT REFERRAL        For the possible seizures, the EEG is ordered and the Neurology consult is done. Do the EEG first.   We discussed that you should not drive, or go on ladders, or swim, and the other safety issues.     Juan Iyer

## 2019-05-31 NOTE — PATIENT INSTRUCTIONS
(F32.0) Mild major depression (H)  (primary encounter diagnosis)  Comment:   Plan: we discussed the issues and she is stable but unable to work from the multiple, chronic medical issues.   See the form for disability from today.     (F41.1) Anxiety state  Comment:   Plan: see above.     (K21.0) Gastroesophageal reflux disease with esophagitis  Comment:   Plan: Instructions given on diagnoses and the goal for the symptoms is to be zero. Use the med and dietary and mechanical instructions.     (M51.36) DDD (degenerative disc disease), lumbar  Comment:   Plan: you are seeing our Neurologist. The pain from standing more than a few minutes in the lower back and neck, severely restricts her ability to work and stand and lift and bend.     (G40.909) Seizure disorder (H)  Comment:   Plan: [facility] EEG ROUTINE, NEUROLOGY ADULT REFERRAL        For the possible seizures, the EEG is ordered and the Neurology consult is done. Do the EEG first.   We discussed that you should not drive, or go on ladders, or swim, and the other safety issues.

## 2019-06-03 ENCOUNTER — TELEPHONE (OUTPATIENT)
Dept: FAMILY MEDICINE | Facility: CLINIC | Age: 59
End: 2019-06-03

## 2019-06-03 NOTE — TELEPHONE ENCOUNTER
Patient called requesting a letter so she is able to keep her cat as an emotional support animal.     Once letter completed please mail to home address.     She reports she was able to schedule her EEG scheduled for Thursday 06/06 and neurology appt scheduled for July 22.     Maribel PIMENTEL  Station

## 2019-06-03 NOTE — TELEPHONE ENCOUNTER
"Dr Iyer , please see her request below for \"cat as emotional support animal\",   Is this something you are able to do for her? I will start a communication/ letter for you if you want to edit/ complete and sign.   Thanks,   Leonie Alva RNC    "

## 2019-06-03 NOTE — LETTER
6/3/2019        RE: Brittany Sandoval Karl  [address]  [facility] [address]    To Whom it May Concern,     Brittany is a patient of mine. She suffers from multiple chronic medical issues and would benefit from having a cat for emotional support.                 Sincerely,                    Juan Iyer MD

## 2019-06-06 ENCOUNTER — TELEPHONE (OUTPATIENT)
Dept: NEUROLOGY | Facility: CLINIC | Age: 59
End: 2019-06-06

## 2019-06-06 ENCOUNTER — HOSPITAL ENCOUNTER (OUTPATIENT)
Dept: NEUROLOGY | Facility: CLINIC | Age: 59
Discharge: HOME | End: 2019-06-06
Attending: FAMILY MEDICINE | Admitting: FAMILY MEDICINE
Payer: COMMERCIAL

## 2019-06-06 DIAGNOSIS — G40.909 SEIZURE DISORDER (H): ICD-10-CM

## 2019-06-06 PROCEDURE — 95816 EEG AWAKE AND DROWSY: CPT | Mod: 26 | Performed by: PSYCHIATRY & NEUROLOGY

## 2019-06-06 PROCEDURE — 95816 EEG AWAKE AND DROWSY: CPT

## 2019-06-06 NOTE — PROCEDURES
OUTPATIENT ROUTINE EEG REPORT.    Patient Name:  Brittany Barajas  MRN:     [id number]  : `    1960    EEG#: [id number].        DATE OF RECORDIN2019.       DURATION OF RECORDIN minutes and 1 seconds.        CLINICAL SUMMARY: Brittany Barajas is 58 year old female patient with past medical history of seizure disorder related to encephalitis, breast cancer, anxiety, and traumatic brain injury, who was recently seen in primary care clinic for possible recurrence of her seizures. This study was ordered to evaluate for seizures and epileptiform abnormalities. On the day of the study, the patient is reported to take amitriptyline and Paxil amongst other listed in medical record medications.    TECHNICAL SUMMARY:  This outpatient routine EEG monitoring procedure is performed with 19 scalp electrodes in 10-20 system placements, and additional scalp, precordial, and other surface electrodes used for electrical referencing and artifact detection. Video monitoring is not utilized.          INTERICTAL EEG ACTIVITIES: During maximal wakefulness, background consists of symmetric well modulated moderate amplitude 9 Hz posterior dominant rhythm, that attenuates with eye opening. It is intermixed with diffuse rare theta slowing. There is no focal slowing during waking seen. Drowsiness is manifested by predominance of centrally maximum semirhythmic theta slowing and dropout of posterior dominant rhythm.  No further sleep progression is seen. Intermittent photic stimulation is performed and does not induce any epileptiform abnormalities. Hyperventilation is not performed.        INTERICTAL EPILEPTIFORM DISCHARGES: None.        ICTAL RECORDINGS: No electrographic or clinical seizures and no paroxysmal behavioral events are recorded during this study.        IMPRESSION: This outpatient routine EEG study is abnormal due to rare diffuse theta slowing consistent with mild encephalopathy of non-specific etiology.   No interictal epileptiform discharges, no electrographic or clinical seizures, and no paroxysmal behavioral events are seen during the study.  Clinical correlation is recommended.  Abdelrahman Ordonez MD.

## 2019-06-06 NOTE — TELEPHONE ENCOUNTER
I called [facility] and read the note from Dr David.  EMG reveals mild carpel tunnel syndrome. Dr Davdi recommends that Laverne wears a splint at bedtime and sees a hand specialist. Flaquita ESPARZA Rn

## 2019-06-06 NOTE — TELEPHONE ENCOUNTER
Needs to clarify if provider wanted pt to be seen by a hand surgeon at [facility] or a hand therapist (OT).     If there is an OT order, please fax to @:  [phone]    Pt is scheduled to see a hand therapist (OT) on 06/18 - so needs order before then.     Please call [facility] @: [phone]    Denise Behrendt  Specialty CSS

## 2019-06-13 DIAGNOSIS — Z87.820 HISTORY OF CLOSED HEAD INJURY: ICD-10-CM

## 2019-06-13 DIAGNOSIS — M54.2 CERVICALGIA: ICD-10-CM

## 2019-06-13 NOTE — TELEPHONE ENCOUNTER
Reason for Call:  Medication or medication refill:    Do you use a [facility]?  Name of the pharmacy and phone number for the current request:  [facility] [phone]    Name of the medication requested: cyclobenzaprine    Other request: has an appt July 9th w/ hand specialist and an appt w/ Dr. David on July 22nd    LOV:  12/3/18  LAST REFILL:  ?    Can we leave a detailed message on this number? YES    Phone number patient can be reached at: Home number on file [phone] (home)    Best Time: any     Call taken on 6/13/2019 at 1:46 PM by Mi Logan

## 2019-06-13 NOTE — TELEPHONE ENCOUNTER
Medication not on nurse protocol. Sent to provider for review.  Albina Crane RN BSN PHN    LOV 12/03/19- Pt has follow up 7/22/19 scheduled    Patient Instructions:  MRI Brain and Cervical Spine. We will notify you of the results.   Flexeril 5-10mg as needed for pain/muscle tightness.  I recommend you have an updated eye exam.   Return to neurology in 3 months.

## 2019-06-14 RX ORDER — CYCLOBENZAPRINE HCL 10 MG
5-10 TABLET ORAL 3 TIMES DAILY PRN
Qty: 30 TABLET | Refills: 1 | Status: SHIPPED | OUTPATIENT
Start: 2019-06-14 | End: 2019-08-28

## 2019-07-09 ENCOUNTER — TRANSFERRED RECORDS (OUTPATIENT)
Dept: HEALTH INFORMATION MANAGEMENT | Facility: CLINIC | Age: 59
End: 2019-07-09

## 2019-07-23 ENCOUNTER — OFFICE VISIT (OUTPATIENT)
Dept: NEUROLOGY | Facility: CLINIC | Age: 59
End: 2019-07-23
Payer: COMMERCIAL

## 2019-07-23 VITALS
BODY MASS INDEX: 30 KG/M2 | TEMPERATURE: 97.4 F | WEIGHT: 158.8 LBS | SYSTOLIC BLOOD PRESSURE: 146 MMHG | HEART RATE: 95 BPM | DIASTOLIC BLOOD PRESSURE: 94 MMHG | RESPIRATION RATE: 10 BRPM

## 2019-07-23 DIAGNOSIS — Z87.898 HISTORY OF SEIZURES: ICD-10-CM

## 2019-07-23 DIAGNOSIS — G56.02 CARPAL TUNNEL SYNDROME OF LEFT WRIST: ICD-10-CM

## 2019-07-23 DIAGNOSIS — R41.3 MEMORY PROBLEM: Primary | ICD-10-CM

## 2019-07-23 PROCEDURE — 99214 OFFICE O/P EST MOD 30 MIN: CPT | Performed by: PSYCHIATRY & NEUROLOGY

## 2019-07-23 NOTE — PROGRESS NOTES
ESTABLISHED PATIENT NEUROLOGY NOTE    DATE OF VISIT: 7/23/2019  MRN: [id number]  PATIENT NAME: Brittany Barajas  YOB: 1960    Chief Complaint   Patient presents with     RECHECK     TBI     SUBJECTIVE:                                                      HISTORY OF PRESENT ILLNESS:  Brittany is here for follow up regarding history of head injury. I met the patient late last year. Then she did not show up for follow-up scheduled in March. She presented with several diffuse complaints in the setting of several head injuries in the past (2016). Her main concern was neck pain. I ordered brain and cervical spine imaging and prescribed Flexeril for neck tension. Brain MRI showed a venous angioma, unremarkable and no e/o injury. There were some mild degenerative findings on the cervical imaging, but no severe stenosis to explain her pain. She saw Dr. Hudson for dysphonia/chronic cough. 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 I referred Brittany on for NCS/EMG for her Left arm numbness. The study showed mild CTS. She saw Dr. Hutson as well for this and he noted inconsistencies in the symptoms/presentation. He ordered an xray. He recommended ultimately trial of localized injection.    An EEG was completed last month, and reported as normal for possible seizure recurrence. She has history of remote seizures, and reported more recently some episodes of lost time, staring spells.     Additional history as previously documented by me:  The seizures started with a Whooping cough/fever. She was on medications for a while. She remembers Dilantin. She describes an aura and Left arm motion followed by convulsions.     The patient tells me that recently she has had some trouble with losing time. She says it is a feeling that she is frozen and unable to respond for a few seconds. She is hoping this is stress-related. She had about 5 episodes over 8 weeks and then none now for 6 weeks. She says  that one episode did occur while in the car. She was aware of her surroundings. She understands people talk to her. She denies convulsions, or LOC. No incontinence or injury. She says that her previous seizures were different and both have been witnessed by her older sister. Her previous seizures dissipated almost 40 years ago.    She does wake-up with her head feeling foggy at times. She thinks her memory has slowed down. She says short term has always been poor. She endorses good days/bad days. Anxiety makes things worse but lately the mood is good. She says she did have some cognitive testing done when she was applying for disability recently. She does not have the results.      She says the numbness in the Left hand is better since the cortisone injection. She does still wear the wrist splint at night. She also remarks on how the Flexeril continues to be helpful for her neck spasms. She did have a recent eye exam.     CURRENT MEDICATIONS:     Current Outpatient Medications on File Prior to Visit:  albuterol (PROAIR HFA/PROVENTIL HFA/VENTOLIN HFA) 108 (90 Base) MCG/ACT inhaler Inhale 2 puffs into the lungs every 6 hours as needed for shortness of breath / dyspnea   amitriptyline (ELAVIL) 50 MG tablet TAKE ONE TABLET BY MOUTH AT BEDTIME   cyclobenzaprine (FLEXERIL) 10 MG tablet Take 0.5-1 tablets (5-10 mg) by mouth 3 times daily as needed for muscle spasms   fluticasone (ARNUITY ELLIPTA) 100 MCG/ACT inhaler Inhale 1 puff into the lungs daily   ipratropium - albuterol 0.5 mg/2.5 mg/3 mL (DUONEB) 0.5-2.5 (3) MG/3ML neb solution Take 1 vial (3 mLs) by nebulization every 6 hours as needed for shortness of breath / dyspnea   Multiple Vitamin (MULTIVITAMIN OR) Take 1 tablet by mouth daily.   PARoxetine (PAXIL) 10 MG tablet TAKE ONE TABLET BY MOUTH AT BEDTIME   PARoxetine (PAXIL) 20 MG tablet TAKE ONE TABLET BY MOUTH EVERY NIGHT AT BEDTIME   aspirin 81 MG tablet Take 1 tablet by mouth daily.   order for DME Equipment  being ordered: Left wrist splint for CTS     No current facility-administered medications on file prior to visit.     RECENT DIAGNOSTIC STUDIES:   Results for orders placed or performed during the hospital encounter of 19   HC EEG ROUTINE    Narrative    Abdelrahman Ordonez MD     2019  4:05 PM  OUTPATIENT ROUTINE EEG REPORT.    Patient Name:  Brittany Barajas  MRN:     [id number]  : `    1960    EEG#: [id number].        DATE OF RECORDIN2019.       DURATION OF RECORDIN minutes and 1 seconds.        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 This study was ordered to evaluate   for seizures and epileptiform abnormalities. On the day of the   study, the patient is reported to take amitriptyline and Paxil   amongst other listed in medical record medications.    TECHNICAL SUMMARY:  This outpatient routine EEG monitoring   procedure is performed with 19 scalp electrodes in 10-20 system   placements, and additional scalp, precordial, and other surface   electrodes used for electrical referencing and artifact   detection. Video monitoring is not utilized.          INTERICTAL EEG ACTIVITIES: During maximal wakefulness, background   consists of symmetric well modulated moderate amplitude 9 Hz   posterior dominant rhythm, that attenuates with eye opening. It   is intermixed with diffuse rare theta slowing. There is no focal   slowing during waking seen. Drowsiness is manifested by   predominance of centrally maximum semirhythmic theta slowing and   dropout of posterior dominant rhythm.  No further sleep   progression is seen. Intermittent photic stimulation is performed   and does not induce any epileptiform abnormalities.   Hyperventilation  is not performed.        INTERICTAL EPILEPTIFORM DISCHARGES: None.        ICTAL RECORDINGS: No electrographic or clinical seizures and no   paroxysmal behavioral events are recorded during this study.        IMPRESSION: This outpatient routine EEG study is abnormal due to   rare diffuse theta slowing consistent with mild encephalopathy of   non-specific etiology.  No interictal epileptiform discharges, no   electrographic or clinical seizures, and no paroxysmal behavioral   events are seen during the study.  Clinical correlation is recommended.  Abdelrahman Ordonez MD.       REVIEW OF SYSTEMS:                                                      10-point review of systems is negative except as mentioned above in HPI.     EXAM:                                                      Physical Exam:   Vitals: BP (!) 146/94 (BP Location: Right arm, Patient Position: Sitting, Cuff Size: Adult Regular)   Pulse 95   Temp 97.4  F (36.3  C) (Tympanic)   Resp 10   Wt 72 kg (158 lb 12.8 oz)   LMP 10/17/2008   BMI 30.00 kg/m    BMI= Body mass index is 30 kg/m .  GENERAL: NAD.   CV: RRR. S1, S2.   NECK: No bruits.  Neurologic:  MENTAL STATUS: Alert, attentive. Speech is fluent.  Normal comprehension. Normal concentration. Adequate fund of knowledge. Mini-Co/5. Writing is small.   CRANIAL NERVES: Discs technically difficult to visualize due to eye movement. Visual fields intact to confrontation. Pupils equally, round and reactive to light. Facial sensation and movement normal. EOM full. Hearing intact to conversation. Trapezius strength intact. Palate moves symmetrically. Tongue midline.  MOTOR: 5/5 in proximal and distal muscle groups of upper and lower extremities. Tone and bulk normal.   DTRs: Brisk throughout. Babinski down-going.   SENSATION: Slightly decreased pinprick in ulnar distribution of the Left hand. Otherwise normal light touch and pinprick. Intact proprioception. Vibration: Normal at both ankles.    COORDINATION: Normal finger nose finger. Finger tapping normal.   STATION AND GAIT: Romberg Negative. Good postural reflexes. Tandem normal,  No hand tremor on today's exam.  Left hand-dominant.     ASSESSMENT and PLAN:                                                      Assessment and Plan:    ICD-10-CM    1. Memory problem R41.3 NEUROPSYCHOLOGY REFERRAL   2. History of seizures Z87.898    3. Carpal tunnel syndrome of left wrist G56.02         Ms. Barajas is a 60 yo woman here for follow-up. We mainly discussed her new concerns regarding mental fogginess and memory today. She has also had a few spells of staring, with halted speech, no LOC. Not typical of her previous seizures and no spells in the past several weeks. I explained to the patient that it is difficult to say whether these were seizures unless we captured one on electroencephalogram. We reviewed the electroencephalogram and previous brain MRI results. I think it would be safe to monitor for spell recurrence and then do additional work-up if they return. For the cognitive complaints, I have referred [facility] for Neuropsych testing.     The Left hand is feeling improved since the cortisone injection by Dr. Calvert, so she will continue to wear the wrist splint at night for now. The neck spasms are improved with as -needed Flexeril, so we will continue this as well.     Patient Instructions:  Referral for Neuropsych (cognitive) testing.   Continue the Flexeril for the neck spasms.   Continue to wear the wrist splint for the carpal tunnel syndrome.   Return to Neurology after the cognitive testing is completed.       Total Time: 25 minutes were spent with the patient. More than 50% of the time spent on counseling (as described above in Assessment and Plan/Instructions) /coordinating the care.    Ruthie David MD  Neurology

## 2019-07-23 NOTE — PATIENT INSTRUCTIONS
Plan:    Referral for Neuropsych (cognitive) testing.   Continue the Flexeril for the neck spasms.   Continue to wear the wrist splint for the carpal tunnel syndrome.   Return to Neurology after the cognitive testing is completed.

## 2019-07-23 NOTE — LETTER
7/23/2019         RE: Brittany Barajas  [address]  [place] [address]        Dear Colleague,    Thank you for referring your patient, Brittany Barajas, to the [facility]. Please see a copy of my visit note below.    ESTABLISHED PATIENT NEUROLOGY NOTE    DATE OF VISIT: 7/23/2019  MRN: [id number]  PATIENT NAME: Brittany Barajas  YOB: 1960    Chief Complaint   Patient presents with     RECHECK     TBI     SUBJECTIVE:                                                      HISTORY OF PRESENT ILLNESS:  Brittany is here for follow up regarding history of head injury. I met the patient late last year. Then she did not show up for follow-up scheduled in March. She presented with several diffuse complaints in the setting of several head injuries in the past (2016). Her main concern was neck pain. I ordered brain and cervical spine imaging and prescribed Flexeril for neck tension. Brain MRI showed a venous angioma, unremarkable and no e/o injury. There were some mild degenerative findings on the cervical imaging, but no severe stenosis to explain her pain. She saw Dr. Hudson for dysphonia/chronic cough. 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 I referred Brittany on for NCS/EMG for her Left arm numbness. The study showed mild CTS. She saw Dr. Hutson as well for this and he noted inconsistencies in the symptoms/presentation. He ordered an xray. He recommended ultimately trial of localized injection.    An EEG was completed last month, and reported as normal for possible seizure recurrence. She has history of remote seizures, and reported more recently some episodes of lost time, staring spells.     Additional history as previously documented by me:  The seizures started with a Whooping cough/fever. She was on medications for a while. She remembers Dilantin. She describes an aura and Left arm motion followed by convulsions.     The patient tells me  that recently she has had some trouble with losing time. She says it is a feeling that she is frozen and unable to respond for a few seconds. She is hoping this is stress-related. She had about 5 episodes over 8 weeks and then none now for 6 weeks. She says that one episode did occur while in the car. She was aware of her surroundings. She understands people talk to her. She denies convulsions, or LOC. No incontinence or injury. She says that her previous seizures were different and both have been witnessed by her older sister. Her previous seizures dissipated almost 40 years ago.    She does wake-up with her head feeling foggy at times. She thinks her memory has slowed down. She says short term has always been poor. She endorses good days/bad days. Anxiety makes things worse but lately the mood is good. She says she did have some cognitive testing done when she was applying for disability recently. She does not have the results.      She says the numbness in the Left hand is better since the cortisone injection. She does still wear the wrist splint at night. She also remarks on how the Flexeril continues to be helpful for her neck spasms. She did have a recent eye exam.     CURRENT MEDICATIONS:     Current Outpatient Medications on File Prior to Visit:  albuterol (PROAIR HFA/PROVENTIL HFA/VENTOLIN HFA) 108 (90 Base) MCG/ACT inhaler Inhale 2 puffs into the lungs every 6 hours as needed for shortness of breath / dyspnea   amitriptyline (ELAVIL) 50 MG tablet TAKE ONE TABLET BY MOUTH AT BEDTIME   cyclobenzaprine (FLEXERIL) 10 MG tablet Take 0.5-1 tablets (5-10 mg) by mouth 3 times daily as needed for muscle spasms   fluticasone (ARNUITY ELLIPTA) 100 MCG/ACT inhaler Inhale 1 puff into the lungs daily   ipratropium - albuterol 0.5 mg/2.5 mg/3 mL (DUONEB) 0.5-2.5 (3) MG/3ML neb solution Take 1 vial (3 mLs) by nebulization every 6 hours as needed for shortness of breath / dyspnea   Multiple Vitamin (MULTIVITAMIN OR) Take 1  tablet by mouth daily.   PARoxetine (PAXIL) 10 MG tablet TAKE ONE TABLET BY MOUTH AT BEDTIME   PARoxetine (PAXIL) 20 MG tablet TAKE ONE TABLET BY MOUTH EVERY NIGHT AT BEDTIME   aspirin 81 MG tablet Take 1 tablet by mouth daily.   order for DME Equipment being ordered: Left wrist splint for CTS     No current facility-administered medications on file prior to visit.     RECENT DIAGNOSTIC STUDIES:   Results for orders placed or performed during the hospital encounter of 19   HC EEG ROUTINE    Narrative    Abdelrahman Ordonez MD     2019  4:05 PM  OUTPATIENT ROUTINE EEG REPORT.    Patient Name:  Brittany Barajas  MRN:     [id number]  : `    1960    EEG#: [id number].        DATE OF RECORDIN2019.       DURATION OF RECORDIN minutes and 1 seconds.        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 This study was ordered to evaluate   for seizures and epileptiform abnormalities. On the day of the   study, the patient is reported to take amitriptyline and Paxil   amongst other listed in medical record medications.    TECHNICAL SUMMARY:  This outpatient routine EEG monitoring   procedure is performed with 19 scalp electrodes in 10-20 system   placements, and additional scalp, precordial, and other surface   electrodes used for electrical referencing and artifact   detection. Video monitoring is not utilized.          INTERICTAL EEG ACTIVITIES: During maximal wakefulness, background   consists of symmetric well modulated moderate amplitude 9 Hz   posterior dominant rhythm, that attenuates with eye opening. It   is intermixed with diffuse rare theta slowing. There is no focal   slowing during waking seen. Drowsiness is manifested by    predominance of centrally maximum semirhythmic theta slowing and   dropout of posterior dominant rhythm.  No further sleep   progression is seen. Intermittent photic stimulation is performed   and does not induce any epileptiform abnormalities.   Hyperventilation is not performed.        INTERICTAL EPILEPTIFORM DISCHARGES: None.        ICTAL RECORDINGS: No electrographic or clinical seizures and no   paroxysmal behavioral events are recorded during this study.        IMPRESSION: This outpatient routine EEG study is abnormal due to   rare diffuse theta slowing consistent with mild encephalopathy of   non-specific etiology.  No interictal epileptiform discharges, no   electrographic or clinical seizures, and no paroxysmal behavioral   events are seen during the study.  Clinical correlation is recommended.  Abdelrahman Ordonez MD.       REVIEW OF SYSTEMS:                                                      10-point review of systems is negative except as mentioned above in HPI.     EXAM:                                                      Physical Exam:   Vitals: BP (!) 146/94 (BP Location: Right arm, Patient Position: Sitting, Cuff Size: Adult Regular)   Pulse 95   Temp 97.4  F (36.3  C) (Tympanic)   Resp 10   Wt 72 kg (158 lb 12.8 oz)   LMP 10/17/2008   BMI 30.00 kg/m     BMI= Body mass index is 30 kg/m .  GENERAL: NAD.   CV: RRR. S1, S2.   NECK: No bruits.  Neurologic:  MENTAL STATUS: Alert, attentive. Speech is fluent.  Normal comprehension. Normal concentration. Adequate fund of knowledge. Mini-Co/5. Writing is small.   CRANIAL NERVES: Discs technically difficult to visualize due to eye movement. Visual fields intact to confrontation. Pupils equally, round and reactive to light. Facial sensation and movement normal. EOM full. Hearing intact to conversation. Trapezius strength intact. Palate moves symmetrically. Tongue midline.  MOTOR: 5/5 in proximal and distal muscle groups of upper and lower  extremities. Tone and bulk normal.   DTRs: Brisk throughout. Babinski down-going.   SENSATION: Slightly decreased pinprick in ulnar distribution of the Left hand. Otherwise normal light touch and pinprick. Intact proprioception. Vibration: Normal at both ankles.   COORDINATION: Normal finger nose finger. Finger tapping normal.   STATION AND GAIT: Romberg Negative. Good postural reflexes. Tandem normal,  No hand tremor on today's exam.  Left hand-dominant.     ASSESSMENT and PLAN:                                                      Assessment and Plan:    ICD-10-CM    1. Memory problem R41.3 NEUROPSYCHOLOGY REFERRAL   2. History of seizures Z87.898    3. Carpal tunnel syndrome of left wrist G56.02         Ms. Barajas is a 60 yo woman here for follow-up. We mainly discussed her new concerns regarding mental fogginess and memory today. She has also had a few spells of staring, with halted speech, no LOC. Not typical of her previous seizures and no spells in the past several weeks. I explained to the patient that it is difficult to say whether these were seizures unless we captured one on electroencephalogram. We reviewed the electroencephalogram and previous brain MRI results. I think it would be safe to monitor for spell recurrence and then do additional work-up if they return. For the cognitive complaints, I have referred [facility] for Neuropsych testing.     The Left hand is feeling improved since the cortisone injection by Dr. Calvert, so she will continue to wear the wrist splint at night for now. The neck spasms are improved with as -needed Flexeril, so we will continue this as well.     Patient Instructions:  Referral for Neuropsych (cognitive) testing.   Continue the Flexeril for the neck spasms.   Continue to wear the wrist splint for the carpal tunnel syndrome.   Return to Neurology after the cognitive testing is completed.       Total Time: 25 minutes were spent with the patient. More than 50% of the time  spent on counseling (as described above in Assessment and Plan/Instructions) /coordinating the care.    Ruthie David MD  Neurology                    Again, thank you for allowing me to participate in the care of your patient.        Sincerely,        Ruthie David MD

## 2019-08-19 DIAGNOSIS — M54.2 CERVICALGIA: ICD-10-CM

## 2019-08-19 DIAGNOSIS — Z87.820 HISTORY OF CLOSED HEAD INJURY: ICD-10-CM

## 2019-08-27 NOTE — TELEPHONE ENCOUNTER
Called pt left message to return call.  At last ov on 07-23-19 Dr. David advised pt to f/u in 3 months.  No future appt has been scheduled to date.    Rx refill request for Flexeril last issued on 06-14-19 for a qty of 30 with 1 refill.  Date of last ov was on 07-23-19.    To provider to authorize additional refills.    Leeanne Smith  [facility] RN

## 2019-08-28 RX ORDER — CYCLOBENZAPRINE HCL 10 MG
5-10 TABLET ORAL 3 TIMES DAILY PRN
Qty: 30 TABLET | Refills: 1 | Status: SHIPPED | OUTPATIENT
Start: 2019-08-28 | End: 2020-06-25

## 2019-09-24 ENCOUNTER — APPOINTMENT (OUTPATIENT)
Dept: GENERAL RADIOLOGY | Facility: CLINIC | Age: 59
End: 2019-09-24
Attending: NURSE PRACTITIONER
Payer: COMMERCIAL

## 2019-09-24 ENCOUNTER — HOSPITAL ENCOUNTER (EMERGENCY)
Facility: CLINIC | Age: 59
Discharge: HOME | End: 2019-09-24
Attending: NURSE PRACTITIONER | Admitting: NURSE PRACTITIONER
Payer: COMMERCIAL

## 2019-09-24 VITALS
RESPIRATION RATE: 18 BRPM | WEIGHT: 159 LBS | BODY MASS INDEX: 30.04 KG/M2 | TEMPERATURE: 98.5 F | OXYGEN SATURATION: 98 % | DIASTOLIC BLOOD PRESSURE: 57 MMHG | SYSTOLIC BLOOD PRESSURE: 117 MMHG

## 2019-09-24 DIAGNOSIS — J20.9 ACUTE BRONCHITIS: ICD-10-CM

## 2019-09-24 DIAGNOSIS — J98.01 ACUTE BRONCHOSPASM: ICD-10-CM

## 2019-09-24 PROCEDURE — 99214 OFFICE O/P EST MOD 30 MIN: CPT | Mod: Z6 | Performed by: NURSE PRACTITIONER

## 2019-09-24 PROCEDURE — G0463 HOSPITAL OUTPT CLINIC VISIT: HCPCS | Performed by: NURSE PRACTITIONER

## 2019-09-24 PROCEDURE — 71046 X-RAY EXAM CHEST 2 VIEWS: CPT

## 2019-09-24 RX ORDER — PREDNISONE 20 MG/1
TABLET ORAL
Qty: 10 TABLET | Refills: 0 | Status: SHIPPED | OUTPATIENT
Start: 2019-09-24 | End: 2020-06-25

## 2019-09-24 RX ORDER — DOXYCYCLINE 100 MG/1
100 TABLET ORAL 2 TIMES DAILY
Qty: 20 TABLET | Refills: 0 | Status: SHIPPED | OUTPATIENT
Start: 2019-09-24 | End: 2019-10-04

## 2019-09-24 RX ORDER — BENZONATATE 200 MG/1
200 CAPSULE ORAL 3 TIMES DAILY PRN
Qty: 15 CAPSULE | Refills: 0 | Status: SHIPPED | OUTPATIENT
Start: 2019-09-24 | End: 2020-06-25

## 2019-09-24 ASSESSMENT — ENCOUNTER SYMPTOMS
DIZZINESS: 0
VOMITING: 0
MYALGIAS: 1
CHILLS: 1
WHEEZING: 1
COUGH: 1
CHEST TIGHTNESS: 1
ABDOMINAL PAIN: 0
FEVER: 0
APPETITE CHANGE: 0
HEADACHES: 0
LIGHT-HEADEDNESS: 0
SORE THROAT: 0

## 2019-09-24 NOTE — ED PROVIDER NOTES
History     Chief Complaint   Patient presents with     Cough     cough and congestion 8 days     HPI  Brittany Barajas is a 59 year old female with history of degenerative disc disease, mild persistent asthma, urgency incontinence, breast cancer, anxiety, mitral valve disorder, seizure disorder, esophageal reflux, and depression who presents to urgent care for evaluation of cough and congestion.  Symptoms started 8 days ago.  Accompanied by chills, but no fevers.  Wheezing.  Chest tightness.  Using DuoNeb's and MDI inhaler without relief.    Allergies:  Allergies   Allergen Reactions     Docetaxel Other (See Comments)     Chest tightness, black out     Taxotere Other (See Comments)     Chest tightness, black out       Problem List:    Patient Active Problem List    Diagnosis Date Noted     Seizure disorder (H) 05/31/2019     Priority: Medium     DDD (degenerative disc disease), lumbar 08/23/2018     Priority: Medium     DDD (degenerative disc disease), cervical 08/23/2018     Priority: Medium     Dysphonia 08/23/2018     Priority: Medium     Other dysphagia 08/23/2018     Priority: Medium     Traumatic brain injury, without loss of consciousness, subsequent encounter 08/23/2018     Priority: Medium     Fatigue, unspecified type 08/23/2018     Priority: Medium     Advanced directives, counseling/discussion 11/04/2015     Priority: Medium     Advance Care Planning 11/4/2015: ACP Review and Resources Provided:  Reviewed chart for advance care plan.  Brittany Barajas has no plan or code status on file. Discussed available resources and provided with information. Confirmed code status reflects current choices pending further ACP discussions.  Confirmed/documented legally designated decision maker(s). Added by Leonie Alva             Urgency incontinence 01/02/2014     Priority: Medium     Mild persistent asthma 07/02/2013     Priority: Medium     Drug reaction 05/16/2012     Priority: Medium     sideeffects  from donald  See May 16 2012 note       H/O: hysterectomy 11/29/2011     Priority: Medium     No pap needed       Colon polyp 11/29/2011     Priority: Medium     2011 colonoscopy --unable to get polyp out due to technical issues-repeat suggested  2012-colonoscopy normal--suggested repeat three years       Transaminase or LDH elevation 11/28/2010     Priority: Medium     Mild and stable in 2010       CARDIOVASCULAR SCREENING; LDL GOAL LESS THAN 160 10/31/2010     Priority: Medium     Breast cancer (H) 11/17/2009     Priority: Medium     09       Anxiety state 12/08/2007     Priority: Medium     Problem list name updated by automated process. Provider to review       Mitral valve disorder 12/01/2005     Priority: Medium     MVP  2010 echo--Mild mitral valve prolapse, posterior leaflet.  There is mild to moderate (1-2+) mitral regurgitation  Problem list name updated by automated process. Provider to review       Allergic rhinitis 12/01/2005     Priority: Medium     Problem list name updated by automated process. 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 Provider to review          Past Medical History:    Past Medical History:   Diagnosis Date     Allergic rhinitis, cause unspecified      Depressive disorder, not elsewhere classified      Esophageal reflux      Intramural leiomyoma of uterus      Mitral valve  disorders(424.0)      Other affections of shoulder region, not elsewhere classified      Other convulsions      Other diseases of trachea and bronchus, not elsewhere classified      Papanicolaou smear of cervix with low grade squamous intraepithelial lesion (LGSIL) 11/09     Plantar fascial fibromatosis        Past Surgical History:    Past Surgical History:   Procedure Laterality Date     COLONOSCOPY  1/28/2011    COLONOSCOPY performed by ELOY BRIONES at [facility]     COLONOSCOPY  1/20/2012    Procedure:COLONOSCOPY; Surgeon:ELOY BRIONES; Location:[facility]     ESOPHAGOSCOPY, GASTROSCOPY, DUODENOSCOPY (EGD), COMBINED N/A 10/22/2018    Procedure: gastroscopy;  Surgeon: Seth Berman MD;  Location: [facility]     HYSTERECTOMY, PAP NO LONGER INDICATED  12/1/2008    for fibroids     INJECT EPIDURAL LUMBAR  3/9/2012    Procedure:INJECT EPIDURAL LUMBAR; JAMIL - ; Surgeon:GENERIC ANESTHESIA PROVIDER; Location:[facility]     INJECT EPIDURAL LUMBAR  5/25/2012    Procedure:INJECT EPIDURAL LUMBAR; JAMILLiliya; Surgeon:GENERIC ANESTHESIA PROVIDER; Location:[facility]     LEEP TX, CERVICAL  10/06    CECILIO 1     SURGICAL HISTORY OF -   10/7/1998    Right parotidectomy-mass     SURGICAL HISTORY OF -       Laparoscopy     SURGICAL HISTORY OF -   10/1996    Fibroid removal       Family History:    Family History   Problem Relation Age of Onset     C.A.D. Mother      Diabetes Mother      Hypertension Mother      Cerebrovascular Disease Mother      Breast Cancer Mother      Allergies Mother      Arthritis Mother      Cancer Mother      Eye Disorder Mother      Gastrointestinal Disease Mother      Gynecology Mother      Lipids Mother      Heart Disease Mother      Osteoporosis Mother      Obesity Mother      Thyroid Disease Mother      Respiratory Mother      Alcohol/Drug Father      Cancer Father      Alcohol/Drug Brother      Musculoskeletal Disorder Brother         spina bifida-recent dx     C.A.D. Brother      Thyroid Disease Sister       Lipids Sister      Gynecology Sister         fibroids/ s/p hysterectomy     Other - See Comments Sister         hysterectomy, dilation of the throat.     Thyroid Disease Brother      Heart Disease Brother         DOUBLE BYPASS     Lipids Brother      Thyroid Disease Sister      Lipids Sister      Other - See Comments Sister         hysterectomy     Gastrointestinal Disease Sister         liver function off     Thyroid Disease Sister      Thyroid Disease Sister      Heart Disease Sister      Cancer Other         thyroid cancer     Cancer - colorectal No family hx of        Social History:  Marital Status:  Single [1]  Social History     Tobacco Use     Smoking status: Never Smoker     Smokeless tobacco: Never Used   Substance Use Topics     Alcohol use: Yes     Comment: rarely-none for the past 12 weeks for wine, 11-14-18 visit.     Drug use: No        Medications:    benzonatate (TESSALON) 200 MG capsule  doxycycline monohydrate (ADOXA) 100 MG tablet  predniSONE (DELTASONE) 20 MG tablet  albuterol (PROAIR HFA/PROVENTIL HFA/VENTOLIN HFA) 108 (90 Base) MCG/ACT inhaler  amitriptyline (ELAVIL) 50 MG tablet  aspirin 81 MG tablet  cyclobenzaprine (FLEXERIL) 10 MG tablet  fluticasone (ARNUITY ELLIPTA) 100 MCG/ACT inhaler  ipratropium - albuterol 0.5 mg/2.5 mg/3 mL (DUONEB) 0.5-2.5 (3) MG/3ML neb solution  Multiple Vitamin (MULTIVITAMIN OR)  order for DME  PARoxetine (PAXIL) 10 MG tablet  PARoxetine (PAXIL) 20 MG tablet          Review of Systems   Constitutional: Positive for chills. Negative for appetite change and fever.   HENT: Positive for congestion. Negative for ear pain and sore throat.    Respiratory: Positive for cough, chest tightness and wheezing.    Cardiovascular: Negative for chest pain.   Gastrointestinal: Negative for abdominal pain and vomiting.   Musculoskeletal: Positive for myalgias.   Neurological: Negative for dizziness, light-headedness and headaches.       Physical Exam   BP: 117/57  Heart Rate:  95  Temp: 98.5  F (36.9  C)  Resp: 18  Weight: 72.1 kg (159 lb)  SpO2: 98 %      Physical Exam    GENERAL APPEARANCE: healthy, alert, ill-appearing but nontoxic.   EYES: EOMI, conjunctiva clear  HENT: bilateral ear canals clear, intact, and without inflammation. Right TM normal. Left TM normal. Nose normal.  Oropharynx without ulcers, erythema or lesions  NECK: supple, nontender, no lymphadenopathy  RESP: Faint expiratory wheezing throughout, mildly diminished in bilateral bases.  No tachypnea.  Speaking full sentences.  CV: regular rates and rhythm, normal S1 S2, no murmur noted      ED Course        Procedures             Results for orders placed or performed during the hospital encounter of 09/24/19 (from the past 24 hour(s))   XR Chest 2 Views    Narrative    CHEST TWO VIEW   9/24/2019 3:52 PM     HISTORY: Cough.    COMPARISON: Chest x-ray 12/11/2018.      Impression    IMPRESSION: PA and lateral views of the chest. Lungs are clear. Heart  is normal in size. No effusions are evident. No pneumothorax.    TUNG ELIAS MD       Medications - No data to display    Assessments & Plan (with Medical Decision Making)   History and exam is consistent with acute bronchitis.  Chest x-ray is negative for pneumonia.  On exam patient has faint expiratory wheezing.  No tachypnea.  No hypoxia.  Given her history of asthma and her significant worsening in the last 2 days patient be treated for bacterial pathogen with doxycycline.  Bronchospasm and wheezing will be treated with prednisone.  Patient instructed to continue to use her inhalers as prescribed.  Worrisome reasons to recheck discussed.  I have reviewed the nursing notes.    I have reviewed the findings, diagnosis, plan and need for follow up with the patient.      Discharge Medication List as of 9/24/2019  4:21 PM      START taking these medications    Details   benzonatate (TESSALON) 200 MG capsule Take 1 capsule (200 mg) by mouth 3 times daily as needed for cough,  Disp-15 capsule, R-0, E-Prescribe      doxycycline monohydrate (ADOXA) 100 MG tablet Take 1 tablet (100 mg) by mouth 2 times daily for 10 days, Disp-20 tablet, R-0, E-Prescribe      predniSONE (DELTASONE) 20 MG tablet Take two tablets (= 40mg) each day for 5 (five) days, Disp-10 tablet, R-0, E-Prescribe             Final diagnoses:   Acute bronchitis   Acute bronchospasm       9/24/2019   [facility] EMERGENCY DEPARTMENT     Misael, EDEL Ozuna CNP  09/24/19 [id number]

## 2019-09-24 NOTE — DISCHARGE INSTRUCTIONS
Prednisone 40 mg daily for 5 days.  Doxycycline 100 mg twice daily for 10 days.  Tessalon 200 mg up to 3 times a day as needed for cough.

## 2019-09-24 NOTE — ED AVS SNAPSHOT
[facility]  [address] [address]  Phone:  [phone]  Fax:  [phone]                                    Brittany Barajas   MRN: [id number]    Department:  [facility]   Date of Visit:  9/24/2019           After Visit Summary Signature Page    I have received my discharge instructions, and my questions have been answered. I have discussed any challenges I see with this plan with the nurse or doctor.    ..........................................................................................................................................  Patient/Patient Representative Signature      ..........................................................................................................................................  Patient Representative Print Name and Relationship to Patient    ..................................................               ................................................  Date                                   Time    ..........................................................................................................................................  Reviewed by Signature/Title    ...................................................              ..............................................  Date                                               Time          22EPIC Rev 08/18

## 2019-09-30 ENCOUNTER — HEALTH MAINTENANCE LETTER (OUTPATIENT)
Age: 59
End: 2019-09-30

## 2020-01-31 DIAGNOSIS — F41.1 GENERALIZED ANXIETY DISORDER: ICD-10-CM

## 2020-01-31 RX ORDER — PAROXETINE 20 MG/1
TABLET, FILM COATED ORAL
Qty: 90 TABLET | Refills: 3 | OUTPATIENT
Start: 2020-01-31

## 2020-03-07 DIAGNOSIS — F41.1 GENERALIZED ANXIETY DISORDER: ICD-10-CM

## 2020-03-09 NOTE — TELEPHONE ENCOUNTER
"Requested Prescriptions   Pending Prescriptions Disp Refills     PARoxetine (PAXIL) 20 MG tablet  Last Written Prescription Date:  5/20/19  Last Fill Quantity: 90,  # refills: 2   Last Office Visit with [facility], [facility] or [facility] prescribing provider:  5/31/19   Future Office Visit:      90 tablet 3       SSRIs Protocol Passed - 3/7/2020  7:02 PM        Passed - Recent (12 mo) or future (30 days) visit within the authorizing provider's specialty     Patient has had an office visit with the authorizing provider or a provider within the authorizing providers department within the previous 12 mos or has a future within next 30 days. See \"Patient Info\" tab in inbasket, or \"Choose Columns\" in Meds & Orders section of the refill encounter.              Passed - Medication is active on med list        Passed - Patient is age 18 or older        Passed - No active pregnancy on record        Passed - No positive pregnancy test in last 12 months             "

## 2020-03-10 NOTE — TELEPHONE ENCOUNTER
Routing refill request to provider for review/approval because:  PHQ-9 not done since 2018.  Last OV (addressed depression) was 5/31/2019. Pt was advised to: Return in about 1 year (around 5/31/2020).   Rx last ordered by Dr. Duran    PHQ 3/8/2018 6/19/2018 11/14/2018   PHQ-9 Total Score 9 16 8   Q9: Thoughts of better off dead/self-harm past 2 weeks Not at all Not at all Not at all     CLAY-7 SCORE 3/8/2018 6/19/2018 11/14/2018   Total Score - - -   Total Score 8 15 7       Zoë SUAREZ, RN

## 2020-03-11 RX ORDER — PAROXETINE 20 MG/1
TABLET, FILM COATED ORAL
Qty: 90 TABLET | Refills: 3 | OUTPATIENT
Start: 2020-03-11

## 2020-03-16 RX ORDER — PAROXETINE 20 MG/1
TABLET, FILM COATED ORAL
Qty: 90 TABLET | Refills: 0 | Status: SHIPPED | OUTPATIENT
Start: 2020-03-16 | End: 2020-06-10

## 2020-04-01 DIAGNOSIS — F41.1 GENERALIZED ANXIETY DISORDER: ICD-10-CM

## 2020-04-01 DIAGNOSIS — G47.00 INSOMNIA, UNSPECIFIED TYPE: ICD-10-CM

## 2020-04-01 RX ORDER — AMITRIPTYLINE HYDROCHLORIDE 50 MG/1
TABLET ORAL
Qty: 90 TABLET | Refills: 0 | Status: SHIPPED | OUTPATIENT
Start: 2020-04-01 | End: 2020-07-14

## 2020-04-01 NOTE — TELEPHONE ENCOUNTER
"Prescription approved per [facility] Refill Protocol.  Manasa BACA RN      Requested Prescriptions   Pending Prescriptions Disp Refills     amitriptyline (ELAVIL) 50 MG tablet [Pharmacy Med Name: AMITRIPTYLINE HCL 50MG TABS] 90 tablet 3     Sig: TAKE ONE TABLET BY MOUTH ONCE DAILY AT BEDTIME       Tricyclic Agents ( Annual appt and no PHQ9) Passed - 4/1/2020  1:49 PM        Passed - Blood Pressure under 140/90 in past 12 mos     BP Readings from Last 3 Encounters:   09/24/19 117/57   07/23/19 (!) 146/94   05/31/19 114/64                 Passed - Recent (12 mo) or future (30 days) visit within authorizing provider's specialty     Patient has had an office visit with the authorizing provider or a provider within the authorizing providers department within the previous 12 mos or has a future within next 30 days. See \"Patient Info\" tab in inbasket, or \"Choose Columns\" in Meds & Orders section of the refill encounter.              Passed - Medication is active on med list        Passed - Patient is age 18 or older        Passed - Patient is not pregnant        Passed - No positive pregnancy test on record in past 12 mos             "

## 2020-06-10 DIAGNOSIS — F41.1 GENERALIZED ANXIETY DISORDER: ICD-10-CM

## 2020-06-10 RX ORDER — PAROXETINE 20 MG/1
TABLET, FILM COATED ORAL
Qty: 30 TABLET | Refills: 0 | Status: SHIPPED | OUTPATIENT
Start: 2020-06-10 | End: 2020-06-25

## 2020-06-10 NOTE — TELEPHONE ENCOUNTER
Requested Prescriptions   Pending Prescriptions Disp Refills     PARoxetine (PAXIL) 20 MG tablet 90 tablet 0     Sig: TAKE ONE TABLET BY MOUTH EVERY NIGHT AT BEDTIME       There is no refill protocol information for this order        Pt has a upcoming appt and will be out this week of this medication.    Carlie Alexander  [facility]

## 2020-06-10 NOTE — TELEPHONE ENCOUNTER
"Paroxetine 20 mg tab  Last Written Prescription Date:  3/16/20  Last Fill Quantity: 90,  # refills: 0   Last office visit: 5/31/2019 with prescribing provider:  Jaylon   Future Office Visit:      Requested Prescriptions   Pending Prescriptions Disp Refills     PARoxetine (PAXIL) 20 MG tablet 90 tablet 0     Sig: TAKE ONE TABLET BY MOUTH EVERY NIGHT AT BEDTIME       SSRIs Protocol Passed - 6/10/2020  3:20 PM        Passed - Recent (12 mo) or future (30 days) visit within the authorizing provider's specialty     Patient has had an office visit with the authorizing provider or a provider within the authorizing providers department within the previous 12 mos or has a future within next 30 days. See \"Patient Info\" tab in inbasket, or \"Choose Columns\" in Meds & Orders section of the refill encounter.              Passed - Medication is active on med list        Passed - Patient is age 18 or older        Passed - No active pregnancy on record        Passed - No positive pregnancy test in last 12 months               "

## 2020-06-10 NOTE — LETTER
June 11, 2020      Brittany Barajas  [address]  [place] [address]        Dear Brittany,       We received a refill request for your paroxetine medication.  This medication has been refilled for 30 days as you are due for an office visit for further refills.  Please call [phone] to schedule an appointment.      Sincerely,        Juan Iyer MD

## 2020-06-10 NOTE — TELEPHONE ENCOUNTER
Routing refill request to provider for review/approval because:  1.  Pt is due for office visit; last seen one year ago  2. Drug to drug warning: paroxetine and amitriptyline.    Batsheva Wagoner RN

## 2020-06-16 ENCOUNTER — NURSE TRIAGE (OUTPATIENT)
Dept: FAMILY MEDICINE | Facility: CLINIC | Age: 60
End: 2020-06-16

## 2020-06-16 NOTE — TELEPHONE ENCOUNTER
Patient has had a fever x 1 week patient reports 101.8, with headaches and no has developed sinus pressure around eyes and has nasal congestion. Advised Office visit, schedule for morning at 11:40am, patient declined sooner appointment offered.       Additional Information    Sinus pain (not just congestion) and fever    Fever present > 3 days (72 hours)    Negative: SEVERE sinus pain    Negative: Severe headache    Negative: Redness or swelling on the cheek, forehead, or around the eye    Negative: Fever > 103 F (39.4 C)    Negative: Fever > 101 F (38.3 C) and over 60 years of age    Negative: Fever > 100.0 F (37.8 C) and has diabetes mellitus or a weak immune system (e.g., HIV positive, cancer chemotherapy, organ transplant, splenectomy, chronic steroids)    Negative: Fever > 100.0 F (37.8 C) and bedridden (e.g., nursing home patient, stroke, chronic illness, recovering from surgery)    Negative: SEVERE headache and has fever    Negative: Patient sounds very sick or weak to the triager    Negative: Difficulty breathing, and not from stuffy nose (e.g., not relieved by cleaning out the nose)    Negative: Sounds like a life-threatening emergency to the triager    Protocols used: SINUS PAIN AND CONGESTION-A-OH

## 2020-06-16 NOTE — TELEPHONE ENCOUNTER
Reason for call:  Patient reporting a symptom    Symptom or request: Pt has had fever and headache x 1 week.  Pt feels worn out.  No cough or SOB.  Please call patient and advise.      Duration (how long have symptoms been present): 1 week    Have you been treated for this before? No    Additional comments:     Phone Number patient can be reached at:  Home number on file [phone] (home)    Best Time:  any    Can we leave a detailed message on this number:  YES    Call taken on 6/16/2020 at 3:17 PM by Davina Nobles

## 2020-06-22 ENCOUNTER — NURSE TRIAGE (OUTPATIENT)
Dept: FAMILY MEDICINE | Facility: CLINIC | Age: 60
End: 2020-06-22

## 2020-06-22 NOTE — TELEPHONE ENCOUNTER
Reason for call:  Patient reporting a symptom    Symptom or request: Pt has a med check appt with Dr. Iyer this am and pt has had a fever x 2 days and wants to know if she should still come to appt? Pt is also fatigued, has a rash and a headache.  Please call patient and advise.      Duration (how long have symptoms been present): 2 days    Have you been treated for this before? Yes    Additional comments:     Phone Number patient can be reached at:  Home number on file [phone] (home)    Best Time:  any    Can we leave a detailed message on this number:  YES    Call taken on 6/22/2020 at 7:03 AM by Davina Nobles

## 2020-06-22 NOTE — TELEPHONE ENCOUNTER
It appears that patient cancelled appointment. Left message for patient to return call to clinic.     EDGAR RezaN, RN

## 2020-06-22 NOTE — TELEPHONE ENCOUNTER
Patient states she will continue to monitor symptoms and call back for virtual appointment or send E-Visit.       Additional Information    Negative: Difficult to awaken or acting confused (e.g., disoriented, slurred speech)    Negative: Pale cold skin and very weak (can't stand)    Negative: Difficulty breathing and bluish (or gray) lips or face    Negative: New onset rash with purple (or blood-colored) spots or dots    Negative: Sounds like a life-threatening emergency to the triager    Negative: Fever onset within 24 hours of receiving VACCINE    Negative: Fever within 21 days of Ebola EXPOSURE    Negative: Postpartum (from 0 to 6 weeks after delivery)    Negative: Headache and stiff neck (can't touch chin to chest)    Negative: Difficulty breathing    Negative: IV drug abuse    Fever > 101 F (38.3 C) and over 60 years of age    Protocols used: FEVER-A-OH

## 2020-06-23 ENCOUNTER — APPOINTMENT (OUTPATIENT)
Dept: GENERAL RADIOLOGY | Facility: CLINIC | Age: 60
End: 2020-06-23
Attending: PHYSICIAN ASSISTANT
Payer: COMMERCIAL

## 2020-06-23 ENCOUNTER — HOSPITAL ENCOUNTER (EMERGENCY)
Facility: CLINIC | Age: 60
Discharge: HOME | End: 2020-06-23
Attending: PHYSICIAN ASSISTANT | Admitting: PHYSICIAN ASSISTANT
Payer: COMMERCIAL

## 2020-06-23 VITALS
TEMPERATURE: 98.5 F | HEIGHT: 62 IN | WEIGHT: 160 LBS | DIASTOLIC BLOOD PRESSURE: 73 MMHG | OXYGEN SATURATION: 94 % | HEART RATE: 105 BPM | SYSTOLIC BLOOD PRESSURE: 115 MMHG | BODY MASS INDEX: 29.44 KG/M2 | RESPIRATION RATE: 22 BRPM

## 2020-06-23 DIAGNOSIS — R50.9 ACUTE FEBRILE ILLNESS: ICD-10-CM

## 2020-06-23 LAB
ALBUMIN SERPL-MCNC: 3.2 G/DL (ref 3.4–5)
ALBUMIN UR-MCNC: NEGATIVE MG/DL
ALP SERPL-CCNC: 89 U/L (ref 40–150)
ALT SERPL W P-5'-P-CCNC: 67 U/L (ref 0–50)
ANION GAP SERPL CALCULATED.3IONS-SCNC: 5 MMOL/L (ref 3–14)
APPEARANCE UR: CLEAR
AST SERPL W P-5'-P-CCNC: 64 U/L (ref 0–45)
BASOPHILS # BLD AUTO: 0 10E9/L (ref 0–0.2)
BASOPHILS NFR BLD AUTO: 0.3 %
BILIRUB SERPL-MCNC: 0.3 MG/DL (ref 0.2–1.3)
BILIRUB UR QL STRIP: NEGATIVE
BUN SERPL-MCNC: 7 MG/DL (ref 7–30)
CALCIUM SERPL-MCNC: 9.7 MG/DL (ref 8.5–10.1)
CHLORIDE SERPL-SCNC: 98 MMOL/L (ref 94–109)
CO2 SERPL-SCNC: 28 MMOL/L (ref 20–32)
COLOR UR AUTO: YELLOW
CREAT SERPL-MCNC: 0.75 MG/DL (ref 0.52–1.04)
DIFFERENTIAL METHOD BLD: NORMAL
EOSINOPHIL # BLD AUTO: 0.1 10E9/L (ref 0–0.7)
EOSINOPHIL NFR BLD AUTO: 0.6 %
ERYTHROCYTE [DISTWIDTH] IN BLOOD BY AUTOMATED COUNT: 13.8 % (ref 10–15)
GFR SERPL CREATININE-BSD FRML MDRD: 86 ML/MIN/{1.73_M2}
GLUCOSE SERPL-MCNC: 110 MG/DL (ref 70–99)
GLUCOSE UR STRIP-MCNC: NEGATIVE MG/DL
HCT VFR BLD AUTO: 43.4 % (ref 35–47)
HGB BLD-MCNC: 14.5 G/DL (ref 11.7–15.7)
HGB UR QL STRIP: NEGATIVE
IMM GRANULOCYTES # BLD: 0 10E9/L (ref 0–0.4)
IMM GRANULOCYTES NFR BLD: 0.3 %
KETONES UR STRIP-MCNC: NEGATIVE MG/DL
LACTATE BLD-SCNC: 1.9 MMOL/L (ref 0.7–2)
LEUKOCYTE ESTERASE UR QL STRIP: ABNORMAL
LYMPHOCYTES # BLD AUTO: 1.2 10E9/L (ref 0.8–5.3)
LYMPHOCYTES NFR BLD AUTO: 13.5 %
MCH RBC QN AUTO: 29.8 PG (ref 26.5–33)
MCHC RBC AUTO-ENTMCNC: 33.4 G/DL (ref 31.5–36.5)
MCV RBC AUTO: 89 FL (ref 78–100)
MONOCYTES # BLD AUTO: 0.5 10E9/L (ref 0–1.3)
MONOCYTES NFR BLD AUTO: 5.3 %
NEUTROPHILS # BLD AUTO: 7.3 10E9/L (ref 1.6–8.3)
NEUTROPHILS NFR BLD AUTO: 80 %
NITRATE UR QL: NEGATIVE
NRBC # BLD AUTO: 0 10*3/UL
NRBC BLD AUTO-RTO: 0 /100
PH UR STRIP: 8 PH (ref 5–7)
PLATELET # BLD AUTO: 446 10E9/L (ref 150–450)
POTASSIUM SERPL-SCNC: 4.5 MMOL/L (ref 3.4–5.3)
PROT SERPL-MCNC: 8.8 G/DL (ref 6.8–8.8)
RBC # BLD AUTO: 4.87 10E12/L (ref 3.8–5.2)
RBC #/AREA URNS AUTO: 1 /HPF (ref 0–2)
SARS-COV-2 PCR COMMENT: NORMAL
SARS-COV-2 RNA SPEC QL NAA+PROBE: NEGATIVE
SARS-COV-2 RNA SPEC QL NAA+PROBE: NORMAL
SODIUM SERPL-SCNC: 131 MMOL/L (ref 133–144)
SOURCE: ABNORMAL
SP GR UR STRIP: 1 (ref 1–1.03)
SPECIMEN SOURCE: NORMAL
SPECIMEN SOURCE: NORMAL
SQUAMOUS #/AREA URNS AUTO: 4 /HPF (ref 0–1)
TROPONIN I SERPL-MCNC: <0.015 UG/L (ref 0–0.04)
UROBILINOGEN UR STRIP-MCNC: 0 MG/DL (ref 0–2)
WBC # BLD AUTO: 9.1 10E9/L (ref 4–11)
WBC #/AREA URNS AUTO: 9 /HPF (ref 0–5)

## 2020-06-23 PROCEDURE — 99285 EMERGENCY DEPT VISIT HI MDM: CPT | Mod: 25 | Performed by: PHYSICIAN ASSISTANT

## 2020-06-23 PROCEDURE — 93010 ELECTROCARDIOGRAM REPORT: CPT | Mod: Z6 | Performed by: PHYSICIAN ASSISTANT

## 2020-06-23 PROCEDURE — 86618 LYME DISEASE ANTIBODY: CPT | Performed by: PHYSICIAN ASSISTANT

## 2020-06-23 PROCEDURE — U0003 INFECTIOUS AGENT DETECTION BY NUCLEIC ACID (DNA OR RNA); SEVERE ACUTE RESPIRATORY SYNDROME CORONAVIRUS 2 (SARS-COV-2) (CORONAVIRUS DISEASE [COVID-19]), AMPLIFIED PROBE TECHNIQUE, MAKING USE OF HIGH THROUGHPUT TECHNOLOGIES AS DESCRIBED BY CMS-2020-01-R: HCPCS | Performed by: PHYSICIAN ASSISTANT

## 2020-06-23 PROCEDURE — 85025 COMPLETE CBC W/AUTO DIFF WBC: CPT | Performed by: PHYSICIAN ASSISTANT

## 2020-06-23 PROCEDURE — C9803 HOPD COVID-19 SPEC COLLECT: HCPCS | Performed by: PHYSICIAN ASSISTANT

## 2020-06-23 PROCEDURE — 84484 ASSAY OF TROPONIN QUANT: CPT | Performed by: PHYSICIAN ASSISTANT

## 2020-06-23 PROCEDURE — 25800030 ZZH RX IP 258 OP 636: Performed by: PHYSICIAN ASSISTANT

## 2020-06-23 PROCEDURE — 93005 ELECTROCARDIOGRAM TRACING: CPT | Performed by: PHYSICIAN ASSISTANT

## 2020-06-23 PROCEDURE — 87040 BLOOD CULTURE FOR BACTERIA: CPT | Performed by: PHYSICIAN ASSISTANT

## 2020-06-23 PROCEDURE — 25000132 ZZH RX MED GY IP 250 OP 250 PS 637: Performed by: PHYSICIAN ASSISTANT

## 2020-06-23 PROCEDURE — 80053 COMPREHEN METABOLIC PANEL: CPT | Performed by: PHYSICIAN ASSISTANT

## 2020-06-23 PROCEDURE — 83605 ASSAY OF LACTIC ACID: CPT | Performed by: PHYSICIAN ASSISTANT

## 2020-06-23 PROCEDURE — 81001 URINALYSIS AUTO W/SCOPE: CPT | Performed by: PHYSICIAN ASSISTANT

## 2020-06-23 PROCEDURE — 86617 LYME DISEASE ANTIBODY: CPT | Performed by: PHYSICIAN ASSISTANT

## 2020-06-23 PROCEDURE — 96360 HYDRATION IV INFUSION INIT: CPT | Performed by: PHYSICIAN ASSISTANT

## 2020-06-23 PROCEDURE — 86617 LYME DISEASE ANTIBODY: CPT | Mod: 59 | Performed by: PHYSICIAN ASSISTANT

## 2020-06-23 PROCEDURE — 71045 X-RAY EXAM CHEST 1 VIEW: CPT

## 2020-06-23 RX ORDER — DOXYCYCLINE 100 MG/1
100 CAPSULE ORAL 2 TIMES DAILY
Qty: 28 CAPSULE | Refills: 0 | Status: SHIPPED | OUTPATIENT
Start: 2020-06-23 | End: 2020-07-07

## 2020-06-23 RX ORDER — ACETAMINOPHEN 500 MG
1000 TABLET ORAL ONCE
Status: COMPLETED | OUTPATIENT
Start: 2020-06-23 | End: 2020-06-23

## 2020-06-23 RX ADMIN — SODIUM CHLORIDE 1000 ML: 9 INJECTION, SOLUTION INTRAVENOUS at 16:23

## 2020-06-23 RX ADMIN — ACETAMINOPHEN 1000 MG: 500 TABLET, FILM COATED ORAL at 16:15

## 2020-06-23 ASSESSMENT — MIFFLIN-ST. JEOR: SCORE: 1249.01

## 2020-06-23 NOTE — ED AVS SNAPSHOT
[facility]  [address] [address]  Phone:  [phone]  Fax:  [phone]                                    Brittany Barajas   MRN: [id number]    Department:  [facility]   Date of Visit:  6/23/2020           After Visit Summary Signature Page    I have received my discharge instructions, and my questions have been answered. I have discussed any challenges I see with this plan with the nurse or doctor.    ..........................................................................................................................................  Patient/Patient Representative Signature      ..........................................................................................................................................  Patient Representative Print Name and Relationship to Patient    ..................................................               ................................................  Date                                   Time    ..........................................................................................................................................  Reviewed by Signature/Title    ...................................................              ..............................................  Date                                               Time          22EPIC Rev 08/18

## 2020-06-23 NOTE — LETTER
June 24, 2020        Brittany Loriyolanda Barajas  [address]  [place] [address]    This letter provides a written record that you were tested for COVID-19 on 6/23/20.       Your result was negative. This means that we didn t find the virus that causes COVID-19 in your sample. A test may show negative when you do actually have the virus. This can happen when the virus is in the early stages of infection, before you feel illness symptoms.    If you have symptoms   Stay home and away from others (self-isolate) until you meet ALL of the guidelines below:    You ve had no fever--and no medicine that reduces fever--for 3 full days (72 hours). And      Your other symptoms have gotten better. For example, your cough or breathing has improved. And     At least 10 days have passed since your symptoms started.    During this time:    Stay home. Don t go to work, school or anywhere else.     Stay in your own room, including for meals. Use your own bathroom if you can.    Stay away from others in your home. No hugging, kissing or shaking hands. No visitors.    Clean  high touch  surfaces often (doorknobs, counters, handles, etc.). Use a household cleaning spray or wipes. You can find a full list on the EPA website at www.epa.gov/pesticide-registration/list-n-disinfectants-use-against-sars-cov-2.    Cover your mouth and nose with a mask, tissue or washcloth to avoid spreading germs.    Wash your hands and face often with soap and water.    Going back to work  Check with your employer for any guidelines to follow for going back to work.    Employers: This document serves as formal notice that your employee tested negative for COVID-19, as of the testing date shown above.

## 2020-06-23 NOTE — ED PROVIDER NOTES
History     Chief Complaint   Patient presents with     Fever     left sided neck pain, left tongue pain. denies sore throat. states her fever has been x 10 days.      HPI  Brittany Barajas is a 60 year old female with past medical history significant for symptoms for mitral valve prolapse, anxiety , history of TBI, history of seizure disorder, mild persistent asthma, elevated liver transaminases, remote history of breast cancer who presents to the emergency department with concern over fever measured up to 101.8 orally at home which has been present for the last 10 days.  Patient additionally complains of headache, nasal congestion, sinus pressure, left-sided neck pain, rash on her neck, and legs.  She reports that she has had a chronic cough which has not changed in frequency or severity.  She denies any chest pain, palpitations, abdominal pain, diarrhea, melena, hematochezia, dysuria, increased urinary frequency or urgency.  She has taken NyQuil 3-4 times daily however nothing today.  She denies any close contacts with similar fevers or known contacts with COVID-19.  She reports that she has been self isolating however he does meet with neighbors outside.  She is unaware of any tick bites.      Allergies:  Allergies   Allergen Reactions     Docetaxel Other (See Comments)     Chest tightness, black out     Taxotere Other (See Comments)     Chest tightness, black out     Problem List:    Patient Active Problem List    Diagnosis Date Noted     Seizure disorder (H) 05/31/2019     Priority: Medium     DDD (degenerative disc disease), lumbar 08/23/2018     Priority: Medium     DDD (degenerative disc disease), cervical 08/23/2018     Priority: Medium     Dysphonia 08/23/2018     Priority: Medium     Other dysphagia 08/23/2018     Priority: Medium     Traumatic brain injury, without loss of consciousness, subsequent encounter 08/23/2018     Priority: Medium     Fatigue, unspecified type 08/23/2018     Priority:  Medium     Advanced directives, counseling/discussion 11/04/2015     Priority: Medium     Advance Care Planning 11/4/2015: ACP Review and Resources Provided:  Reviewed chart for advance care plan.  Brittany Barajas has no plan or code status on file. Discussed available resources and provided with information. Confirmed code status reflects current choices pending further ACP discussions.  Confirmed/documented legally designated decision maker(s). Added by Leonie Alva             Urgency incontinence 01/02/2014     Priority: Medium     Mild persistent asthma 07/02/2013     Priority: Medium     Drug reaction 05/16/2012     Priority: Medium     sideeffects from donald  See May 16 2012 note       H/O: hysterectomy 11/29/2011     Priority: Medium     No pap needed       Colon polyp 11/29/2011     Priority: Medium     2011 colonoscopy --unable to get polyp out due to technical issues-repeat suggested  2012-colonoscopy normal--suggested repeat three years       Transaminase or LDH elevation 11/28/2010     Priority: Medium     Mild and stable in 2010       CARDIOVASCULAR SCREENING; LDL GOAL LESS THAN 160 10/31/2010     Priority: Medium     Breast cancer (H) 11/17/2009     Priority: Medium     09       Anxiety state 12/08/2007     Priority: Medium     Problem list name updated by automated process. Provider to review       Mitral valve disorder 12/01/2005     Priority: Medium     MVP  2010 echo--Mild mitral valve prolapse, posterior leaflet.  There is mild to moderate (1-2+) mitral regurgitation  Problem list name updated by automated process. Provider to review       Allergic rhinitis 12/01/2005     Priority: Medium     Problem list name updated by automated process. 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 Provider to review        Past Medical History:    Past Medical History:   Diagnosis Date     Allergic rhinitis, cause unspecified      Depressive disorder, not elsewhere classified      Esophageal reflux      Intramural leiomyoma of uterus      Mitral valve disorders(424.0)      Other affections of shoulder region, not elsewhere classified      Other convulsions      Other diseases of trachea and bronchus, not elsewhere classified      Papanicolaou smear of cervix with low grade squamous intraepithelial lesion (LGSIL) 11/09     Plantar fascial fibromatosis      Past Surgical History:    Past Surgical History:   Procedure Laterality Date     COLONOSCOPY  1/28/2011    COLONOSCOPY performed by ELOY BRIONES at [facility]     COLONOSCOPY  1/20/2012    Procedure:COLONOSCOPY; Surgeon:ELOY BRIONES; Location:[facility]     ESOPHAGOSCOPY, GASTROSCOPY, DUODENOSCOPY (EGD), COMBINED N/A 10/22/2018    Procedure: gastroscopy;  Surgeon: Seth Berman MD;  Location: [facility]     HYSTERECTOMY, PAP NO LONGER INDICATED  12/1/2008    for fibroids     INJECT EPIDURAL LUMBAR  3/9/2012    Procedure:INJECT EPIDURAL LUMBAR; JAMIL - ; Surgeon:GENERIC ANESTHESIA PROVIDER; Location:[facility]     INJECT EPIDURAL LUMBAR  5/25/2012    Procedure:INJECT EPIDURAL LUMBAR; JAMIL-; Surgeon:GENERIC ANESTHESIA PROVIDER; Location:[facility]     LEEP TX, CERVICAL  10/06    CECILIO 1     SURGICAL HISTORY OF -   10/7/1998    Right parotidectomy-mass     SURGICAL HISTORY OF -       Laparoscopy     SURGICAL HISTORY OF -   10/1996    Fibroid removal     Family  History:    Family History   Problem Relation Age of Onset     C.A.D. Mother      Diabetes Mother      Hypertension Mother      Cerebrovascular Disease Mother      Breast Cancer Mother      Allergies Mother      Arthritis Mother      Cancer Mother      Eye Disorder Mother      Gastrointestinal Disease Mother      Gynecology Mother      Lipids Mother      Heart Disease Mother      Osteoporosis Mother      Obesity Mother      Thyroid Disease Mother      Respiratory Mother      Alcohol/Drug Father      Cancer Father      Alcohol/Drug Brother      Musculoskeletal Disorder Brother         spina bifida-recent dx     C.A.D. Brother      Thyroid Disease Sister      Lipids Sister      Gynecology Sister         fibroids/ s/p hysterectomy     Other - See Comments Sister         hysterectomy, dilation of the throat.     Thyroid Disease Brother      Heart Disease Brother         DOUBLE BYPASS     Lipids Brother      Thyroid Disease Sister      Lipids Sister      Other - See Comments Sister         hysterectomy     Gastrointestinal Disease Sister         liver function off     Thyroid Disease Sister      Thyroid Disease Sister      Heart Disease Sister      Cancer Other         thyroid cancer     Cancer - colorectal No family hx of      Social History:  Marital Status:  Single [1]  Social History     Tobacco Use     Smoking status: Never Smoker     Smokeless tobacco: Never Used   Substance Use Topics     Alcohol use: Yes     Comment: rarely-none for the past 12 weeks for wine, 11-14-18 visit.     Drug use: No      Medications:    albuterol (PROAIR HFA/PROVENTIL HFA/VENTOLIN HFA) 108 (90 Base) MCG/ACT inhaler  amitriptyline (ELAVIL) 50 MG tablet  aspirin 81 MG tablet  benzonatate (TESSALON) 200 MG capsule  cyclobenzaprine (FLEXERIL) 10 MG tablet  ipratropium - albuterol 0.5 mg/2.5 mg/3 mL (DUONEB) 0.5-2.5 (3) MG/3ML neb solution  Multiple Vitamin (MULTIVITAMIN OR)  order for DME  PARoxetine (PAXIL) 10 MG tablet  PARoxetine (PAXIL)  "20 MG tablet  predniSONE (DELTASONE) 20 MG tablet      Review of Systems   Constitutional: Positive for activity change, appetite change, chills, fatigue and fever.   HENT: Positive for congestion and sinus pain. Negative for ear pain and sore throat.    Eyes: Negative for photophobia, pain, discharge, redness, itching and visual disturbance.   Respiratory: Positive for cough. Negative for shortness of breath and wheezing.    Cardiovascular: Negative for chest pain, palpitations and leg swelling.   Gastrointestinal: Positive for nausea and vomiting. Negative for abdominal pain, constipation and diarrhea.   Genitourinary: Negative for dysuria, flank pain, frequency and urgency.   Musculoskeletal: Positive for arthralgias and myalgias.   Neurological: Positive for weakness, light-headedness and headaches. Negative for tremors, seizures, syncope and speech difficulty.     Physical Exam   BP: 112/81  Pulse: 147  Temp: 97.9  F (36.6  C)  Resp: 22  Height: 157.5 cm (5' 2\")  Weight: 72.6 kg (160 lb)  SpO2: 97 %  Physical Exam  GENERAL APPEARANCE: alert, non-toxic, febrile appearing, repeat temp check at time of examination 103.0 orally  EYES: EOMI,  PERRL, conjunctiva clear  HENT: ear canals and TM's normal.  Posterior pharynx nonerythematous without exudate, uvula and soft palate rise symmetrically with phonation, tongue protrudes to midline.    NECK: supple, mild tenderness left tonsillar lymph node  RESP: lungs clear to auscultation - no rales, rhonchi or wheezes  CV: regular rhythm, tachycardia, normal S1 S2, no murmur noted  ABDOMEN:  soft, nontender, no HSM or masses and bowel sounds normal  NEURO: Normal strength and tone, sensory exam grossly normal,  normal speech and mentation  SKIN: 15-20 macular erythematous target like lesions on the back, chest, lower extremities as seen in pictures below.             ED Course        Procedures               EKG Interpretation:      Interpreted by Marguerite Ocampo PA-C and  " Jonasesperanza Garcia   Time reviewed: 16:10  Symptoms at time of EKG: fever, tachycardia    Rhythm: normal sinus   Rate: 109  Axis: Normal  Ectopy: none  Conduction: normal  ST Segments/ T Waves: No ST-T wave changes and No acute ischemic changes  Q Waves: none  Comparison to prior: Unchanged from 12/11/18    Clinical Impression: normal EKG    Critical Care time:  none          Results for orders placed or performed during the hospital encounter of 06/23/20 (from the past 24 hour(s))   CBC with platelets differential   Result Value Ref Range    WBC 9.1 4.0 - 11.0 10e9/L    RBC Count 4.87 3.8 - 5.2 10e12/L    Hemoglobin 14.5 11.7 - 15.7 g/dL    Hematocrit 43.4 35.0 - 47.0 %    MCV 89 78 - 100 fl    MCH 29.8 26.5 - 33.0 pg    MCHC 33.4 31.5 - 36.5 g/dL    RDW 13.8 10.0 - 15.0 %    Platelet Count 446 150 - 450 10e9/L    Diff Method Automated Method     % Neutrophils 80.0 %    % Lymphocytes 13.5 %    % Monocytes 5.3 %    % Eosinophils 0.6 %    % Basophils 0.3 %    % Immature Granulocytes 0.3 %    Nucleated RBCs 0 0 /100    Absolute Neutrophil 7.3 1.6 - 8.3 10e9/L    Absolute Lymphocytes 1.2 0.8 - 5.3 10e9/L    Absolute Monocytes 0.5 0.0 - 1.3 10e9/L    Absolute Eosinophils 0.1 0.0 - 0.7 10e9/L    Absolute Basophils 0.0 0.0 - 0.2 10e9/L    Abs Immature Granulocytes 0.0 0 - 0.4 10e9/L    Absolute Nucleated RBC 0.0    Comprehensive metabolic panel   Result Value Ref Range    Sodium 131 (L) 133 - 144 mmol/L    Potassium 4.5 3.4 - 5.3 mmol/L    Chloride 98 94 - 109 mmol/L    Carbon Dioxide 28 20 - 32 mmol/L    Anion Gap 5 3 - 14 mmol/L    Glucose 110 (H) 70 - 99 mg/dL    Urea Nitrogen 7 7 - 30 mg/dL    Creatinine 0.75 0.52 - 1.04 mg/dL    GFR Estimate 86 >60 mL/min/[1.73_m2]    GFR Estimate If Black >90 >60 mL/min/[1.73_m2]    Calcium 9.7 8.5 - 10.1 mg/dL    Bilirubin Total 0.3 0.2 - 1.3 mg/dL    Albumin 3.2 (L) 3.4 - 5.0 g/dL    Protein Total 8.8 6.8 - 8.8 g/dL    Alkaline Phosphatase 89 40 - 150 U/L    ALT 67 (H) 0 - 50 U/L     AST 64 (H) 0 - 45 U/L   Lactic acid whole blood   Result Value Ref Range    Lactic Acid 1.9 0.7 - 2.0 mmol/L   Troponin I   Result Value Ref Range    Troponin I ES <0.015 0.000 - 0.045 ug/L   Blood culture    Specimen: Blood    Right Arm   Result Value Ref Range    Specimen Description Blood Right Arm     Special Requests ORION     Culture Micro No growth after 1 hour    Blood culture    Specimen: Blood    Right Arm   Result Value Ref Range    Specimen Description Blood Right Arm     Special Requests ORION     Culture Micro No growth after 1 hour    XR Chest Port 1 View    Narrative    CHEST PORTABLE ONE VIEW   6/23/2020 5:02 PM     HISTORY: Fever.    COMPARISON: Chest x-ray on 9/24/2019      Impression    IMPRESSION: No acute airspace infiltrate.    SONIA HENRIQUEZ MD   UA reflex to Microscopic and Culture    Specimen: Unspecified Urine   Result Value Ref Range    Color Urine Yellow     Appearance Urine Clear     Glucose Urine Negative NEG^Negative mg/dL    Bilirubin Urine Negative NEG^Negative    Ketones Urine Negative NEG^Negative mg/dL    Specific Gravity Urine 1.005 1.003 - 1.035    Blood Urine Negative NEG^Negative    pH Urine 8.0 (H) 5.0 - 7.0 pH    Protein Albumin Urine Negative NEG^Negative mg/dL    Urobilinogen mg/dL 0.0 0.0 - 2.0 mg/dL    Nitrite Urine Negative NEG^Negative    Leukocyte Esterase Urine Trace (A) NEG^Negative    Source Unspecified Urine     RBC Urine 1 0 - 2 /HPF    WBC Urine 9 (H) 0 - 5 /HPF    Squamous Epithelial /HPF Urine 4 (H) 0 - 1 /HPF       Medications   0.9% sodium chloride BOLUS (0 mLs Intravenous Stopped 6/23/20 1817)   acetaminophen (TYLENOL) tablet 1,000 mg (1,000 mg Oral Given 6/23/20 1615)     Assessments & Plan (with Medical Decision Making)     I have reviewed the nursing notes.    I have reviewed the findings, diagnosis, plan and need for follow up with the patient.       Discharge Medication List as of 6/23/2020  6:18 PM      START taking these medications    Details    doxycycline hyclate (VIBRAMYCIN) 100 MG capsule Take 1 capsule (100 mg) by mouth 2 times daily for 14 days, Disp-28 capsule,R-0, E-Prescribe           Final diagnoses:   Acute febrile illness     60-year-old female presents to the emergency department with concern over fever up to 101.8 at home measured up to 103 orally here for the last 10 days accompanied by nasal congestion, headache, nausea, single episode of emesis, left-sided neck pain and rash.  Patient was noted to be febrile with compensatory tachycardia upon arrival.  She had EKG which did confirm sinus tachycardia, no evidence of ischemia or arrhythmia.  Laboratory testing included normal CBC, lactate, troponin.  Non-contributory CMP.  Urinalysis did not appear concerning for pyelonephritis.  Testing for Lyme disease, blood cultures, COVID-19 pending at time of discharge.  Given the rash, symptoms are concerning for potential Lyme disease.  I did discuss risk/benefits of initiating patient on doxycycline pending testing results and patient agreed to initiate.  She was discharged home stable with prescription for doxycycline 100 mg BID for 14 days.  Follow up if no resolution of fever in 48-72 hours.  Worrisome reasons to return to ER sooner discussed.      Disclaimer: This note consists of symbols derived from keyboarding, dictation, and/or voice recognition software. As a result, there may be errors in the script that have gone undetected.  Please consider this when interpreting information found in the chart.    6/23/2020   [facility] EMERGENCY DEPARTMENT     Marguerite Ocampo PA-C  06/24/20 3225

## 2020-06-23 NOTE — ED NOTES
Pt here with fevers for 10 days, generalized weakness and decreased appetite. Pt has been taking nyquil and motrin every 6 hours for her fevers, last took last night, nothing today. She also has large rash spots on her back, chest and legs. States she has not been outside recently, no known tick bites.

## 2020-06-24 ENCOUNTER — PATIENT OUTREACH (OUTPATIENT)
Dept: CARE COORDINATION | Facility: CLINIC | Age: 60
End: 2020-06-24

## 2020-06-24 LAB — B BURGDOR IGG+IGM SER QL: 6.78 (ref 0–0.89)

## 2020-06-24 ASSESSMENT — ENCOUNTER SYMPTOMS
APPETITE CHANGE: 1
WHEEZING: 0
COUGH: 1
MYALGIAS: 1
EYE PAIN: 0
LIGHT-HEADEDNESS: 1
SORE THROAT: 0
FREQUENCY: 0
FLANK PAIN: 0
EYE REDNESS: 0
SPEECH DIFFICULTY: 0
PALPITATIONS: 0
FEVER: 1
ABDOMINAL PAIN: 0
VOMITING: 1
HEADACHES: 1
EYE ITCHING: 0
FATIGUE: 1
CONSTIPATION: 0
SINUS PAIN: 1
ARTHRALGIAS: 1
PHOTOPHOBIA: 0
DIARRHEA: 0
ACTIVITY CHANGE: 1
TREMORS: 0
CHILLS: 1
WEAKNESS: 1
DYSURIA: 0
SEIZURES: 0
NAUSEA: 1
EYE DISCHARGE: 0
SHORTNESS OF BREATH: 0

## 2020-06-24 NOTE — PROGRESS NOTES
Clinic Care Coordination Contact  Community Health Worker Initial Outreach    CHW Initial Information Gathering:  Informal Support system:: Family  No PCP office visit in Past Year: Yes    Patient accepts CC: Yes     The Clinic Community Health Worker spoke with the patient today to discuss possible Clinic Care Coordination enrollment. The service was described to the patient and immediate needs were discussed. The patient agreed to enrollment and an assessment was scheduled. The patient was provided with contact information for the clinic CHW.             Assessment date: 6/26 @1pm    Referral reason: ED visit yesterday for acute illness. COVID was negative; meets criteria for outreach to ensure she understands to f/u with PCP if not improving and if she desires more support from CCC team.     Patient reports a fever last night (6/23) of 104 and requests CHW make ED follow up appt for her with Dr. Iyer.  CHW scheduled virtual visit for 6/25 @1:20pm, patient very appreciative and awaits RN CC phone visit Friday.     Milena OLIVIER Community Health Worker  [facility] Care Coordination  [facility]  Phone: [phone]

## 2020-06-25 ENCOUNTER — VIRTUAL VISIT (OUTPATIENT)
Dept: FAMILY MEDICINE | Facility: CLINIC | Age: 60
End: 2020-06-25
Payer: COMMERCIAL

## 2020-06-25 DIAGNOSIS — F33.1 MODERATE RECURRENT MAJOR DEPRESSION (H): ICD-10-CM

## 2020-06-25 DIAGNOSIS — F41.1 GENERALIZED ANXIETY DISORDER: ICD-10-CM

## 2020-06-25 DIAGNOSIS — A69.20 LYME DISEASE: Primary | ICD-10-CM

## 2020-06-25 PROCEDURE — 99214 OFFICE O/P EST MOD 30 MIN: CPT | Mod: 95 | Performed by: FAMILY MEDICINE

## 2020-06-25 RX ORDER — PAROXETINE 20 MG/1
TABLET, FILM COATED ORAL
Qty: 90 TABLET | Refills: 3 | Status: SHIPPED | OUTPATIENT
Start: 2020-06-25 | End: 2021-01-21

## 2020-06-25 RX ORDER — PAROXETINE 10 MG/1
TABLET, FILM COATED ORAL
Qty: 90 TABLET | Refills: 3 | Status: SHIPPED | OUTPATIENT
Start: 2020-06-25 | End: 2021-01-21

## 2020-06-25 ASSESSMENT — ANXIETY QUESTIONNAIRES
1. FEELING NERVOUS, ANXIOUS, OR ON EDGE: NOT AT ALL
2. NOT BEING ABLE TO STOP OR CONTROL WORRYING: SEVERAL DAYS
IF YOU CHECKED OFF ANY PROBLEMS ON THIS QUESTIONNAIRE, HOW DIFFICULT HAVE THESE PROBLEMS MADE IT FOR YOU TO DO YOUR WORK, TAKE CARE OF THINGS AT HOME, OR GET ALONG WITH OTHER PEOPLE: SOMEWHAT DIFFICULT
5. BEING SO RESTLESS THAT IT IS HARD TO SIT STILL: NOT AT ALL
6. BECOMING EASILY ANNOYED OR IRRITABLE: NOT AT ALL
GAD7 TOTAL SCORE: 3
7. FEELING AFRAID AS IF SOMETHING AWFUL MIGHT HAPPEN: NOT AT ALL
3. WORRYING TOO MUCH ABOUT DIFFERENT THINGS: SEVERAL DAYS

## 2020-06-25 ASSESSMENT — PATIENT HEALTH QUESTIONNAIRE - PHQ9
5. POOR APPETITE OR OVEREATING: SEVERAL DAYS
SUM OF ALL RESPONSES TO PHQ QUESTIONS 1-9: 5

## 2020-06-25 NOTE — PROGRESS NOTES
"Brittany Barajas is a 60 year old female who is being evaluated via a billable video visit.      The patient has been notified of following:     \"This video visit will be conducted via a call between you and your physician/provider. We have found that certain health care needs can be provided without the need for an in-person physical exam.  This service lets us provide the care you need with a video conversation.  If a prescription is necessary we can send it directly to your pharmacy.  If lab work is needed we can place an order for that and you can then stop by our lab to have the test done at a later time.    Video visits are billed at different rates depending on your insurance coverage.  Please reach out to your insurance provider with any questions.    If during the course of the call the physician/provider feels a video visit is not appropriate, you will not be charged for this service.\"    Patient has given verbal consent for Video visit? Yes. [phone]. Video not available for technical reasons.     Will anyone else be joining your video visit? No      Subjective     Brittany Barajas is a 60 year old female who presents today via phone visit for the following health issues:    HPI  ED/UC Followup:    Facility:  [facility]  Date of visit: 6/23/2020  Reason for visit: Fever and rash. Lyme disease.  Current Status: Patient states she is already feeling better. Fever has broke. Rash is starting to clear up.    Final diagnoses:   Acute febrile illness     60-year-old female presents to the emergency department with concern over fever up to 101.8 at home measured up to 103 orally here for the last 10 days accompanied by nasal congestion, headache, nausea, single episode of emesis, left-sided neck pain and rash.  Patient was noted to be febrile with compensatory tachycardia upon arrival.  She had EKG which did confirm sinus tachycardia, no evidence of ischemia or arrhythmia.  Laboratory " testing included normal CBC, lactate, troponin.  Non-contributory CMP.  Urinalysis did not appear concerning for pyelonephritis.  Testing for Lyme disease, blood cultures, COVID-19 pending at time of discharge.  Given the rash, symptoms are concerning for potential Lyme disease.  I did discuss risk/benefits of initiating patient on doxycycline pending testing results and patient agreed to initiate.  She was discharged home stable with prescription for doxycycline 100 mg BID for 14 days.  Follow up if no resolution of fever in 48-72 hours.  Worrisome reasons to return to ER sooner discussed.     The Covid tests are negative, and the Lyme hilary is positive.        She needs refills on the Paxil. She is doing well and uses a 20 mg plus a 10 mg pill daily, for a total of 30 mg daily.        Current Outpatient Medications:      albuterol (PROAIR HFA/PROVENTIL HFA/VENTOLIN HFA) 108 (90 Base) MCG/ACT inhaler, Inhale 2 puffs into the lungs every 6 hours as needed for shortness of breath / dyspnea, Disp: 2 Inhaler, Rfl: 6     amitriptyline (ELAVIL) 50 MG tablet, TAKE ONE TABLET BY MOUTH ONCE DAILY AT BEDTIME, Disp: 90 tablet, Rfl: 0     doxycycline hyclate (VIBRAMYCIN) 100 MG capsule, Take 1 capsule (100 mg) by mouth 2 times daily for 14 days, Disp: 28 capsule, Rfl: 0     Multiple Vitamin (MULTIVITAMIN OR), Take 1 tablet by mouth daily., Disp: , Rfl:      PARoxetine (PAXIL) 10 MG tablet, TAKE ONE TABLET BY MOUTH AT BEDTIME, Disp: 90 tablet, Rfl: 2     PARoxetine (PAXIL) 20 MG tablet, TAKE ONE TABLET BY MOUTH EVERY NIGHT AT BEDTIME, Disp: 30 tablet, Rfl: 0     ipratropium - albuterol 0.5 mg/2.5 mg/3 mL (DUONEB) 0.5-2.5 (3) MG/3ML neb solution, Take 1 vial (3 mLs) by nebulization every 6 hours as needed for shortness of breath / dyspnea (Patient not taking: Reported on 6/25/2020), Disp: 1 Box, Rfl: 2    Patient Active Problem List   Diagnosis     Mitral valve disorder     Allergic rhinitis     Convulsions (H)     Esophageal  reflux     Mild major depression (H)     Plantar fascial fibromatosis     Other affections of shoulder region, not elsewhere classified     Neoplasm of digestive system     Anxiety state     Breast cancer (H)     CARDIOVASCULAR SCREENING; LDL GOAL LESS THAN 160     Transaminase or LDH elevation     H/O: hysterectomy     Colon polyp     Drug reaction     Mild persistent asthma     Urgency incontinence     Advanced directives, counseling/discussion     DDD (degenerative disc disease), lumbar     DDD (degenerative disc disease), cervical     Dysphonia     Other dysphagia     Traumatic brain injury, without loss of consciousness, subsequent encounter     Fatigue, unspecified type     Seizure disorder (H)     (A69.20) Lyme disease  (primary encounter diagnosis)  Comment:   Plan: she is improving over the last 2-3 days on the Doxycycline. She will finish the 14 day course and monitor for the complete  Resolution of the symptoms. Prevention discussed and follow up as needed.     (F41.1) Generalized anxiety disorder  Comment:   Plan: PARoxetine (PAXIL) 20 MG tablet, PARoxetine         (PAXIL) 10 MG tablet        She is doing well and stable with the depression and anxiety symptoms. The 30 mg daily total dose is refilled for one year.   Use the non drug therapies and monitor the symptoms and follow up in one year if doing well.     (F33.1) Moderate recurrent major depression (H)  Comment:   Plan: PARoxetine (PAXIL) 20 MG tablet, PARoxetine         (PAXIL) 10 MG tablet        As above.         Juan Iyer MD    Phone time: 18 minutes.

## 2020-06-26 ENCOUNTER — PATIENT OUTREACH (OUTPATIENT)
Dept: NURSING | Facility: CLINIC | Age: 60
End: 2020-06-26
Attending: FAMILY MEDICINE
Payer: COMMERCIAL

## 2020-06-26 LAB
B BURGDOR IGG SER QL IB: POSITIVE
B BURGDOR IGM SER QL IB: POSITIVE

## 2020-06-26 ASSESSMENT — ANXIETY QUESTIONNAIRES: GAD7 TOTAL SCORE: 3

## 2020-06-26 ASSESSMENT — ACTIVITIES OF DAILY LIVING (ADL): DEPENDENT_IADLS:: INDEPENDENT

## 2020-06-26 NOTE — PROGRESS NOTES
Clinic Care Coordination Contact    Clinic Care Coordination Contact  OUTREACH    Referral Information:  Referral Source: ED Follow-Up    Primary Diagnosis: Respiratory Disorders - other    Chief Complaint   Patient presents with     Clinic Care Coordination - Initial     RN        [place] Utilization:   Clinic Utilization  Difficulty keeping appointments:: No  Compliance Concerns: No  No-Show Concerns: No  No PCP office visit in Past Year: No  Utilization    Last refreshed: 6/26/2020  1:18 PM:  Hospital Admissions 0           Last refreshed: 6/26/2020  1:18 PM:  ED Visits 2           Last refreshed: 6/26/2020  1:18 PM:  No Show Count (past year) 1              Current as of: 6/26/2020  1:18 PM              Clinical Concerns:  Current Medical Concerns: Patient reports her symptoms of fever, headache, neck pain and rash on legs have continue to improve since recent ED visit on 6/23.  She completed a telephone visit with PCP and also was contacted to report her Lyme's disease test came back as positive. She is relieved to know COVID-19 test was negative but also understands to continue to monitor her health and symptoms.  Patient reports she was afebrile yesterday but had one spike of a fever to 102 degrees overnight. She took Tylenol and has remained afebrile today. Her rash has resolved as well as her headache. She is still having joint pain but it's improving.    Current Behavioral Concerns: No acute concerns. Patient has hx of depression and anxiety, managed under PCP with Paroxetine 30 mg daily. Patient reports she is doing well and has been stable with this medication for past several years.    Education Provided to patient: reviewed clinic care coordination and established goal     Pain  Pain (GOAL):: No  Health Maintenance Reviewed: Due/Overdue   Health Maintenance Due   Topic Date Due     ZOSTER IMMUNIZATION (1 of 2) 06/19/2010     COLORECTAL CANCER SCREENING  11/16/2015     PREVENTIVE CARE VISIT   "01/26/2016     MAMMO SCREENING  01/19/2018     ASTHMA CONTROL TEST  05/14/2019     ASTHMA ACTION PLAN  06/19/2019     LIPID  02/04/2020       Clinical Pathway: None    Medication Management:  Medication reconciliation status: Medications reviewed and reconciled.  Continue medications without change.       Functional Status:  Dependent ADLs:: Independent  Dependent IADLs:: Independent  Bed or wheelchair confined:: No  Mobility Status: Independent  Fallen 2 or more times in the past year?: No  Any fall with injury in the past year?: No    Living Situation:  Current living arrangement:: I live alone  [facility]    Lifestyle & Psychosocial Needs:        Diet:: Regular  Inadequate nutrition (GOAL):: No  Tube Feeding: No  Inadequate activity/exercise (GOAL):: No  Significant changes in sleep pattern (GOAL): No  Transportation means:: Regular car     Druze or spiritual beliefs that impact treatment:: No  Mental health DX:: Yes  Mental health DX how managed:: Medication  Mental health management concern (GOAL):: No  Informal Support system:: Family, Neighbors   Socioeconomic History     Marital status: Single     Spouse name: Not on file     Number of children: Not on file     Years of education: Not on file     Highest education level: Not on file     Tobacco Use     Smoking status: Never Smoker     Smokeless tobacco: Never Used   Substance and Sexual Activity     Alcohol use: Yes     Comment: rarely-none for the past 12 weeks for wine, 11-14-18 visit.     Drug use: No     Sexual activity: Not Currently        Patient lives alone in a private home with 2 cats, one is 19 yrs old and she says is \"senile.\"  Her sister lives locally and will be coming to help with housecleaning tomorrow so patient can rest. Her neighbors are also close support system and can bring her supplies as needed including groceries.     Resources and Interventions:  Current Resources:   Community Resources: " None  Supplies used at home:: None  Equipment Currently Used at Home: none    Advance Care Plan/Directive  Advanced Care Plans/Directives on file:: No  Advanced Care Plan/Directive Status: Declined Further Information    Referrals Placed: None     Goals:   Goals        General    #1 Medical (pt-stated)     Notes - Note created  6/26/2020  1:23 PM by Rashmi Lawler, RN    Goal Statement: I will have full recovery from Lyme's disease.  Date Goal set: 6/26/20  Barriers: recent diagnosis of Lyme's disease  Strengths: completed PCP follow up on 6/25; engaged in plan of care  Date to Achieve By: 10/1/20  Patient expressed understanding of goal: Yes  Action steps to achieve this goal:  1. I will take oral antibiotics as prescribed through entire treatment course.  2. I will use Tylenol as needed for symptom support including joint pain, headache and fevers.  3. I will contact my clinic care team as needed with questions or concerns.              Patient/Caregiver understanding: Yes    Outreach Frequency: monthly      Plan: 1) Patient will continue to take oral antibiotics for treatment of Lyme's disease through 7/7/20.   2) Patient will contact clinic care team if her symptoms don't continue to improve or if new concerns arise.    RN Care Coordinator will follow up in 2-3 weeks.    REG Gaines, RN   [facility]  - Clinic Care Coordinator  Phone: [phone]

## 2020-06-26 NOTE — LETTER
[facility]  Complex Care Plan  About Me:    Patient Name:  Brittany Barajas    YOB: 1960  Age:         60 year old   [place] MRN:    [id number] Telephone Information:  Home Phone [phone]   Mobile [phone]       Address:  [address] [place] [address] Email address:  [email or web address]      Emergency Contact(s)    Name Relationship Lgl Grd Work Phone Home Phone Mobile Phone   1. MIA HSIEH Sister   [phone]    2. DECLINE, PER P* Declined              My Access Plan  Medical Emergency 911   Primary Clinic Line [facility] - [phone]   24 Hour Appointment Line [phone] or  [phone] ([phone]) (toll-free)   24 Hour Nurse Line [phone] (toll-free)   Preferred Urgent Care [facility] [place], [phone]   Preferred Hospital [place], [place]  [phone]   Preferred Pharmacy [facility] [address] [facility]     Behavioral Health Crisis Line The National Suicide Prevention Lifeline at 1-746.196.5332 or 911       My Care Team Members  Patient Care Team       Relationship Specialty Notifications Start End    Juan Iyer MD PCP - General Family Practice  10/10/18     Phone: [phone] Fax: [phone] [address] [facility] [address]    Wanda Styles MD MD Oncology  1/1/10     Phone: [phone] Pager: [phone] Fax: [phone] [address] [place] [address]    Juan Iyer MD Assigned PCP   11/18/18     Phone: [phone] Fax: [phone] [address] [facility] [address]    Rashmi Lawler, RN Lead Care Coordinator Primary Care - [facility] Admissions 6/26/20     Phone: [phone]         Milena Saeed Community Health Worker Primary Care - [facility]  6/26/20     Phone: [phone]                 My Care Plans  Self Management and Treatment Plan  Goals and (Comments)  Goals         General    #1 Medical (pt-stated)     Notes - Note created  6/26/2020  1:23 PM by Rashmi Lawler RN    Goal Statement: I will have full recovery from Lyme's disease.  Date Goal set: 6/26/20  Barriers: recent diagnosis of Lyme's disease  Strengths: completed PCP follow up on 6/25; engaged in plan of care  Date to Achieve By: 10/1/20  Patient expressed understanding of goal: Yes  Action steps to achieve this goal:  1. I will take oral antibiotics as prescribed through entire treatment course.  2. I will use Tylenol as needed for symptom support including joint pain, headache and fevers.  3. I will contact my clinic care team as needed with questions or concerns.                 My Medical and Care Information  Problem List   Patient Active Problem List   Diagnosis     Mitral valve disorder     Allergic rhinitis     Convulsions (H)     Esophageal reflux     Mild major depression (H)     Plantar fascial fibromatosis     Other affections of shoulder region, not elsewhere classified     Neoplasm of digestive system     Anxiety state     Breast cancer (H)     CARDIOVASCULAR SCREENING; LDL GOAL LESS THAN 160     Transaminase or LDH elevation     H/O: hysterectomy     Colon polyp     Drug reaction     Mild persistent asthma     Urgency incontinence     Advanced directives, counseling/discussion     DDD (degenerative disc disease), lumbar     DDD (degenerative disc disease), cervical     Dysphonia     Other dysphagia     Traumatic brain injury, without loss of consciousness, subsequent encounter     Fatigue, unspecified type     Seizure disorder (H)      Current Medications and Allergies:  See printed Medication Report.

## 2020-06-29 LAB
BACTERIA SPEC CULT: NO GROWTH
BACTERIA SPEC CULT: NO GROWTH
SPECIMEN SOURCE: NORMAL
SPECIMEN SOURCE: NORMAL

## 2020-07-13 DIAGNOSIS — F41.1 GENERALIZED ANXIETY DISORDER: ICD-10-CM

## 2020-07-13 DIAGNOSIS — G47.00 INSOMNIA, UNSPECIFIED TYPE: ICD-10-CM

## 2020-07-14 ENCOUNTER — PATIENT OUTREACH (OUTPATIENT)
Dept: CARE COORDINATION | Facility: CLINIC | Age: 60
End: 2020-07-14

## 2020-07-14 ENCOUNTER — PATIENT OUTREACH (OUTPATIENT)
Dept: NURSING | Facility: CLINIC | Age: 60
End: 2020-07-14
Payer: COMMERCIAL

## 2020-07-14 NOTE — TELEPHONE ENCOUNTER
"Requested Prescriptions   Pending Prescriptions Disp Refills     amitriptyline (ELAVIL) 50 MG tablet [Pharmacy Med Name: AMITRIPTYLINE HCL 50MG TABS] 90 tablet 0     Sig: TAKE ONE TABLET BY MOUTH ONCE DAILY AT BEDTIME.       Tricyclic Agents ( Annual appt and no PHQ9) Passed - 7/13/2020 11:48 AM        Passed - Blood Pressure under 140/90 in past 12 mos     BP Readings from Last 3 Encounters:   06/23/20 115/73   09/24/19 117/57   07/23/19 (!) 146/94                 Passed - Recent (12 mo) or future (30 days) visit within authorizing provider's specialty     Patient has had an office visit with the authorizing provider or a provider within the authorizing providers department within the previous 12 mos or has a future within next 30 days. See \"Patient Info\" tab in inbasket, or \"Choose Columns\" in Meds & Orders section of the refill encounter.              Passed - Medication is active on med list        Passed - Patient is age 18 or older        Passed - Patient is not pregnant        Passed - No positive pregnancy test on record in past 12 mos           Last Written Prescription Date:  4/1/20  Last Fill Quantity: 90,  # refills: 0   Last office visit: 5/31/2019 with prescribing provider:  Jaylon   Future Office Visit:            "

## 2020-07-14 NOTE — PROGRESS NOTES
Clinic Care Coordination Contact    Follow Up Progress Note      Assessment: Patient returned call to RN CC and reports she is feeling great. She completed oral antibiotics as prescribed (finished last week) and reports her red spots/rash areas have healed.  Patient reports she is regaining her energy level and able to return to more physical activity.     She denies any questions or concerns.      Goals addressed this encounter:   Goals Addressed                 This Visit's Progress      #1 Medical (pt-stated)   50%     Goal Statement: I will have full recovery from Lyme's disease.  Date Goal set: 6/26/20  Barriers: recent diagnosis of Lyme's disease  Strengths: completed PCP follow up on 6/25; engaged in plan of care  Date to Achieve By: 10/1/20  Patient expressed understanding of goal: Yes  Action steps to achieve this goal:  1. I will take oral antibiotics as prescribed through entire treatment course.  2. I will use Tylenol as needed for symptom support including joint pain, headache and fevers.  3. I will contact my clinic care team as needed with questions or concerns.               Intervention/Education provided during outreach: medications reviewed     Outreach Frequency: monthly    Plan:   Patient will continue to resume activities and increase physical exercise as she recovers from recent Lymes disease.  Care Coordinator will follow up in 1 month.    REG Gaines, RN   [facility]  - Clinic Care Coordinator  Phone: [phone]

## 2020-07-14 NOTE — PROGRESS NOTES
Clinic Care Coordination Contact  [facility]/Voicemail       Clinical Data: Care Coordinator Outreach  Proactive follow up outreach    Outreach attempted x 1.  Left message on patient's voicemail with call back information and requested return call.    Plan: Care Coordinator remains available for patient to call.  RN CC will try to reach patient again in 2 weeks per standard workflow.    EDGAR GainesN, RN   [facility]  - Clinic Care Coordinator  Phone: [phone]

## 2020-07-15 RX ORDER — AMITRIPTYLINE HYDROCHLORIDE 50 MG/1
TABLET ORAL
Qty: 90 TABLET | Refills: 1 | Status: SHIPPED | OUTPATIENT
Start: 2020-07-15 | End: 2021-01-21

## 2020-08-11 ENCOUNTER — PATIENT OUTREACH (OUTPATIENT)
Dept: NURSING | Facility: CLINIC | Age: 60
End: 2020-08-11
Payer: COMMERCIAL

## 2020-08-11 NOTE — PROGRESS NOTES
Clinic Care Coordination Contact    Follow Up Progress Note      Assessment: Patient contacted by phone for proactive follow up. Patient reports she is feeling well and denies any acute concerns or symptoms.    Goals addressed this encounter:   Goals Addressed                 This Visit's Progress      COMPLETED: #1 Medical (pt-stated)        Goal Statement: I will have full recovery from Lyme's disease.  Date Goal set: 6/26/20  Barriers: recent diagnosis of Lyme's disease  Strengths: completed PCP follow up on 6/25; engaged in plan of care  Date to Achieve By: 10/1/20  Patient expressed understanding of goal: Yes  Action steps to achieve this goal:  1. I will take oral antibiotics as prescribed through entire treatment course.  2. I will use Tylenol as needed for symptom support including joint pain, headache and fevers.  3. I will contact my clinic care team as needed with questions or concerns.               Intervention/Education provided during outreach: we reviewed plan of care- patient denies any other goals to establish; she feels comfortable with transition to maintenance/2 month follow up.      Outreach Frequency: 2 months    Plan:   Patient will continue to follow plan of care and contact clinic as needed with questions or concerns.  Care Coordinator will follow up in 2 months to ensure no new concerns are present.    REG Gaines, RN   [facility]  - Clinic Care Coordinator  Phone: [phone]

## 2020-08-19 ENCOUNTER — OFFICE VISIT (OUTPATIENT)
Dept: FAMILY MEDICINE | Facility: CLINIC | Age: 60
End: 2020-08-19
Payer: COMMERCIAL

## 2020-08-19 VITALS
BODY MASS INDEX: 29.63 KG/M2 | WEIGHT: 161 LBS | OXYGEN SATURATION: 99 % | TEMPERATURE: 98.5 F | HEIGHT: 62 IN | HEART RATE: 103 BPM | SYSTOLIC BLOOD PRESSURE: 124 MMHG | DIASTOLIC BLOOD PRESSURE: 70 MMHG

## 2020-08-19 DIAGNOSIS — M51.369 DDD (DEGENERATIVE DISC DISEASE), LUMBAR: Primary | ICD-10-CM

## 2020-08-19 PROCEDURE — 99213 OFFICE O/P EST LOW 20 MIN: CPT | Performed by: FAMILY MEDICINE

## 2020-08-19 ASSESSMENT — MIFFLIN-ST. JEOR: SCORE: 1253.54

## 2020-08-19 NOTE — PROGRESS NOTES
Subjective     Brittany Barajas is a 60 year old female who presents to clinic today for the following health issues:    HPI       Back Pain  Onset/Duration: 2 weeks, flaring   Description:   Location of pain: low back bilateral  Character of pain: burning and constant down the legs. No numbness or tingling.   Pain radiation: radiates into the right buttocks, radiates into the right leg below the knee and radiates into the left buttocks and upper leg.  New numbness or weakness in legs, not attributed to pain: no   Intensity: Currently 7-8/10, At its worst 10/10  Progression of Symptoms: Up and down.  History:   Specific cause: none  Pain interferes with job: YES  History of back problems: recurrent self limited episodes of low back pain in the past, DJD.  Any previous MRI or X-rays: Yes- at [place].  Date 2018 for most recent at [place].  Sees a specialist for back pain: No  Alleviating factors:   Improved by: Staying still, heat. Extension improves symptoms.   Precipitating factors:  Worsened by: Lifting, Bending, Standing, Sitting, Lying Flat and Walking  Therapies tried and outcome: Ibuprofen during the day, 2 every 6 hours.  Heat.  Pain has been keeping her up at night.    Current Outpatient Medications   Medication Instructions     albuterol (PROAIR HFA/PROVENTIL HFA/VENTOLIN HFA) 108 (90 Base) MCG/ACT inhaler 2 puffs, Inhalation, EVERY 6 HOURS PRN     amitriptyline (ELAVIL) 50 MG tablet TAKE ONE TABLET BY MOUTH ONCE DAILY AT BEDTIME.     ipratropium - albuterol 0.5 mg/2.5 mg/3 mL (DUONEB) 0.5-2.5 (3) MG/3ML neb solution 3 mLs, Nebulization, EVERY 6 HOURS PRN     Multiple Vitamin (MULTIVITAMIN OR) 1 tablet, DAILY     PARoxetine (PAXIL) 10 MG tablet TAKE ONE TABLET BY MOUTH AT BEDTIME; with the 20 mg pill for a total of 30 mg daily     PARoxetine (PAXIL) 20 MG tablet TAKE ONE TABLET BY MOUTH EVERY NIGHT AT BEDTIME; with the 10 mg pill, for a total of 30 mg daily.       Patient Active Problem List  "  Diagnosis     Mitral valve disorder     Allergic rhinitis     Convulsions (H)     Esophageal reflux     Mild major depression (H)     Plantar fascial fibromatosis     Other affections of shoulder region, not elsewhere classified     Neoplasm of digestive system     Anxiety state     Breast cancer (H)     CARDIOVASCULAR SCREENING; LDL GOAL LESS THAN 160     Transaminase or LDH elevation     H/O: hysterectomy     Colon polyp     Drug reaction     Mild persistent asthma     Urgency incontinence     Advanced directives, counseling/discussion     DDD (degenerative disc disease), lumbar     DDD (degenerative disc disease), cervical     Dysphonia     Other dysphagia     Traumatic brain injury, without loss of consciousness, subsequent encounter     Fatigue, unspecified type     Seizure disorder (H)       Blood pressure 124/70, pulse 103, temperature 98.5  F (36.9  C), temperature source Tympanic, height 1.575 m (5' 2\"), weight 73 kg (161 lb), last menstrual period 10/17/2008, SpO2 99 %, not currently breastfeeding.    Exam:  GENERAL APPEARANCE: healthy, alert and no distress  MS: normal range of motion in forward bending and about 20 degrees back bending; and tender to palpation SI joints and the sciatic notches bilaterally.   NEURO: Normal strength and tone, sensory exam grossly normal, mentation intact and speech normal  PSYCH: mentation appears normal and affect normal/bright    (M51.36) DDD (degenerative disc disease), lumbar  (primary encounter diagnosis)  Comment:   Plan: PHYSICAL THERAPY REFERRAL        We discussed the issue and PT with modalities and traction is ordered. If not better then call our clinic RN at [phone]   And I will order the lumbar MRI without contrast. Modify activities and avoid repetitive bending and twisting and lifting.   Use the Tylenol and advil as discussed. Heat may relax the muscles.     Juan Iyer MD        "

## 2020-08-19 NOTE — PATIENT INSTRUCTIONS
Thank you for choosing [facility].  You may be receiving an email and/or telephone survey request from [facility] Customer Experience regarding your visit today.  Please take a few minutes to respond to the survey to let us know how we are doing.      If you have questions or concerns, please contact us via [employer] or you can contact your care team at [phone].    Our Clinic hours are:  Monday 6:40 am  to 7:00 pm  Tuesday -Friday 6:40 am to 5:00 pm    The [place] outpatient lab hours are:  Monday - Friday 6:10 am to 4:45 pm  Saturdays 7:00 am to 11:00 am  Appointments are required, call [phone]    If you have clinical questions after hours or would like to schedule an appointment,  call the clinic at [phone].    (M51.36) DDD (degenerative disc disease), lumbar  (primary encounter diagnosis)  Comment:   Plan: PHYSICAL THERAPY REFERRAL        We discussed the issue and PT with modalities and traction is ordered. If not better then call our clinic RN at [phone]   And I will order the lumbar MRI without contrast. Modify activities and avoid repetitive bending and twisting and lifting.   Use the Tylenol and advil as discussed. Heat may relax the muscles.

## 2020-08-20 ASSESSMENT — ASTHMA QUESTIONNAIRES: ACT_TOTALSCORE: 23

## 2020-08-21 ENCOUNTER — TELEPHONE (OUTPATIENT)
Dept: FAMILY MEDICINE | Facility: CLINIC | Age: 60
End: 2020-08-21

## 2020-08-21 DIAGNOSIS — M53.3 SI (SACROILIAC) JOINT DYSFUNCTION: Primary | ICD-10-CM

## 2020-08-21 NOTE — TELEPHONE ENCOUNTER
Reason for Call:  Other prescription    Detailed comments: Patient is calling asking if she can get a new belt for her hips she needs a order. sacroiliac lock belt   Please sent to the medical supply store at [facility].  Phone Number Patient can be reached at: Home number on file [phone] (home)    Best Time: any    Can we leave a detailed message on this number? YES    Call taken on 8/21/2020 at 10:54 AM by Myra Oneill

## 2020-10-14 ENCOUNTER — PATIENT OUTREACH (OUTPATIENT)
Dept: CARE COORDINATION | Facility: CLINIC | Age: 60
End: 2020-10-14

## 2020-10-14 NOTE — PROGRESS NOTES
Clinic Care Coordination Contact  [facility]/Voicemail    CHW Outreach attempted x 1.  Left message on patient's voicemail with call back information and requested return call.  Plan: CHW will try to reach patient again in 3-5 business days.      Milena OLIVIER Community Health Worker  [facility] Care Coordination  [facility] [facility]  Phone: [phone]

## 2020-10-15 ENCOUNTER — PATIENT OUTREACH (OUTPATIENT)
Dept: CARE COORDINATION | Facility: CLINIC | Age: 60
End: 2020-10-15

## 2020-10-15 NOTE — PROGRESS NOTES
Clinic Care Coordination Contact    Assessment: Community Health Worker contacted patient for 2 month follow up on 10/14/20. Voicemail was left for patient encouraging call back if any questions or concerns were present.  Per chart review, patient has continued to follow the plan of care and assessment is negative for any new needs or concerns.    Enrollment status: Graduated.      Plan: No further outreaches at this time.  Patient will continue to follow the plan of care.  If new needs arise a new Care Coordination referral may be placed.     REG Gaines, RN   [facility]  - Clinic Care Coordinator  Phone: [phone]

## 2021-01-13 DIAGNOSIS — F41.1 GENERALIZED ANXIETY DISORDER: ICD-10-CM

## 2021-01-13 DIAGNOSIS — G47.00 INSOMNIA, UNSPECIFIED TYPE: ICD-10-CM

## 2021-01-13 NOTE — TELEPHONE ENCOUNTER
"Requested Prescriptions   Pending Prescriptions Disp Refills     amitriptyline (ELAVIL) 50 MG tablet [Pharmacy Med Name: AMITRIPTYLINE HCL 50MG TABS] 90 tablet 1     Sig: TAKE ONE TABLET BY MOUTH AT BEDTIME       Tricyclic Agents ( Annual appt and no PHQ9) Passed - 1/13/2021  8:32 AM        Passed - Blood Pressure under 140/90 in past 12 mos     BP Readings from Last 3 Encounters:   08/19/20 124/70   06/23/20 115/73   09/24/19 117/57                 Passed - Recent (12 mo) or future (30 days) visit within authorizing provider's specialty     Patient has had an office visit with the authorizing provider or a provider within the authorizing providers department within the previous 12 mos or has a future within next 30 days. See \"Patient Info\" tab in inbasket, or \"Choose Columns\" in Meds & Orders section of the refill encounter.              Passed - Medication is active on med list        Passed - Patient is age 18 or older        Passed - Patient is not pregnant        Passed - No positive pregnancy test on record in past 12 mos             "

## 2021-01-15 ENCOUNTER — HEALTH MAINTENANCE LETTER (OUTPATIENT)
Age: 61
End: 2021-01-15

## 2021-01-15 RX ORDER — AMITRIPTYLINE HYDROCHLORIDE 50 MG/1
TABLET ORAL
Qty: 90 TABLET | Refills: 1 | OUTPATIENT
Start: 2021-01-15

## 2021-01-15 NOTE — TELEPHONE ENCOUNTER
Routing refill request to provider for review/approval because: Drug interaction warning HIGH    Zoë SUAREZ RN, BSN

## 2021-01-19 NOTE — TELEPHONE ENCOUNTER
Left message on answering machine for patient to call back. Needs appt to discuss refills.Keesha Rosado RN

## 2021-01-19 NOTE — TELEPHONE ENCOUNTER
Patient returned call to clinic, she was able to schedule telephone visit for Thursday 01/21. Requesting refill until visit.       Maribel PIMENTEL  Station

## 2021-01-21 ENCOUNTER — VIRTUAL VISIT (OUTPATIENT)
Dept: FAMILY MEDICINE | Facility: CLINIC | Age: 61
End: 2021-01-21
Payer: COMMERCIAL

## 2021-01-21 DIAGNOSIS — J45.40 MODERATE PERSISTENT ASTHMA WITHOUT COMPLICATION: ICD-10-CM

## 2021-01-21 DIAGNOSIS — F33.1 MODERATE RECURRENT MAJOR DEPRESSION (H): ICD-10-CM

## 2021-01-21 DIAGNOSIS — F41.1 GENERALIZED ANXIETY DISORDER: ICD-10-CM

## 2021-01-21 DIAGNOSIS — G47.00 INSOMNIA, UNSPECIFIED TYPE: ICD-10-CM

## 2021-01-21 PROCEDURE — 99214 OFFICE O/P EST MOD 30 MIN: CPT | Mod: TEL | Performed by: FAMILY MEDICINE

## 2021-01-21 RX ORDER — ALBUTEROL SULFATE 90 UG/1
2 AEROSOL, METERED RESPIRATORY (INHALATION) EVERY 4 HOURS PRN
Qty: 1 INHALER | Refills: 6 | Status: SHIPPED | OUTPATIENT
Start: 2021-01-21 | End: 2022-04-05

## 2021-01-21 RX ORDER — AMITRIPTYLINE HYDROCHLORIDE 50 MG/1
TABLET ORAL
Qty: 135 TABLET | Refills: 3 | Status: SHIPPED | OUTPATIENT
Start: 2021-01-21 | End: 2021-07-26 | Stop reason: ALTCHOICE

## 2021-01-21 RX ORDER — PAROXETINE 10 MG/1
TABLET, FILM COATED ORAL
Qty: 90 TABLET | Refills: 3 | Status: SHIPPED | OUTPATIENT
Start: 2021-01-21 | End: 2021-07-26 | Stop reason: ALTCHOICE

## 2021-01-21 RX ORDER — PAROXETINE 20 MG/1
TABLET, FILM COATED ORAL
Qty: 90 TABLET | Refills: 3 | Status: SHIPPED | OUTPATIENT
Start: 2021-01-21 | End: 2021-07-26 | Stop reason: ALTCHOICE

## 2021-01-21 ASSESSMENT — PAIN SCALES - GENERAL: PAINLEVEL: NO PAIN (0)

## 2021-01-21 NOTE — PROGRESS NOTES
HAILEY is a 60 year old who is being evaluated via a billable telephone visit.      What phone number would you like to be contacted at? [phone]  How would you like to obtain your AVS? Bertin Navas     HAILEY is a 60 year old who presents to clinic today for the following health issues     HPI       Anxiety Follow-Up    How are you doing with your anxiety since your last visit? No change    Are you having other symptoms that might be associated with anxiety? Yes:  lack of consentration and poor sleep    Have you had a significant life event? No      Are you feeling depressed? No    Do you have any concerns with your use of alcohol or other drugs? No    Social History     Tobacco Use     Smoking status: Never Smoker     Smokeless tobacco: Never Used   Substance Use Topics     Alcohol use: Yes     Comment: rarely-none for the past 12 weeks for wine, 11-14-18 visit.     Drug use: No     CLAY-7 SCORE 6/19/2018 11/14/2018 6/25/2020   Total Score - - -   Total Score 15 7 3     PHQ 6/19/2018 11/14/2018 6/25/2020   PHQ-9 Total Score 16 8 5   Q9: Thoughts of better off dead/self-harm past 2 weeks Not at all Not at all Not at all           How many servings of fruits and vegetables do you eat daily?  2-3    On average, how many sweetened beverages do you drink each day (Examples: soda, juice, sweet tea, etc.  Do NOT count diet or artificially sweetened beverages)?   0    How many days per week do you exercise enough to make your heart beat faster? 7    How many minutes a day do you exercise enough to make your heart beat faster? 30 - 60    How many days per week do you miss taking your medication? 0    Current Outpatient Medications   Medication Instructions     albuterol (PROAIR HFA/PROVENTIL HFA/VENTOLIN HFA) 108 (90 Base) MCG/ACT inhaler 2 puffs, Inhalation, EVERY 6 HOURS PRN     amitriptyline (ELAVIL) 50 MG tablet TAKE ONE TABLET BY MOUTH ONCE DAILY AT BEDTIME.     Multiple Vitamin (MULTIVITAMIN OR) 1 tablet,  DAILY     PARoxetine (PAXIL) 10 MG tablet TAKE ONE TABLET BY MOUTH AT BEDTIME; with the 20 mg pill for a total of 30 mg daily     PARoxetine (PAXIL) 20 MG tablet TAKE ONE TABLET BY MOUTH EVERY NIGHT AT BEDTIME; with the 10 mg pill, for a total of 30 mg daily.       Patient Active Problem List   Diagnosis     Mitral valve disorder     Allergic rhinitis     Convulsions (H)     Esophageal reflux     Mild major depression (H)     Plantar fascial fibromatosis     Other affections of shoulder region, not elsewhere classified     Neoplasm of digestive system     Anxiety state     Breast cancer (H)     CARDIOVASCULAR SCREENING; LDL GOAL LESS THAN 160     Transaminase or LDH elevation     H/O: hysterectomy     Colon polyp     Drug reaction     Mild persistent asthma     Urgency incontinence     Advanced directives, counseling/discussion     DDD (degenerative disc disease), lumbar     DDD (degenerative disc disease), cervical     Dysphonia     Other dysphagia     Traumatic brain injury, without loss of consciousness, subsequent encounter     Fatigue, unspecified type     Seizure disorder (H)       (J45.40) Moderate persistent asthma without complication  Comment:   Plan: albuterol (PROAIR HFA/PROVENTIL HFA/VENTOLIN         HFA) 108 (90 Base) MCG/ACT inhaler, Lipid panel        reflex to direct LDL Fasting        Use the inhaler as discussed and this is refilled for one year.   Identify triggers to avoid. Get the flu and Prevnar 13 soon. Use good hygiene.     (F41.1) Generalized anxiety disorder  Comment:   Plan: amitriptyline (ELAVIL) 50 MG tablet, PARoxetine        (PAXIL) 10 MG tablet, PARoxetine (PAXIL) 20 MG         tablet        Use the med daily as discussed. Avoid stimulants.   Exercise daily and refills are done.     (F33.1) Moderate recurrent major depression (H)  Comment:   Plan: PARoxetine (PAXIL) 10 MG tablet, PARoxetine         (PAXIL) 20 MG tablet        See above.     (G47.00) Insomnia, unspecified  type  Comment:   Plan: amitriptyline (ELAVIL) 50 MG tablet        Use the med at 75 mg daily and refills are done.   Use the non drug therapies as discussed.       Juan Iyer MD           Phone call duration: 23 minutes

## 2021-01-21 NOTE — PATIENT INSTRUCTIONS
(J45.40) Moderate persistent asthma without complication  Comment:   Plan: albuterol (PROAIR HFA/PROVENTIL HFA/VENTOLIN         HFA) 108 (90 Base) MCG/ACT inhaler, Lipid panel        reflex to direct LDL Fasting        Use the inhaler as discussed and this is refilled for one year.   Identify triggers to avoid. Get the flu and Prevnar 13 soon. Use good hygiene.     (F41.1) Generalized anxiety disorder  Comment:   Plan: amitriptyline (ELAVIL) 50 MG tablet, PARoxetine        (PAXIL) 10 MG tablet, PARoxetine (PAXIL) 20 MG         tablet        Use the med daily as discussed. Avoid stimulants.   Exercise daily and refills are done.     (F33.1) Moderate recurrent major depression (H)  Comment:   Plan: PARoxetine (PAXIL) 10 MG tablet, PARoxetine         (PAXIL) 20 MG tablet        See above.     (G47.00) Insomnia, unspecified type  Comment:   Plan: amitriptyline (ELAVIL) 50 MG tablet        Use the med at 75 mg daily and refills are done.   Use the non drug therapies as discussed.

## 2021-01-22 ENCOUNTER — TELEPHONE (OUTPATIENT)
Dept: FAMILY MEDICINE | Facility: CLINIC | Age: 61
End: 2021-01-22

## 2021-01-22 NOTE — TELEPHONE ENCOUNTER
Reason for call:    Symptom or request:     Patient called wanting to thank dr tellez for increasing amitriptyline dosing. She reports sleeping 14 hours and feeling refreshed.      Best Time:  any    Can we leave a detailed message on this number?  YES     Maribel PIMENTEL  Station

## 2021-01-31 ENCOUNTER — TELEPHONE (OUTPATIENT)
Dept: FAMILY MEDICINE | Facility: CLINIC | Age: 61
End: 2021-01-31

## 2021-01-31 NOTE — TELEPHONE ENCOUNTER
Prior Authorization Retail Medication Request    Medication/Dose: albuterol aer hfa  ICD code (if different than what is on RX):    Previously Tried and Failed:  Ipratropium-albuterol; arnuity ellipta; flovent hfa; albuterol inhaler;   Rationale:  Temporary fill letter received; patient has used since 2011    Insurance Name:  [employer] [phone]  Insurance ID:  [id number]      Pharmacy Information (if different than what is on RX)  Name:    Phone:

## 2021-02-01 NOTE — TELEPHONE ENCOUNTER
Central Prior Authorization Team   Phone: [phone]      [place] Initiation    Medication: albuterol aer hfa-Initiated  Insurance Company: Other (see comments)Comment:  [employer] [phone]  Pharmacy Filling the Rx: [facility] [place], [place] - [address]  Filling Pharmacy Phone: [phone]  Filling Pharmacy Fax:    Start Date: 2/1/2021

## 2021-02-02 NOTE — TELEPHONE ENCOUNTER
Prior Authorization Approval    Authorization Effective Date: 1/1/2021  Authorization Expiration Date: 2/2/2022  Medication: albuterol aer hfa-APPROVED  Approved Dose/Quantity:   Reference #:     Insurance Company: Other (see comments)Comment:  Prime [phone]  Expected CoPay:       CoPay Card Available:      Foundation Assistance Needed:    Which Pharmacy is filling the prescription (Not needed for infusion/clinic administered): [facility], [place] - [address] [place] [address]  Pharmacy Notified: Yes  Patient Notified: No    Pharmacy will notify patient when medication is ready.

## 2021-03-08 ENCOUNTER — ALLIED HEALTH/NURSE VISIT (OUTPATIENT)
Dept: FAMILY MEDICINE | Facility: CLINIC | Age: 61
End: 2021-03-08
Payer: COMMERCIAL

## 2021-03-08 DIAGNOSIS — Z23 NEED FOR PROPHYLACTIC VACCINATION AND INOCULATION AGAINST INFLUENZA: Primary | ICD-10-CM

## 2021-03-08 PROCEDURE — 99207 PR NO CHARGE NURSE ONLY: CPT

## 2021-03-08 PROCEDURE — 90682 RIV4 VACC RECOMBINANT DNA IM: CPT

## 2021-03-08 PROCEDURE — G0008 ADMIN INFLUENZA VIRUS VAC: HCPCS

## 2021-03-14 ENCOUNTER — HEALTH MAINTENANCE LETTER (OUTPATIENT)
Age: 61
End: 2021-03-14

## 2021-03-22 NOTE — TELEPHONE ENCOUNTER
"Requested Prescriptions   Pending Prescriptions Disp Refills     PARoxetine (PAXIL) 20 MG tablet [Pharmacy Med Name: PAROXETINE HCL 20MG TABS]  Last Written Prescription Date:  5/20/19  Last Fill Quantity: 90,  # refills: 2   Last Office Visit with G, P or [facility] prescribing provider:  5/31/19   Future Office Visit:      90 tablet 3     Sig: TAKE ONE TABLET BY MOUTH EVERY NIGHT AT BEDTIME       SSRIs Protocol Passed - 1/31/2020 11:24 AM        Passed - Recent (12 mo) or future (30 days) visit within the authorizing provider's specialty     Patient has had an office visit with the authorizing provider or a provider within the authorizing providers department within the previous 12 mos or has a future within next 30 days. See \"Patient Info\" tab in inbasket, or \"Choose Columns\" in Meds & Orders section of the refill encounter.              Passed - Medication is active on med list        Passed - Patient is age 18 or older        Passed - No active pregnancy on record        Passed - No positive pregnancy test in last 12 months          " No

## 2021-07-26 ENCOUNTER — OFFICE VISIT (OUTPATIENT)
Dept: FAMILY MEDICINE | Facility: CLINIC | Age: 61
End: 2021-07-26
Payer: COMMERCIAL

## 2021-07-26 VITALS
HEIGHT: 62 IN | SYSTOLIC BLOOD PRESSURE: 128 MMHG | HEART RATE: 80 BPM | OXYGEN SATURATION: 98 % | BODY MASS INDEX: 29.81 KG/M2 | DIASTOLIC BLOOD PRESSURE: 68 MMHG | WEIGHT: 162 LBS | TEMPERATURE: 97.3 F | RESPIRATION RATE: 16 BRPM

## 2021-07-26 DIAGNOSIS — J38.3 LESION OF VOCAL CORD: ICD-10-CM

## 2021-07-26 DIAGNOSIS — R13.14 PHARYNGOESOPHAGEAL DYSPHAGIA: Primary | ICD-10-CM

## 2021-07-26 DIAGNOSIS — F41.1 ANXIETY STATE: ICD-10-CM

## 2021-07-26 DIAGNOSIS — F33.1 MODERATE RECURRENT MAJOR DEPRESSION (H): ICD-10-CM

## 2021-07-26 PROCEDURE — 99214 OFFICE O/P EST MOD 30 MIN: CPT | Performed by: NURSE PRACTITIONER

## 2021-07-26 RX ORDER — CITALOPRAM HYDROBROMIDE 40 MG/1
40 TABLET ORAL DAILY
Qty: 30 TABLET | Refills: 1 | Status: SHIPPED | OUTPATIENT
Start: 2021-07-26 | End: 2022-04-05

## 2021-07-26 RX ORDER — AMITRIPTYLINE HYDROCHLORIDE 50 MG/1
TABLET ORAL
Qty: 135 TABLET | Refills: 3 | COMMUNITY
Start: 2021-07-26 | End: 2022-01-19

## 2021-07-26 RX ORDER — LACTOBACILLUS RHAMNOSUS GG 10B CELL
1 CAPSULE ORAL 2 TIMES DAILY
COMMUNITY
End: 2023-01-27

## 2021-07-26 ASSESSMENT — PATIENT HEALTH QUESTIONNAIRE - PHQ9
SUM OF ALL RESPONSES TO PHQ QUESTIONS 1-9: 12
5. POOR APPETITE OR OVEREATING: MORE THAN HALF THE DAYS

## 2021-07-26 ASSESSMENT — ANXIETY QUESTIONNAIRES
7. FEELING AFRAID AS IF SOMETHING AWFUL MIGHT HAPPEN: NOT AT ALL
2. NOT BEING ABLE TO STOP OR CONTROL WORRYING: NOT AT ALL
GAD7 TOTAL SCORE: 10
IF YOU CHECKED OFF ANY PROBLEMS ON THIS QUESTIONNAIRE, HOW DIFFICULT HAVE THESE PROBLEMS MADE IT FOR YOU TO DO YOUR WORK, TAKE CARE OF THINGS AT HOME, OR GET ALONG WITH OTHER PEOPLE: VERY DIFFICULT
3. WORRYING TOO MUCH ABOUT DIFFERENT THINGS: SEVERAL DAYS
1. FEELING NERVOUS, ANXIOUS, OR ON EDGE: MORE THAN HALF THE DAYS
5. BEING SO RESTLESS THAT IT IS HARD TO SIT STILL: NEARLY EVERY DAY
6. BECOMING EASILY ANNOYED OR IRRITABLE: MORE THAN HALF THE DAYS

## 2021-07-26 ASSESSMENT — MIFFLIN-ST. JEOR: SCORE: 1253.08

## 2021-07-26 NOTE — PATIENT INSTRUCTIONS
1.  Make appointment with ENT first for evaluation of vocal cord lesion.  2.  Then make an appointment with GI for further evaluation for esophageal clearing with food.  3.  Stop Paxil and start Celexa 40 mg daily.  You can try 1/2 tab if you want and increase to full tab after 1 week.  4.  Make appointment with counseling due to recent situational issues for coping to reduce anxiety and depression also.  5.  Exercise is good for anxiety and depression also, so take time for you.

## 2021-07-26 NOTE — PROGRESS NOTES
Assessment & Plan     Pharyngoesophageal dysphagia  Patient also has had abnormal swallow study with no improvement over the last couple years and feels that symptoms are worsening.  She has been changing her diet more to make swallowing easy.  Referral to GI for further evaluation of dysphagia and esophageal motility.  - Adult Gastro Ref - Consult Only; Future    Lesion of vocal cord  Due to hx of lesion on laryngoscope with no changes and worsening symptoms despite speech therapy exercises, ordered referral for second opinion evaluation.  - Otolaryngology Referral; Future    Anxiety state  Patient seems to have some twitching of her lips on Paxil and would like to change medications.  I will start her on citalopram 20 mg (1/2 tab) for 1 week and then increase to 40 mg if symptoms are not improved.  Discussed serotonin syndrome and to discontinue medication and follow-up in ER if this occurs due to being on amitriptyline as well.  Recommend follow-up in 1 month for recheck or sooner if not improving symptoms.  Referral to counseling also to help with coping during this time and recommended exercise to help reduce stress also.  - MENTAL HEALTH REFERRAL  - Adult; Outpatient Treatment; Individual/Couples/Family/Group Therapy/Health Psychology; [facility] [place]: [facility] [phone]; We will contact you to schedule the appointment or please call with any questions; Future  - citalopram (CELEXA) 40 MG tablet; Take 1 tablet (40 mg) by mouth daily    Moderate recurrent major depression (H)  See note above.    See Patient Instructions    Return in about 1 month (around 8/26/2021) for Follow up.    Alba Martinez NP  [facility] RADHA Galloway is a 61 year old who presents for the following health issues     HPI     Depression and Anxiety Follow-Up    How are you doing with your depression since your last visit? Worsened     How are you doing with your anxiety since your last  visit?  Worsened     Are you having other symptoms that might be associated with depression or anxiety? No    Have you had a significant life event? Housing Concerns; patient is getting house ready to go up for sale     Do ou have any concerns with your use of alcohol or other drugs? No     Denies any suicidal thoughts or thoughts of hurting others.    Social History     Tobacco Use     Smoking status: Never Smoker     Smokeless tobacco: Never Used   Substance Use Topics     Alcohol use: Yes     Comment: rarely-none for the past 12 weeks for wine, 11-14-18 visit.     Drug use: No     PHQ 6/25/2020 1/21/2021 7/26/2021   PHQ-9 Total Score 5 8 12   Q9: Thoughts of better off dead/self-harm past 2 weeks Not at all Not at all Not at all     CLAY-7 SCORE 6/25/2020 1/21/2021 7/26/2021   Total Score - - -   Total Score 3 11 10     Last PHQ-9 7/26/2021   1.  Little interest or pleasure in doing things 1   2.  Feeling down, depressed, or hopeless 0   3.  Trouble falling or staying asleep, or sleeping too much 3   4.  Feeling tired or having little energy 2   5.  Poor appetite or overeating 3   6.  Feeling bad about yourself 0   7.  Trouble concentrating 3   8.  Moving slowly or restless 0   Q9: Thoughts of better off dead/self-harm past 2 weeks 0   PHQ-9 Total Score 12   Difficulty at work, home, or with people Very difficult     CLAY-7  7/26/2021   1. Feeling nervous, anxious, or on edge 2   2. Not being able to stop or control worrying 0   3. Worrying too much about different things 1   4. Trouble relaxing 2   5. Being so restless that it is hard to sit still 3   6. Becoming easily annoyed or irritable 2   7. Feeling afraid, as if something awful might happen 0   CLAY-7 Total Score 10   If you checked any problems, how difficult have they made it for you to do your work, take care of things at home, or get along with other people? Very difficult     Asthma Follow-Up    Was ACT completed today?    Yes    ACT Total Scores  7/26/2021   ACT TOTAL SCORE -   ASTHMA ER VISITS -   ASTHMA HOSPITALIZATIONS -   ACT TOTAL SCORE (Goal Greater than or Equal to 20) 25   In the past 12 months, how many times did you visit the emergency room for your asthma without being admitted to the hospital? 0   In the past 12 months, how many times were you hospitalized overnight because of your asthma? 0          How many days per week do you miss taking your asthma controller medication?  0    Please describe any recent triggers for your asthma: None    Have you had any Emergency Room Visits, Urgent Care Visits, or Hospital Admissions since your last office visit?  No      How many servings of fruits and vegetables do you eat daily?  2-3    On average, how many sweetened beverages do you drink each day (Examples: soda, juice, sweet tea, etc.  Do NOT count diet or artificially sweetened beverages)?   0    How many days per week do you exercise enough to make your heart beat faster? 4    How many minutes a day do you exercise enough to make your heart beat faster? 20 - 29    How many days per week do you miss taking your medication? 0    Patient is requesting referral.  She underwent video swallow study in 2018 for hoarse voice and trouble getting food swallowed.  Swallow study showed poor clearing of the esophagus with the primary peristaltic wave.  Patient saw speech therapy and this was not helpful.  She has just not been back to follow up.  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  She continues having issues swallowing food initially to get it to go down, coughing persistently and has horse voice.    Review of Systems   CONSTITUTIONAL: NEGATIVE for fever, chills, change in weight  ENT/MOUTH: POSITIVE for hoarse voice  RESP: NEGATIVE for significant cough or SOB  CV: NEGATIVE for chest pain, palpitations or peripheral edema  PSYCHIATRIC: POSITIVE for Hx  "anxiety, Hx depression and increase in anxiety and depression due to current situation at home  ROS otherwise negative      Objective    /68 (BP Location: Right arm, Patient Position: Sitting, Cuff Size: Adult Large)   Pulse 80   Temp 97.3  F (36.3  C) (Tympanic)   Resp 16   Ht 1.575 m (5' 2\")   Wt 73.5 kg (162 lb)   LMP 10/17/2008   SpO2 98%   Breastfeeding No   BMI 29.63 kg/m    Body mass index is 29.63 kg/m .  Physical Exam   GENERAL: healthy, alert and no distress  HENT: oropharynx clear and oral mucous membranes moist  NECK: no adenopathy, no asymmetry, masses, or scars and thyroid normal to palpation  RESP: lungs clear to auscultation - no rales, rhonchi or wheezes  CV: regular rate and rhythm, normal S1 S2, no S3 or S4, no murmur, click or rub, no peripheral edema and peripheral pulses strong  PSYCH: mentation appears normal, affect normal/bright        "

## 2021-07-27 ASSESSMENT — ASTHMA QUESTIONNAIRES: ACT_TOTALSCORE: 25

## 2021-07-27 ASSESSMENT — ANXIETY QUESTIONNAIRES: GAD7 TOTAL SCORE: 10

## 2021-08-13 NOTE — PROGRESS NOTES
Chief Complaint   Patient presents with     Ent Problem     history of voice change since 2018 - went to speech therapy and told to do exercises without any relief-  today c/o chronic cough and sound hoarse- some difficulty swallowing     History of Present Illness  Brittany Barajas is a 61 year old female who presents today for evaluation.  The patient was previously seen on 12/3/2018 and diagnosed with a right infraglottic true vocal fold lesion/cyst by Dr. Hudson.     The patient reports a history of persistent dysphonia worse over the past several months.  She has the past history of a right true vocal fold lesion.  Reports intermittent trouble swallowing and has had to alter her diet.  She does intermittently have pain with swallowing.  She also has some discomfort with voice use.  She denies any otalgia, hemoptysis, neck lumps/bumps/swelling, or unintentional weight loss.  The patient denies any recent intubations or throat procedures.  The patient is a lifetime non-smoker. The patient notes a significant history of reflux symptoms almost daily basis, usually worse at night when lying down.  She is on a twice daily proton pump inhibitor without control of symptoms.  She does carefully watch her diet and avoids eating 3 to 4 hours prior to bedtime..  She does use an albuterol inhaler occasionally.  She has an appointment to see gastroenterology in the near future..    Past Medical History  Patient Active Problem List   Diagnosis     Mitral valve disorder     Allergic rhinitis     Convulsions (H)     Esophageal reflux     Mild major depression (H)     Plantar fascial fibromatosis     Other affections of shoulder region, not elsewhere classified     Neoplasm of digestive system     Anxiety state     Breast cancer (H)     CARDIOVASCULAR SCREENING; LDL GOAL LESS THAN 160     Transaminase or LDH elevation     H/O: hysterectomy     Colon polyp     Drug reaction     Mild persistent asthma     Urgency  incontinence     Advanced directives, counseling/discussion     DDD (degenerative disc disease), lumbar     DDD (degenerative disc disease), cervical     Dysphonia     Other dysphagia     Traumatic brain injury, without loss of consciousness, subsequent encounter     Fatigue, unspecified type     Seizure disorder (H)     Current Medications     Current Outpatient Medications:      albuterol (PROAIR HFA/PROVENTIL HFA/VENTOLIN HFA) 108 (90 Base) MCG/ACT inhaler, Inhale 2 puffs into the lungs every 4 hours as needed for shortness of breath / dyspnea, Disp: 1 Inhaler, Rfl: 6     amitriptyline (ELAVIL) 50 MG tablet, TAKE 1.5 pills, or 75 mg,  DAILY AT BEDTIME., Disp: 135 tablet, Rfl: 3     citalopram (CELEXA) 40 MG tablet, Take 1 tablet (40 mg) by mouth daily, Disp: 30 tablet, Rfl: 1     famotidine (PEPCID) 40 MG tablet, Take 1 tablet (40 mg) by mouth At Bedtime, Disp: 90 tablet, Rfl: 1     lactobacillus rhamnosus, GG, (CULTURELL) capsule, Take 1 capsule by mouth 2 times daily, Disp: , Rfl:      Multiple Vitamin (MULTIVITAMIN OR), Take 1 tablet by mouth daily., Disp: , Rfl:      pantoprazole (PROTONIX) 40 MG EC tablet, Take 1 tablet (40 mg) by mouth daily, Disp: 90 tablet, Rfl: 1     sucralfate (CARAFATE) 1 GM tablet, Take 1 tablet (1 g) by mouth 4 times daily as needed (Heartburn), Disp: 30 tablet, Rfl: 1    Allergies  Allergies   Allergen Reactions     Docetaxel Other (See Comments)     Chest tightness, black out       Social History   Social History     Socioeconomic History     Marital status: Single     Spouse name: Not on file     Number of children: Not on file     Years of education: Not on file     Highest education level: Not on file   Occupational History     Not on file   Tobacco Use     Smoking status: Never Smoker     Smokeless tobacco: Never Used   Substance and Sexual Activity     Alcohol use: Yes     Comment: rarely-none for the past 12 weeks for wine, 11-14-18 visit.     Drug use: No     Sexual  activity: Not Currently   Other Topics Concern     Parent/sibling w/ CABG, MI or angioplasty before 65F 55M? Yes     Comment: brother triple CABG in his 50s      Service No     Blood Transfusions No     Caffeine Concern No     Occupational Exposure Yes     Comment: maybe     Hobby Hazards No     Sleep Concern No     Stress Concern No     Weight Concern No     Special Diet Yes     Comment: less meat     Back Care Yes     Comment: low back  S-I joint     Exercise Yes     Comment: floor exercises     Bike Helmet Yes     Seat Belt Yes     Self-Exams Yes   Social History Narrative     Not on file     Social Determinants of Health     Financial Resource Strain:      Difficulty of Paying Living Expenses:    Food Insecurity:      Worried About Running Out of Food in the Last Year:      Ran Out of Food in the Last Year:    Transportation Needs:      Lack of Transportation (Medical):      Lack of Transportation (Non-Medical):    Physical Activity:      Days of Exercise per Week:      Minutes of Exercise per Session:    Stress:      Feeling of Stress :    Social Connections:      Frequency of Communication with Friends and Family:      Frequency of Social Gatherings with Friends and Family:      Attends Anabaptism Services:      Active Member of Clubs or Organizations:      Attends Club or Organization Meetings:      Marital Status:    Intimate Partner Violence:      Fear of Current or Ex-Partner:      Emotionally Abused:      Physically Abused:      Sexually Abused:        Family History  Family History   Problem Relation Age of Onset     C.A.D. Mother      Diabetes Mother      Hypertension Mother      Cerebrovascular Disease Mother      Breast Cancer Mother      Allergies Mother      Arthritis Mother      Cancer Mother      Eye Disorder Mother      Gastrointestinal Disease Mother      Gynecology Mother      Lipids Mother      Heart Disease Mother      Osteoporosis Mother      Obesity Mother      Thyroid Disease Mother       Respiratory Mother      Alcohol/Drug Father      Cancer Father      Alcohol/Drug Brother      Musculoskeletal Disorder Brother         spina bifida-recent dx     C.A.D. Brother      Thyroid Disease Sister      Lipids Sister      Gynecology Sister         fibroids/ s/p hysterectomy     Other - See Comments Sister         hysterectomy, dilation of the throat.     Thyroid Disease Brother      Heart Disease Brother         DOUBLE BYPASS     Lipids Brother      Thyroid Disease Sister      Lipids Sister      Other - See Comments Sister         hysterectomy     Gastrointestinal Disease Sister         liver function off     Thyroid Disease Sister      Thyroid Disease Sister      Heart Disease Sister      Cancer Other         thyroid cancer     Cancer - colorectal No family hx of        Review of Systems  As per HPI and PMHx, otherwise 10+ comprehensive system review is negative.    Physical Exam  /85 (BP Location: Right arm, Patient Position: Chair, Cuff Size: Adult Regular)   Pulse 99   Temp 97.7  F (36.5  C) (Tympanic)   Wt 73.5 kg (162 lb)   LMP 10/17/2008   BMI 29.63 kg/m    GENERAL: Patient is a pleasant, cooperative 61 year old female in no acute distress.  HEAD: Normocephalic, atraumatic.  Hair and scalp are normal.  EYES: Pupils are equal, round, reactive to light and accommodation.  Extraocular movements are intact.  The sclera nonicteric without injection.  The extraocular structures are normal.  EARS: Normal shape and symmetry.  No tenderness when palpating the mastoid or tragal areas bilaterally.  Otoscopic exam reveals a minimal amount of cerumen bilaterally.  The bilateral tympanic membranes are round, intact without evidence of effusion, good landmarks.  No retraction, granulation, or drainage.  NOSE: Nares are patent.  Nasal mucosa is again moist.  Negative anterior rhinoscopy.  ORAL CAVITY: Dentition is in good repair.  Mucous membranes are moist.  Tongue is mobile, protrudes to the midline.   Palate elevates symmetrically.  Tonsils are 2+.  No erythema or exudate.  No oral cavity or oropharyngeal masses, lesions, ulcerations, leukoplakia.  NECK: Supple, trachea is midline.  There no palpable cervical lymphadenopathy or masses bilaterally.  Palpation of the bilateral parotid and submandibular areas reveal no masses.  No thyromegaly.    NEUROLOGIC: Cranial nerves II through XII are grossly intact.  The patients voice is hoarse and raspy with intermittent vocal breaks.  Patient is House-Brackmann I/VI bilaterally.  CARDIOVASCULAR: Extremities are warm and well-perfused.  No significant peripheral edema.  RESPIRATORY: Patient has nonlabored breathing without cough, wheeze, stridor.  PSYCHIATRIC: Patient is alert and oriented.  Mood and affect appear normal.  SKIN: Warm and dry.  No scalp, face, or neck lesions noted.    Procedure: Flexible Laryngoscopy  Indication: Dysphonia    To best visualize the upper airway anatomy and due to the chief complaint and HPI, I proceeded with flexible fiberoptic laryngoscopy examination.  The bilateral nasal cavities were anesthetized and decongested with a mixture of lidocaine and neosynephrine.  The bilateral nasal cavities were examined using a flexible fiberoptic laryngoscope.  There were no nasal cavity masses, polyps, or mucopurulence bilaterally.  The nasal septum is essentially midline.  The nasopharynx had a normal appearance with normal Eustachian tube openings and fossa of Rosenmuller bilaterally.  Minimal adenoid tissue.  There is some posterior oropharyngeal cobblestoning that does extend down to the piriforms.  The base of tongue, vallecula, epiglottis, aryepiglottic folds, arytenoids, and piriform sinuses were without mass or lesion.  There is a moderate amount of interarytenoid thickening and a mild amount of erythema.  The bilateral true vocal folds were symmetrically mobile.  At the inferior margin of the right true vocal fold roughly mid fold is an  infraglottic fullness that is likely consistent with the cystic lesion.  No vocal fold nodules or masses. The visualized portions of the infraglottic and subglottic airway are unremarkable.  The scope was removed.  The patient tolerated the procedure well.                Assessment and Plan    ICD-10-CM    1. Muscle tension dysphonia  R49.0 LARYNGOSCOPY FLEX FIBEROPTIC, DIAGNOSTIC     Speech Therapy Referral     XR Video Swallow with SLP or OT - Order with Speech Therapy Referral     pantoprazole (PROTONIX) 40 MG EC tablet     XR Esophagram     famotidine (PEPCID) 40 MG tablet     sucralfate (CARAFATE) 1 GM tablet   2. Laryngopharyngeal reflux  K21.9 LARYNGOSCOPY FLEX FIBEROPTIC, DIAGNOSTIC     Speech Therapy Referral     XR Video Swallow with SLP or OT - Order with Speech Therapy Referral     pantoprazole (PROTONIX) 40 MG EC tablet     XR Esophagram     famotidine (PEPCID) 40 MG tablet     sucralfate (CARAFATE) 1 GM tablet   3. Gastroesophageal reflux disease, unspecified whether esophagitis present  K21.9 LARYNGOSCOPY FLEX FIBEROPTIC, DIAGNOSTIC     Speech Therapy Referral     XR Video Swallow with SLP or OT - Order with Speech Therapy Referral     pantoprazole (PROTONIX) 40 MG EC tablet     XR Esophagram     famotidine (PEPCID) 40 MG tablet     sucralfate (CARAFATE) 1 GM tablet   4. Lesion of vocal fold  J38.3 LARYNGOSCOPY FLEX FIBEROPTIC, DIAGNOSTIC     Speech Therapy Referral     XR Video Swallow with SLP or OT - Order with Speech Therapy Referral     pantoprazole (PROTONIX) 40 MG EC tablet     XR Esophagram     famotidine (PEPCID) 40 MG tablet     sucralfate (CARAFATE) 1 GM tablet   5. Dysphagia, unspecified type  R13.10 LARYNGOSCOPY FLEX FIBEROPTIC, DIAGNOSTIC     Speech Therapy Referral     XR Video Swallow with SLP or OT - Order with Speech Therapy Referral     pantoprazole (PROTONIX) 40 MG EC tablet     XR Esophagram     famotidine (PEPCID) 40 MG tablet     sucralfate (CARAFATE) 1 GM tablet      It was my  pleasure seeing Brittany Lori Melissamikeybrent today in clinic.  The patient presented today with dysphonia on examination.  She has endoscopic findings consistent with muscle tension dysphonia.  She does have a small mid right true vocal fold infraglottic fullness that is likely consistent with a small cyst.  This does not appear to inhibit glottic closure.  She has endoscopic findings consistent with laryngopharyngeal reflux.  She has no significant signs of inflammation or infection on examination.  I think we will transition her to Protonix 40 mg once daily in the morning 20-30 minutes prior to morning meal.  We discussed Pepcid 40 mg at bedtime.  We discussed not eating or drinking anything 3 to 4 hours prior to bedtime.  We also discussed using Carafate as needed for breakthrough symptoms.      Given her swallowing concerns and long history of reflux, I think that we should repeat her video swallow study and esophagram.  I will contact her with the results when available.  She might also benefit from speech therapy to help with her voice/dysphonia.  We will see what the swallow study and esophagram shows.  I would recommend that she return in 6 weeks for follow-up.  She also has an appointment with GI this fall which I recommend that she keep as they will likely consider endoscopy given her long reflux history.      David Shine MD  Department of Otolarygology-Head and Neck Surgery  [facility]

## 2021-08-16 ENCOUNTER — OFFICE VISIT (OUTPATIENT)
Dept: OTOLARYNGOLOGY | Facility: CLINIC | Age: 61
End: 2021-08-16
Payer: COMMERCIAL

## 2021-08-16 VITALS
HEART RATE: 99 BPM | TEMPERATURE: 97.7 F | DIASTOLIC BLOOD PRESSURE: 85 MMHG | SYSTOLIC BLOOD PRESSURE: 127 MMHG | WEIGHT: 162 LBS | BODY MASS INDEX: 29.63 KG/M2

## 2021-08-16 DIAGNOSIS — R13.10 DYSPHAGIA, UNSPECIFIED TYPE: ICD-10-CM

## 2021-08-16 DIAGNOSIS — K21.9 LARYNGOPHARYNGEAL REFLUX: ICD-10-CM

## 2021-08-16 DIAGNOSIS — K21.9 GASTROESOPHAGEAL REFLUX DISEASE, UNSPECIFIED WHETHER ESOPHAGITIS PRESENT: ICD-10-CM

## 2021-08-16 DIAGNOSIS — R49.0 MUSCLE TENSION DYSPHONIA: Primary | ICD-10-CM

## 2021-08-16 DIAGNOSIS — J38.3 LESION OF VOCAL FOLD: ICD-10-CM

## 2021-08-16 PROCEDURE — 31575 DIAGNOSTIC LARYNGOSCOPY: CPT | Performed by: OTOLARYNGOLOGY

## 2021-08-16 PROCEDURE — 99203 OFFICE O/P NEW LOW 30 MIN: CPT | Mod: 25 | Performed by: OTOLARYNGOLOGY

## 2021-08-16 RX ORDER — SUCRALFATE 1 G/1
1 TABLET ORAL 4 TIMES DAILY PRN
Qty: 30 TABLET | Refills: 1 | Status: SHIPPED | OUTPATIENT
Start: 2021-08-16 | End: 2022-06-06

## 2021-08-16 RX ORDER — PANTOPRAZOLE SODIUM 40 MG/1
40 TABLET, DELAYED RELEASE ORAL DAILY
Qty: 90 TABLET | Refills: 1 | Status: SHIPPED | OUTPATIENT
Start: 2021-08-16 | End: 2022-06-06

## 2021-08-16 RX ORDER — FAMOTIDINE 40 MG/1
40 TABLET, FILM COATED ORAL AT BEDTIME
Qty: 90 TABLET | Refills: 1 | Status: SHIPPED | OUTPATIENT
Start: 2021-08-16 | End: 2022-02-09

## 2021-08-16 ASSESSMENT — PAIN SCALES - GENERAL: PAINLEVEL: MODERATE PAIN (4)

## 2021-08-16 NOTE — NURSING NOTE
"Initial /85 (BP Location: Right arm, Patient Position: Chair, Cuff Size: Adult Regular)   Pulse 99   Temp 97.7  F (36.5  C) (Tympanic)   Wt 73.5 kg (162 lb)   LMP 10/17/2008   BMI 29.63 kg/m   Estimated body mass index is 29.63 kg/m  as calculated from the following:    Height as of 7/26/21: 1.575 m (5' 2\").    Weight as of this encounter: 73.5 kg (162 lb). .    Velma Shepherd LPN    "

## 2021-08-16 NOTE — LETTER
8/16/2021         RE: Brittany Barajas  [address]  [place] [address]        Dear Colleague,    Thank you for referring your patient, Brittany Barajas, to the [facility]. Please see a copy of my visit note below.    Chief Complaint   Patient presents with     Ent Problem     history of voice change since 2018 - went to speech therapy and told to do exercises without any relief-  today c/o chronic cough and sound hoarse- some difficulty swallowing     History of Present Illness  Brittany Barajas is a 61 year old female who presents today for evaluation.  The patient was previously seen on 12/3/2018 and diagnosed with a right infraglottic true vocal fold lesion/cyst by Dr. Hudson.     The patient reports a history of persistent dysphonia worse over the past several months.  She has the past history of a right true vocal fold lesion.  Reports intermittent trouble swallowing and has had to alter her diet.  She does intermittently have pain with swallowing.  She also has some discomfort with voice use.  She denies any otalgia, hemoptysis, neck lumps/bumps/swelling, or unintentional weight loss.  The patient denies any recent intubations or throat procedures.  The patient is a lifetime non-smoker. The patient notes a significant history of reflux symptoms almost daily basis, usually worse at night when lying down.  She is on a twice daily proton pump inhibitor without control of symptoms.  She does carefully watch her diet and avoids eating 3 to 4 hours prior to bedtime..  She does use an albuterol inhaler occasionally.  She has an appointment to see gastroenterology in the near future..    Past Medical History  Patient Active Problem List   Diagnosis     Mitral valve disorder     Allergic rhinitis     Convulsions (H)     Esophageal reflux     Mild major depression (H)     Plantar fascial fibromatosis     Other affections of shoulder region, not elsewhere classified      Neoplasm of digestive system     Anxiety state     Breast cancer (H)     CARDIOVASCULAR SCREENING; LDL GOAL LESS THAN 160     Transaminase or LDH elevation     H/O: hysterectomy     Colon polyp     Drug reaction     Mild persistent asthma     Urgency incontinence     Advanced directives, counseling/discussion     DDD (degenerative disc disease), lumbar     DDD (degenerative disc disease), cervical     Dysphonia     Other dysphagia     Traumatic brain injury, without loss of consciousness, subsequent encounter     Fatigue, unspecified type     Seizure disorder (H)     Current Medications     Current Outpatient Medications:      albuterol (PROAIR HFA/PROVENTIL HFA/VENTOLIN HFA) 108 (90 Base) MCG/ACT inhaler, Inhale 2 puffs into the lungs every 4 hours as needed for shortness of breath / dyspnea, Disp: 1 Inhaler, Rfl: 6     amitriptyline (ELAVIL) 50 MG tablet, TAKE 1.5 pills, or 75 mg,  DAILY AT BEDTIME., Disp: 135 tablet, Rfl: 3     citalopram (CELEXA) 40 MG tablet, Take 1 tablet (40 mg) by mouth daily, Disp: 30 tablet, Rfl: 1     famotidine (PEPCID) 40 MG tablet, Take 1 tablet (40 mg) by mouth At Bedtime, Disp: 90 tablet, Rfl: 1     lactobacillus rhamnosus, GG, (CULTURELL) capsule, Take 1 capsule by mouth 2 times daily, Disp: , Rfl:      Multiple Vitamin (MULTIVITAMIN OR), Take 1 tablet by mouth daily., Disp: , Rfl:      pantoprazole (PROTONIX) 40 MG EC tablet, Take 1 tablet (40 mg) by mouth daily, Disp: 90 tablet, Rfl: 1     sucralfate (CARAFATE) 1 GM tablet, Take 1 tablet (1 g) by mouth 4 times daily as needed (Heartburn), Disp: 30 tablet, Rfl: 1    Allergies  Allergies   Allergen Reactions     Docetaxel Other (See Comments)     Chest tightness, black out       Social History   Social History     Socioeconomic History     Marital status: Single     Spouse name: Not on file     Number of children: Not on file     Years of education: Not on file     Highest education level: Not on file   Occupational History     Not  on file   Tobacco Use     Smoking status: Never Smoker     Smokeless tobacco: Never Used   Substance and Sexual Activity     Alcohol use: Yes     Comment: rarely-none for the past 12 weeks for wine, 11-14-18 visit.     Drug use: No     Sexual activity: Not Currently   Other Topics Concern     Parent/sibling w/ CABG, MI or angioplasty before 65F 55M? Yes     Comment: brother triple CABG in his 50s      Service No     Blood Transfusions No     Caffeine Concern No     Occupational Exposure Yes     Comment: maybe     Hobby Hazards No     Sleep Concern No     Stress Concern No     Weight Concern No     Special Diet Yes     Comment: less meat     Back Care Yes     Comment: low back  S-I joint     Exercise Yes     Comment: floor exercises     Bike Helmet Yes     Seat Belt Yes     Self-Exams Yes   Social History Narrative     Not on file     Social Determinants of Health     Financial Resource Strain:      Difficulty of Paying Living Expenses:    Food Insecurity:      Worried About Running Out of Food in the Last Year:      Ran Out of Food in the Last Year:    Transportation Needs:      Lack of Transportation (Medical):      Lack of Transportation (Non-Medical):    Physical Activity:      Days of Exercise per Week:      Minutes of Exercise per Session:    Stress:      Feeling of Stress :    Social Connections:      Frequency of Communication with Friends and Family:      Frequency of Social Gatherings with Friends and Family:      Attends Islam Services:      Active Member of Clubs or Organizations:      Attends Club or Organization Meetings:      Marital Status:    Intimate Partner Violence:      Fear of Current or Ex-Partner:      Emotionally Abused:      Physically Abused:      Sexually Abused:        Family History  Family History   Problem Relation Age of Onset     C.A.D. Mother      Diabetes Mother      Hypertension Mother      Cerebrovascular Disease Mother      Breast Cancer Mother      Allergies Mother       Arthritis Mother      Cancer Mother      Eye Disorder Mother      Gastrointestinal Disease Mother      Gynecology Mother      Lipids Mother      Heart Disease Mother      Osteoporosis Mother      Obesity Mother      Thyroid Disease Mother      Respiratory Mother      Alcohol/Drug Father      Cancer Father      Alcohol/Drug Brother      Musculoskeletal Disorder Brother         spina bifida-recent dx     C.A.D. Brother      Thyroid Disease Sister      Lipids Sister      Gynecology Sister         fibroids/ s/p hysterectomy     Other - See Comments Sister         hysterectomy, dilation of the throat.     Thyroid Disease Brother      Heart Disease Brother         DOUBLE BYPASS     Lipids Brother      Thyroid Disease Sister      Lipids Sister      Other - See Comments Sister         hysterectomy     Gastrointestinal Disease Sister         liver function off     Thyroid Disease Sister      Thyroid Disease Sister      Heart Disease Sister      Cancer Other         thyroid cancer     Cancer - colorectal No family hx of        Review of Systems  As per HPI and PMHx, otherwise 10+ comprehensive system review is negative.    Physical Exam  /85 (BP Location: Right arm, Patient Position: Chair, Cuff Size: Adult Regular)   Pulse 99   Temp 97.7  F (36.5  C) (Tympanic)   Wt 73.5 kg (162 lb)   LMP 10/17/2008   BMI 29.63 kg/m    GENERAL: Patient is a pleasant, cooperative 61 year old female in no acute distress.  HEAD: Normocephalic, atraumatic.  Hair and scalp are normal.  EYES: Pupils are equal, round, reactive to light and accommodation.  Extraocular movements are intact.  The sclera nonicteric without injection.  The extraocular structures are normal.  EARS: Normal shape and symmetry.  No tenderness when palpating the mastoid or tragal areas bilaterally.  Otoscopic exam reveals a minimal amount of cerumen bilaterally.  The bilateral tympanic membranes are round, intact without evidence of effusion, good landmarks.   No retraction, granulation, or drainage.  NOSE: Nares are patent.  Nasal mucosa is again moist.  Negative anterior rhinoscopy.  ORAL CAVITY: Dentition is in good repair.  Mucous membranes are moist.  Tongue is mobile, protrudes to the midline.  Palate elevates symmetrically.  Tonsils are 2+.  No erythema or exudate.  No oral cavity or oropharyngeal masses, lesions, ulcerations, leukoplakia.  NECK: Supple, trachea is midline.  There no palpable cervical lymphadenopathy or masses bilaterally.  Palpation of the bilateral parotid and submandibular areas reveal no masses.  No thyromegaly.    NEUROLOGIC: Cranial nerves II through XII are grossly intact.  The patients voice is hoarse and raspy with intermittent vocal breaks.  Patient is House-Brackmann I/VI bilaterally.  CARDIOVASCULAR: Extremities are warm and well-perfused.  No significant peripheral edema.  RESPIRATORY: Patient has nonlabored breathing without cough, wheeze, stridor.  PSYCHIATRIC: Patient is alert and oriented.  Mood and affect appear normal.  SKIN: Warm and dry.  No scalp, face, or neck lesions noted.    Procedure: Flexible Laryngoscopy  Indication: Dysphonia    To best visualize the upper airway anatomy and due to the chief complaint and HPI, I proceeded with flexible fiberoptic laryngoscopy examination.  The bilateral nasal cavities were anesthetized and decongested with a mixture of lidocaine and neosynephrine.  The bilateral nasal cavities were examined using a flexible fiberoptic laryngoscope.  There were no nasal cavity masses, polyps, or mucopurulence bilaterally.  The nasal septum is essentially midline.  The nasopharynx had a normal appearance with normal Eustachian tube openings and fossa of Rosenmuller bilaterally.  Minimal adenoid tissue.  There is some posterior oropharyngeal cobblestoning that does extend down to the piriforms.  The base of tongue, vallecula, epiglottis, aryepiglottic folds, arytenoids, and piriform sinuses were without  mass or lesion.  There is a moderate amount of interarytenoid thickening and a mild amount of erythema.  The bilateral true vocal folds were symmetrically mobile.  At the inferior margin of the right true vocal fold roughly mid fold is an infraglottic fullness that is likely consistent with the cystic lesion.  No vocal fold nodules or masses. The visualized portions of the infraglottic and subglottic airway are unremarkable.  The scope was removed.  The patient tolerated the procedure well.                Assessment and Plan    ICD-10-CM    1. Muscle tension dysphonia  R49.0 LARYNGOSCOPY FLEX FIBEROPTIC, DIAGNOSTIC     Speech Therapy Referral     XR Video Swallow with SLP or OT - Order with Speech Therapy Referral     pantoprazole (PROTONIX) 40 MG EC tablet     XR Esophagram     famotidine (PEPCID) 40 MG tablet     sucralfate (CARAFATE) 1 GM tablet   2. Laryngopharyngeal reflux  K21.9 LARYNGOSCOPY FLEX FIBEROPTIC, DIAGNOSTIC     Speech Therapy Referral     XR Video Swallow with SLP or OT - Order with Speech Therapy Referral     pantoprazole (PROTONIX) 40 MG EC tablet     XR Esophagram     famotidine (PEPCID) 40 MG tablet     sucralfate (CARAFATE) 1 GM tablet   3. Gastroesophageal reflux disease, unspecified whether esophagitis present  K21.9 LARYNGOSCOPY FLEX FIBEROPTIC, DIAGNOSTIC     Speech Therapy Referral     XR Video Swallow with SLP or OT - Order with Speech Therapy Referral     pantoprazole (PROTONIX) 40 MG EC tablet     XR Esophagram     famotidine (PEPCID) 40 MG tablet     sucralfate (CARAFATE) 1 GM tablet   4. Lesion of vocal fold  J38.3 LARYNGOSCOPY FLEX FIBEROPTIC, DIAGNOSTIC     Speech Therapy Referral     XR Video Swallow with SLP or OT - Order with Speech Therapy Referral     pantoprazole (PROTONIX) 40 MG EC tablet     XR Esophagram     famotidine (PEPCID) 40 MG tablet     sucralfate (CARAFATE) 1 GM tablet   5. Dysphagia, unspecified type  R13.10 LARYNGOSCOPY FLEX FIBEROPTIC, DIAGNOSTIC     Speech  Therapy Referral     XR Video Swallow with SLP or OT - Order with Speech Therapy Referral     pantoprazole (PROTONIX) 40 MG EC tablet     XR Esophagram     famotidine (PEPCID) 40 MG tablet     sucralfate (CARAFATE) 1 GM tablet      It was my pleasure seeing Brittany Barajas today in clinic.  The patient presented today with dysphonia on examination.  She has endoscopic findings consistent with muscle tension dysphonia.  She does have a small mid right true vocal fold infraglottic fullness that is likely consistent with a small cyst.  This does not appear to inhibit glottic closure.  She has endoscopic findings consistent with laryngopharyngeal reflux.  She has no significant signs of inflammation or infection on examination.  I think we will transition her to Protonix 40 mg once daily in the morning 20-30 minutes prior to morning meal.  We discussed Pepcid 40 mg at bedtime.  We discussed not eating or drinking anything 3 to 4 hours prior to bedtime.  We also discussed using Carafate as needed for breakthrough symptoms.      Given her swallowing concerns and long history of reflux, I think that we should repeat her video swallow study and esophagram.  I will contact her with the results when available.  She might also benefit from speech therapy to help with her voice/dysphonia.  We will see what the swallow study and esophagram shows.  I would recommend that she return in 6 weeks for follow-up.  She also has an appointment with GI this fall which I recommend that she keep as they will likely consider endoscopy given her long reflux history.      David Shine MD  Department of Otolarygology-Head and Neck Surgery  [facility]         Again, thank you for allowing me to participate in the care of your patient.        Sincerely,        David Shine MD

## 2021-08-16 NOTE — PATIENT INSTRUCTIONS
Per physician's instructions    1) Pantoprazole 40 mg in morning 20-30 minutes before meal  2) Pepcid 40 mg at bedtime  3) Don't eat 2-4 hours prior to bedtime  4) Carafate as needed for breakthrough heartburn  5) Swallow test with esophagram

## 2021-08-31 ENCOUNTER — HOSPITAL ENCOUNTER (OUTPATIENT)
Dept: GENERAL RADIOLOGY | Facility: CLINIC | Age: 61
End: 2021-08-31
Attending: OTOLARYNGOLOGY
Payer: COMMERCIAL

## 2021-08-31 ENCOUNTER — HOSPITAL ENCOUNTER (OUTPATIENT)
Dept: SPEECH THERAPY | Facility: CLINIC | Age: 61
End: 2021-08-31
Attending: OTOLARYNGOLOGY
Payer: COMMERCIAL

## 2021-08-31 DIAGNOSIS — K21.9 LARYNGOPHARYNGEAL REFLUX: ICD-10-CM

## 2021-08-31 DIAGNOSIS — J38.3 LESION OF VOCAL FOLD: ICD-10-CM

## 2021-08-31 DIAGNOSIS — R49.0 MUSCLE TENSION DYSPHONIA: ICD-10-CM

## 2021-08-31 DIAGNOSIS — R13.10 DYSPHAGIA, UNSPECIFIED TYPE: ICD-10-CM

## 2021-08-31 DIAGNOSIS — K21.9 GASTROESOPHAGEAL REFLUX DISEASE, UNSPECIFIED WHETHER ESOPHAGITIS PRESENT: ICD-10-CM

## 2021-08-31 PROCEDURE — 92611 MOTION FLUOROSCOPY/SWALLOW: CPT | Mod: GN | Performed by: SPEECH-LANGUAGE PATHOLOGIST

## 2021-08-31 PROCEDURE — 74230 X-RAY XM SWLNG FUNCJ C+: CPT

## 2021-08-31 RX ORDER — BARIUM SULFATE 400 MG/ML
SUSPENSION ORAL ONCE
Status: COMPLETED | OUTPATIENT
Start: 2021-08-31 | End: 2021-08-31

## 2021-08-31 RX ADMIN — BARIUM SULFATE 50 ML: 400 SUSPENSION ORAL at 13:02

## 2021-09-01 ENCOUNTER — HOSPITAL ENCOUNTER (OUTPATIENT)
Dept: MAMMOGRAPHY | Facility: CLINIC | Age: 61
Discharge: HOME | End: 2021-09-01
Attending: FAMILY MEDICINE | Admitting: FAMILY MEDICINE
Payer: COMMERCIAL

## 2021-09-01 ENCOUNTER — TELEPHONE (OUTPATIENT)
Dept: OTOLARYNGOLOGY | Facility: CLINIC | Age: 61
End: 2021-09-01

## 2021-09-01 DIAGNOSIS — Z12.31 VISIT FOR SCREENING MAMMOGRAM: ICD-10-CM

## 2021-09-01 PROCEDURE — 77063 BREAST TOMOSYNTHESIS BI: CPT

## 2021-09-01 NOTE — PROGRESS NOTES
Impression:  Pt is referred for video swallow study due to dysphagia symptoms.  Pt referred by ENT and has PMH of LPRD, GERD, and dysphonia.  Pt takes medications for GERD.  Pt reports feeling of food sticking in throat and states she uses a chin tuck, alternates liquids/solids, and swallows twice to clear food residue.  Pt was assessed with the following consistencies: thin, moderately thick, extremely thick, cracker and barium pill.  Mild  pharyngeal delay with thicker/pudding consistency with bolus to level of vallaculae prior to swallow.  No penetration or aspiration across consistencies.  Pharynx clears well with the swallow.   Recommend continue regular diet consistency.  No treatment indicated.       Video Swallow Study  08/31/21    General Information   Type Of Visit Initial   Start Of Care Date 08/31/21   Referring Physician David Shine MD   Orders Evaluate And Treat   Orders Comment Video Swallow Study   Medical Diagnosis Dysphagia   Onset Of Illness/injury Or Date Of Surgery 08/16/21  (order date)   Precautions/limitations No Known Precautions/limitations   Hearing WFL for exam   Pertinent History of Current Problem/OT: Additional Occupational Profile Info Pt is referred for video swallow study due to dysphagia symptoms.  Pt referred by ENT and has PMH of LPRD, GERD, and dysphonia.  Pt takes medications for GERD.  Pt reports feeling of food sticking in throat and states she uses a chin tuck, alternates liquids/solids, and swallows twice to clear food residue.   Respiratory Status Room air   Prior Level Of Function Comment Pt eats a regular diet consistency.   Living Environment [facility]   General Observations Pt is pleasant and cooperative and able to provide PMH.   Patient/family Goals To complete assessment to help determine cause of symptoms.   Pain Assessment   Pain Reported No   Fall Risk Screen   Fall screen completed by SLP   Have you fallen 2 or more times in the past year? No   Have you  fallen and had an injury in the past year? No   Is patient a fall risk? No   Abuse Screen (yes response referral indicated)   Feels Unsafe at Home or Work/School no   Feels Threatened by Someone no   Does Anyone Try to Keep You From Having Contact with Others or Doing Things Outside Your Home? no   Physical Signs of Abuse Present no   Clinical Swallow Evaluation   Oral Musculature generally intact   Structural Abnormalities none present   Dentition present and adequate   Mucosal Quality adequate   Mandibular Strength and Mobility intact   Oral Labial Strength and Mobility WFL   Lingual Strength and Mobility WFL   Laryngeal Function Cough;Throat clear;Swallow;Voicing initiated;Dry swallow palpated  (Dysphonia present)   Oral Musculature Comments WFL   VFSS Evaluation   VFSS Additional Documentation Yes   VFSS Eval: Radiology   Radiologist Dr. Sandoval   Views Taken left lateral   Physical Location of Procedure [facility]    VFSS Eval: Thin Liquid Texture Trial   Mode of Presentation, Thin Liquid cup;self-fed   Order of Presentation 1, 6 (straw, consecutive)   Preparatory Phase WFL   Oral Phase, Thin Liquid WFL   Pharyngeal Phase, Thin Liquid WFL   Rosenbek's Penetration Aspiration Scale: Thin Liquid Trial Results 1 - no aspiration, contrast does not enter airway   Diagnostic Statement [facility]   VFSS Eval: Moderately Thick Liquids    Mode of Presentation cup;self-fed   Order of Presentation 2   Preparatory Phase WFL   Oral Phase WFL   Pharyngeal Phase WFL   Rosenbek's Penetration Aspiration Scale 1 - no aspiration, contrast does not enter airway   Diagnostic Statement [facility]   VFSS Eval: Extremely Thick Liquids    Mode of Presentation spoon;self-fed   Order of Presentation 3   Preparatory Phase WFL   Oral Phase Premature pharyngeal entry;WFL   Pharyngeal Phase WFL   Rosenbek's Penetration Aspiration Scale 1 - no aspiration, contrast does not enter airway   Diagnostic Statement [facility]   VFSS Eval: Regular  Texture Trial (Solid)   Mode of Presentation self-fed   Order of Presentation 4 (pill), 5 cracker   Preparatory Phase WFL   Oral Phase WFL   Pharyngeal Phase WFL   Rosenbek's Penetration Aspiration Scale 1 - no aspiration, contrast does not enter airway   Diagnostic Statement WNL   Swallow Compensations   Swallow Compensations No compensations were used   Educational Assessment   Barriers to Learning No barriers   Esophageal Phase of Swallow   Patient reports or presents with symptoms of esophageal dysphagia Yes   Esophageal sweep performed during today s vidofluoroscopic exam  Please refer to radiologist's report for details   Esophageal comments Had esophagram after video swallow today   Swallow Eval: Clinical Impressions   Skilled Criteria for Therapy Intervention No problems identified which require skilled intervention   Functional Assessment Scale (FAS) 7   Dysphagia Outcome Severity Scale (DORIAN) Level 6 - DORIAN   Diet texture recommendations Regular diet   Clinical Impression Comments Pt was assessed with the following consistencies: thin, moderately thick, extremely thick, cracker and barium pill.  Mild  pharyngeal delay with thicker/pudding consistency with bolus to level of vallaculae prior to swallow.  No penetration or aspiration across consistencies.  Pharynx clears well with the swallow.   Recommend continue regular diet consistency.  No treatment indicated.   Total Session Time   SLP Eval: VideoFluoroscopic Swallow function Minutes (44516) 25   Total Evaluation Time 25

## 2021-09-24 NOTE — PROGRESS NOTES
Chief Complaint   Patient presents with     Follow Up     6 week recheck of vocal cords- patient states her throat doesn't feel so tight and sleeping better     History of Present Illness  Brittany Barajas is a 61 year old female who presents today for follow-up.  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 Hudson.  I evaluated her on 8/16/2021 and did note a similar area on endoscopy.  This did not appear to limit glottic closure.  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  She was also having some trouble with swallowing.  We obtained a video swallow study with esophagram performed 8/31/2021.  This did not show any significant esophageal dysmotility, stricture, pharyngeal pouch, or signs of dysphagia.  We had placed her on dual acid suppression therapy with Carafate for breakthrough symptoms.  We discussed careful dietary restrictions including avoiding eating or drinking 3 to 4 hours prior to bedtime.  She returns today for follow-up.    Since starting these medications last seeing the patient, the patient reports significant interval improvement in symptoms.  She feels like her throat symptoms are about 75% better.  She does still have some intermittent issues with postnasal drainage and some intermittent dysphonia but again much improved.  She is no longer coughing at night and sleeping much better.  She had to use the Carafate several times last week as she ran out of the pantoprazole and needed to get a refill.  She currently denies any significant dysphagia, odynophagia, pharyngodynia.    Past Medical History  Patient Active Problem List   Diagnosis     Mitral valve disorder     Allergic rhinitis     Convulsions (H)     Esophageal reflux     Mild major depression (H)     Plantar fascial fibromatosis     Other affections of shoulder region, not elsewhere classified     Neoplasm of digestive system      Anxiety state     Breast cancer (H)     CARDIOVASCULAR SCREENING; LDL GOAL LESS THAN 160     Transaminase or LDH elevation     H/O: hysterectomy     Colon polyp     Drug reaction     Mild persistent asthma     Urgency incontinence     Advanced directives, counseling/discussion     DDD (degenerative disc disease), lumbar     DDD (degenerative disc disease), cervical     Dysphonia     Other dysphagia     Traumatic brain injury, without loss of consciousness, subsequent encounter     Fatigue, unspecified type     Seizure disorder (H)     Current Medications    Current Outpatient Medications:      albuterol (PROAIR HFA/PROVENTIL HFA/VENTOLIN HFA) 108 (90 Base) MCG/ACT inhaler, Inhale 2 puffs into the lungs every 4 hours as needed for shortness of breath / dyspnea, Disp: 1 Inhaler, Rfl: 6     amitriptyline (ELAVIL) 50 MG tablet, TAKE 1.5 pills, or 75 mg,  DAILY AT BEDTIME., Disp: 135 tablet, Rfl: 3     citalopram (CELEXA) 40 MG tablet, Take 1 tablet (40 mg) by mouth daily, Disp: 30 tablet, Rfl: 1     famotidine (PEPCID) 40 MG tablet, Take 1 tablet (40 mg) by mouth At Bedtime, Disp: 90 tablet, Rfl: 1     lactobacillus rhamnosus, GG, (CULTURELL) capsule, Take 1 capsule by mouth 2 times daily, Disp: , Rfl:      Multiple Vitamin (MULTIVITAMIN OR), Take 1 tablet by mouth daily., Disp: , Rfl:      pantoprazole (PROTONIX) 40 MG EC tablet, Take 1 tablet (40 mg) by mouth daily, Disp: 90 tablet, Rfl: 1     sucralfate (CARAFATE) 1 GM tablet, Take 1 tablet (1 g) by mouth 4 times daily as needed (Heartburn), Disp: 30 tablet, Rfl: 1    Allergies  Allergies   Allergen Reactions     Docetaxel Other (See Comments)     Chest tightness, black out       Social History  Social History     Socioeconomic History     Marital status: Single     Spouse name: Not on file     Number of children: Not on file     Years of education: Not on file     Highest education level: Not on file   Occupational History     Not on file   Tobacco Use      Smoking status: Never Smoker     Smokeless tobacco: Never Used   Substance and Sexual Activity     Alcohol use: Yes     Comment: rare     Drug use: No     Sexual activity: Not Currently   Other Topics Concern     Parent/sibling w/ CABG, MI or angioplasty before 65F 55M? Yes     Comment: brother triple CABG in his 50s      Service No     Blood Transfusions No     Caffeine Concern No     Occupational Exposure Yes     Comment: maybe     Hobby Hazards No     Sleep Concern No     Stress Concern No     Weight Concern No     Special Diet Yes     Comment: less meat     Back Care Yes     Comment: low back  S-I joint     Exercise Yes     Comment: floor exercises     Bike Helmet Yes     Seat Belt Yes     Self-Exams Yes   Social History Narrative     Not on file     Social Determinants of Health     Financial Resource Strain:      Difficulty of Paying Living Expenses:    Food Insecurity:      Worried About Running Out of Food in the Last Year:      Ran Out of Food in the Last Year:    Transportation Needs:      Lack of Transportation (Medical):      Lack of Transportation (Non-Medical):    Physical Activity:      Days of Exercise per Week:      Minutes of Exercise per Session:    Stress:      Feeling of Stress :    Social Connections:      Frequency of Communication with Friends and Family:      Frequency of Social Gatherings with Friends and Family:      Attends Jehovah's witness Services:      Active Member of Clubs or Organizations:      Attends Club or Organization Meetings:      Marital Status:    Intimate Partner Violence:      Fear of Current or Ex-Partner:      Emotionally Abused:      Physically Abused:      Sexually Abused:        Family History  Family History   Problem Relation Age of Onset     C.A.D. Mother      Diabetes Mother      Hypertension Mother      Cerebrovascular Disease Mother      Breast Cancer Mother      Allergies Mother      Arthritis Mother      Cancer Mother      Eye Disorder Mother       Gastrointestinal Disease Mother      Gynecology Mother      Lipids Mother      Heart Disease Mother      Osteoporosis Mother      Obesity Mother      Thyroid Disease Mother      Respiratory Mother      Alcohol/Drug Father      Cancer Father      Alcohol/Drug Brother      Musculoskeletal Disorder Brother         spina bifida-recent dx     C.A.D. Brother      Thyroid Disease Sister      Lipids Sister      Gynecology Sister         fibroids/ s/p hysterectomy     Other - See Comments Sister         hysterectomy, dilation of the throat.     Thyroid Disease Brother      Heart Disease Brother         DOUBLE BYPASS     Lipids Brother      Thyroid Disease Sister      Lipids Sister      Other - See Comments Sister         hysterectomy     Gastrointestinal Disease Sister         liver function off     Thyroid Disease Sister      Thyroid Disease Sister      Heart Disease Sister      Cancer Other         thyroid cancer     Cancer - colorectal No family hx of        Review of Systems  As per HPI and PMHx, otherwise 10 system review including the head and neck, constitutional, eyes, respiratory, GI, skin, neurologic, lymphatic, endocrine, and allergy systems is negative.    Physical Exam  BP (!) 161/85 (BP Location: Right arm, Patient Position: Chair, Cuff Size: Adult Regular)   Pulse 96   Temp 97.9  F (36.6  C) (Tympanic)   Wt 73.5 kg (162 lb)   LMP 10/17/2008   BMI 29.63 kg/m    GENERAL: Patient is a pleasant, cooperative 61 year old female in no acute distress.  HEAD: Normocephalic, atraumatic.  Hair and scalp are normal.  EYES: Pupils are equal, round, reactive to light and accommodation.  Extraocular movements are intact.  The sclera nonicteric without injection.  The extraocular structures are normal.  EARS: Normal shape and symmetry.  No tenderness when palpating the mastoid or tragal areas bilaterally.    NOSE: Nares are patent.  Nasal mucosa is again moist.  Negative anterior rhinoscopy.  ORAL CAVITY: Dentition is in  good repair.  Mucous membranes are moist.  Tongue is mobile, protrudes to the midline.  Palate elevates symmetrically.  Tonsils are 2+.  No erythema or exudate.  No oral cavity or oropharyngeal masses, lesions, ulcerations, leukoplakia.  NECK: Supple, trachea is midline.  There no palpable cervical lymphadenopathy or masses bilaterally.  Palpation of the bilateral parotid and submandibular areas reveal no masses.  No thyromegaly.    NEUROLOGIC: Cranial nerves II through XII are grossly intact.  The patients voice is hoarse and raspy with intermittent vocal breaks.  Patient is House-Brackmann I/VI bilaterally.  CARDIOVASCULAR: Extremities are warm and well-perfused.  No significant peripheral edema.  RESPIRATORY: Patient has nonlabored breathing without cough, wheeze, stridor.  PSYCHIATRIC: Patient is alert and oriented.  Mood and affect appear normal.  SKIN: Warm and dry.  No scalp, face, or neck lesions noted.     Assessment and Plan     ICD-10-CM    1. Muscle tension dysphonia  R49.0    2. Laryngopharyngeal reflux  K21.9    3. Gastroesophageal reflux disease, unspecified whether esophagitis present  K21.9    4. Lesion of vocal fold  J38.3       It was my pleasure seeing Brittany Barajas today in clinic.  All, the patient's had really good improvement with her twice daily dual acid suppression therapy and the Carafate for breakthrough.  She does note that when she stops the Protonix she has significant symptoms.  She does have an appointment to see gastroenterology upcoming, I recommend that she follow-up with them.  Her last EGD was okay in 2018.  We can keep her on the current reflux regimen for now.  Like to see her back in 3 to 4 months to recheck her symptoms.  She feels like her voice is doing well now.  If she is having more voice issues, we should consider speech therapy evaluation and treatment for muscle tension dysphonia.    Laverne to follow up with Primary Care provider regarding elevated blood  pressure.    David Shine MD  Department of Otolaryngology-Head and Neck Surgery  [facility]

## 2021-09-27 ENCOUNTER — OFFICE VISIT (OUTPATIENT)
Dept: OTOLARYNGOLOGY | Facility: CLINIC | Age: 61
End: 2021-09-27
Payer: COMMERCIAL

## 2021-09-27 VITALS
BODY MASS INDEX: 29.63 KG/M2 | SYSTOLIC BLOOD PRESSURE: 161 MMHG | TEMPERATURE: 97.9 F | DIASTOLIC BLOOD PRESSURE: 85 MMHG | WEIGHT: 162 LBS | HEART RATE: 96 BPM

## 2021-09-27 DIAGNOSIS — K21.9 LARYNGOPHARYNGEAL REFLUX: ICD-10-CM

## 2021-09-27 DIAGNOSIS — K21.9 GASTROESOPHAGEAL REFLUX DISEASE, UNSPECIFIED WHETHER ESOPHAGITIS PRESENT: ICD-10-CM

## 2021-09-27 DIAGNOSIS — R49.0 MUSCLE TENSION DYSPHONIA: Primary | ICD-10-CM

## 2021-09-27 DIAGNOSIS — J38.3 LESION OF VOCAL FOLD: ICD-10-CM

## 2021-09-27 PROCEDURE — 99213 OFFICE O/P EST LOW 20 MIN: CPT | Performed by: OTOLARYNGOLOGY

## 2021-09-27 ASSESSMENT — PAIN SCALES - GENERAL: PAINLEVEL: NO PAIN (0)

## 2021-09-27 NOTE — NURSING NOTE
"Initial BP (!) 161/85 (BP Location: Right arm, Patient Position: Chair, Cuff Size: Adult Regular)   Pulse 96   Temp 97.9  F (36.6  C) (Tympanic)   Wt 73.5 kg (162 lb)   LMP 10/17/2008   BMI 29.63 kg/m   Estimated body mass index is 29.63 kg/m  as calculated from the following:    Height as of 7/26/21: 1.575 m (5' 2\").    Weight as of this encounter: 73.5 kg (162 lb). .    Velma Shepherd LPN    "

## 2021-09-27 NOTE — LETTER
9/27/2021         RE: Brittany Barajas  [address]  [place] [address]        Dear Colleague,    Thank you for referring your patient, Brittany Barajas, to the [facility]. Please see a copy of my visit note below.    Chief Complaint   Patient presents with     Follow Up     6 week recheck of vocal cords- patient states her throat doesn't feel so tight and sleeping better     History of Present Illness  Brittany Barajas is a 61 year old female who presents today for follow-up.  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 Hudson.  I evaluated her on 8/16/2021 and did note a similar area on endoscopy.  This did not appear to limit glottic closure.  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  She was also having some trouble with swallowing.  We obtained a video swallow study with esophagram performed 8/31/2021.  This did not show any significant esophageal dysmotility, stricture, pharyngeal pouch, or signs of dysphagia.  We had placed her on dual acid suppression therapy with Carafate for breakthrough symptoms.  We discussed careful dietary restrictions including avoiding eating or drinking 3 to 4 hours prior to bedtime.  She returns today for follow-up.    Since starting these medications last seeing the patient, the patient reports significant interval improvement in symptoms.  She feels like her throat symptoms are about 75% better.  She does still have some intermittent issues with postnasal drainage and some intermittent dysphonia but again much improved.  She is no longer coughing at night and sleeping much better.  She had to use the Carafate several times last week as she ran out of the pantoprazole and needed to get a refill.  She currently denies any significant dysphagia, odynophagia, pharyngodynia.    Past Medical History  Patient Active Problem List    Diagnosis     Mitral valve disorder     Allergic rhinitis     Convulsions (H)     Esophageal reflux     Mild major depression (H)     Plantar fascial fibromatosis     Other affections of shoulder region, not elsewhere classified     Neoplasm of digestive system     Anxiety state     Breast cancer (H)     CARDIOVASCULAR SCREENING; LDL GOAL LESS THAN 160     Transaminase or LDH elevation     H/O: hysterectomy     Colon polyp     Drug reaction     Mild persistent asthma     Urgency incontinence     Advanced directives, counseling/discussion     DDD (degenerative disc disease), lumbar     DDD (degenerative disc disease), cervical     Dysphonia     Other dysphagia     Traumatic brain injury, without loss of consciousness, subsequent encounter     Fatigue, unspecified type     Seizure disorder (H)     Current Medications    Current Outpatient Medications:      albuterol (PROAIR HFA/PROVENTIL HFA/VENTOLIN HFA) 108 (90 Base) MCG/ACT inhaler, Inhale 2 puffs into the lungs every 4 hours as needed for shortness of breath / dyspnea, Disp: 1 Inhaler, Rfl: 6     amitriptyline (ELAVIL) 50 MG tablet, TAKE 1.5 pills, or 75 mg,  DAILY AT BEDTIME., Disp: 135 tablet, Rfl: 3     citalopram (CELEXA) 40 MG tablet, Take 1 tablet (40 mg) by mouth daily, Disp: 30 tablet, Rfl: 1     famotidine (PEPCID) 40 MG tablet, Take 1 tablet (40 mg) by mouth At Bedtime, Disp: 90 tablet, Rfl: 1     lactobacillus rhamnosus, GG, (CULTURELL) capsule, Take 1 capsule by mouth 2 times daily, Disp: , Rfl:      Multiple Vitamin (MULTIVITAMIN OR), Take 1 tablet by mouth daily., Disp: , Rfl:      pantoprazole (PROTONIX) 40 MG EC tablet, Take 1 tablet (40 mg) by mouth daily, Disp: 90 tablet, Rfl: 1     sucralfate (CARAFATE) 1 GM tablet, Take 1 tablet (1 g) by mouth 4 times daily as needed (Heartburn), Disp: 30 tablet, Rfl: 1    Allergies  Allergies   Allergen Reactions     Docetaxel Other (See Comments)     Chest tightness, black out       Social History  Social  History     Socioeconomic History     Marital status: Single     Spouse name: Not on file     Number of children: Not on file     Years of education: Not on file     Highest education level: Not on file   Occupational History     Not on file   Tobacco Use     Smoking status: Never Smoker     Smokeless tobacco: Never Used   Substance and Sexual Activity     Alcohol use: Yes     Comment: rare     Drug use: No     Sexual activity: Not Currently   Other Topics Concern     Parent/sibling w/ CABG, MI or angioplasty before 65F 55M? Yes     Comment: brother triple CABG in his 50s      Service No     Blood Transfusions No     Caffeine Concern No     Occupational Exposure Yes     Comment: maybe     Hobby Hazards No     Sleep Concern No     Stress Concern No     Weight Concern No     Special Diet Yes     Comment: less meat     Back Care Yes     Comment: low back  S-I joint     Exercise Yes     Comment: floor exercises     Bike Helmet Yes     Seat Belt Yes     Self-Exams Yes   Social History Narrative     Not on file     Social Determinants of Health     Financial Resource Strain:      Difficulty of Paying Living Expenses:    Food Insecurity:      Worried About Running Out of Food in the Last Year:      Ran Out of Food in the Last Year:    Transportation Needs:      Lack of Transportation (Medical):      Lack of Transportation (Non-Medical):    Physical Activity:      Days of Exercise per Week:      Minutes of Exercise per Session:    Stress:      Feeling of Stress :    Social Connections:      Frequency of Communication with Friends and Family:      Frequency of Social Gatherings with Friends and Family:      Attends Gnosticist Services:      Active Member of Clubs or Organizations:      Attends Club or Organization Meetings:      Marital Status:    Intimate Partner Violence:      Fear of Current or Ex-Partner:      Emotionally Abused:      Physically Abused:      Sexually Abused:        Family History  Family History    Problem Relation Age of Onset     C.A.D. Mother      Diabetes Mother      Hypertension Mother      Cerebrovascular Disease Mother      Breast Cancer Mother      Allergies Mother      Arthritis Mother      Cancer Mother      Eye Disorder Mother      Gastrointestinal Disease Mother      Gynecology Mother      Lipids Mother      Heart Disease Mother      Osteoporosis Mother      Obesity Mother      Thyroid Disease Mother      Respiratory Mother      Alcohol/Drug Father      Cancer Father      Alcohol/Drug Brother      Musculoskeletal Disorder Brother         spina bifida-recent dx     C.A.D. Brother      Thyroid Disease Sister      Lipids Sister      Gynecology Sister         fibroids/ s/p hysterectomy     Other - See Comments Sister         hysterectomy, dilation of the throat.     Thyroid Disease Brother      Heart Disease Brother         DOUBLE BYPASS     Lipids Brother      Thyroid Disease Sister      Lipids Sister      Other - See Comments Sister         hysterectomy     Gastrointestinal Disease Sister         liver function off     Thyroid Disease Sister      Thyroid Disease Sister      Heart Disease Sister      Cancer Other         thyroid cancer     Cancer - colorectal No family hx of        Review of Systems  As per HPI and PMHx, otherwise 10 system review including the head and neck, constitutional, eyes, respiratory, GI, skin, neurologic, lymphatic, endocrine, and allergy systems is negative.    Physical Exam  BP (!) 161/85 (BP Location: Right arm, Patient Position: Chair, Cuff Size: Adult Regular)   Pulse 96   Temp 97.9  F (36.6  C) (Tympanic)   Wt 73.5 kg (162 lb)   LMP 10/17/2008   BMI 29.63 kg/m    GENERAL: Patient is a pleasant, cooperative 61 year old female in no acute distress.  HEAD: Normocephalic, atraumatic.  Hair and scalp are normal.  EYES: Pupils are equal, round, reactive to light and accommodation.  Extraocular movements are intact.  The sclera nonicteric without injection.  The  extraocular structures are normal.  EARS: Normal shape and symmetry.  No tenderness when palpating the mastoid or tragal areas bilaterally.    NOSE: Nares are patent.  Nasal mucosa is again moist.  Negative anterior rhinoscopy.  ORAL CAVITY: Dentition is in good repair.  Mucous membranes are moist.  Tongue is mobile, protrudes to the midline.  Palate elevates symmetrically.  Tonsils are 2+.  No erythema or exudate.  No oral cavity or oropharyngeal masses, lesions, ulcerations, leukoplakia.  NECK: Supple, trachea is midline.  There no palpable cervical lymphadenopathy or masses bilaterally.  Palpation of the bilateral parotid and submandibular areas reveal no masses.  No thyromegaly.    NEUROLOGIC: Cranial nerves II through XII are grossly intact.  The patients voice is hoarse and raspy with intermittent vocal breaks.  Patient is House-Brackmann I/VI bilaterally.  CARDIOVASCULAR: Extremities are warm and well-perfused.  No significant peripheral edema.  RESPIRATORY: Patient has nonlabored breathing without cough, wheeze, stridor.  PSYCHIATRIC: Patient is alert and oriented.  Mood and affect appear normal.  SKIN: Warm and dry.  No scalp, face, or neck lesions noted.     Assessment and Plan     ICD-10-CM    1. Muscle tension dysphonia  R49.0    2. Laryngopharyngeal reflux  K21.9    3. Gastroesophageal reflux disease, unspecified whether esophagitis present  K21.9    4. Lesion of vocal fold  J38.3       It was my pleasure seeing Brittany Barajas today in clinic.  All, the patient's had really good improvement with her twice daily dual acid suppression therapy and the Carafate for breakthrough.  She does note that when she stops the Protonix she has significant symptoms.  She does have an appointment to see gastroenterology upcoming, I recommend that she follow-up with them.  Her last EGD was okay in 2018.  We can keep her on the current reflux regimen for now.  Like to see her back in 3 to 4 months to recheck her  symptoms.  She feels like her voice is doing well now.  If she is having more voice issues, we should consider speech therapy evaluation and treatment for muscle tension dysphonia.    Laverne to follow up with Primary Care provider regarding elevated blood pressure.    David Shine MD  Department of Otolaryngology-Head and Neck Surgery  [facility]         Again, thank you for allowing me to participate in the care of your patient.        Sincerely,        David Shine MD

## 2021-10-24 ENCOUNTER — HEALTH MAINTENANCE LETTER (OUTPATIENT)
Age: 61
End: 2021-10-24

## 2022-01-17 DIAGNOSIS — G47.00 INSOMNIA, UNSPECIFIED TYPE: Primary | ICD-10-CM

## 2022-01-17 NOTE — TELEPHONE ENCOUNTER
Reason for Call:  Medication or medication refill:    Do you use a [facility]?  Name of the pharmacy and phone number for the current request:  [facility] [phone]    Name of the medication requested: amitriptyline (ELAVIL)    Other request: Patient called to scheduled medication recheck with Dr. Barrientos for soonest available. Dr. Barrientos is booking out to 2/23/22. Patient scheduled but stated she runs out of medication this week. Please refill medication to last until appointment.    Can we leave a detailed message on this number? YES    Phone number patient can be reached at: Home number on file [phone] (home)    Best Time: Anytime    Call taken on 1/17/2022 at 10:32 AM by Sujata Carvajal

## 2022-01-19 RX ORDER — AMITRIPTYLINE HYDROCHLORIDE 50 MG/1
TABLET ORAL
Qty: 135 TABLET | Refills: 0 | Status: SHIPPED | OUTPATIENT
Start: 2022-01-19 | End: 2022-04-05

## 2022-01-19 NOTE — TELEPHONE ENCOUNTER
Pending Prescriptions:                       Disp   Refills    amitriptyline (ELAVIL) 50 MG tablet       135 ta*3            Sig: TAKE 1.5 pills, or 75 mg,  DAILY AT BEDTIME.    Routing refill request to provider for review/approval because:  Medication is reported/historical    Pt last seen by float provider 7/26/21.  Dr Barrientos is listed as PCP but she has not seen pt yet.  Appt 2/23/22.  Previously seen by Dr Iyer who retired.    Batsheva Wagoner RN

## 2022-01-19 NOTE — TELEPHONE ENCOUNTER
Left message for the patient to call the clinic.  Clarify what the name of medication she will run out of is. Carlie CHIN RN

## 2022-02-09 ENCOUNTER — TELEPHONE (OUTPATIENT)
Dept: OTOLARYNGOLOGY | Facility: CLINIC | Age: 62
End: 2022-02-09
Payer: COMMERCIAL

## 2022-02-09 DIAGNOSIS — R49.0 MUSCLE TENSION DYSPHONIA: ICD-10-CM

## 2022-02-09 DIAGNOSIS — K21.9 GASTROESOPHAGEAL REFLUX DISEASE, UNSPECIFIED WHETHER ESOPHAGITIS PRESENT: ICD-10-CM

## 2022-02-09 DIAGNOSIS — K21.9 LARYNGOPHARYNGEAL REFLUX: ICD-10-CM

## 2022-02-09 DIAGNOSIS — R13.10 DYSPHAGIA, UNSPECIFIED TYPE: ICD-10-CM

## 2022-02-09 DIAGNOSIS — J38.3 LESION OF VOCAL FOLD: ICD-10-CM

## 2022-02-09 RX ORDER — FAMOTIDINE 40 MG/1
40 TABLET, FILM COATED ORAL AT BEDTIME
Qty: 90 TABLET | Refills: 0 | Status: SHIPPED | OUTPATIENT
Start: 2022-02-09 | End: 2022-06-06

## 2022-02-09 NOTE — TELEPHONE ENCOUNTER
Reason for Call:  Medication or medication refill:    Do you use a [facility]?  Name of the pharmacy and phone number for the current request: [facility] [place] [address]    Name of the medication requested: famotidine (PEPCID) 40 MG tablet    Other request: Pt called requesting refill we made an apt for 3/7    Can we leave a detailed message on this number? YES    Phone number patient can be reached at: Home number on file [phone] (home)    Best Time: anyitme    Call taken on 2/9/2022 at 12:21 PM by Kenisha Gutierrez           Statement Selected

## 2022-03-01 NOTE — PROGRESS NOTES
Chief Complaint   Patient presents with     RECHECK     History of Present Illness  Brittany Barajas is a 61 year old female who presents today for follow-up.  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 Hudson.  I evaluated her on 8/16/2021 and noted a similar area on endoscopy.  This did not appear to limit glottic closure.  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  She was also having some trouble with swallowing.  We obtained a video swallow study with esophagram performed 8/31/2021.    The swallow study did not show any significant esophageal dysmotility, stricture, pharyngeal pouch, or signs of dysphagia.  We had placed her on dual acid suppression therapy with Carafate for breakthrough symptoms.  We discussed careful dietary restrictions including avoiding eating or drinking 3 to 4 hours prior to bedtime.  The patient was last seen on 9/27/2021.  At that time, she felt like she had about 75% improvement in her symptoms.  We decided to keep her on her current reflux regimen at that time.  She has canceled her appointment to see gastroenterology. Her last EGD was okay in 2018. She returns today for follow-up.    Since last seeing the patient, the patient reports continued interval improvement in symptoms.  She feels like her throat symptoms are about 80% better.  She does still have some intermittent issues with postnasal drainage and some intermittent dysphonia but again much improved.  She is no longer coughing at night and sleeping much better.  She had to use the Carafate 1-2 times a week. She currently denies any significant dysphagia, odynophagia, pharyngodynia.    For the past 3 weeks or so, she has had some issues with chronic nasal congestion, facial pressure, concerns for sinus infection.  She was also having some sore throat from postnasal drainage.  She underwent strep and COVID testing  in the walk-in clinic, which was negative.  Symptoms been present for nearly 3 weeks.  She has not been on any antibiotics.  She is using nasal saline in her nose.  She is tried Flonase in the past but this dries out her nose too much.  She has not had previous nose or sinus surgery.    Past Medical History  Patient Active Problem List   Diagnosis     Mitral valve disorder     Allergic rhinitis     Esophageal reflux     Plantar fascial fibromatosis     Other affections of shoulder region, not elsewhere classified     Anxiety state     Personal history of malignant neoplasm of breast     Elevated liver enzymes     H/O: hysterectomy     Colon polyp     Drug reaction     Mild persistent asthma     Urgency incontinence     Advanced directives, counseling/discussion     DDD (degenerative disc disease), lumbar     DDD (degenerative disc disease), cervical     Dysphonia     Other dysphagia     Traumatic brain injury, without loss of consciousness, subsequent encounter     Fatigue, unspecified type     Seizure disorder (H)     Moderate recurrent major depression (H)     Current Medications    Current Outpatient Medications:      albuterol (PROAIR HFA/PROVENTIL HFA/VENTOLIN HFA) 108 (90 Base) MCG/ACT inhaler, Inhale 2 puffs into the lungs every 4 hours as needed for shortness of breath / dyspnea, Disp: 18 g, Rfl: 3     amitriptyline (ELAVIL) 50 MG tablet, Take 1.5 tablets (75 mg) by mouth At Bedtime, Disp: 135 tablet, Rfl: 0     amoxicillin-clavulanate (AUGMENTIN) 875-125 MG tablet, Take 1 tablet by mouth 2 times daily, Disp: 20 tablet, Rfl: 0     citalopram (CELEXA) 10 MG tablet, Take 1 tablet (10 mg) by mouth daily, Disp: 90 tablet, Rfl: 3     famotidine (PEPCID) 40 MG tablet, Take 1 tablet (40 mg) by mouth At Bedtime Discuss further refills at follow-up appointment with Dr. Shine, Disp: 90 tablet, Rfl: 0     guaiFENesin-codeine (ROBITUSSIN AC) 100-10 MG/5ML solution, Take 5-10 mLs by mouth nightly as needed for cough,  Disp: 60 mL, Rfl: 0     lactobacillus rhamnosus, GG, (CULTURELL) capsule, Take 1 capsule by mouth 2 times daily, Disp: , Rfl:      Multiple Vitamin (MULTIVITAMIN OR), Take 1 tablet by mouth daily, Disp: , Rfl:      pantoprazole (PROTONIX) 40 MG EC tablet, Take 1 tablet (40 mg) by mouth daily, Disp: 90 tablet, Rfl: 1     predniSONE (DELTASONE) 10 MG tablet, Take 3 tablets (30 mg) by mouth daily for 5 days, Disp: 15 tablet, Rfl: 0     sucralfate (CARAFATE) 1 GM tablet, Take 1 tablet (1 g) by mouth 4 times daily as needed (Heartburn), Disp: 30 tablet, Rfl: 1     ipratropium - albuterol 0.5 mg/2.5 mg/3 mL (DUONEB) 0.5-2.5 (3) MG/3ML neb solution, Take 1 vial (3 mLs) by nebulization every 6 hours as needed for shortness of breath / dyspnea or wheezing (Patient not taking: No sig reported), Disp: 90 mL, Rfl: 0    Allergies  Allergies   Allergen Reactions     Docetaxel Other (See Comments)     Chest tightness, black out       Social History  Social History     Socioeconomic History     Marital status: Single     Spouse name: Not on file     Number of children: Not on file     Years of education: Not on file     Highest education level: Not on file   Occupational History     Not on file   Tobacco Use     Smoking status: Never Smoker     Smokeless tobacco: Never Used   Substance and Sexual Activity     Alcohol use: Yes     Comment: rare     Drug use: No     Sexual activity: Not Currently   Other Topics Concern     Parent/sibling w/ CABG, MI or angioplasty before 65F 55M? Yes     Comment: brother triple CABG in his 50s      Service No     Blood Transfusions No     Caffeine Concern No     Occupational Exposure Yes     Comment: maybe     Hobby Hazards No     Sleep Concern No     Stress Concern No     Weight Concern No     Special Diet Yes     Comment: less meat     Back Care Yes     Comment: low back  S-I joint     Exercise Yes     Comment: floor exercises     Bike Helmet Yes     Seat Belt Yes     Self-Exams Yes    Social History Narrative     Not on file     Social Determinants of Health     Financial Resource Strain: Not on file   Food Insecurity: Not on file   Transportation Needs: Not on file   Physical Activity: Not on file   Stress: Not on file   Social Connections: Not on file   Intimate Partner Violence: Not on file   Housing Stability: Not on file       Family History  Family History   Problem Relation Age of Onset     C.A.D. Mother      Diabetes Mother      Hypertension Mother      Cerebrovascular Disease Mother      Breast Cancer Mother      Allergies Mother      Arthritis Mother      Cancer Mother      Eye Disorder Mother      Gastrointestinal Disease Mother      Gynecology Mother      Lipids Mother      Heart Disease Mother      Osteoporosis Mother      Obesity Mother      Thyroid Disease Mother      Respiratory Mother      Alcohol/Drug Father      Cancer Father      Alcohol/Drug Brother      Musculoskeletal Disorder Brother         spina bifida-recent dx     C.A.D. Brother      Thyroid Disease Sister      Lipids Sister      Gynecology Sister         fibroids/ s/p hysterectomy     Other - See Comments Sister         hysterectomy, dilation of the throat.     Thyroid Disease Brother      Heart Disease Brother         DOUBLE BYPASS     Lipids Brother      Thyroid Disease Sister      Lipids Sister      Other - See Comments Sister         hysterectomy     Gastrointestinal Disease Sister         liver function off     Thyroid Disease Sister      Thyroid Disease Sister      Heart Disease Sister      Cancer Other         thyroid cancer     Cancer - colorectal No family hx of        Review of Systems  As per HPI and PMHx, otherwise 10 system review including the head and neck, constitutional, eyes, respiratory, GI, skin, neurologic, lymphatic, endocrine, and allergy systems is negative.    Physical Exam  /85 (BP Location: Right arm, Patient Position: Sitting, Cuff Size: Adult Large)   Pulse 88   Temp 97.2  F  "(36.2  C) (Tympanic)   Ht 1.549 m (5' 1\")   Wt 72.1 kg (159 lb)   LMP 10/17/2008   SpO2 97%   BMI 30.04 kg/m    GENERAL: Patient is a pleasant, cooperative 61 year old female in no acute distress.  HEAD: Normocephalic, atraumatic.  Hair and scalp are normal.  EYES: Pupils are equal, round, reactive to light and accommodation.  Extraocular movements are intact.  The sclera nonicteric without injection.  The extraocular structures are normal.  EARS: Normal shape and symmetry.  No tenderness when palpating the mastoid or tragal areas bilaterally.    NOSE: Nares are patent.  Nasal mucosa is boggy and inflamed with sticky, inflammatory mucus.  No obvious nasal cavity masses or polyps.  No tammi mucopurulent drainage anteriorly.    ORAL CAVITY: Dentition is in good repair.  Mucous membranes are moist.  Tongue is mobile, protrudes to the midline.  Palate elevates symmetrically.  Tonsils are 2+.  No erythema or exudate.  No oral cavity or oropharyngeal masses, lesions, ulcerations, leukoplakia.  NECK: Supple, trachea is midline.  There no palpable cervical lymphadenopathy or masses bilaterally.  Palpation of the bilateral parotid and submandibular areas reveal no masses.  No thyromegaly.    NEUROLOGIC: Cranial nerves II through XII are grossly intact.  The patients voice is hoarse and raspy with intermittent vocal breaks.  Patient is House-Brackmann I/VI bilaterally.  CARDIOVASCULAR: Extremities are warm and well-perfused.  No significant peripheral edema.  RESPIRATORY: Patient has nonlabored breathing without cough, wheeze, stridor.  PSYCHIATRIC: Patient is alert and oriented.  Mood and affect appear normal.  SKIN: Warm and dry.  No scalp, face, or neck lesions noted.     Assessment and Plan     ICD-10-CM    1. Muscle tension dysphonia  R49.0    2. Laryngopharyngeal reflux  K21.9    3. Gastroesophageal reflux disease, unspecified whether esophagitis present  K21.9    4. Lesion of vocal fold  J38.3    5. Dysphagia, " unspecified type  R13.10    6. Acute sinusitis with symptoms greater than 10 days  J01.90 predniSONE (DELTASONE) 10 MG tablet     amoxicillin-clavulanate (AUGMENTIN) 875-125 MG tablet      It was my pleasure seeing Brittany Barajas today in clinic.  The patient continues to have good improvement with her twice daily dual acid suppression therapy and the Carafate for breakthrough.  We can keep her on the current reflux regimen for now.  I would like to see her back in 3 to 4 months to recheck her symptoms.  She feels like her voice is doing well now.  If she is having more voice issues, we should consider speech therapy evaluation and treatment for muscle tension dysphonia.  We also discussed seeing gastroenterology.  She had to cancel her appointment for now but will think about this is an option in the future.    From a nose and sinus standpoint, the patient's been having sinus symptoms for the past 3 weeks.  She continues to get some colored nasal drainage.  We discussed nasal saline irrigations, I provided her with instructions and a rinse bottle.  I will provide her with a burst of prednisone 30 mg daily for 5 days and a 10-day course of Augmentin.  The patient knows to contact me if her symptoms are not improved after treatment or worsening.    David Shine MD  Department of Otolaryngology-Head and Neck Surgery  [facility]

## 2022-03-20 ENCOUNTER — HOSPITAL ENCOUNTER (EMERGENCY)
Facility: CLINIC | Age: 62
Discharge: HOME | End: 2022-03-20
Attending: NURSE PRACTITIONER | Admitting: NURSE PRACTITIONER
Payer: COMMERCIAL

## 2022-03-20 ENCOUNTER — APPOINTMENT (OUTPATIENT)
Dept: GENERAL RADIOLOGY | Facility: CLINIC | Age: 62
End: 2022-03-20
Attending: NURSE PRACTITIONER
Payer: COMMERCIAL

## 2022-03-20 VITALS
HEART RATE: 105 BPM | OXYGEN SATURATION: 98 % | BODY MASS INDEX: 30.58 KG/M2 | RESPIRATION RATE: 18 BRPM | DIASTOLIC BLOOD PRESSURE: 69 MMHG | HEIGHT: 61 IN | TEMPERATURE: 99.2 F | WEIGHT: 162 LBS | SYSTOLIC BLOOD PRESSURE: 131 MMHG

## 2022-03-20 DIAGNOSIS — J06.9 VIRAL URI WITH COUGH: ICD-10-CM

## 2022-03-20 LAB
DEPRECATED S PYO AG THROAT QL EIA: NEGATIVE
FLUAV RNA SPEC QL NAA+PROBE: NEGATIVE
FLUBV RNA RESP QL NAA+PROBE: NEGATIVE
GROUP A STREP BY PCR: NOT DETECTED
SARS-COV-2 RNA RESP QL NAA+PROBE: NEGATIVE

## 2022-03-20 PROCEDURE — 87636 SARSCOV2 & INF A&B AMP PRB: CPT | Performed by: NURSE PRACTITIONER

## 2022-03-20 PROCEDURE — G0463 HOSPITAL OUTPT CLINIC VISIT: HCPCS | Mod: 25 | Performed by: NURSE PRACTITIONER

## 2022-03-20 PROCEDURE — 87651 STREP A DNA AMP PROBE: CPT | Performed by: NURSE PRACTITIONER

## 2022-03-20 PROCEDURE — C9803 HOPD COVID-19 SPEC COLLECT: HCPCS | Performed by: NURSE PRACTITIONER

## 2022-03-20 PROCEDURE — 99214 OFFICE O/P EST MOD 30 MIN: CPT | Performed by: NURSE PRACTITIONER

## 2022-03-20 PROCEDURE — 71045 X-RAY EXAM CHEST 1 VIEW: CPT

## 2022-03-20 RX ORDER — IPRATROPIUM BROMIDE AND ALBUTEROL SULFATE 2.5; .5 MG/3ML; MG/3ML
1 SOLUTION RESPIRATORY (INHALATION) EVERY 6 HOURS PRN
Qty: 90 ML | Refills: 0 | Status: SHIPPED | OUTPATIENT
Start: 2022-03-20

## 2022-03-20 RX ORDER — CODEINE PHOSPHATE AND GUAIFENESIN 10; 100 MG/5ML; MG/5ML
1-2 SOLUTION ORAL
Qty: 60 ML | Refills: 0 | Status: SHIPPED | OUTPATIENT
Start: 2022-03-20 | End: 2023-01-27

## 2022-03-20 NOTE — ED PROVIDER NOTES
History     Chief Complaint   Patient presents with     Cough     coughing, nasal congestion, ill since Tuesday     HPI  Brittany Barajas is a 61 year old female who presents for evaluation of cough and sinus congestion. Symptoms started 5 days ago. Cough is productive. She can feel some crackling in left side of chest when coughing. Intermittent vomiting that is triggered by coughing hard. Sore throat. Sinus pressure. No fevers. No chest pain or shortness of breath. Nonsmoker. She is treating her symptoms with nyquil and duoneb.    Allergies:  Allergies   Allergen Reactions     Docetaxel Other (See Comments)     Chest tightness, black out       Problem List:    Patient Active Problem List    Diagnosis Date Noted     Seizure disorder (H) 05/31/2019     Priority: Medium     DDD (degenerative disc disease), lumbar 08/23/2018     Priority: Medium     DDD (degenerative disc disease), cervical 08/23/2018     Priority: Medium     Dysphonia 08/23/2018     Priority: Medium     Other dysphagia 08/23/2018     Priority: Medium     Traumatic brain injury, without loss of consciousness, subsequent encounter 08/23/2018     Priority: Medium     Fatigue, unspecified type 08/23/2018     Priority: Medium     Advanced directives, counseling/discussion 11/04/2015     Priority: Medium     Advance Care Planning 11/4/2015: ACP Review and Resources Provided:  Reviewed chart for advance care plan.  Brittany Barajas has no plan or code status on file. Discussed available resources and provided with information. Confirmed code status reflects current choices pending further ACP discussions.  Confirmed/documented legally designated decision maker(s). Added by Leonie Alva             Urgency incontinence 01/02/2014     Priority: Medium     Mild persistent asthma 07/02/2013     Priority: Medium     Drug reaction 05/16/2012     Priority: Medium     sideeffects from donald  See May 16 2012 note       H/O: hysterectomy 11/29/2011      Priority: Medium     No pap needed       Colon polyp 11/29/2011     Priority: Medium     2011 colonoscopy --unable to get polyp out due to technical issues-repeat suggested  2012-colonoscopy normal--suggested repeat three years       Transaminase or LDH elevation 11/28/2010     Priority: Medium     Mild and stable in 2010       CARDIOVASCULAR SCREENING; LDL GOAL LESS THAN 160 10/31/2010     Priority: Medium     Breast cancer (H) 11/17/2009     Priority: Medium     09       Anxiety state 12/08/2007     Priority: Medium     Problem list name updated by automated process. Provider to review       Mitral valve disorder 12/01/2005     Priority: Medium     MVP  2010 echo--Mild mitral valve prolapse, posterior leaflet.  There is mild to moderate (1-2+) mitral regurgitation  Problem list name updated by automated process. Provider to review       Allergic rhinitis 12/01/2005     Priority: Medium     Problem list name updated by automated process. 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 Provider to review          Past Medical History:    Past Medical History:   Diagnosis Date     Allergic rhinitis, cause unspecified      Breast cancer (H)      Depressive disorder, not elsewhere classified      Esophageal reflux      Intramural leiomyoma of uterus      Mitral valve disorders(424.0)      Other affections of shoulder  region, not elsewhere classified      Other convulsions      Other diseases of trachea and bronchus, not elsewhere classified      Papanicolaou smear of cervix with low grade squamous intraepithelial lesion (LGSIL) 11/09     Plantar fascial fibromatosis        Past Surgical History:    Past Surgical History:   Procedure Laterality Date     BIOPSY BREAST       COLONOSCOPY  1/28/2011    COLONOSCOPY performed by ELOY BRIONES at [facility]     COLONOSCOPY  1/20/2012    Procedure:COLONOSCOPY; Surgeon:ELOY BRIONES; Location:[facility]     ESOPHAGOSCOPY, GASTROSCOPY, DUODENOSCOPY (EGD), COMBINED N/A 10/22/2018    Procedure: gastroscopy;  Surgeon: Seth Berman MD;  Location: [facility]     HYSTERECTOMY, PAP NO LONGER INDICATED  12/1/2008    for fibroids     INJECT EPIDURAL LUMBAR  3/9/2012    Procedure:INJECT EPIDURAL LUMBAR; JAMIL - ; Surgeon:GENERIC ANESTHESIA PROVIDER; Location:[facility]     INJECT EPIDURAL LUMBAR  5/25/2012    Procedure:INJECT EPIDURAL LUMBAR; JAMIL-; Surgeon:GENERIC ANESTHESIA PROVIDER; Location:[facility]     LEEP TX, CERVICAL  10/06    CECILIO 1     LUMPECTOMY BREAST       SURGICAL HISTORY OF -   10/7/1998    Right parotidectomy-mass     SURGICAL HISTORY OF -       Laparoscopy     SURGICAL HISTORY OF -   10/1996    Fibroid removal       Family History:    Family History   Problem Relation Age of Onset     C.A.D. Mother      Diabetes Mother      Hypertension Mother      Cerebrovascular Disease Mother      Breast Cancer Mother      Allergies Mother      Arthritis Mother      Cancer Mother      Eye Disorder Mother      Gastrointestinal Disease Mother      Gynecology Mother      Lipids Mother      Heart Disease Mother      Osteoporosis Mother      Obesity Mother      Thyroid Disease Mother      Respiratory Mother      Alcohol/Drug Father      Cancer Father      Alcohol/Drug Brother      Musculoskeletal Disorder Brother         spina bifida-recent dx     C.A.D. Brother      Thyroid Disease Sister       "Lipids Sister      Gynecology Sister         fibroids/ s/p hysterectomy     Other - See Comments Sister         hysterectomy, dilation of the throat.     Thyroid Disease Brother      Heart Disease Brother         DOUBLE BYPASS     Lipids Brother      Thyroid Disease Sister      Lipids Sister      Other - See Comments Sister         hysterectomy     Gastrointestinal Disease Sister         liver function off     Thyroid Disease Sister      Thyroid Disease Sister      Heart Disease Sister      Cancer Other         thyroid cancer     Cancer - colorectal No family hx of        Social History:  Marital Status:  Single [1]  Social History     Tobacco Use     Smoking status: Never Smoker     Smokeless tobacco: Never Used   Substance Use Topics     Alcohol use: Yes     Comment: rare     Drug use: No        Medications:    guaiFENesin-codeine (ROBITUSSIN AC) 100-10 MG/5ML solution  ipratropium - albuterol 0.5 mg/2.5 mg/3 mL (DUONEB) 0.5-2.5 (3) MG/3ML neb solution  albuterol (PROAIR HFA/PROVENTIL HFA/VENTOLIN HFA) 108 (90 Base) MCG/ACT inhaler  amitriptyline (ELAVIL) 50 MG tablet  citalopram (CELEXA) 40 MG tablet  famotidine (PEPCID) 40 MG tablet  lactobacillus rhamnosus, GG, (CULTURELL) capsule  Multiple Vitamin (MULTIVITAMIN OR)  pantoprazole (PROTONIX) 40 MG EC tablet  sucralfate (CARAFATE) 1 GM tablet          Review of Systems  As mentioned above in the history present illness. All other systems were reviewed and are negative.    Physical Exam   BP: 131/69  Pulse: 105  Temp: 99.2  F (37.3  C)  Resp: 18  Height: 154.9 cm (5' 1\")  Weight: 73.5 kg (162 lb)  SpO2: 98 %      Physical Exam  Constitutional:       General: She is not in acute distress.     Appearance: Normal appearance. She is well-developed. She is not ill-appearing.   HENT:      Head: Normocephalic and atraumatic.      Right Ear: External ear normal.      Left Ear: External ear normal.      Nose: Congestion present.      Mouth/Throat:      Mouth: Mucous " membranes are moist.   Eyes:      Conjunctiva/sclera: Conjunctivae normal.   Cardiovascular:      Rate and Rhythm: Normal rate and regular rhythm.      Heart sounds: Normal heart sounds. No murmur heard.  Pulmonary:      Effort: Pulmonary effort is normal. No respiratory distress.      Breath sounds: Normal breath sounds.      Comments: Frequent wet sounding cough.  Abdominal:      General: Bowel sounds are normal. There is no distension.      Palpations: Abdomen is soft.      Tenderness: There is no abdominal tenderness.   Musculoskeletal:         General: Normal range of motion.   Skin:     General: Skin is warm and dry.      Findings: No rash.   Neurological:      General: No focal deficit present.      Mental Status: She is alert and oriented to person, place, and time.         ED Course                 Procedures            Results for orders placed or performed during the hospital encounter of 03/20/22 (from the past 24 hour(s))   Symptomatic; Yes; 3/15/2022 Influenza A/B & SARS-CoV2 (COVID-19) Virus PCR Multiplex Nasopharyngeal    Specimen: Nasopharyngeal; Swab   Result Value Ref Range    Influenza A PCR Negative Negative    Influenza B PCR Negative Negative    SARS CoV2 PCR Negative Negative    Narrative    Testing was performed using the seda SARS-CoV-2 & Influenza A/B Assay on the seda Lydia System. This test should be ordered for the detection of SARS-CoV-2 and influenza viruses in individuals who meet clinical and/or epidemiological criteria. Test performance is unknown in asymptomatic patients. This test is for in vitro diagnostic use under the FDA EUA for laboratories certified under CLIA to perform moderate and/or high complexity testing. This test has not been FDA cleared or approved. A negative result does not rule out the presence of PCR inhibitors in the specimen or target RNA in concentration below the limit of detection for the assay. If only one viral target is positive but coinfection with  multiple targets is suspected, the sample should be re-tested with another FDA cleared, approved or authorized test, if coinfection would change clinical management. [facility] are certified under the Clinical Laboratory Improvement Amendments of 1988 (CLIA-88) as  qualified to perform moderate and/or high complexity laboratory testing.   Streptococcus A Rapid Scr w Reflx to PCR    Specimen: Throat; Swab   Result Value Ref Range    Group A Strep antigen Negative Negative   XR Chest Port 1 View    Narrative    EXAM: XR CHEST PORTABLE 1 VIEW  LOCATION: [facility]  DATE/TIME: 03/20/2022, 12:42 PM    INDICATION: Cough.  COMPARISON: 06/23/2020.      Impression    IMPRESSION: Negative chest.         Medications - No data to display    Assessments & Plan (with Medical Decision Making)     Patient informed of negative covid, influenza, and strep test. CXR is negative. No respiratory distress.  Likely viral uri with cough. No indication for antibiotics at this time.     Plan:  Duoneb every 6 hours as needed for wheezing or shortness of breath.  Robitussin AC 5-10 ml at bedtime as needed for cough.  Stay well hydrated.  Recheck for fevers, increased difficulty breathing, failure to improve after 5 more days, or sooner for worsening.    Discharge Medication List as of 3/20/2022  1:11 PM      START taking these medications    Details   guaiFENesin-codeine (ROBITUSSIN AC) 100-10 MG/5ML solution Take 5-10 mLs by mouth nightly as needed for cough, Disp-60 mL, R-0, E-Prescribe      ipratropium - albuterol 0.5 mg/2.5 mg/3 mL (DUONEB) 0.5-2.5 (3) MG/3ML neb solution Take 1 vial (3 mLs) by nebulization every 6 hours as needed for shortness of breath / dyspnea or wheezing, Disp-90 mL, R-0, E-Prescribe             Final diagnoses:   Viral URI with cough       3/20/2022   [facility] EMERGENCY DEPT     Misael, EDEL Ozuna CNP  03/20/22 [id number]

## 2022-03-20 NOTE — DISCHARGE INSTRUCTIONS
Duoneb every 6 hours as needed for wheezing or shortness of breath.  Robitussin AC 5-10 ml at bedtime as needed for cough.  Stay well hydrated.  Recheck for fevers, increased difficulty breathing, failure to improve after 5 more days, or sooner for worsening.

## 2022-04-05 ENCOUNTER — OFFICE VISIT (OUTPATIENT)
Dept: FAMILY MEDICINE | Facility: CLINIC | Age: 62
End: 2022-04-05
Payer: COMMERCIAL

## 2022-04-05 VITALS
HEIGHT: 61 IN | SYSTOLIC BLOOD PRESSURE: 126 MMHG | RESPIRATION RATE: 16 BRPM | WEIGHT: 159 LBS | HEART RATE: 78 BPM | BODY MASS INDEX: 30.02 KG/M2 | TEMPERATURE: 97 F | DIASTOLIC BLOOD PRESSURE: 76 MMHG | OXYGEN SATURATION: 99 %

## 2022-04-05 DIAGNOSIS — Z00.00 ENCOUNTER FOR ANNUAL WELLNESS EXAM IN MEDICARE PATIENT: Primary | ICD-10-CM

## 2022-04-05 DIAGNOSIS — J45.40 MODERATE PERSISTENT ASTHMA WITHOUT COMPLICATION: ICD-10-CM

## 2022-04-05 DIAGNOSIS — G40.909 SEIZURE DISORDER (H): ICD-10-CM

## 2022-04-05 DIAGNOSIS — F41.1 GENERALIZED ANXIETY DISORDER: ICD-10-CM

## 2022-04-05 DIAGNOSIS — Z12.11 SPECIAL SCREENING FOR MALIGNANT NEOPLASMS, COLON: ICD-10-CM

## 2022-04-05 DIAGNOSIS — F33.1 MODERATE RECURRENT MAJOR DEPRESSION (H): ICD-10-CM

## 2022-04-05 DIAGNOSIS — M62.411 CONTRACTURE OF MUSCLE OF RIGHT SHOULDER: ICD-10-CM

## 2022-04-05 DIAGNOSIS — Z13.220 SCREENING FOR HYPERLIPIDEMIA: ICD-10-CM

## 2022-04-05 DIAGNOSIS — R74.8 ELEVATED LIVER ENZYMES: ICD-10-CM

## 2022-04-05 DIAGNOSIS — G47.00 INSOMNIA, UNSPECIFIED TYPE: ICD-10-CM

## 2022-04-05 DIAGNOSIS — Z85.3 PERSONAL HISTORY OF MALIGNANT NEOPLASM OF BREAST: ICD-10-CM

## 2022-04-05 LAB
ALBUMIN SERPL-MCNC: 3.9 G/DL (ref 3.4–5)
ALP SERPL-CCNC: 49 U/L (ref 40–150)
ALT SERPL W P-5'-P-CCNC: 76 U/L (ref 0–50)
ANION GAP SERPL CALCULATED.3IONS-SCNC: 6 MMOL/L (ref 3–14)
AST SERPL W P-5'-P-CCNC: 52 U/L (ref 0–45)
BILIRUB SERPL-MCNC: 0.2 MG/DL (ref 0.2–1.3)
BUN SERPL-MCNC: 13 MG/DL (ref 7–30)
CALCIUM SERPL-MCNC: 9.4 MG/DL (ref 8.5–10.1)
CHLORIDE BLD-SCNC: 106 MMOL/L (ref 94–109)
CHOLEST SERPL-MCNC: 251 MG/DL
CO2 SERPL-SCNC: 27 MMOL/L (ref 20–32)
CREAT SERPL-MCNC: 0.7 MG/DL (ref 0.52–1.04)
FASTING STATUS PATIENT QL REPORTED: YES
GFR SERPL CREATININE-BSD FRML MDRD: >90 ML/MIN/1.73M2
GLUCOSE BLD-MCNC: 100 MG/DL (ref 70–99)
HBA1C MFR BLD: 5.4 % (ref 0–5.6)
HDLC SERPL-MCNC: 46 MG/DL
LDLC SERPL CALC-MCNC: 165 MG/DL
NONHDLC SERPL-MCNC: 205 MG/DL
POTASSIUM BLD-SCNC: 3.9 MMOL/L (ref 3.4–5.3)
PROT SERPL-MCNC: 8.1 G/DL (ref 6.8–8.8)
SODIUM SERPL-SCNC: 139 MMOL/L (ref 133–144)
TRIGL SERPL-MCNC: 198 MG/DL

## 2022-04-05 PROCEDURE — 90714 TD VACC NO PRESV 7 YRS+ IM: CPT | Performed by: FAMILY MEDICINE

## 2022-04-05 PROCEDURE — 90682 RIV4 VACC RECOMBINANT DNA IM: CPT | Performed by: FAMILY MEDICINE

## 2022-04-05 PROCEDURE — 90471 IMMUNIZATION ADMIN: CPT | Performed by: FAMILY MEDICINE

## 2022-04-05 PROCEDURE — 90472 IMMUNIZATION ADMIN EACH ADD: CPT | Performed by: FAMILY MEDICINE

## 2022-04-05 PROCEDURE — 36415 COLL VENOUS BLD VENIPUNCTURE: CPT | Performed by: FAMILY MEDICINE

## 2022-04-05 PROCEDURE — 80053 COMPREHEN METABOLIC PANEL: CPT | Performed by: FAMILY MEDICINE

## 2022-04-05 PROCEDURE — 80061 LIPID PANEL: CPT | Performed by: FAMILY MEDICINE

## 2022-04-05 PROCEDURE — G0438 PPPS, INITIAL VISIT: HCPCS | Performed by: FAMILY MEDICINE

## 2022-04-05 PROCEDURE — 99214 OFFICE O/P EST MOD 30 MIN: CPT | Mod: 25 | Performed by: FAMILY MEDICINE

## 2022-04-05 PROCEDURE — 83036 HEMOGLOBIN GLYCOSYLATED A1C: CPT | Performed by: FAMILY MEDICINE

## 2022-04-05 RX ORDER — CITALOPRAM HYDROBROMIDE 10 MG/1
10 TABLET ORAL DAILY
Qty: 90 TABLET | Refills: 3 | Status: SHIPPED | OUTPATIENT
Start: 2022-04-05 | End: 2023-01-27

## 2022-04-05 RX ORDER — AMITRIPTYLINE HYDROCHLORIDE 50 MG/1
75 TABLET ORAL AT BEDTIME
Qty: 135 TABLET | Refills: 0 | Status: SHIPPED | OUTPATIENT
Start: 2022-04-05 | End: 2022-07-20

## 2022-04-05 RX ORDER — ALBUTEROL SULFATE 90 UG/1
2 AEROSOL, METERED RESPIRATORY (INHALATION) EVERY 4 HOURS PRN
Qty: 18 G | Refills: 3 | Status: SHIPPED | OUTPATIENT
Start: 2022-04-05 | End: 2023-06-05

## 2022-04-05 ASSESSMENT — ANXIETY QUESTIONNAIRES
GAD7 TOTAL SCORE: 8
7. FEELING AFRAID AS IF SOMETHING AWFUL MIGHT HAPPEN: NOT AT ALL
5. BEING SO RESTLESS THAT IT IS HARD TO SIT STILL: SEVERAL DAYS
3. WORRYING TOO MUCH ABOUT DIFFERENT THINGS: SEVERAL DAYS
5. BEING SO RESTLESS THAT IT IS HARD TO SIT STILL: MORE THAN HALF THE DAYS
7. FEELING AFRAID AS IF SOMETHING AWFUL MIGHT HAPPEN: NOT AT ALL
6. BECOMING EASILY ANNOYED OR IRRITABLE: SEVERAL DAYS
GAD7 TOTAL SCORE: 7
GAD7 TOTAL SCORE: 7
IF YOU CHECKED OFF ANY PROBLEMS ON THIS QUESTIONNAIRE, HOW DIFFICULT HAVE THESE PROBLEMS MADE IT FOR YOU TO DO YOUR WORK, TAKE CARE OF THINGS AT HOME, OR GET ALONG WITH OTHER PEOPLE: SOMEWHAT DIFFICULT
3. WORRYING TOO MUCH ABOUT DIFFERENT THINGS: SEVERAL DAYS
6. BECOMING EASILY ANNOYED OR IRRITABLE: SEVERAL DAYS
4. TROUBLE RELAXING: SEVERAL DAYS
1. FEELING NERVOUS, ANXIOUS, OR ON EDGE: MORE THAN HALF THE DAYS
2. NOT BEING ABLE TO STOP OR CONTROL WORRYING: SEVERAL DAYS
7. FEELING AFRAID AS IF SOMETHING AWFUL MIGHT HAPPEN: NOT AT ALL
2. NOT BEING ABLE TO STOP OR CONTROL WORRYING: NOT AT ALL
GAD7 TOTAL SCORE: 7
1. FEELING NERVOUS, ANXIOUS, OR ON EDGE: MORE THAN HALF THE DAYS

## 2022-04-05 ASSESSMENT — PATIENT HEALTH QUESTIONNAIRE - PHQ9
5. POOR APPETITE OR OVEREATING: MORE THAN HALF THE DAYS
SUM OF ALL RESPONSES TO PHQ QUESTIONS 1-9: 9

## 2022-04-05 ASSESSMENT — PAIN SCALES - GENERAL: PAINLEVEL: MODERATE PAIN (4)

## 2022-04-05 ASSESSMENT — ASTHMA QUESTIONNAIRES: ACT_TOTALSCORE: 13

## 2022-04-05 NOTE — PATIENT INSTRUCTIONS
1. Influenza and Td    2. To lab    Celexa at 10mg once a day for 4 weeks to know if that dose is enough.  In 4-6 weeks notify me if this dose is working well or if you'd like to increase to 20mg.    OT will call you to schedule.    I send refills for the year.    Shingles: check insurance pharmacy or clinic? Cost?

## 2022-04-05 NOTE — PROGRESS NOTES
Assessment & Plan     Encounter for annual wellness exam in Medicare patient  - Lipid panel reflex to direct LDL Fasting; Future  - Hemoglobin A1c; Future  - Comprehensive metabolic panel (BMP + Alb, Alk Phos, ALT, AST, Total. Bili, TP); Future  - Comprehensive metabolic panel (BMP + Alb, Alk Phos, ALT, AST, Total. Bili, TP)  - Hemoglobin A1c  - Lipid panel reflex to direct LDL Fasting    Moderate recurrent major depression (H)  Slightly worse, restart celexa at 10mg daily.  - citalopram (CELEXA) 10 MG tablet; Take 1 tablet (10 mg) by mouth daily    Seizure disorder (H)  Focal seizures in past, currently following with neurology for some staring spells to figure out if these are related to seizures again. Otherwise no seizures since age 19.    Moderate persistent asthma without complication  Stable, refill  - albuterol (PROAIR HFA/PROVENTIL HFA/VENTOLIN HFA) 108 (90 Base) MCG/ACT inhaler; Inhale 2 puffs into the lungs every 4 hours as needed for shortness of breath / dyspnea    Insomnia, unspecified type  Stable, refill  - amitriptyline (ELAVIL) 50 MG tablet; Take 1.5 tablets (75 mg) by mouth At Bedtime    Personal history of malignant neoplasm of breast  In remission      Generalized anxiety disorder Anxiety state  Slightly worsen, restart celexa at 10mg daily.  - citalopram (CELEXA) 10 MG tablet; Take 1 tablet (10 mg) by mouth daily        Screening for hyperlipidemia  - Lipid panel reflex to direct LDL Fasting; Future    Special screening for malignant neoplasms, colon  - Fecal colorectal cancer screen (FIT); Future      Contracture of muscle of right shoulder  After surgery and radition to right breast.  - Occupational Therapy Referral; Future    Elevated liver enzymes  Slight elevation since chemo, recheck yearly.  - Comprehensive metabolic panel (BMP + Alb, Alk Phos, ALT, AST, Total. Bili, TP); Future       BMI:   Estimated body mass index is 30.04 kg/m  as calculated from the following:    Height as of this  "encounter: 1.549 m (5' 1\").    Weight as of this encounter: 72.1 kg (159 lb).   Weight management plan: Discussed healthy diet and exercise guidelines  See Patient Instructions    Return in about 1 year (around 4/5/2023).    Russell Barrientos MD  [facility] RADHA Galloway is a 61 year old who presents for the following health issues     - Patient wants to restart the Citalopram. Patient states she probably would like to make her way up again. Patient would like to start on a low dose.    History of Present Illness       Mental Health Follow-up:  Patient presents to follow-up on Anxiety.    Patient's anxiety since last visit has been:  Medium  The patient is having other symptoms associated with anxiety.  Any significant life events: relationship concerns and health concerns  Patient is feeling anxious or having panic attacks.  Patient has no concerns about alcohol or drug use.         Today's CLAY-7 Score: 7    Reason for visit:  Follow up medication and up visit  Symptom onset:  1-2 weeks ago  Symptoms include:  Constirction in throat. Seeing Dr. Goldman. GERD.  Symptom intensity:  Moderate  Symptom progression:  Improving  Had these symptoms before:  Yes  Has tried/received treatment for these symptoms:  Yes  Previous treatment was successful:  Yes  Prior treatment description:  Medication  What makes it worse:  Talking, eating re cough  What makes it better:  Not talking, taking small bites    She eats 2-3 servings of fruits and vegetables daily.She consumes 0 sweetened beverage(s) daily.She exercises with enough effort to increase her heart rate 10 to 19 minutes per day.  She exercises with enough effort to increase her heart rate 4 days per week.   She is taking medications regularly.     Low back pain:  Has done Physical therapy and does home exercises  Amitriptyline at bedtime to help with pain.    Depression and anxiety:  Celexa (citalopram) helpful for mood.  Off medication for 5 " "months. Higher anxiety    Had been on paxil in the past. Was having TD symptoms with tremors in the mouth and hands.    Chemo caused LFTs to be elevated slightly.    Annual Wellness Visit  Patient has been advised of split billing requirements and indicates understanding: Yes     Are you in the first 12 months of your Medicare Part B coverage?  No    Physical Health:    In general, how would you rate your overall physical health? good    Outside of work, how many days during the week do you exercise?2-3 days/week    Outside of work, approximately how many minutes a day do you exercise?15-30 minutes    If you drink alcohol do you typically have >3 drinks per day or >7 drinks per week? No    Do you usually eat at least 4 servings of fruit and vegetables a day, include whole grains & fiber and avoid regularly eating high fat or \"junk\" foods? NO    Do you have any problems taking medications regularly? No. Has a throat issue so sometimes they get stuck. Patient is seeing Dr. Goldman for this.    Do you have any side effects from medications? none    Needs assistance for the following daily activities: no assistance needed    Which of the following safety concerns are present in your home?  none identified     Hearing impairment: No    In the past 6 months, have you been bothered by leaking of urine? Yes- daily    Mental Health:    In general, how would you rate your overall mental or emotional health? good  PHQ-2 Score: (P) 2    Do you feel safe in your environment? Yes    Have you ever done Advance Care Planning? (For example, a Health Directive, POLST, or a discussion with a medical provider or your loved ones about your wishes)? No, advance care planning information given to patient to review.  Advanced care planning was discussed at today's visit.    Fall risk:  Fallen 2 or more times in the past year?: Yes  Any fall with injury in the past year?: No  Timed Up and Go Test (>13.5 is fall risk; contact physician) : " "10      Cognitive Screenin) Repeat 3 items (Leader, Season, Table)    2) Clock draw: NORMAL  3) 3 item recall: Recalls 3 objects  Results: 3 items recalled: COGNITIVE IMPAIRMENT LESS LIKELY    Mini-CogTM Copyright S Joanne. Licensed by the author for use in [facility]; reprinted with permission (matt@[facility]). All rights reserved.      Do you have sleep apnea, excessive snoring or daytime drowsiness?: yes- all    Current providers sharing in care for this patient include:   Patient Care Team:  Russell Barrientos MD as PCP - General (Family Medicine)  Juan Iyer MD as Assigned PCP  David Shine MD as Assigned Surgical Provider    Patient has been advised of split billing requirements and indicates understanding: Yes    Review of Systems   Constitutional, HEENT, cardiovascular, pulmonary, gi and gu systems are negative, except as otherwise noted.      Objective    /76   Pulse 78   Temp 97  F (36.1  C) (Tympanic)   Resp 16   Ht 1.549 m (5' 1\")   Wt 72.1 kg (159 lb)   LMP 10/17/2008   SpO2 99%   BMI 30.04 kg/m    Body mass index is 30.04 kg/m .  Physical Exam   GENERAL: healthy, alert and no distress  NECK: no adenopathy, no asymmetry, masses, or scars and thyroid normal to palpation  RESP: lungs clear to auscultation - no rales, rhonchi or wheezes  CV: regular rate and rhythm, normal S1 S2, no S3 or S4, no murmur, click or rub, no peripheral edema and peripheral pulses strong  ABDOMEN: soft, nontender, no hepatosplenomegaly, no masses and bowel sounds normal  MS: no gross musculoskeletal defects noted, no edema            "

## 2022-04-06 NOTE — ADDENDUM NOTE
Addended by: TI MORALES on: 4/6/2022 12:06 PM     Modules accepted: Orders     Detail Level: Detailed

## 2022-04-13 ENCOUNTER — APPOINTMENT (OUTPATIENT)
Dept: LAB | Facility: CLINIC | Age: 62
End: 2022-04-13
Payer: COMMERCIAL

## 2022-04-13 PROCEDURE — 82274 ASSAY TEST FOR BLOOD FECAL: CPT

## 2022-04-16 DIAGNOSIS — Z12.11 SPECIAL SCREENING FOR MALIGNANT NEOPLASMS, COLON: ICD-10-CM

## 2022-04-16 LAB — HEMOCCULT STL QL IA: NEGATIVE

## 2022-04-28 ENCOUNTER — OFFICE VISIT (OUTPATIENT)
Dept: OTOLARYNGOLOGY | Facility: CLINIC | Age: 62
End: 2022-04-28
Payer: COMMERCIAL

## 2022-04-28 VITALS
TEMPERATURE: 97.2 F | BODY MASS INDEX: 30.02 KG/M2 | WEIGHT: 159 LBS | HEART RATE: 88 BPM | OXYGEN SATURATION: 97 % | HEIGHT: 61 IN | DIASTOLIC BLOOD PRESSURE: 85 MMHG | SYSTOLIC BLOOD PRESSURE: 138 MMHG

## 2022-04-28 DIAGNOSIS — K21.9 GASTROESOPHAGEAL REFLUX DISEASE, UNSPECIFIED WHETHER ESOPHAGITIS PRESENT: ICD-10-CM

## 2022-04-28 DIAGNOSIS — R49.0 MUSCLE TENSION DYSPHONIA: Primary | ICD-10-CM

## 2022-04-28 DIAGNOSIS — K21.9 LARYNGOPHARYNGEAL REFLUX: ICD-10-CM

## 2022-04-28 DIAGNOSIS — R13.10 DYSPHAGIA, UNSPECIFIED TYPE: ICD-10-CM

## 2022-04-28 DIAGNOSIS — J01.90 ACUTE SINUSITIS WITH SYMPTOMS GREATER THAN 10 DAYS: ICD-10-CM

## 2022-04-28 DIAGNOSIS — J38.3 LESION OF VOCAL FOLD: ICD-10-CM

## 2022-04-28 PROCEDURE — 99213 OFFICE O/P EST LOW 20 MIN: CPT | Performed by: OTOLARYNGOLOGY

## 2022-04-28 RX ORDER — PREDNISONE 10 MG/1
30 TABLET ORAL DAILY
Qty: 15 TABLET | Refills: 0 | Status: SHIPPED | OUTPATIENT
Start: 2022-04-28 | End: 2022-05-03

## 2022-04-28 ASSESSMENT — PAIN SCALES - GENERAL: PAINLEVEL: SEVERE PAIN (6)

## 2022-04-28 NOTE — PATIENT INSTRUCTIONS
NASAL SALINE IRRIGATION INSTRUCTIONS    You will be starting nasal saline irrigations and will need to obtain the following:      - NeilMed Sinus Rinse 8 oz Kit  - Distilled or filtered water   - Normal saline salt packets    Place filtered or distilled water into the NeilMed bottle up to the fill line (DO NOT USE TAP OR WELL WATER).  Place the pre-made salt packet in the 8 oz of saline.  Shake the bottle to suspend into solution.  Lean head forward over a sink or a basin.  Rinse each side of the nose with one-half of the bottle (each squeeze is about one-half of the bottle). Rinse the nose daily.     If you use topical nasal sprays, apply following irrigation.    Video example: [email or web address]/[email or web address]?v=[email or web address]

## 2022-04-28 NOTE — LETTER
4/28/2022         RE: Brittany Barajas  [address]  [place] [address]        Dear Colleague,    Thank you for referring your patient, Brittany Barajas, to the [facility]. Please see a copy of my visit note below.    Chief Complaint   Patient presents with     RECHECK     History of Present Illness  Brittany Barajas is a 61 year old female who presents today for follow-up.  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 Hudson.  I evaluated her on 8/16/2021 and noted a similar area on endoscopy.  This did not appear to limit glottic closure.  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  She was also having some trouble with swallowing.  We obtained a video swallow study with esophagram performed 8/31/2021.    The swallow study did not show any significant esophageal dysmotility, stricture, pharyngeal pouch, or signs of dysphagia.  We had placed her on dual acid suppression therapy with Carafate for breakthrough symptoms.  We discussed careful dietary restrictions including avoiding eating or drinking 3 to 4 hours prior to bedtime.  The patient was last seen on 9/27/2021.  At that time, she felt like she had about 75% improvement in her symptoms.  We decided to keep her on her current reflux regimen at that time.  She has canceled her appointment to see gastroenterology. Her last EGD was okay in 2018. She returns today for follow-up.    Since last seeing the patient, the patient reports continued interval improvement in symptoms.  She feels like her throat symptoms are about 80% better.  She does still have some intermittent issues with postnasal drainage and some intermittent dysphonia but again much improved.  She is no longer coughing at night and sleeping much better.  She had to use the Carafate 1-2 times a week. She currently denies any significant  dysphagia, odynophagia, pharyngodynia.    For the past 3 weeks or so, she has had some issues with chronic nasal congestion, facial pressure, concerns for sinus infection.  She was also having some sore throat from postnasal drainage.  She underwent strep and COVID testing in the walk-in clinic, which was negative.  Symptoms been present for nearly 3 weeks.  She has not been on any antibiotics.  She is using nasal saline in her nose.  She is tried Flonase in the past but this dries out her nose too much.  She has not had previous nose or sinus surgery.    Past Medical History  Patient Active Problem List   Diagnosis     Mitral valve disorder     Allergic rhinitis     Esophageal reflux     Plantar fascial fibromatosis     Other affections of shoulder region, not elsewhere classified     Anxiety state     Personal history of malignant neoplasm of breast     Elevated liver enzymes     H/O: hysterectomy     Colon polyp     Drug reaction     Mild persistent asthma     Urgency incontinence     Advanced directives, counseling/discussion     DDD (degenerative disc disease), lumbar     DDD (degenerative disc disease), cervical     Dysphonia     Other dysphagia     Traumatic brain injury, without loss of consciousness, subsequent encounter     Fatigue, unspecified type     Seizure disorder (H)     Moderate recurrent major depression (H)     Current Medications    Current Outpatient Medications:      albuterol (PROAIR HFA/PROVENTIL HFA/VENTOLIN HFA) 108 (90 Base) MCG/ACT inhaler, Inhale 2 puffs into the lungs every 4 hours as needed for shortness of breath / dyspnea, Disp: 18 g, Rfl: 3     amitriptyline (ELAVIL) 50 MG tablet, Take 1.5 tablets (75 mg) by mouth At Bedtime, Disp: 135 tablet, Rfl: 0     amoxicillin-clavulanate (AUGMENTIN) 875-125 MG tablet, Take 1 tablet by mouth 2 times daily, Disp: 20 tablet, Rfl: 0     citalopram (CELEXA) 10 MG tablet, Take 1 tablet (10 mg) by mouth daily, Disp: 90 tablet, Rfl: 3     famotidine  (PEPCID) 40 MG tablet, Take 1 tablet (40 mg) by mouth At Bedtime Discuss further refills at follow-up appointment with Dr. Shine, Disp: 90 tablet, Rfl: 0     guaiFENesin-codeine (ROBITUSSIN AC) 100-10 MG/5ML solution, Take 5-10 mLs by mouth nightly as needed for cough, Disp: 60 mL, Rfl: 0     lactobacillus rhamnosus, GG, (CULTURELL) capsule, Take 1 capsule by mouth 2 times daily, Disp: , Rfl:      Multiple Vitamin (MULTIVITAMIN OR), Take 1 tablet by mouth daily, Disp: , Rfl:      pantoprazole (PROTONIX) 40 MG EC tablet, Take 1 tablet (40 mg) by mouth daily, Disp: 90 tablet, Rfl: 1     predniSONE (DELTASONE) 10 MG tablet, Take 3 tablets (30 mg) by mouth daily for 5 days, Disp: 15 tablet, Rfl: 0     sucralfate (CARAFATE) 1 GM tablet, Take 1 tablet (1 g) by mouth 4 times daily as needed (Heartburn), Disp: 30 tablet, Rfl: 1     ipratropium - albuterol 0.5 mg/2.5 mg/3 mL (DUONEB) 0.5-2.5 (3) MG/3ML neb solution, Take 1 vial (3 mLs) by nebulization every 6 hours as needed for shortness of breath / dyspnea or wheezing (Patient not taking: No sig reported), Disp: 90 mL, Rfl: 0    Allergies  Allergies   Allergen Reactions     Docetaxel Other (See Comments)     Chest tightness, black out       Social History  Social History     Socioeconomic History     Marital status: Single     Spouse name: Not on file     Number of children: Not on file     Years of education: Not on file     Highest education level: Not on file   Occupational History     Not on file   Tobacco Use     Smoking status: Never Smoker     Smokeless tobacco: Never Used   Substance and Sexual Activity     Alcohol use: Yes     Comment: rare     Drug use: No     Sexual activity: Not Currently   Other Topics Concern     Parent/sibling w/ CABG, MI or angioplasty before 65F 55M? Yes     Comment: brother triple CABG in his 50s      Service No     Blood Transfusions No     Caffeine Concern No     Occupational Exposure Yes     Comment: maybe     Hobby Hazards No      Sleep Concern No     Stress Concern No     Weight Concern No     Special Diet Yes     Comment: less meat     Back Care Yes     Comment: low back  S-I joint     Exercise Yes     Comment: floor exercises     Bike Helmet Yes     Seat Belt Yes     Self-Exams Yes   Social History Narrative     Not on file     Social Determinants of Health     Financial Resource Strain: Not on file   Food Insecurity: Not on file   Transportation Needs: Not on file   Physical Activity: Not on file   Stress: Not on file   Social Connections: Not on file   Intimate Partner Violence: Not on file   Housing Stability: Not on file       Family History  Family History   Problem Relation Age of Onset     C.A.D. Mother      Diabetes Mother      Hypertension Mother      Cerebrovascular Disease Mother      Breast Cancer Mother      Allergies Mother      Arthritis Mother      Cancer Mother      Eye Disorder Mother      Gastrointestinal Disease Mother      Gynecology Mother      Lipids Mother      Heart Disease Mother      Osteoporosis Mother      Obesity Mother      Thyroid Disease Mother      Respiratory Mother      Alcohol/Drug Father      Cancer Father      Alcohol/Drug Brother      Musculoskeletal Disorder Brother         spina bifida-recent dx     C.A.D. Brother      Thyroid Disease Sister      Lipids Sister      Gynecology Sister         fibroids/ s/p hysterectomy     Other - See Comments Sister         hysterectomy, dilation of the throat.     Thyroid Disease Brother      Heart Disease Brother         DOUBLE BYPASS     Lipids Brother      Thyroid Disease Sister      Lipids Sister      Other - See Comments Sister         hysterectomy     Gastrointestinal Disease Sister         liver function off     Thyroid Disease Sister      Thyroid Disease Sister      Heart Disease Sister      Cancer Other         thyroid cancer     Cancer - colorectal No family hx of        Review of Systems  As per HPI and PMHx, otherwise 10 system review including the  "head and neck, constitutional, eyes, respiratory, GI, skin, neurologic, lymphatic, endocrine, and allergy systems is negative.    Physical Exam  /85 (BP Location: Right arm, Patient Position: Sitting, Cuff Size: Adult Large)   Pulse 88   Temp 97.2  F (36.2  C) (Tympanic)   Ht 1.549 m (5' 1\")   Wt 72.1 kg (159 lb)   LMP 10/17/2008   SpO2 97%   BMI 30.04 kg/m    GENERAL: Patient is a pleasant, cooperative 61 year old female in no acute distress.  HEAD: Normocephalic, atraumatic.  Hair and scalp are normal.  EYES: Pupils are equal, round, reactive to light and accommodation.  Extraocular movements are intact.  The sclera nonicteric without injection.  The extraocular structures are normal.  EARS: Normal shape and symmetry.  No tenderness when palpating the mastoid or tragal areas bilaterally.    NOSE: Nares are patent.  Nasal mucosa is boggy and inflamed with sticky, inflammatory mucus.  No obvious nasal cavity masses or polyps.  No tammi mucopurulent drainage anteriorly.    ORAL CAVITY: Dentition is in good repair.  Mucous membranes are moist.  Tongue is mobile, protrudes to the midline.  Palate elevates symmetrically.  Tonsils are 2+.  No erythema or exudate.  No oral cavity or oropharyngeal masses, lesions, ulcerations, leukoplakia.  NECK: Supple, trachea is midline.  There no palpable cervical lymphadenopathy or masses bilaterally.  Palpation of the bilateral parotid and submandibular areas reveal no masses.  No thyromegaly.    NEUROLOGIC: Cranial nerves II through XII are grossly intact.  The patients voice is hoarse and raspy with intermittent vocal breaks.  Patient is House-Brackmann I/VI bilaterally.  CARDIOVASCULAR: Extremities are warm and well-perfused.  No significant peripheral edema.  RESPIRATORY: Patient has nonlabored breathing without cough, wheeze, stridor.  PSYCHIATRIC: Patient is alert and oriented.  Mood and affect appear normal.  SKIN: Warm and dry.  No scalp, face, or neck lesions " noted.     Assessment and Plan     ICD-10-CM    1. Muscle tension dysphonia  R49.0    2. Laryngopharyngeal reflux  K21.9    3. Gastroesophageal reflux disease, unspecified whether esophagitis present  K21.9    4. Lesion of vocal fold  J38.3    5. Dysphagia, unspecified type  R13.10    6. Acute sinusitis with symptoms greater than 10 days  J01.90 predniSONE (DELTASONE) 10 MG tablet     amoxicillin-clavulanate (AUGMENTIN) 875-125 MG tablet      It was my pleasure seeing Brittany Barajas today in clinic.  The patient continues to have good improvement with her twice daily dual acid suppression therapy and the Carafate for breakthrough.  We can keep her on the current reflux regimen for now.  I would like to see her back in 3 to 4 months to recheck her symptoms.  She feels like her voice is doing well now.  If she is having more voice issues, we should consider speech therapy evaluation and treatment for muscle tension dysphonia.  We also discussed seeing gastroenterology.  She had to cancel her appointment for now but will think about this is an option in the future.    From a nose and sinus standpoint, the patient's been having sinus symptoms for the past 3 weeks.  She continues to get some colored nasal drainage.  We discussed nasal saline irrigations, I provided her with instructions and a rinse bottle.  I will provide her with a burst of prednisone 30 mg daily for 5 days and a 10-day course of Augmentin.  The patient knows to contact me if her symptoms are not improved after treatment or worsening.    David Shine MD  Department of Otolaryngology-Head and Neck Surgery  [facility]         Again, thank you for allowing me to participate in the care of your patient.        Sincerely,        David Shine MD

## 2022-04-28 NOTE — NURSING NOTE
"Initial /85 (BP Location: Right arm, Patient Position: Sitting, Cuff Size: Adult Large)   Pulse 88   Temp 97.2  F (36.2  C) (Tympanic)   Ht 1.549 m (5' 1\")   Wt 72.1 kg (159 lb)   LMP 10/17/2008   SpO2 97%   BMI 30.04 kg/m   Estimated body mass index is 30.04 kg/m  as calculated from the following:    Height as of this encounter: 1.549 m (5' 1\").    Weight as of this encounter: 72.1 kg (159 lb). .    Zoë Suazo LPN on 4/28/2022 at 2:19 PM    "

## 2022-04-29 ENCOUNTER — HOSPITAL ENCOUNTER (OUTPATIENT)
Dept: PHYSICAL THERAPY | Facility: CLINIC | Age: 62
Setting detail: THERAPIES SERIES
Discharge: HOME | End: 2022-04-29
Attending: FAMILY MEDICINE
Payer: COMMERCIAL

## 2022-04-29 DIAGNOSIS — M62.411 CONTRACTURE OF MUSCLE OF RIGHT SHOULDER: ICD-10-CM

## 2022-04-29 PROCEDURE — 97161 PT EVAL LOW COMPLEX 20 MIN: CPT | Mod: GP | Performed by: PHYSICAL THERAPIST

## 2022-04-29 PROCEDURE — 97110 THERAPEUTIC EXERCISES: CPT | Mod: GP | Performed by: PHYSICAL THERAPIST

## 2022-04-29 PROCEDURE — 97140 MANUAL THERAPY 1/> REGIONS: CPT | Mod: GP | Performed by: PHYSICAL THERAPIST

## 2022-04-29 NOTE — PROGRESS NOTES
PHYSICAL THERAPY EVALUATION    04/29/22 0800   General Information   Type of Visit Initial OP Ortho PT Evaluation   Start of Care Date 04/29/22   Referring Physician Dr Barrientos   Patient/Family Goals Statement have full range of motion   Orders Evaluate and Treat   Date of Order 04/05/22   Certification Required? Yes   Medical Diagnosis muscle contrature R shoulder   Surgical/Medical history reviewed Yes  (Breast CA 2009)   Body Part(s)   Body Part(s) Shoulder   Presentation and Etiology   Pertinent history of current problem (include personal factors and/or comorbidities that impact the POC) Tenderness in R armpit, post 2 lymph node removal and lumpectomy in 2009. Feels she is getting increased tightness. Sx reaching out to side. Did break R elbow 2 years ago - maybe adding to tightness. .Pain 3-6/10. L hand dominant   Onset date of current episode/exacerbation 04/05/22  (MD appt)   Prior Level of Function   Functional Level Prior Comment indep living, sold her house, in apt now   Current Level of Function   Patient role/employment history F. Retired   Fall Risk Screen   Fall screen completed by PT   Have you fallen 2 or more times in the past year? No   Have you fallen and had an injury in the past year? No   Is patient a fall risk? No   Abuse Screen (yes response referral indicated)   Physical Signs of Abuse Present no   Shoulder Objective Findings   Side (if bilateral, select both right and left) Right   Cervical Screen (ROM, quadrant) ROM WNL   Palpation tightness R mid scap, levator insertion, tender R lat, subscap area in axilla   Posture protracted scap, forward head   Right Shoulder Flexion AROM WNL   Right Shoulder Flexion PROM 155*, L 170*   Right Shoulder Abduction AROM 95*   Right Shoulder Abduction PROM 110*   Right Shoulder ER AROM WNL   Right Shoulder ER PROM 80* at 90abd, L 90*   Right Shoulder IR AROM T7 B   Right Shoulder IR PROM 70* at 90abd B   Right Shoulder ER Strength 5   Right Shoulder IR  Strength 5   Right Shoulder Extension Strength 5   Planned Therapy Interventions   Planned Therapy Interventions stretching;strengthening;manual therapy   Clinical Impression   Criteria for Skilled Therapeutic Interventions Met yes, treatment indicated   PT Diagnosis mild adhesive capsulitis R shld   Functional limitations due to impairments reaching out to side   Clinical Presentation Stable/Uncomplicated   Clinical Decision Making (Complexity) Low complexity   Therapy Frequency 1 time/week   Predicted Duration of Therapy Intervention (days/wks) 4wk   Risk & Benefits of therapy have been explained Yes   Patient, Family & other staff in agreement with plan of care Yes   Education Assessment   Preferred Learning Style Listening   Barriers to Learning No barriers   ORTHO GOALS   PT Ortho Eval Goals 1;2   Ortho Goal 1   Goal Identifier 1   Goal Description ful lpainfree ROM R shld for all ADL's in 4wk   Target Date 05/27/22   Ortho Goal 2   Goal Identifier 2   Goal Description pt will be indep in home program for self management of sx in 4wk   Target Date 05/27/22   Total Evaluation Time   PT Eval, Low Complexity Minutes (25673) 10   Therapy Certification   Certification date from 04/29/22   Certification date to 05/27/22   Medical Diagnosis R shoulder muscle contracture   [facility]  Kris Hoenk  PT  [facility]  [address] [place] [address]  [email or web address]   Office: [phone]  Voicemail: [phone]

## 2022-04-29 NOTE — PROGRESS NOTES
[facility]    OUTPATIENT PHYSICAL THERAPY ORTHOPEDIC EVALUATION  PLAN OF TREATMENT FOR OUTPATIENT REHABILITATION  (COMPLETE FOR INITIAL CLAIMS ONLY)  Patient's Last Name, First Name, M.I.  YOB: 1960  Brittany Barajas    Provider s Name:  [facility]   Medical Record No.  [id number]   Start of Care Date:  04/29/22   Onset Date:  04/05/22 (MD appt)   Type:     _X__PT   ___OT   ___SLP Medical Diagnosis:  R shoulder muscle contracture     PT Diagnosis:  mild adhesive capsulitis R shld   Visits from SOC:  1      _________________________________________________________________________________  Plan of Treatment/Functional Goals:  stretching, strengthening, manual therapy           Goals  Goal Identifier: 1  Goal Description: ful lpainfree ROM R shld for all ADL's in 4wk  Target Date: 05/27/22    Goal Identifier: 2  Goal Description: pt will be indep in home program for self management of sx in 4wk  Target Date: 05/27/22          Therapy Frequency:  1 time/week  Predicted Duration of Therapy Intervention:  4wk    Kris Hoenk, PT                 I CERTIFY THE NEED FOR THESE SERVICES FURNISHED UNDER        THIS PLAN OF TREATMENT AND WHILE UNDER MY CARE     (Physician co-signature of this document indicates review and certification of the therapy plan).                       Certification Date From:  04/29/22   Certification Date To:  05/27/22    Referring Provider:  Dr Barrientos    Initial Assessment        See Epic Evaluation Start of Care Date: 04/29/22

## 2022-05-06 ENCOUNTER — HOSPITAL ENCOUNTER (OUTPATIENT)
Dept: PHYSICAL THERAPY | Facility: CLINIC | Age: 62
Setting detail: THERAPIES SERIES
Discharge: HOME | End: 2022-05-06
Attending: FAMILY MEDICINE
Payer: COMMERCIAL

## 2022-05-06 PROCEDURE — 97140 MANUAL THERAPY 1/> REGIONS: CPT | Mod: GP | Performed by: PHYSICAL THERAPIST

## 2022-05-06 PROCEDURE — 97110 THERAPEUTIC EXERCISES: CPT | Mod: GP | Performed by: PHYSICAL THERAPIST

## 2022-05-16 DIAGNOSIS — K21.9 LARYNGOPHARYNGEAL REFLUX: ICD-10-CM

## 2022-05-16 DIAGNOSIS — R49.0 MUSCLE TENSION DYSPHONIA: ICD-10-CM

## 2022-05-16 DIAGNOSIS — R13.10 DYSPHAGIA, UNSPECIFIED TYPE: ICD-10-CM

## 2022-05-16 DIAGNOSIS — J38.3 LESION OF VOCAL FOLD: ICD-10-CM

## 2022-05-16 DIAGNOSIS — K21.9 GASTROESOPHAGEAL REFLUX DISEASE, UNSPECIFIED WHETHER ESOPHAGITIS PRESENT: ICD-10-CM

## 2022-06-06 RX ORDER — PANTOPRAZOLE SODIUM 40 MG/1
40 TABLET, DELAYED RELEASE ORAL DAILY
Qty: 90 TABLET | Refills: 1 | Status: SHIPPED | OUTPATIENT
Start: 2022-06-06 | End: 2023-06-28

## 2022-06-06 RX ORDER — SUCRALFATE 1 G/1
1 TABLET ORAL 4 TIMES DAILY PRN
Qty: 30 TABLET | Refills: 1 | Status: SHIPPED | OUTPATIENT
Start: 2022-06-06 | End: 2023-06-28

## 2022-06-06 RX ORDER — FAMOTIDINE 40 MG/1
TABLET, FILM COATED ORAL
Qty: 90 TABLET | Refills: 0 | Status: SHIPPED | OUTPATIENT
Start: 2022-06-06

## 2022-06-16 ENCOUNTER — HOSPITAL ENCOUNTER (EMERGENCY)
Facility: CLINIC | Age: 62
Discharge: HOME | End: 2022-06-16
Attending: PHYSICIAN ASSISTANT | Admitting: PHYSICIAN ASSISTANT
Payer: COMMERCIAL

## 2022-06-16 ENCOUNTER — APPOINTMENT (OUTPATIENT)
Dept: GENERAL RADIOLOGY | Facility: CLINIC | Age: 62
End: 2022-06-16
Attending: PHYSICIAN ASSISTANT
Payer: COMMERCIAL

## 2022-06-16 VITALS
TEMPERATURE: 97.9 F | OXYGEN SATURATION: 96 % | HEART RATE: 83 BPM | SYSTOLIC BLOOD PRESSURE: 143 MMHG | RESPIRATION RATE: 16 BRPM | BODY MASS INDEX: 27.56 KG/M2 | WEIGHT: 146 LBS | HEIGHT: 61 IN | DIASTOLIC BLOOD PRESSURE: 70 MMHG

## 2022-06-16 DIAGNOSIS — M79.671 RIGHT FOOT PAIN: ICD-10-CM

## 2022-06-16 PROCEDURE — G0463 HOSPITAL OUTPT CLINIC VISIT: HCPCS | Performed by: PHYSICIAN ASSISTANT

## 2022-06-16 PROCEDURE — 73630 X-RAY EXAM OF FOOT: CPT | Mod: RT

## 2022-06-16 PROCEDURE — 99212 OFFICE O/P EST SF 10 MIN: CPT | Performed by: PHYSICIAN ASSISTANT

## 2022-06-16 NOTE — ED TRIAGE NOTES
Foot injury ~ 10 days ago, pain and swelling to right foot-no bruising, attempted to treat at home with OTC medications, rest, and ice. Sx not improving, would like xray     Triage Assessment     Row Name 06/16/22 2227       Triage Assessment (Adult)    Airway WDL WDL       Respiratory WDL    Respiratory WDL WDL       Skin Circulation/Temperature WDL    Skin Circulation/Temperature WDL WDL       Cardiac WDL    Cardiac WDL WDL       Peripheral/Neurovascular WDL    Peripheral Neurovascular WDL WDL       Cognitive/Neuro/Behavioral WDL    Cognitive/Neuro/Behavioral WDL WDL

## 2022-06-16 NOTE — ED PROVIDER NOTES
"  History     Chief Complaint   Patient presents with     Foot Pain     Foot injury ~ 10 days ago, pain and swelling to right foot-no bruising, attempted to treat at home with OTC medications, rest, and ice. Sx not improving, would like xray     HPI  Brittany Barajas is a 61 year old female who presents the urgent care with concern over right foot pain present present for approximately last 10 days.  Patient reports that she was attempting to pull a gama over a threshold on the floor when gama landed on her foot.  She did have pain initially increasing swelling for 2 days following her injury which has subsided.  She continues to complain of significant pain exacerbated by weightbearing and \"feels like there is a marble under her foot\".   She denies any ecchymosis, lacerations, abrasions.  No distal numbness or paresthesias.   She has attempted to treat with rest, ice, compression, ibuprofen and elevation without relief.      Allergies:  Allergies   Allergen Reactions     Docetaxel Other (See Comments)     Chest tightness, black out     Problem List:    Patient Active Problem List    Diagnosis Date Noted     Moderate recurrent major depression (H) 04/05/2022     Priority: Medium     Seizure disorder (H) 05/31/2019     Priority: Medium     Focal Last seizure age 19, off medication over 30 years.       DDD (degenerative disc disease), lumbar 08/23/2018     Priority: Medium     DDD (degenerative disc disease), cervical 08/23/2018     Priority: Medium     Dysphonia 08/23/2018     Priority: Medium     Other dysphagia 08/23/2018     Priority: Medium     Traumatic brain injury, without loss of consciousness, subsequent encounter 08/23/2018     Priority: Medium     Fatigue, unspecified type 08/23/2018     Priority: Medium     Advanced directives, counseling/discussion 11/04/2015     Priority: Medium     Advance Care Planning 11/4/2015: ACP Review and Resources Provided:  Reviewed chart for advance care plan.  Brittany" Lori Barajas has no plan or code status on file. Discussed available resources and provided with information. Confirmed code status reflects current choices pending further ACP discussions.  Confirmed/documented legally designated decision maker(s). Added by Leonie Alva             Urgency incontinence 01/02/2014     Priority: Medium     Mild persistent asthma 07/02/2013     Priority: Medium     Drug reaction 05/16/2012     Priority: Medium     sideeffects from donald  See May 16 2012 note       H/O: hysterectomy 11/29/2011     Priority: Medium     No pap needed       Colon polyp 11/29/2011     Priority: Medium     2011 colonoscopy --unable to get polyp out due to technical issues-repeat suggested  2012-colonoscopy normal--suggested repeat three years       Elevated liver enzymes 11/28/2010     Priority: Medium     Mild and stable in 2010, from chemo elevated mildly.       Personal history of malignant neoplasm of breast 11/17/2009     Priority: Medium     09       Anxiety state 12/08/2007     Priority: Medium     Problem list name updated by automated process. Provider to review       Mitral valve disorder 12/01/2005     Priority: Medium     MVP  2010 echo--Mild mitral valve prolapse, posterior leaflet.  There is mild to moderate (1-2+) mitral regurgitation  Problem list name updated by automated process. Provider to review       Allergic rhinitis 12/01/2005     Priority: Medium     Problem list name updated by automated process. Provider to review       Esophageal reflux 12/01/2005     Priority: Medium     Plantar fascial fibromatosis 12/01/2005     Priority: Medium     Other affections of shoulder region, not elsewhere classified 12/01/2005     Priority: Medium     Left shoulder impingement        Past Medical History:    Past Medical History:   Diagnosis Date     Allergic rhinitis, cause unspecified      Breast cancer (H)      Depressive disorder, not elsewhere classified      Esophageal reflux      Intramural  leiomyoma of uterus      Mitral valve disorders(424.0)      Other affections of shoulder region, not elsewhere classified      Other convulsions      Other diseases of trachea and bronchus, not elsewhere classified      Papanicolaou smear of cervix with low grade squamous intraepithelial lesion (LGSIL) 11/09     Plantar fascial fibromatosis      Past Surgical History:    Past Surgical History:   Procedure Laterality Date     BIOPSY BREAST       COLONOSCOPY  1/28/2011    COLONOSCOPY performed by ELOY BRIONES at [facility]     COLONOSCOPY  1/20/2012    Procedure:COLONOSCOPY; Surgeon:ELOY BRIONES; Location:[facility]     ESOPHAGOSCOPY, GASTROSCOPY, DUODENOSCOPY (EGD), COMBINED N/A 10/22/2018    Procedure: gastroscopy;  Surgeon: Seth Berman MD;  Location: [facility]     HYSTERECTOMY, PAP NO LONGER INDICATED  12/1/2008    for fibroids     INJECT EPIDURAL LUMBAR  3/9/2012    Procedure:INJECT EPIDURAL LUMBAR; JAMIL - ; Surgeon:GENERIC ANESTHESIA PROVIDER; Location:[facility]     INJECT EPIDURAL LUMBAR  5/25/2012    Procedure:INJECT EPIDURAL LUMBAR; JAMIL-; Surgeon:GENERIC ANESTHESIA PROVIDER; Location:[facility]     LEEP TX, CERVICAL  10/06    CECILIO 1     LUMPECTOMY BREAST       SURGICAL HISTORY OF -   10/7/1998    Right parotidectomy-mass     SURGICAL HISTORY OF -       Laparoscopy     SURGICAL HISTORY OF -   10/1996    Fibroid removal       Family History:    Family History   Problem Relation Age of Onset     C.A.D. Mother      Diabetes Mother      Hypertension Mother      Cerebrovascular Disease Mother      Breast Cancer Mother      Allergies Mother      Arthritis Mother      Cancer Mother      Eye Disorder Mother      Gastrointestinal Disease Mother      Gynecology Mother      Lipids Mother      Heart Disease Mother      Osteoporosis Mother      Obesity Mother      Thyroid Disease Mother      Respiratory Mother      Alcohol/Drug Father      Cancer Father      Alcohol/Drug Brother      Musculoskeletal Disorder Brother          spina bifida-recent dx     C.A.D. Brother      Thyroid Disease Sister      Lipids Sister      Gynecology Sister         fibroids/ s/p hysterectomy     Other - See Comments Sister         hysterectomy, dilation of the throat.     Thyroid Disease Brother      Heart Disease Brother         DOUBLE BYPASS     Lipids Brother      Thyroid Disease Sister      Lipids Sister      Other - See Comments Sister         hysterectomy     Gastrointestinal Disease Sister         liver function off     Thyroid Disease Sister      Thyroid Disease Sister      Heart Disease Sister      Cancer Other         thyroid cancer     Cancer - colorectal No family hx of        Social History:  Marital Status:  Single [1]  Social History     Tobacco Use     Smoking status: Never Smoker     Smokeless tobacco: Never Used   Vaping Use     Vaping Use: Never used   Substance Use Topics     Alcohol use: Yes     Comment: rare     Drug use: No        Medications:    albuterol (PROAIR HFA/PROVENTIL HFA/VENTOLIN HFA) 108 (90 Base) MCG/ACT inhaler  amitriptyline (ELAVIL) 50 MG tablet  amoxicillin-clavulanate (AUGMENTIN) 875-125 MG tablet  citalopram (CELEXA) 10 MG tablet  famotidine (PEPCID) 40 MG tablet  guaiFENesin-codeine (ROBITUSSIN AC) 100-10 MG/5ML solution  ipratropium - albuterol 0.5 mg/2.5 mg/3 mL (DUONEB) 0.5-2.5 (3) MG/3ML neb solution  lactobacillus rhamnosus, GG, (CULTURELL) capsule  Multiple Vitamin (MULTIVITAMIN OR)  pantoprazole (PROTONIX) 40 MG EC tablet  sucralfate (CARAFATE) 1 GM tablet      Review of Systems  CONSTITUTIONAL:NEGATIVE for fever, chills, change in weight  INTEGUMENTARY/SKIN: NEGATIVE for worrisome rashes, moles or lesions  RESP:NEGATIVE for significant cough or SOB  MUSCULOSKELETAL: POSITIVE  for right foot pain, resolved swelling and NEGATIVE for other concerning arthralgias or myalgias   NEURO: NEGATIVE for numbness, paresthesias   Physical Exam   BP: (!) 143/70  Pulse: 83  Temp: 97.9  F (36.6  C)  Resp: 16  Height:  "154.9 cm (5' 1\")  Weight: 66.2 kg (146 lb)  SpO2: 96 %  Physical Exam  Constitutional:       General: She is not in acute distress.     Appearance: She is not ill-appearing or toxic-appearing.   HENT:      Head: Normocephalic and atraumatic.   Musculoskeletal:      Right ankle: Normal.      Right foot: Normal range of motion and normal capillary refill. Swelling and tenderness present. No laceration or crepitus. Normal pulse.        Feet:    Skin:     General: Skin is warm and dry.      Findings: No abrasion, ecchymosis, erythema, laceration, rash or wound.   Neurological:      Mental Status: She is alert.      Sensory: No sensory deficit.       ED Course             Procedures       Critical Care time:  none            Results for orders placed or performed during the hospital encounter of 06/16/22 (from the past 24 hour(s))   Foot  XR, G/E 3 views, right    Narrative    RIGHT FOOT THREE OR MORE VIEWS  6/16/2022 2:50 PM    INDICATION: Pain after injury one week ago when gama fell on ankle.    COMPARISON: 9/29/2018      Impression    IMPRESSION: Chronic healed right fifth metatarsal fracture. No acute  right foot fracture or dislocation identified. Moderate-sized heel  spur with distal Achilles insertional enthesopathy. No localizing  right foot soft tissue swelling. 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     Medications - No data to display    Assessments & Plan (with Medical Decision Making)     I have reviewed the nursing notes.  I have reviewed the findings, diagnosis, plan and need for follow up with the patient.     Discharge Medication List as of 6/16/2022  3:35 PM        Final diagnoses:   Right foot pain     62-year-old female presents the urgent care with concern over her right fifth foot pain which is been present approximately last week after a gama that she was trying to transport landed on her foot.  She had elevated blood pressure upon arrival.  Physical exam findings were significant " for some tenderness palpation over the dorsal surface of the midfoot.  As part of evaluation she did have x-ray which was negative for acute fracture or dislocation, chronic right fifth metatarsal fracture was noted.  Moderate sized heel spur with distal Achilles insertional encephalopathy.  Symptoms are most likely secondary to contusion differential also include sprain/strain.  I have low concern for occult fracture however this would remain in the differential.  She was discharged home stable with instructions for symptomatic treatment with rest, ice, Tylenol/ibuprofen.  Follow up with PCP if no improvement in 5-7 days. Worrisome reasons to return to ER/UC sooner discussed.     Disclaimer: This note consists of symbols derived from keyboarding, dictation, and/or voice recognition software. As a result, there may be errors in the script that have gone undetected.  Please consider this when interpreting information found in the chart.    6/16/2022   [facility] EMERGENCY DEPT     Marguerite Ocampo PA-C  06/19/22 8136

## 2022-07-08 NOTE — PROGRESS NOTES
PHYSICAL THERAPY DISCHARGE  05/06/22 1300   Signing Clinician's Name / Credentials   Signing clinician's name / credentials Kris Hoenk, PT   Session Number   Session Number 2 visits total, failed to show for third appt   Progress Note/Recertification   Recertification Due Date 05/27/22   Adult Goals   PT Ortho Eval Goals 1;2   Ortho Goal 1   Goal Identifier 1   Goal Description ful lpainfree ROM R shld for all ADL's in 4wk   Target Date 05/27/22   Ortho Goal 2   Goal Identifier 2   Goal Description pt will be indep in home program for self management of sx in 4wk   Target Date 05/27/22   Subjective Report   Subjective Report shoulder is doing better   Objective Measures   Objective Measures Objective Measure 1   Objective Measure 1   Details AAROM flex and abd WFL   Treatment Interventions   Interventions Therapeutic Procedure/Exercise;Manual Therapy   Therapeutic Procedure/exercise   Therapeutic Procedures: strength, endurance, ROM, flexibillity minutes (52736) 15   Skilled Intervention ROM and stretches for HEP   Patient Response undrstood, ROM increased thru session   Treatment Detail red TB rows, shld exten and B ER x12-15, PROM R shld   Manual Therapy   Manual Therapy: Mobilization, MFR, MLD, friction massage minutes (94793) 10   Skilled Intervention jt mob, STM for mobility, ROM   Treatment Detail R GH post and inf mobs GR III-IV, seated rib 1-2 R inf mob, STM R levator insert, mid scap   Plan   Home program ex listed   Plan for next session see next week, then 2 wks out  Patient did not complete therapy as suggested.  Patient has not been seen in over 30 days and will be discharged at this time.  Final status not known.     Total Session Time   Timed Code Treatment Minutes 25   Total Treatment Time (sum of timed and untimed services) 25   AMBULATORY CLINICS ONLY-MEDICAL AND TREATMENT DIAGNOSIS   Medical Diagnosis muscle contrature R shoulder   PT Diagnosis mild adhesive capsulitis R shld   [facility]  [place]  Kris Hoenk PT  [facility]  [address] [place] [address]  [email or web address]   Office: [phone]  Voicemail: [phone]

## 2022-07-20 DIAGNOSIS — G47.00 INSOMNIA, UNSPECIFIED TYPE: ICD-10-CM

## 2022-07-20 RX ORDER — AMITRIPTYLINE HYDROCHLORIDE 50 MG/1
TABLET ORAL
Qty: 135 TABLET | Refills: 2 | Status: SHIPPED | OUTPATIENT
Start: 2022-07-20 | End: 2023-04-19

## 2022-07-20 NOTE — TELEPHONE ENCOUNTER
"Requested Prescriptions   Pending Prescriptions Disp Refills    amitriptyline (ELAVIL) 50 MG tablet [Pharmacy Med Name: AMITRIPTYLINE HCL 50MG TABS] 135 tablet 0     Sig: TAKE 1 AND 1/2 TABLETS (75 MG) BY MOUTH AT BEDTIME        Tricyclic Agents ( Annual appt and no PHQ9) Failed - 7/20/2022 11:49 AM        Failed - Blood Pressure under 140/90 in past 12 mos       BP Readings from Last 3 Encounters:   06/16/22 (!) 143/70   04/28/22 138/85   04/05/22 126/76                 Passed - Recent (12 mo) or future (30 days) visit within authorizing provider's specialty     Patient has had an office visit with the authorizing provider or a provider within the authorizing providers department within the previous 12 mos or has a future within next 30 days. See \"Patient Info\" tab in inbasket, or \"Choose Columns\" in Meds & Orders section of the refill encounter.              Passed - Medication is active on med list        Passed - Patient is age 18 or older        Passed - Patient is not pregnant        Passed - No positive pregnancy test on record in past 12 mos              "

## 2022-07-21 ENCOUNTER — HOSPITAL ENCOUNTER (EMERGENCY)
Facility: CLINIC | Age: 62
Discharge: HOME | End: 2022-07-21
Attending: FAMILY MEDICINE | Admitting: FAMILY MEDICINE
Payer: COMMERCIAL

## 2022-07-21 VITALS
HEART RATE: 77 BPM | TEMPERATURE: 97.9 F | WEIGHT: 148 LBS | OXYGEN SATURATION: 100 % | RESPIRATION RATE: 9 BRPM | SYSTOLIC BLOOD PRESSURE: 146 MMHG | DIASTOLIC BLOOD PRESSURE: 93 MMHG | HEIGHT: 61 IN | BODY MASS INDEX: 27.94 KG/M2

## 2022-07-21 DIAGNOSIS — T67.1XXA HEAT SYNCOPE, INITIAL ENCOUNTER: ICD-10-CM

## 2022-07-21 LAB
ANION GAP SERPL CALCULATED.3IONS-SCNC: 6 MMOL/L (ref 3–14)
BASOPHILS # BLD AUTO: 0.1 10E3/UL (ref 0–0.2)
BASOPHILS NFR BLD AUTO: 1 %
BUN SERPL-MCNC: 10 MG/DL (ref 7–30)
CALCIUM SERPL-MCNC: 8.8 MG/DL (ref 8.5–10.1)
CHLORIDE BLD-SCNC: 105 MMOL/L (ref 94–109)
CO2 SERPL-SCNC: 27 MMOL/L (ref 20–32)
CREAT SERPL-MCNC: 0.6 MG/DL (ref 0.52–1.04)
EOSINOPHIL # BLD AUTO: 0.2 10E3/UL (ref 0–0.7)
EOSINOPHIL NFR BLD AUTO: 2 %
ERYTHROCYTE [DISTWIDTH] IN BLOOD BY AUTOMATED COUNT: 13.5 % (ref 10–15)
GFR SERPL CREATININE-BSD FRML MDRD: >90 ML/MIN/1.73M2
GLUCOSE BLD-MCNC: 88 MG/DL (ref 70–99)
HCT VFR BLD AUTO: 42.3 % (ref 35–47)
HGB BLD-MCNC: 14 G/DL (ref 11.7–15.7)
IMM GRANULOCYTES # BLD: 0 10E3/UL
IMM GRANULOCYTES NFR BLD: 0 %
LYMPHOCYTES # BLD AUTO: 3.5 10E3/UL (ref 0.8–5.3)
LYMPHOCYTES NFR BLD AUTO: 53 %
MCH RBC QN AUTO: 29.2 PG (ref 26.5–33)
MCHC RBC AUTO-ENTMCNC: 33.1 G/DL (ref 31.5–36.5)
MCV RBC AUTO: 88 FL (ref 78–100)
MONOCYTES # BLD AUTO: 0.5 10E3/UL (ref 0–1.3)
MONOCYTES NFR BLD AUTO: 7 %
NEUTROPHILS # BLD AUTO: 2.5 10E3/UL (ref 1.6–8.3)
NEUTROPHILS NFR BLD AUTO: 37 %
NRBC # BLD AUTO: 0 10E3/UL
NRBC BLD AUTO-RTO: 0 /100
PLATELET # BLD AUTO: 279 10E3/UL (ref 150–450)
POTASSIUM BLD-SCNC: 3.7 MMOL/L (ref 3.4–5.3)
RBC # BLD AUTO: 4.8 10E6/UL (ref 3.8–5.2)
SODIUM SERPL-SCNC: 138 MMOL/L (ref 133–144)
TROPONIN I SERPL HS-MCNC: 5 NG/L
WBC # BLD AUTO: 6.6 10E3/UL (ref 4–11)

## 2022-07-21 PROCEDURE — 99284 EMERGENCY DEPT VISIT MOD MDM: CPT | Performed by: FAMILY MEDICINE

## 2022-07-21 PROCEDURE — 93010 ELECTROCARDIOGRAM REPORT: CPT | Performed by: FAMILY MEDICINE

## 2022-07-21 PROCEDURE — 84484 ASSAY OF TROPONIN QUANT: CPT | Performed by: FAMILY MEDICINE

## 2022-07-21 PROCEDURE — 80048 BASIC METABOLIC PNL TOTAL CA: CPT | Performed by: FAMILY MEDICINE

## 2022-07-21 PROCEDURE — 93005 ELECTROCARDIOGRAM TRACING: CPT | Performed by: FAMILY MEDICINE

## 2022-07-21 PROCEDURE — 99284 EMERGENCY DEPT VISIT MOD MDM: CPT | Mod: 25 | Performed by: FAMILY MEDICINE

## 2022-07-21 PROCEDURE — 85025 COMPLETE CBC W/AUTO DIFF WBC: CPT | Performed by: FAMILY MEDICINE

## 2022-07-21 PROCEDURE — 36415 COLL VENOUS BLD VENIPUNCTURE: CPT | Performed by: FAMILY MEDICINE

## 2022-07-21 NOTE — ED TRIAGE NOTES
Pt reports on Monday she was out in the heat and had a syncopal episode, pt reports everything went white and she went down, pt denies hitting her head, no injuries. Pt reports someone heard it happen and helped she inside, pt reports she started feeling better. Pt reports since she has been feeling tired and worn down, pt reports she does have a mitral valve prolapse with regurititation and is concerned about her heart, pt reports some intermittent aches on left side of chest. Pt denies pain.

## 2022-07-21 NOTE — ED NOTES
Pt presents to ED with concerns of syncopal episode on Monday; states she was working outside when she felt overcome by the heat. Pt drove home, got out of the car and then began feeling faint when trying to close the garage door. Pt then slumped down next to the car. Someone helped her inside and she began feeling a little better. Pt notes additional concern due to history of mitral valve prolapse. Pt reports the sensation of her mitral valve prolapse acting up over the past few days.

## 2022-07-22 ENCOUNTER — PATIENT OUTREACH (OUTPATIENT)
Dept: CARE COORDINATION | Facility: CLINIC | Age: 62
End: 2022-07-22

## 2022-07-22 DIAGNOSIS — Z71.89 OTHER SPECIFIED COUNSELING: ICD-10-CM

## 2022-07-22 NOTE — PROGRESS NOTES
Clinic Care Coordination Contact  [facility]/Voicemail       Clinical Data: Care Coordinator Outreach  Outreach attempted x 1.  Left message on patient's voicemail with call back information and requested return call.  Plan:. Care Coordinator will try to reach patient again in 1-2 business days.    MARTI Crowder  [phone]  [facility]

## 2022-07-22 NOTE — DISCHARGE INSTRUCTIONS
RETURN TO THE EMERGENCY ROOM FOR THE FOLLOWING:    Recurring episodes of fainting without an obvious heat related cause, new chest pain, trouble with breathing, or at anytime for any concern.    FOLLOW UP:    With your primary clinic only as needed.    TREATMENT RECOMMENDATIONS:    None new.  No changes.    NURSE ADVICE LINE:  [phone] or [phone]

## 2022-07-23 NOTE — PROGRESS NOTES
Clinic Care Coordination Contact  [facility]/Voicemail       Clinical Data: Care Coordinator Outreach  Reason for referral: TCM outreach  Outreach attempted x 2.  Left message on patient's voicemail with main [facility] phone number to reach nurse advisors or scheduling department.  Plan:  Care Coordinator will do no further outreaches at this time.     Melissa Myers  Community Health Worker  Connected Care Resource [place], [facility]  Ph: [phone]

## 2022-10-15 ENCOUNTER — HEALTH MAINTENANCE LETTER (OUTPATIENT)
Age: 62
End: 2022-10-15

## 2022-12-03 ENCOUNTER — HEALTH MAINTENANCE LETTER (OUTPATIENT)
Age: 62
End: 2022-12-03

## 2023-01-24 ENCOUNTER — TELEPHONE (OUTPATIENT)
Dept: FAMILY MEDICINE | Facility: CLINIC | Age: 63
End: 2023-01-24
Payer: COMMERCIAL

## 2023-01-24 DIAGNOSIS — F41.1 GENERALIZED ANXIETY DISORDER: ICD-10-CM

## 2023-01-24 DIAGNOSIS — F33.1 MODERATE RECURRENT MAJOR DEPRESSION (H): ICD-10-CM

## 2023-01-26 NOTE — TELEPHONE ENCOUNTER
Called pt to clarify request.  Pt states she has an appt with Dr Barrientos 3/15.  She currently takes citalopram 10mg.   Pt asking for increase due to situational depression/anxiety. States she has a 6mos old cat that she has been trying to re-home as she is in an apt & the cat is very loud, active & is too busy for the small space she is in. Every shelter is full.   Pt states within the last week she has felt shaky, increased anxiety & hasn't been sleeping (gets few hours of sleep at a time). Has tried melatonin & benadryl without relief.     Pt also reports increased urinary incontinence over the past month. Has to change clothes up to 3x/day & was having a hard time leaving her apt. Pt bought Azo bladder control & states this has helped after 4 doses. Pt has appt 3/15 to discuss referral to urology. No sx of UTI. Denies pain, odor, temp, blood, flank pain.    Routing to provider for review.    Cristy Michel RN

## 2023-01-26 NOTE — TELEPHONE ENCOUNTER
DR Barrientos,    Please see telephone notes & advise.  Ok to take same day appt for pt to be seen sooner than 3/15?    Cristy Michel RN

## 2023-01-27 RX ORDER — CITALOPRAM HYDROBROMIDE 10 MG/1
20 TABLET ORAL DAILY
Qty: 180 TABLET | Refills: 1 | Status: SHIPPED | OUTPATIENT
Start: 2023-01-27 | End: 2023-06-28

## 2023-01-27 RX ORDER — CITALOPRAM HYDROBROMIDE 10 MG/1
20 TABLET ORAL DAILY
Qty: 180 TABLET | Refills: 1 | Status: SHIPPED | OUTPATIENT
Start: 2023-01-27 | End: 2023-01-27

## 2023-01-27 NOTE — TELEPHONE ENCOUNTER
Yes, ok to increase celexa to 20mg (take 10mg together) and I'll send the updated increased medication when you need to fill it call pharmacy.  Do schedule for a follow up phone call in 4-5 weeks of the increase.  Thank you,  Russell Barrientos MD

## 2023-01-27 NOTE — TELEPHONE ENCOUNTER
Called pt & read providers message.  Pt requested Rx to be tx to [facility] in [place].  tx'd Rx. Pt then tx to scheduling to make telephone appt.    Cristy Michel RN

## 2023-02-08 ENCOUNTER — HOSPITAL ENCOUNTER (OUTPATIENT)
Dept: MAMMOGRAPHY | Facility: CLINIC | Age: 63
Discharge: HOME | End: 2023-02-08
Attending: FAMILY MEDICINE | Admitting: FAMILY MEDICINE
Payer: COMMERCIAL

## 2023-02-08 DIAGNOSIS — Z12.31 VISIT FOR SCREENING MAMMOGRAM: ICD-10-CM

## 2023-02-08 PROCEDURE — 77067 SCR MAMMO BI INCL CAD: CPT

## 2023-06-01 ENCOUNTER — HEALTH MAINTENANCE LETTER (OUTPATIENT)
Age: 63
End: 2023-06-01

## 2023-06-05 DIAGNOSIS — J45.40 MODERATE PERSISTENT ASTHMA WITHOUT COMPLICATION: ICD-10-CM

## 2023-06-05 RX ORDER — ALBUTEROL SULFATE 90 UG/1
AEROSOL, METERED RESPIRATORY (INHALATION)
Qty: 1 G | Refills: 0 | Status: SHIPPED | OUTPATIENT
Start: 2023-06-05

## 2023-06-05 NOTE — TELEPHONE ENCOUNTER
Leela refill given x 1 only. [employer] message sent to patient to complete ACT and schedule annual wellness exam.  Prescription approved per [facility] Refill Protocol.    Julie Behrendt RN

## 2023-06-06 ENCOUNTER — HOSPITAL ENCOUNTER (EMERGENCY)
Facility: CLINIC | Age: 63
Discharge: HOME | End: 2023-06-06
Attending: NURSE PRACTITIONER | Admitting: NURSE PRACTITIONER
Payer: COMMERCIAL

## 2023-06-06 VITALS
RESPIRATION RATE: 18 BRPM | TEMPERATURE: 97 F | SYSTOLIC BLOOD PRESSURE: 145 MMHG | HEART RATE: 78 BPM | OXYGEN SATURATION: 97 % | DIASTOLIC BLOOD PRESSURE: 77 MMHG

## 2023-06-06 DIAGNOSIS — S61.209A: ICD-10-CM

## 2023-06-06 PROCEDURE — 99213 OFFICE O/P EST LOW 20 MIN: CPT | Performed by: NURSE PRACTITIONER

## 2023-06-06 PROCEDURE — G0463 HOSPITAL OUTPT CLINIC VISIT: HCPCS | Performed by: NURSE PRACTITIONER

## 2023-06-06 ASSESSMENT — ENCOUNTER SYMPTOMS: WOUND: 1

## 2023-06-06 NOTE — ED PROVIDER NOTES
History     Chief Complaint   Patient presents with     Laceration     left thumb laceration happened this morning 3am      HPI  Brittany Barajas is a 62 year old female who presents to the urgent care for evaluation of left thumb laceration. Patient was cutting a carrot at 3 am with a mandolin and slipped causing the avulsion injury. Continues to ooze. Tetanus up to date. Full strength and mobility present. No numbness or tingling.     Allergies:  Allergies   Allergen Reactions     Docetaxel Other (See Comments)     Chest tightness, black out       Problem List:    Patient Active Problem List    Diagnosis Date Noted     Moderate recurrent major depression (H) 04/05/2022     Priority: Medium     Seizure disorder (H) 05/31/2019     Priority: Medium     Focal Last seizure age 19, off medication over 30 years.       DDD (degenerative disc disease), lumbar 08/23/2018     Priority: Medium     DDD (degenerative disc disease), cervical 08/23/2018     Priority: Medium     Dysphonia 08/23/2018     Priority: Medium     Other dysphagia 08/23/2018     Priority: Medium     Traumatic brain injury, without loss of consciousness, subsequent encounter 08/23/2018     Priority: Medium     Fatigue, unspecified type 08/23/2018     Priority: Medium     Advanced directives, counseling/discussion 11/04/2015     Priority: Medium     Advance Care Planning 11/4/2015: ACP Review and Resources Provided:  Reviewed chart for advance care plan.  Brittany Barajas has no plan or code status on file. Discussed available resources and provided with information. Confirmed code status reflects current choices pending further ACP discussions.  Confirmed/documented legally designated decision maker(s). Added by Leonie Alva             Urgency incontinence 01/02/2014     Priority: Medium     Mild persistent asthma 07/02/2013     Priority: Medium     Drug reaction 05/16/2012     Priority: Medium     sideeffects from donald  See May 16 2012 note        H/O: hysterectomy 11/29/2011     Priority: Medium     No pap needed       Colon polyp 11/29/2011     Priority: Medium     2011 colonoscopy --unable to get polyp out due to technical issues-repeat suggested  2012-colonoscopy normal--suggested repeat three years       Elevated liver enzymes 11/28/2010     Priority: Medium     Mild and stable in 2010, from chemo elevated mildly.       Personal history of malignant neoplasm of breast 11/17/2009     Priority: Medium     09       Anxiety state 12/08/2007     Priority: Medium     Problem list name updated by automated process. Provider to review       Mitral valve disorder 12/01/2005     Priority: Medium     MVP  2010 echo--Mild mitral valve prolapse, posterior leaflet.  There is mild to moderate (1-2+) mitral regurgitation  Problem list name updated by automated process. Provider to review       Allergic rhinitis 12/01/2005     Priority: Medium     Problem list name updated by automated process. Provider to review       Esophageal reflux 12/01/2005     Priority: Medium     Plantar fascial fibromatosis 12/01/2005     Priority: Medium     Other affections of shoulder region, not elsewhere classified 12/01/2005     Priority: Medium     Left shoulder impingement          Past Medical History:    Past Medical History:   Diagnosis Date     Allergic rhinitis, cause unspecified      Breast cancer (H)      Depressive disorder, not elsewhere classified      Esophageal reflux      Intramural leiomyoma of uterus      Mitral valve disorders(424.0)      Other affections of shoulder region, not elsewhere classified      Other convulsions      Other diseases of trachea and bronchus, not elsewhere classified      Papanicolaou smear of cervix with low grade squamous intraepithelial lesion (LGSIL) 11/2009     Plantar fascial fibromatosis        Past Surgical History:    Past Surgical History:   Procedure Laterality Date     BIOPSY BREAST       COLONOSCOPY  01/28/2011    COLONOSCOPY  performed by ELOY BRIONES at [facility]     COLONOSCOPY  01/20/2012    Procedure:COLONOSCOPY; Surgeon:ELOY BRIONES; Location:[facility]     ESOPHAGOSCOPY, GASTROSCOPY, DUODENOSCOPY (EGD), COMBINED N/A 10/22/2018    Procedure: gastroscopy;  Surgeon: Seth Berman MD;  Location: [facility]     HYSTERECTOMY, PAP NO LONGER INDICATED  12/01/2008    for fibroids     INJECT EPIDURAL LUMBAR  03/09/2012    Procedure:INJECT EPIDURAL LUMBAR; JAMIL - ; Surgeon:GENERIC ANESTHESIA PROVIDER; Location:[facility]     INJECT EPIDURAL LUMBAR  05/25/2012    Procedure:INJECT EPIDURAL LUMBAR; JAMIL-; Surgeon:GENERIC ANESTHESIA PROVIDER; Location:[facility]     LEEP TX, CERVICAL  10/2006    CECILIO 1     LUMPECTOMY BREAST       SURGICAL HISTORY OF -   10/07/1998    Right parotidectomy-mass     SURGICAL HISTORY OF -       Laparoscopy     SURGICAL HISTORY OF -   10/1996    Fibroid removal       Family History:    Family History   Problem Relation Age of Onset     C.A.D. Mother      Diabetes Mother      Hypertension Mother      Cerebrovascular Disease Mother      Breast Cancer Mother      Allergies Mother      Arthritis Mother      Cancer Mother      Eye Disorder Mother      Gastrointestinal Disease Mother      Gynecology Mother      Lipids Mother      Heart Disease Mother      Osteoporosis Mother      Obesity Mother      Thyroid Disease Mother      Respiratory Mother      Alcohol/Drug Father      Cancer Father      Alcohol/Drug Brother      Musculoskeletal Disorder Brother         spina bifida-recent dx     C.A.D. Brother      Thyroid Disease Sister      Lipids Sister      Gynecology Sister         fibroids/ s/p hysterectomy     Other - See Comments Sister         hysterectomy, dilation of the throat.     Thyroid Disease Brother      Heart Disease Brother         DOUBLE BYPASS     Lipids Brother      Thyroid Disease Sister      Lipids Sister      Other - See Comments Sister         hysterectomy     Gastrointestinal Disease Sister          liver function off     Thyroid Disease Sister      Thyroid Disease Sister      Heart Disease Sister      Cancer Other         thyroid cancer     Cancer - colorectal No family hx of        Social History:  Marital Status:  Single [1]  Social History     Tobacco Use     Smoking status: Never     Smokeless tobacco: Never   Vaping Use     Vaping status: Never Used   Substance Use Topics     Alcohol use: Yes     Comment: rare     Drug use: No        Medications:    amitriptyline (ELAVIL) 50 MG tablet  citalopram (CELEXA) 10 MG tablet  famotidine (PEPCID) 40 MG tablet  ipratropium - albuterol 0.5 mg/2.5 mg/3 mL (DUONEB) 0.5-2.5 (3) MG/3ML neb solution  Multiple Vitamin (MULTIVITAMIN OR)  pantoprazole (PROTONIX) 40 MG EC tablet  sucralfate (CARAFATE) 1 GM tablet  VENTOLIN  (90 Base) MCG/ACT inhaler          Review of Systems   Skin: Positive for wound.   All other systems reviewed and are negative.      Physical Exam   BP: (!) 145/77  Pulse: 78  Temp: 97  F (36.1  C)  Resp: 18  SpO2: 97 %      Physical Exam  Constitutional:       General: She is not in acute distress.     Appearance: Normal appearance.   Eyes:      Conjunctiva/sclera: Conjunctivae normal.      Pupils: Pupils are equal, round, and reactive to light.   Cardiovascular:      Rate and Rhythm: Normal rate.   Pulmonary:      Effort: Pulmonary effort is normal.   Musculoskeletal:         General: Normal range of motion.      Cervical back: Normal range of motion.   Skin:     General: Skin is warm.      Capillary Refill: Capillary refill takes less than 2 seconds.      Comments: Avulsion wound to medial tip of left thumb with small amount of oozing blood.    Neurological:      General: No focal deficit present.      Mental Status: She is alert.       ED Course           Procedures            No results found for this or any previous visit (from the past 24 hour(s)).    Medications - No data to display    Assessments & Plan (with Medical Decision Making)    Brittany Barajas is a 62 year old female who presents to the urgent care for evaluation of left thumb laceration. Patient was cutting a carrot at 3 am with a mandolin and slipped causing the avulsion injury. Continues to ooze. Slightly hypertensive, remaining vitals normal. Exam as above. Wound cleansed, surgicel applied and redressed. Discussed wound care and return precautions. Discharged in good condition.   I have reviewed the nursing notes.    I have reviewed the findings, diagnosis, plan and need for follow up with the patient.    Discharge Medication List as of 6/6/2023  2:37 PM          Final diagnoses:   Finger avulsion       6/6/2023   [facility] EMERGENCY DEPT     Susy Webb, EDEL CNP  06/06/23 2213

## 2023-06-28 ENCOUNTER — OFFICE VISIT (OUTPATIENT)
Dept: FAMILY MEDICINE | Facility: CLINIC | Age: 63
End: 2023-06-28
Payer: COMMERCIAL

## 2023-06-28 VITALS
RESPIRATION RATE: 16 BRPM | HEIGHT: 60 IN | DIASTOLIC BLOOD PRESSURE: 78 MMHG | WEIGHT: 152.2 LBS | OXYGEN SATURATION: 97 % | SYSTOLIC BLOOD PRESSURE: 96 MMHG | HEART RATE: 81 BPM | BODY MASS INDEX: 29.88 KG/M2 | TEMPERATURE: 98.6 F

## 2023-06-28 DIAGNOSIS — G40.909 SEIZURE DISORDER (H): ICD-10-CM

## 2023-06-28 DIAGNOSIS — Z00.00 ENCOUNTER FOR MEDICARE ANNUAL WELLNESS EXAM: Primary | ICD-10-CM

## 2023-06-28 DIAGNOSIS — J38.3 LESION OF VOCAL FOLD: ICD-10-CM

## 2023-06-28 DIAGNOSIS — K21.9 GASTROESOPHAGEAL REFLUX DISEASE, UNSPECIFIED WHETHER ESOPHAGITIS PRESENT: ICD-10-CM

## 2023-06-28 DIAGNOSIS — Z13.1 DIABETES MELLITUS SCREENING: ICD-10-CM

## 2023-06-28 DIAGNOSIS — K21.00 GASTROESOPHAGEAL REFLUX DISEASE WITH ESOPHAGITIS WITHOUT HEMORRHAGE: ICD-10-CM

## 2023-06-28 DIAGNOSIS — R13.10 DYSPHAGIA, UNSPECIFIED TYPE: ICD-10-CM

## 2023-06-28 DIAGNOSIS — K21.9 LARYNGOPHARYNGEAL REFLUX: ICD-10-CM

## 2023-06-28 DIAGNOSIS — R73.01 IMPAIRED FASTING GLUCOSE: ICD-10-CM

## 2023-06-28 DIAGNOSIS — Z12.11 SCREEN FOR COLON CANCER: ICD-10-CM

## 2023-06-28 DIAGNOSIS — R49.0 MUSCLE TENSION DYSPHONIA: ICD-10-CM

## 2023-06-28 DIAGNOSIS — G47.00 INSOMNIA, UNSPECIFIED TYPE: ICD-10-CM

## 2023-06-28 DIAGNOSIS — Z13.220 SCREENING FOR LIPID DISORDERS: ICD-10-CM

## 2023-06-28 DIAGNOSIS — F33.1 MODERATE RECURRENT MAJOR DEPRESSION (H): ICD-10-CM

## 2023-06-28 DIAGNOSIS — F41.1 GENERALIZED ANXIETY DISORDER: ICD-10-CM

## 2023-06-28 LAB
ALBUMIN SERPL BCG-MCNC: 4.6 G/DL (ref 3.5–5.2)
ALP SERPL-CCNC: 41 U/L (ref 35–104)
ALT SERPL W P-5'-P-CCNC: 48 U/L (ref 0–50)
ANION GAP SERPL CALCULATED.3IONS-SCNC: 10 MMOL/L (ref 7–15)
AST SERPL W P-5'-P-CCNC: 47 U/L (ref 0–45)
BILIRUB SERPL-MCNC: 0.3 MG/DL
BUN SERPL-MCNC: 15 MG/DL (ref 8–23)
CALCIUM SERPL-MCNC: 9.7 MG/DL (ref 8.8–10.2)
CHLORIDE SERPL-SCNC: 102 MMOL/L (ref 98–107)
CHOLEST SERPL-MCNC: 265 MG/DL
CREAT SERPL-MCNC: 0.82 MG/DL (ref 0.51–0.95)
DEPRECATED HCO3 PLAS-SCNC: 26 MMOL/L (ref 22–29)
GFR SERPL CREATININE-BSD FRML MDRD: 80 ML/MIN/1.73M2
GLUCOSE SERPL-MCNC: 97 MG/DL (ref 70–99)
HBA1C MFR BLD: 5.5 % (ref 0–5.6)
HDLC SERPL-MCNC: 55 MG/DL
LDLC SERPL CALC-MCNC: 174 MG/DL
NONHDLC SERPL-MCNC: 210 MG/DL
POTASSIUM SERPL-SCNC: 4.3 MMOL/L (ref 3.4–5.3)
PROT SERPL-MCNC: 7.5 G/DL (ref 6.4–8.3)
SODIUM SERPL-SCNC: 138 MMOL/L (ref 136–145)
TRIGL SERPL-MCNC: 182 MG/DL

## 2023-06-28 PROCEDURE — 83036 HEMOGLOBIN GLYCOSYLATED A1C: CPT | Performed by: FAMILY MEDICINE

## 2023-06-28 PROCEDURE — G0439 PPPS, SUBSEQ VISIT: HCPCS | Performed by: FAMILY MEDICINE

## 2023-06-28 PROCEDURE — 99214 OFFICE O/P EST MOD 30 MIN: CPT | Mod: 25 | Performed by: FAMILY MEDICINE

## 2023-06-28 PROCEDURE — 36415 COLL VENOUS BLD VENIPUNCTURE: CPT | Performed by: FAMILY MEDICINE

## 2023-06-28 PROCEDURE — 80053 COMPREHEN METABOLIC PANEL: CPT | Performed by: FAMILY MEDICINE

## 2023-06-28 PROCEDURE — 80061 LIPID PANEL: CPT | Performed by: FAMILY MEDICINE

## 2023-06-28 RX ORDER — CITALOPRAM HYDROBROMIDE 10 MG/1
20 TABLET ORAL DAILY
Qty: 180 TABLET | Refills: 3 | Status: SHIPPED | OUTPATIENT
Start: 2023-06-28 | End: 2024-08-22

## 2023-06-28 RX ORDER — PHENAZOPYRIDINE HYDROCHLORIDE 95 MG/1
190 TABLET ORAL 3 TIMES DAILY
COMMUNITY

## 2023-06-28 RX ORDER — AMITRIPTYLINE HYDROCHLORIDE 50 MG/1
75 TABLET ORAL AT BEDTIME
Qty: 135 TABLET | Refills: 3 | Status: SHIPPED | OUTPATIENT
Start: 2023-06-28 | End: 2024-07-16

## 2023-06-28 RX ORDER — SUCRALFATE 1 G/1
1 TABLET ORAL 4 TIMES DAILY PRN
Qty: 30 TABLET | Refills: 1 | Status: SHIPPED | OUTPATIENT
Start: 2023-06-28 | End: 2024-04-23

## 2023-06-28 RX ORDER — PANTOPRAZOLE SODIUM 40 MG/1
40 TABLET, DELAYED RELEASE ORAL DAILY
Qty: 90 TABLET | Refills: 1 | Status: SHIPPED | OUTPATIENT
Start: 2023-06-28 | End: 2024-04-09

## 2023-06-28 ASSESSMENT — ENCOUNTER SYMPTOMS
CONSTIPATION: 0
JOINT SWELLING: 0
EYE PAIN: 0
PARESTHESIAS: 0
SHORTNESS OF BREATH: 0
NERVOUS/ANXIOUS: 1
ABDOMINAL PAIN: 0
FEVER: 0
CHILLS: 0
DIZZINESS: 0
COUGH: 1
DIARRHEA: 0
NAUSEA: 0
HEARTBURN: 1
PALPITATIONS: 0
ARTHRALGIAS: 1
MYALGIAS: 0
HEMATOCHEZIA: 0
HEMATURIA: 0
HEADACHES: 0
FREQUENCY: 1
SORE THROAT: 0
DYSURIA: 0
WEAKNESS: 0

## 2023-06-28 ASSESSMENT — PATIENT HEALTH QUESTIONNAIRE - PHQ9
10. IF YOU CHECKED OFF ANY PROBLEMS, HOW DIFFICULT HAVE THESE PROBLEMS MADE IT FOR YOU TO DO YOUR WORK, TAKE CARE OF THINGS AT HOME, OR GET ALONG WITH OTHER PEOPLE: SOMEWHAT DIFFICULT
SUM OF ALL RESPONSES TO PHQ QUESTIONS 1-9: 3
SUM OF ALL RESPONSES TO PHQ QUESTIONS 1-9: 3

## 2023-06-28 ASSESSMENT — PAIN SCALES - GENERAL: PAINLEVEL: NO PAIN (0)

## 2023-06-28 ASSESSMENT — ASTHMA QUESTIONNAIRES
QUESTION_5 LAST FOUR WEEKS HOW WOULD YOU RATE YOUR ASTHMA CONTROL: WELL CONTROLLED
QUESTION_4 LAST FOUR WEEKS HOW OFTEN HAVE YOU USED YOUR RESCUE INHALER OR NEBULIZER MEDICATION (SUCH AS ALBUTEROL): TWO OR THREE TIMES PER WEEK
QUESTION_3 LAST FOUR WEEKS HOW OFTEN DID YOUR ASTHMA SYMPTOMS (WHEEZING, COUGHING, SHORTNESS OF BREATH, CHEST TIGHTNESS OR PAIN) WAKE YOU UP AT NIGHT OR EARLIER THAN USUAL IN THE MORNING: ONCE OR TWICE
QUESTION_2 LAST FOUR WEEKS HOW OFTEN HAVE YOU HAD SHORTNESS OF BREATH: ONCE OR TWICE A WEEK
QUESTION_1 LAST FOUR WEEKS HOW MUCH OF THE TIME DID YOUR ASTHMA KEEP YOU FROM GETTING AS MUCH DONE AT WORK, SCHOOL OR AT HOME: A LITTLE OF THE TIME
ACT_TOTALSCORE: 19
ACT_TOTALSCORE: 19

## 2023-06-28 ASSESSMENT — ACTIVITIES OF DAILY LIVING (ADL): CURRENT_FUNCTION: NO ASSISTANCE NEEDED

## 2023-06-28 NOTE — PROGRESS NOTES
"SUBJECTIVE:   Laverne is a 63 year old who presents for Preventive Visit.      6/28/2023    10:35 AM   Additional Questions   Roomed by Joseline Seals MA   Accompanied by Self         6/28/2023    10:35 AM   Patient Reported Additional Medications   Patient reports taking the following new medications AZO weight management Unknown dose, Ibuprofen 200mg tablets as needed     Are you in the first 12 months of your Medicare coverage?  No    Healthy Habits:     In general, how would you rate your overall health?  Fair    Frequency of exercise:  2-3 days/week    Duration of exercise:  15-30 minutes    Do you usually eat at least 4 servings of fruit and vegetables a day, include whole grains    & fiber and avoid regularly eating high fat or \"junk\" foods?  No    Taking medications regularly:  Yes    Medication side effects:  Not applicable    Ability to successfully perform activities of daily living:  No assistance needed    Home Safety:  No safety concerns identified    Hearing Impairment:  Find that men's voices are easier to understand than woman's    In the past 6 months, have you been bothered by leaking of urine? Yes    In general, how would you rate your overall mental or emotional health?  Good      PHQ-2 Total Score: 0    Additional concerns today:  No      Have you ever done Advance Care Planning? (For example, a Health Directive, POLST, or a discussion with a medical provider or your loved ones about your wishes): No, advance care planning information given to patient to review.  Patient plans to discuss their wishes with loved ones or provider.         Fall risk  Fallen 2 or more times in the past year?: No  Any fall with injury in the past year?: No    Cognitive Screening   1) Repeat 3 items (Leader, Season, Table)    2) Clock draw: NORMAL  3) 3 item recall: Recalls 1 object   Results: NORMAL clock, 1-2 items recalled: COGNITIVE IMPAIRMENT LESS LIKELY    Mini-CogTM Copyright S Joanne. Licensed by the author " for use in [facility]; reprinted with permission (matt@[facility]). All rights reserved.      Do you have sleep apnea, excessive snoring or daytime drowsiness?: yes    Reviewed and updated as needed this visit by clinical staff   Tobacco  Allergies  Meds              Reviewed and updated as needed this visit by Provider                 Social History     Tobacco Use     Smoking status: Never     Smokeless tobacco: Never   Substance Use Topics     Alcohol use: Yes     Comment: rare             6/28/2023    10:27 AM   Alcohol Use   Prescreen: >3 drinks/day or >7 drinks/week? No          No data to display              Do you have a current opioid prescription? No  Do you use any other controlled substances or medications that are not prescribed by a provider? None      Medication Followup of Amitriptyline    Taking Medication as prescribed: yes    Side Effects:  None    Medication Helping Symptoms:  yes    Depression and Anxiety Follow-Up    How are you doing with your depression since your last visit? Improved Increased dose from 10mg to 20mg, pt states she feels tremors of the mouth since increased dose.    How are you doing with your anxiety since your last visit?  Improved     Are you having other symptoms that might be associated with depression or anxiety? No    Have you had a significant life event? No     Do you have any concerns with your use of alcohol or other drugs? No    Social History     Tobacco Use     Smoking status: Never     Smokeless tobacco: Never   Vaping Use     Vaping Use: Never used   Substance Use Topics     Alcohol use: Yes     Comment: rare     Drug use: No         7/26/2021     1:39 PM 4/5/2022     9:58 AM 6/28/2023    10:22 AM   PHQ   PHQ-9 Total Score 12 9 3   Q9: Thoughts of better off dead/self-harm past 2 weeks Not at all Not at all Not at all         7/26/2021     1:39 PM 4/5/2022     8:53 AM 4/5/2022     9:58 AM   CLAY-7 SCORE   Total Score  7 (mild anxiety)    Total  Score 10 7 8         6/28/2023    10:22 AM   Last PHQ-9   1.  Little interest or pleasure in doing things 0   2.  Feeling down, depressed, or hopeless 0   3.  Trouble falling or staying asleep, or sleeping too much 1   4.  Feeling tired or having little energy 1   5.  Poor appetite or overeating 1   6.  Feeling bad about yourself 0   7.  Trouble concentrating 0   8.  Moving slowly or restless 0   Q9: Thoughts of better off dead/self-harm past 2 weeks 0   PHQ-9 Total Score 3         4/5/2022     9:58 AM   CLAY-7    1. Feeling nervous, anxious, or on edge 2   2. Not being able to stop or control worrying 0   3. Worrying too much about different things 1   4. Trouble relaxing 2   5. Being so restless that it is hard to sit still 2   6. Becoming easily annoyed or irritable 1   7. Feeling afraid, as if something awful might happen 0   CLAY-7 Total Score 8   If you checked any problems, how difficult have they made it for you to do your work, take care of things at home, or get along with other people? Somewhat difficult       Suicide Assessment Five-step Evaluation and Treatment (SAFE-T)      Current providers sharing in care for this patient include:   Patient Care Team:  Russell Barrientos MD as PCP - General (Family Medicine)  David Shnie MD as Assigned Surgical Provider  Russell Barrientos MD as Assigned PCP    The following health maintenance items are reviewed in Epic and correct as of today:  Health Maintenance   Topic Date Due     Pneumococcal Vaccine: Pediatrics (0 to 5 Years) and At-Risk Patients (6 to 64 Years) (2 - PCV) 09/09/2013     ASTHMA ACTION PLAN  06/19/2019     COVID-19 Vaccine (2 - Booster for Luis series) 07/01/2021     COLORECTAL CANCER SCREENING  04/14/2022     MEDICARE ANNUAL WELLNESS VISIT  04/05/2023     ANNUAL REVIEW OF HM ORDERS  04/05/2023     ASTHMA CONTROL TEST  12/28/2023     PHQ-9  12/28/2023     MAMMO SCREENING  02/08/2024     LIPID  04/05/2027     ADVANCE CARE PLANNING   04/05/2027     DTAP/TDAP/TD IMMUNIZATION (3 - Td or Tdap) 04/05/2032     HEPATITIS C SCREENING  Completed     HIV SCREENING  Completed     DEPRESSION ACTION PLAN  Completed     INFLUENZA VACCINE  Completed     ZOSTER IMMUNIZATION  Completed     IPV IMMUNIZATION  Aged Out     MENINGITIS IMMUNIZATION  Aged Out     PAP  Discontinued     BP Readings from Last 3 Encounters:   06/28/23 96/78   06/06/23 (!) 145/77   07/21/22 (!) 146/93    Wt Readings from Last 3 Encounters:   06/28/23 69 kg (152 lb 3.2 oz)   07/21/22 67.1 kg (148 lb)   06/16/22 66.2 kg (146 lb)                    FHS-7:       9/1/2021     1:08 PM 6/28/2023    10:29 AM   Breast CA Risk Assessment (FHS-7)   Did any of your first-degree relatives have breast or ovarian cancer? Yes Yes   Did any of your relatives have bilateral breast cancer? Yes Yes   Did any man in your family have breast cancer? No No   Did any woman in your family have breast and ovarian cancer? No Yes   Did any woman in your family have breast cancer before age 50 y? Yes Yes   Do you have 2 or more relatives with breast and/or ovarian cancer? No No   Do you have 2 or more relatives with breast and/or bowel cancer? No No       Mammogram Screening: Recommended mammography every 1-2 years with patient discussion and risk factor consideration  Pertinent mammograms are reviewed under the imaging tab.    Review of Systems   Constitutional: Negative for chills and fever.   HENT: Positive for congestion. Negative for ear pain, hearing loss and sore throat.    Eyes: Negative for pain and visual disturbance.   Respiratory: Positive for cough. Negative for shortness of breath.    Cardiovascular: Negative for chest pain, palpitations and peripheral edema.   Gastrointestinal: Positive for heartburn. Negative for abdominal pain, constipation, diarrhea, hematochezia and nausea.   Genitourinary: Positive for frequency. Negative for dysuria, genital sores, hematuria and urgency.   Musculoskeletal:  "Positive for arthralgias. Negative for joint swelling and myalgias.   Skin: Negative for rash.   Neurological: Negative for dizziness, weakness, headaches and paresthesias.   Psychiatric/Behavioral: Negative for mood changes. The patient is nervous/anxious.        OBJECTIVE:   BP 96/78 (BP Location: Right arm, Patient Position: Sitting, Cuff Size: Adult Large)   Pulse 81   Temp 98.6  F (37  C) (Tympanic)   Resp 16   Ht 1.53 m (5' 0.25\")   Wt 69 kg (152 lb 3.2 oz)   LMP 10/17/2008   SpO2 97%   BMI 29.48 kg/m   Estimated body mass index is 29.48 kg/m  as calculated from the following:    Height as of this encounter: 1.53 m (5' 0.25\").    Weight as of this encounter: 69 kg (152 lb 3.2 oz).  Physical Exam  GENERAL APPEARANCE: healthy, alert and no distress  EYES: Eyes grossly normal to inspection, PERRL and conjunctivae and sclerae normal  HENT: ear canals and TM's normal, nose and mouth without ulcers or lesions, oropharynx clear and oral mucous membranes moist  NECK: no adenopathy, no asymmetry, masses, or scars and thyroid normal to palpation  RESP: lungs clear to auscultation - no rales, rhonchi or wheezes  CV: regular rate and rhythm, normal S1 S2, no S3 or S4, no murmur, click or rub, no peripheral edema and peripheral pulses strong  MS: no musculoskeletal defects are noted and gait is age appropriate without ataxia  SKIN: no suspicious lesions or rashes  NEURO: Normal strength and tone, sensory exam grossly normal, mentation intact and speech normal  PSYCH: mentation appears normal and affect normal/bright    Diagnostic Test Results:  Labs reviewed in Epic    ASSESSMENT / PLAN:   Brittany was seen today for physical and recheck medication.    Diagnoses and all orders for this visit:    Encounter for Medicare annual wellness exam  -     PRIMARY CARE FOLLOW-UP SCHEDULING; Future    Insomnia, unspecified type: stable, refill  -     amitriptyline (ELAVIL) 50 MG tablet; Take 1.5 tablets (75 mg) by mouth At " Bedtime    Moderate recurrent major depression (H): stable, refill  -     citalopram (CELEXA) 10 MG tablet; Take 2 tablets (20 mg) by mouth daily    Generalized anxiety disorder: stable, refill  -     citalopram (CELEXA) 10 MG tablet; Take 2 tablets (20 mg) by mouth daily    Screen for colon cancer  -     Fecal colorectal cancer screen (FIT); Future    Gastroesophageal reflux disease with esophagitis without hemorrhage: ongoing issues, only able to get protonix 90days out of the year which is making her symptoms of dysphonia and choking episodes worse.  Taking famotidine 20mg BID which is not as helpful.  -     Helicobacter pylori Antigen Stool; Future  -     Comprehensive metabolic panel (BMP + Alb, Alk Phos, ALT, AST, Total. Bili, TP); Future    Muscle tension dysphonia  -     sucralfate (CARAFATE) 1 GM tablet; Take 1 tablet (1 g) by mouth 4 times daily as needed (Heartburn)  -     pantoprazole (PROTONIX) 40 MG EC tablet; Take 1 tablet (40 mg) by mouth daily    Laryngopharyngeal reflux  -     sucralfate (CARAFATE) 1 GM tablet; Take 1 tablet (1 g) by mouth 4 times daily as needed (Heartburn)  -     pantoprazole (PROTONIX) 40 MG EC tablet; Take 1 tablet (40 mg) by mouth daily    Gastroesophageal reflux disease, unspecified whether esophagitis present  -     sucralfate (CARAFATE) 1 GM tablet; Take 1 tablet (1 g) by mouth 4 times daily as needed (Heartburn)  -     pantoprazole (PROTONIX) 40 MG EC tablet; Take 1 tablet (40 mg) by mouth daily    Lesion of vocal fold  -     sucralfate (CARAFATE) 1 GM tablet; Take 1 tablet (1 g) by mouth 4 times daily as needed (Heartburn)  -     pantoprazole (PROTONIX) 40 MG EC tablet; Take 1 tablet (40 mg) by mouth daily    Dysphagia, unspecified type  -     sucralfate (CARAFATE) 1 GM tablet; Take 1 tablet (1 g) by mouth 4 times daily as needed (Heartburn)  -     pantoprazole (PROTONIX) 40 MG EC tablet; Take 1 tablet (40 mg) by mouth daily    Screening for lipid disorders  -     Lipid  "panel reflex to direct LDL Fasting; Future    Diabetes mellitus screening  -     Hemoglobin A1c; Future    Impaired fasting glucose  -     Hemoglobin A1c; Future    Seizure disorder (H): history of, but at time still gets staring spells when tired.    Other orders  -     REVIEW OF HEALTH MAINTENANCE PROTOCOL ORDERS        Patient has been advised of split billing requirements and indicates understanding: Yes      COUNSELING:  Reviewed preventive health counseling, as reflected in patient instructions       Regular exercise       Healthy diet/nutrition       Vision screening       Dental care       Osteoporosis prevention/bone health      BMI:   Estimated body mass index is 27.96 kg/m  as calculated from the following:    Height as of 7/21/22: 1.549 m (5' 1\").    Weight as of 7/21/22: 67.1 kg (148 lb).   Weight management plan: Discussed healthy diet and exercise guidelines      She reports that she has never smoked. She has never used smokeless tobacco.      Appropriate preventive services were discussed with this patient, including applicable screening as appropriate for cardiovascular disease, diabetes, osteopenia/osteoporosis, and glaucoma.  As appropriate for age/gender, discussed screening for colorectal cancer, prostate cancer, breast cancer, and cervical cancer. Checklist reviewing preventive services available has been given to the patient.    Reviewed patients plan of care and provided an AVS. The Basic Care Plan (routine screening as documented in Health Maintenance) for Brittany meets the Care Plan requirement. This Care Plan has been established and reviewed with the Patient.      Russell Barrientos MD  [facility]    Identified Health Risks:    I have reviewed Opioid Use Disorder and Substance Use Disorder risk factors and made any needed referrals.     Answers for HPI/ROS submitted by the patient on 6/28/2023  If you checked off any problems, how difficult have these problems made " it for you to do your work, take care of things at home, or get along with other people?: Somewhat difficult  PHQ9 TOTAL SCORE: 3        The patient was provided with suggestions to help her develop a healthy physical lifestyle.  The patient was counseled and encouraged to consider modifying their diet and eating habits. She was provided with information on recommended healthy diet options.  The patient was provided with written information regarding signs of hearing loss.  Information on urinary incontinence and treatment options given to patient.

## 2023-06-28 NOTE — PATIENT INSTRUCTIONS
Do get 3 servings of calcium containing foods (1200mg daily) and 1000IU (50mcg) Vitamin D supplement a day or if not getting 3 food servings then take calcium/vitamin D supplement (600mg).  Weight bearing exercise daily helps keep bones strong too.      Patient Education   Personalized Prevention Plan  You are due for the preventive services outlined below.  Your care team is available to assist you in scheduling these services.  If you have already completed any of these items, please share that information with your care team to update in your medical record.  Health Maintenance Due   Topic Date Due    Pneumococcal Vaccine (2 - PCV) 09/09/2013    Asthma Action Plan - yearly  06/19/2019    COVID-19 Vaccine (2 - Booster for Luis series) 07/01/2021    Colorectal Cancer Screening  04/14/2022    ANNUAL REVIEW OF HM ORDERS  04/05/2023     Your Health Risk Assessment indicates you feel you are not in good health    A healthy lifestyle helps keep the body fit and the mind alert. It helps protect you from disease, helps you fight disease, and helps prevent chronic disease (disease that doesn't go away) from getting worse. This is important as you get older and begin to notice twinges in muscles and joints and a decline in the strength and stamina you once took for granted. A healthy lifestyle includes good healthcare, good nutrition, weight control, recreation, and regular exercise. Avoid harmful substances and do what you can to keep safe. Another part of a healthy lifestyle is stay mentally active and socially involved.    Good healthcare   Have a wellness visit every year.   If you have new symptoms, let us know right away. Don't wait until the next checkup.   Take medicines exactly as prescribed and keep your medicines in a safe place. Tell us if your medicine causes problems.   Healthy diet and weight control   Eat 3 or 4 small, nutritious, low-fat, high-fiber meals a day. Include a variety of fruits, vegetables,  and whole-grain foods.   Make sure you get enough calcium in your diet. Calcium, vitamin D, and exercise help prevent osteoporosis (bone thinning).   If you live alone, try eating with others when you can. That way you get a good meal and have company while you eat it.   Try to keep a healthy weight. If you eat more calories than your body uses for energy, it will be stored as fat and you will gain weight.     Recreation   Recreation is not limited to sports and team events. It includes any activity that provides relaxation, interest, enjoyment, and exercise. Recreation provides an outlet for physical, mental, and social energy. It can give a sense of worth and achievement. It can help you stay healthy.    Mental Exercise and Social Involvement  Mental and emotional health is as important as physical health. Keep in touch with friends and family. Stay as active as possible. Continue to learn and challenge yourself.   Things you can do to stay mentally active are:  Learn something new, like a foreign language or musical instrument.   Play SCRABBLE or do crossword puzzles. If you cannot find people to play these games with you at home, you can play them with others on your computer through the Internet.   Join a games club--anything from card games to chess or checkers or lawn bowling.   Start a new hobby.   Go back to school.   Volunteer.   Read.   Keep up with world events.    Understanding USDA MyPlate  The USDA has guidelines to help you make healthy food choices. These are called MyPlate. MyPlate shows the food groups that make up healthy meals using the image of a place setting. Before you eat, think about the healthiest choices for what to put on your plate or in your cup or bowl. To learn more about building a healthy plate, visit www.choosemyplate.gov.     The food groups  Fruits. Any fruit or 100% fruit juice counts as part of the Fruit Group. Fruits may be fresh, canned, frozen, or dried, and may be whole,  cut-up, or pureed. Make 1/2 of your plate fruits and vegetables.  Vegetables. Any vegetable or 100% vegetable juice counts as a member of the Vegetable Group. Vegetables may be fresh, frozen, canned, or dried. They can be served raw or cooked and may be whole, cut-up, or mashed. Make 1/2 of your plate fruits and vegetables.  Grains. All foods made from grains are part of the Grains Group. These include wheat, rice, oats, cornmeal, and barley. Grains are often used to make foods such as bread, pasta, oatmeal, cereal, tortillas, and grits. Grains should be no more than 1/4 of your plate. At least half of your grains should be whole grains.  Protein. This group includes meat, poultry, seafood, beans and peas, eggs, processed soy products (such as tofu), nuts (including nut butters), and seeds. Make protein choices no more than 1/4 of your plate. Meat and poultry choices should be lean or low fat.  Dairy. The Dairy Group includes all fluid milk products and foods made from milk that contain calcium, such as yogurt and cheese. (Foods that have little calcium, such as cream, butter, and cream cheese, are not part of this group.) Most dairy choices should be low-fat or fat-free.  Oils. Oils aren't a food group, but they do contain essential nutrients. However it's important to watch your intake of oils. These are fats that are liquid at room temperature. They include canola, corn, olive, soybean, vegetable, and sunflower oil. Foods that are mainly oil include mayonnaise, certain salad dressings, and soft margarines. You likely already get your daily oil allowance from the foods you eat.  Things to limit  Eating healthy also means limiting these things in your diet:  Salt (sodium). Many processed foods have a lot of sodium. To keep sodium intake down, eat fresh vegetables, meats, poultry, and seafood when possible. Purchase low-sodium, reduced-sodium, or no-salt-added food products at the store. And don't add salt to your  meals at home. Instead, season them with herbs and spices such as dill, oregano, cumin, and paprika. Or try adding flavor with lemon or lime zest and juice.  Saturated fat. Saturated fats are most often found in animal products such as beef, pork, and chicken. They are often solid at room temperature, such as butter. To reduce your saturated fat intake, choose leaner cuts of meat and poultry. And try healthier cooking methods such as grilling, broiling, roasting, or baking. For a simple lower-fat swap, use plain nonfat yogurt instead of mayonnaise when making potato salad or macaroni salad.  Added sugars. These are sugars added to foods. They are in foods such as ice cream, candy, soda, fruit drinks, sports drinks, energy drinks, cookies, pastries, jams, and syrups. Cut down on added sugars by sharing sweet treats with a family member or friend. You can also choose fruit for dessert, and drink water or other unsweetened beverages.  [employer] last reviewed this educational content on 6/1/2020 2000-2022 [employer]. All rights reserved. This information is not intended as a substitute for professional medical care. Always follow your healthcare professional's instructions.          Signs of Hearing Loss  Hearing loss is a problem shared by many people. In fact, it's one of the most common health problems, particularly as people age. Most people aged 65 and older have some hearing loss. By age 80, almost everyone does. Hearing loss often occurs slowly over the years. So, you may not realize your hearing has gotten worse.   When sudden hearing loss occurs, it's important to contact your healthcare provider right away. Your provider will do a medical exam and a hearing exam as soon as possible. This is to help find the cause and type of your sudden hearing loss. Based on your diagnosis, your healthcare provider will discuss possible treatments.      Hearing much better with one ear can be a sign of  hearing loss.     Have your hearing checked  Call your healthcare provider if you:   Have to strain to hear normal conversation  Have to watch other people s faces very carefully to follow what they re saying  Need to ask people to repeat what they ve said  Often misunderstand what people are saying  Turn the volume of the television or radio up so high that others complain  Feel that people are mumbling when they re talking to you  Find that the effort to hear leaves you feeling tired and irritated  Notice, when using the phone, that you hear better with one ear than the other  [employer] last reviewed this educational content on 6/1/2022 2000-2022 [employer]. All rights reserved. This information is not intended as a substitute for professional medical care. Always follow your healthcare professional's instructions.          Urinary Incontinence, Female (Adult)   Urinary incontinence means loss of bladder control. This problem affects many women, especially as they get older. If you have incontinence, you may be embarrassed to ask for help. But know that this problem can be treated.   Types of Incontinence  There are different types of incontinence. Two of the main types are described here. You can have more than one type.   Stress incontinence. With this type, urine leaks when pressure (stress) is put on the bladder. This may happen when you cough, sneeze, or laugh. Stress incontinence most often occurs because the pelvic floor muscles that support the bladder and urethra are weak. This can happen after pregnancy and vaginal childbirth or a hysterectomy. It can also be due to excess body weight or hormone changes.  Urge incontinence (also called overactive bladder). With this type, a sudden urge to urinate is felt often. This may happen even though there may not be much urine in the bladder. The need to urinate often during the night is common. Urge incontinence most often occurs because of  bladder spasms. This may be due to bladder irritation or infection. Damage to bladder nerves or pelvic muscles, constipation, and certain medicines can also lead to urge incontinence.  Treatment depends on the cause. Further evaluation is needed to find the type you have. This will likely include an exam and certain tests. Based on the results, you and your healthcare provider can then plan treatment. Until a diagnosis is made, the home care tips below can help ease symptoms.   Home care  Do pelvic floor muscle exercises, if they are prescribed. The pelvic floor muscles help support the bladder and urethra. Many women find that their symptoms improve when doing special exercises that strengthen these muscles. To do the exercises, contract the muscles you would use to stop your stream of urine. But do this when you re not urinating. Hold for 10 seconds, then relax. Repeat 10 to 20 times in a row, at least 3 times a day. Your healthcare provider may give you other instructions for how to do the exercises and how often.  Keep a bladder diary. This helps track how often and how much you urinate over a set period of time. Bring this diary with you to your next visit with the provider. The information can help your provider learn more about your bladder problem.  Lose weight, if advised to by your provider. Extra weight puts pressure on the bladder. Your provider can help you create a weight-loss plan that s right for you. This may include exercising more and making certain diet changes.  Don't have foods and drinks that may irritate the bladder. These can include alcohol and caffeinated drinks.  Quit smoking. Smoking and other tobacco use can lead to a long-term (chronic) cough that strains the pelvic floor muscles. Smoking may also damage the bladder and urethra. Talk with your provider about treatments or methods you can use to quit smoking.  If drinking large amounts of fluid makes you have symptoms, you may be  advised to limit your fluid intake. You may also be advised to drink most of your fluids during the day and to limit fluids at night.  If you re worried about urine leakage or accidents, you may wear absorbent pads to catch urine. Change the pads often. This helps reduce discomfort. It may also reduce the risk of skin or bladder infections.    Follow-up care  Follow up with your healthcare provider, or as directed. It may take some to find the right treatment for your problem. But healthy lifestyle changes can be made right away. These include such things as exercising on a regular basis, eating a healthy diet, losing weight (if needed), and quitting smoking. Your treatment plan may include special therapies or medicines. Certain procedures or surgery may also be options. Talk about any questions you have with your provider.   When to seek medical advice  Call the healthcare provider right away if any of these occur:  Fever of 100.4 F (38 C) or higher, or as directed by your provider  Bladder pain or fullness  Belly swelling  Nausea or vomiting  Back pain  Weakness, dizziness, or fainting  [employer] last reviewed this educational content on 1/1/2020 2000-2022 [employer]. All rights reserved. This information is not intended as a substitute for professional medical care. Always follow your healthcare professional's instructions.         This is a preventative visit and any additional concerns or chronic disease management including medication refills addressed today could be charged additionally.    Preventative visits screen for diseases prior to they occur.  They do not cover for any new diagnosis or chronic disease management.     If you have questions regarding your coverage please check with your insurance provider.  At [facility] we need to code correctly to be in compliance with all insurance companies.

## 2023-06-28 NOTE — LETTER
June 29, 2023      Laverne Lori Karl  [id number]   [place] [address]        Dear ,    We are writing to inform you of your test results.    Cholesterol was high, not too high to need medication, yet.   To improve your cholesterol exercise 30 minutes per day five days a week, increase eating fresh or frozen fruits and vegetables at least 5 per day, increase plant protein instead of animal protein and when you do eat meats choose lean meats like fish, and avoid fried foods.   Normal electrolyte, liver, and kidney tests.   Liver tests are improved from elevation late few years.   A1c is normal.     Resulted Orders   Lipid panel reflex to direct LDL Fasting   Result Value Ref Range    Cholesterol 265 (H) <200 mg/dL    Triglycerides 182 (H) <150 mg/dL    Direct Measure HDL 55 >=50 mg/dL    LDL Cholesterol Calculated 174 (H) <=100 mg/dL    Non HDL Cholesterol 210 (H) <130 mg/dL    Narrative    Cholesterol  Desirable:  <200 mg/dL    Triglycerides  Normal:  Less than 150 mg/dL  Borderline High:  150-199 mg/dL  High:  200-499 mg/dL  Very High:  Greater than or equal to 500 mg/dL    Direct Measure HDL  Female:  Greater than or equal to 50 mg/dL   Male:  Greater than or equal to 40 mg/dL    LDL Cholesterol  Desirable:  <100mg/dL  Above Desirable:  100-129 mg/dL   Borderline High:  130-159 mg/dL   High:  160-189 mg/dL   Very High:  >= 190 mg/dL    Non HDL Cholesterol  Desirable:  130 mg/dL  Above Desirable:  130-159 mg/dL  Borderline High:  160-189 mg/dL  High:  190-219 mg/dL  Very High:  Greater than or equal to 220 mg/dL   Comprehensive metabolic panel (BMP + Alb, Alk Phos, ALT, AST, Total. Bili, TP)   Result Value Ref Range    Sodium 138 136 - 145 mmol/L    Potassium 4.3 3.4 - 5.3 mmol/L    Chloride 102 98 - 107 mmol/L    Carbon Dioxide (CO2) 26 22 - 29 mmol/L    Anion Gap 10 7 - 15 mmol/L    Urea Nitrogen 15.0 8.0 - 23.0 mg/dL    Creatinine 0.82 0.51 - 0.95 mg/dL    Calcium 9.7 8.8 - 10.2 mg/dL     Glucose 97 70 - 99 mg/dL    Alkaline Phosphatase 41 35 - 104 U/L    AST 47 (H) 0 - 45 U/L      Comment:      Reference intervals for this test were updated on 6/12/2023 to more accurately reflect our healthy population. There may be differences in the flagging of prior results with similar values performed with this method. Interpretation of those prior results can be made in the context of the updated reference intervals.    ALT 48 0 - 50 U/L      Comment:      Reference intervals for this test were updated on 6/12/2023 to more accurately reflect our healthy population. There may be differences in the flagging of prior results with similar values performed with this method. Interpretation of those prior results can be made in the context of the updated reference intervals.      Protein Total 7.5 6.4 - 8.3 g/dL    Albumin 4.6 3.5 - 5.2 g/dL    Bilirubin Total 0.3 <=1.2 mg/dL    GFR Estimate 80 >60 mL/min/1.73m2   Hemoglobin A1c   Result Value Ref Range    Hemoglobin A1C 5.5 0.0 - 5.6 %      Comment:      Normal <5.7%   Prediabetes 5.7-6.4%    Diabetes 6.5% or higher     Note: Adopted from ADA consensus guidelines.       If you have any questions or concerns, please call the clinic at the number listed above.       Sincerely,      Russell Barrientos MD

## 2023-07-06 ENCOUNTER — APPOINTMENT (OUTPATIENT)
Dept: LAB | Facility: CLINIC | Age: 63
End: 2023-07-06
Payer: COMMERCIAL

## 2023-07-06 PROCEDURE — 87338 HPYLORI STOOL AG IA: CPT | Performed by: FAMILY MEDICINE

## 2023-07-10 LAB — H PYLORI AG STL QL IA: NEGATIVE

## 2024-01-18 ENCOUNTER — PATIENT OUTREACH (OUTPATIENT)
Dept: CARE COORDINATION | Facility: CLINIC | Age: 64
End: 2024-01-18
Payer: COMMERCIAL

## 2024-04-09 DIAGNOSIS — J38.3 LESION OF VOCAL FOLD: ICD-10-CM

## 2024-04-09 DIAGNOSIS — R49.0 MUSCLE TENSION DYSPHONIA: ICD-10-CM

## 2024-04-09 DIAGNOSIS — K21.9 LARYNGOPHARYNGEAL REFLUX: ICD-10-CM

## 2024-04-09 DIAGNOSIS — R13.10 DYSPHAGIA, UNSPECIFIED TYPE: ICD-10-CM

## 2024-04-09 DIAGNOSIS — K21.9 GASTROESOPHAGEAL REFLUX DISEASE, UNSPECIFIED WHETHER ESOPHAGITIS PRESENT: ICD-10-CM

## 2024-04-09 RX ORDER — PANTOPRAZOLE SODIUM 40 MG/1
40 TABLET, DELAYED RELEASE ORAL DAILY
Qty: 90 TABLET | Refills: 0 | Status: SHIPPED | OUTPATIENT
Start: 2024-04-09

## 2024-04-22 DIAGNOSIS — K21.9 GASTROESOPHAGEAL REFLUX DISEASE, UNSPECIFIED WHETHER ESOPHAGITIS PRESENT: ICD-10-CM

## 2024-04-22 DIAGNOSIS — R13.10 DYSPHAGIA, UNSPECIFIED TYPE: ICD-10-CM

## 2024-04-22 DIAGNOSIS — J38.3 LESION OF VOCAL FOLD: ICD-10-CM

## 2024-04-22 DIAGNOSIS — R49.0 MUSCLE TENSION DYSPHONIA: ICD-10-CM

## 2024-04-22 DIAGNOSIS — K21.9 LARYNGOPHARYNGEAL REFLUX: ICD-10-CM

## 2024-04-23 RX ORDER — SUCRALFATE 1 G/1
1 TABLET ORAL 4 TIMES DAILY PRN
Qty: 30 TABLET | Refills: 1 | Status: SHIPPED | OUTPATIENT
Start: 2024-04-23

## 2024-05-26 ENCOUNTER — HEALTH MAINTENANCE LETTER (OUTPATIENT)
Age: 64
End: 2024-05-26

## 2024-05-29 ENCOUNTER — PATIENT OUTREACH (OUTPATIENT)
Dept: CARE COORDINATION | Facility: CLINIC | Age: 64
End: 2024-05-29
Payer: COMMERCIAL

## 2024-06-12 ENCOUNTER — PATIENT OUTREACH (OUTPATIENT)
Dept: CARE COORDINATION | Facility: CLINIC | Age: 64
End: 2024-06-12
Payer: COMMERCIAL

## 2024-07-12 DIAGNOSIS — G47.00 INSOMNIA, UNSPECIFIED TYPE: ICD-10-CM

## 2024-07-12 NOTE — TELEPHONE ENCOUNTER
Reason for call:  Medication   If this is a refill request, has the caller requested the refill from the pharmacy already? Yes  Will the patient be using a [facility]? No  Name of the pharmacy and phone number for the current request[facility]    Name of the medication requested: amitriptyline     Other request: patient was told to set up a medication check visit with you for refills she took your first available which is 10/8/24    She needs meds until then     Phone number to reach patient:  Home number on file [phone] (home)    Best Time:  any    Can we leave a detailed message on this number?  YES    Travel screening: Not Applicable

## 2024-07-16 RX ORDER — AMITRIPTYLINE HYDROCHLORIDE 50 MG/1
75 TABLET ORAL AT BEDTIME
Qty: 135 TABLET | Refills: 0 | Status: SHIPPED | OUTPATIENT
Start: 2024-07-16

## 2024-08-04 ENCOUNTER — HEALTH MAINTENANCE LETTER (OUTPATIENT)
Age: 64
End: 2024-08-04

## 2024-08-06 ENCOUNTER — PATIENT OUTREACH (OUTPATIENT)
Dept: CARE COORDINATION | Facility: CLINIC | Age: 64
End: 2024-08-06
Payer: COMMERCIAL

## 2024-08-16 ENCOUNTER — APPOINTMENT (OUTPATIENT)
Dept: GENERAL RADIOLOGY | Facility: CLINIC | Age: 64
End: 2024-08-16
Attending: EMERGENCY MEDICINE
Payer: COMMERCIAL

## 2024-08-16 ENCOUNTER — HOSPITAL ENCOUNTER (EMERGENCY)
Facility: CLINIC | Age: 64
Discharge: HOME | End: 2024-08-16
Attending: EMERGENCY MEDICINE | Admitting: EMERGENCY MEDICINE
Payer: COMMERCIAL

## 2024-08-16 VITALS
BODY MASS INDEX: 28.7 KG/M2 | TEMPERATURE: 98.4 F | OXYGEN SATURATION: 97 % | WEIGHT: 152 LBS | HEART RATE: 80 BPM | SYSTOLIC BLOOD PRESSURE: 133 MMHG | DIASTOLIC BLOOD PRESSURE: 107 MMHG | HEIGHT: 61 IN | RESPIRATION RATE: 18 BRPM

## 2024-08-16 DIAGNOSIS — R05.1 ACUTE COUGH: ICD-10-CM

## 2024-08-16 DIAGNOSIS — R55 NEAR SYNCOPE: ICD-10-CM

## 2024-08-16 LAB
FLUAV RNA SPEC QL NAA+PROBE: NEGATIVE
FLUBV RNA RESP QL NAA+PROBE: NEGATIVE
RSV RNA SPEC NAA+PROBE: NEGATIVE
SARS-COV-2 RNA RESP QL NAA+PROBE: NEGATIVE

## 2024-08-16 PROCEDURE — 71046 X-RAY EXAM CHEST 2 VIEWS: CPT

## 2024-08-16 PROCEDURE — 87637 SARSCOV2&INF A&B&RSV AMP PRB: CPT | Performed by: EMERGENCY MEDICINE

## 2024-08-16 PROCEDURE — 99284 EMERGENCY DEPT VISIT MOD MDM: CPT | Mod: 25 | Performed by: EMERGENCY MEDICINE

## 2024-08-16 PROCEDURE — 99283 EMERGENCY DEPT VISIT LOW MDM: CPT | Performed by: EMERGENCY MEDICINE

## 2024-08-16 PROCEDURE — 250N000013 HC RX MED GY IP 250 OP 250 PS 637: Performed by: EMERGENCY MEDICINE

## 2024-08-16 RX ORDER — BENZONATATE 100 MG/1
200 CAPSULE ORAL
Status: COMPLETED | OUTPATIENT
Start: 2024-08-16 | End: 2024-08-16

## 2024-08-16 RX ORDER — BENZONATATE 200 MG/1
200 CAPSULE ORAL 3 TIMES DAILY PRN
Qty: 15 CAPSULE | Refills: 0 | Status: SHIPPED | OUTPATIENT
Start: 2024-08-16 | End: 2024-08-21

## 2024-08-16 RX ADMIN — BENZONATATE 200 MG: 100 CAPSULE ORAL at 23:30

## 2024-08-16 ASSESSMENT — ENCOUNTER SYMPTOMS
NEUROLOGICAL NEGATIVE: 1
CARDIOVASCULAR NEGATIVE: 1
MUSCULOSKELETAL NEGATIVE: 1
GASTROINTESTINAL NEGATIVE: 1
EYES NEGATIVE: 1
ENDOCRINE NEGATIVE: 1
RESPIRATORY NEGATIVE: 1
HEMATOLOGIC/LYMPHATIC NEGATIVE: 1
ALLERGIC/IMMUNOLOGIC NEGATIVE: 1
PSYCHIATRIC NEGATIVE: 1

## 2024-08-16 ASSESSMENT — ACTIVITIES OF DAILY LIVING (ADL): ADLS_ACUITY_SCORE: 35

## 2024-08-17 NOTE — DISCHARGE INSTRUCTIONS
1) Your evaluation today did not reveal any findings to suggest pneumonia or COVID-19 infection or influenza.  You likely have a cold and there is no clear indication for antibiotics based on your exam and symptoms today.    2) We have reviewed that your fainting spell is likely related to coughing spell.  For home we have discussed that is safe to trial Mucinex but try to avoid NyQuil if able due to potential drug interaction with Celexa.  He also offered Tessalon Perles to use for cough as needed.    3) You appear stable for discharge to home at this time however if you develop new symptoms of concern you should return to be reevaluated

## 2024-08-17 NOTE — ED PROVIDER NOTES
History     Chief Complaint   Patient presents with    URI     Patient has had 4 days of cold/respiratory symptoms getting worse    Dizziness     Near syncope- fell down but did not lose consciousness.      HPI  Brittany Barajas is a 64 year old female who presents reporting dizziness with near syncope with fall down but no loss of consciousness preceded by a 4-day history of cold and URI symptoms.  Reviewed the medical record.  Reviewed office visit on  6/28/23-patient has a history of mitral valve disorder, mild persistent asthma, history of seizure disorder and depression.  Patient also has a history of GERD.  Patient is on amitriptyline, Celexa, Pepcid, DuoNebs, Protonix and Carafate and multivitamin.    On examination patient reports she developed cold symptoms 4 days ago and had a coughing spell and nearly fainted she reports she coughed so hard and fell into the door but did not pass out.  She reports no chest pain or tightness.  She reports no palpitations.  Patient reports she has been taking Claritin-D and due to concern about drug drug interaction with her Celexa she did not take any other agents.  On arrival she reports no chest pain or shortness of breath    Allergies:  Allergies   Allergen Reactions    Docetaxel Other (See Comments)     Chest tightness, black out       Problem List:    Patient Active Problem List    Diagnosis Date Noted    Moderate recurrent major depression (H) 04/05/2022     Priority: Medium    Seizure disorder (H) 05/31/2019     Priority: Medium     Focal Last seizure age 19, off medication over 30 years.      DDD (degenerative disc disease), lumbar 08/23/2018     Priority: Medium    DDD (degenerative disc disease), cervical 08/23/2018     Priority: Medium    Dysphonia 08/23/2018     Priority: Medium    Other dysphagia 08/23/2018     Priority: Medium    Traumatic brain injury, without loss of consciousness, subsequent encounter 08/23/2018     Priority: Medium    Fatigue,  unspecified type 08/23/2018     Priority: Medium    Urgency incontinence 01/02/2014     Priority: Medium    Mild persistent asthma 07/02/2013     Priority: Medium    Drug reaction 05/16/2012     Priority: Medium     sideeffects from donald  See May 16 2012 note      H/O: hysterectomy 11/29/2011     Priority: Medium     No pap needed      Colon polyp 11/29/2011     Priority: Medium     2011 colonoscopy --unable to get polyp out due to technical issues-repeat suggested  2012-colonoscopy normal--suggested repeat three years      Elevated liver enzymes 11/28/2010     Priority: Medium     Mild and stable in 2010, from chemo elevated mildly.      Personal history of malignant neoplasm of breast 11/17/2009     Priority: Medium     09      Anxiety state 12/08/2007     Priority: Medium     Problem list name updated by automated process. Provider to review      Mitral valve disorder 12/01/2005     Priority: Medium     MVP  2010 echo--Mild mitral valve prolapse, posterior leaflet.  There is mild to moderate (1-2+) mitral regurgitation  Problem list name updated by automated process. Provider to review      Allergic rhinitis 12/01/2005     Priority: Medium     Problem list name updated by automated process. Provider to review      Esophageal reflux 12/01/2005     Priority: Medium    Plantar fascial fibromatosis 12/01/2005     Priority: Medium    Other affections of shoulder region, not elsewhere classified 12/01/2005     Priority: Medium     Left shoulder impingement          Past Medical History:    Past Medical History:   Diagnosis Date    Allergic rhinitis, cause unspecified     Breast cancer (H)     Depressive disorder, not elsewhere classified     Esophageal reflux     Intramural leiomyoma of uterus     Mitral valve disorders(424.0)     Other affections of shoulder region, not elsewhere classified     Other convulsions     Other diseases of trachea and bronchus, not elsewhere classified     Papanicolaou smear of cervix with  low grade squamous intraepithelial lesion (LGSIL) 11/2009    Plantar fascial fibromatosis        Past Surgical History:    Past Surgical History:   Procedure Laterality Date    BIOPSY BREAST      COLONOSCOPY  01/28/2011    COLONOSCOPY performed by ELOY BRIONES at [facility]    COLONOSCOPY  01/20/2012    Procedure:COLONOSCOPY; Surgeon:ELOY BRIONES; Location:[facility]    ESOPHAGOSCOPY, GASTROSCOPY, DUODENOSCOPY (EGD), COMBINED N/A 10/22/2018    Procedure: gastroscopy;  Surgeon: Seth Berman MD;  Location: [facility]    HYSTERECTOMY, PAP NO LONGER INDICATED  12/01/2008    for fibroids    INJECT EPIDURAL LUMBAR  03/09/2012    Procedure:INJECT EPIDURAL LUMBAR; JAMIL - ; Surgeon:GENERIC ANESTHESIA PROVIDER; Location:[facility]    INJECT EPIDURAL LUMBAR  05/25/2012    Procedure:INJECT EPIDURAL LUMBAR; JAMIL-; Surgeon:GENERIC ANESTHESIA PROVIDER; Location:[facility]    LEEP TX, CERVICAL  10/2006    CECILIO 1    LUMPECTOMY BREAST      SURGICAL HISTORY OF -   10/07/1998    Right parotidectomy-mass    SURGICAL HISTORY OF -       Laparoscopy    SURGICAL HISTORY OF -   10/1996    Fibroid removal       Family History:    Family History   Problem Relation Age of Onset    C.A.D. Mother     Diabetes Mother     Hypertension Mother     Cerebrovascular Disease Mother     Breast Cancer Mother     Allergies Mother     Arthritis Mother     Cancer Mother     Eye Disorder Mother     Gastrointestinal Disease Mother     Gynecology Mother     Lipids Mother     Heart Disease Mother     Osteoporosis Mother     Obesity Mother     Thyroid Disease Mother     Respiratory Mother     Alcohol/Drug Father     Cancer Father     Alcohol/Drug Brother     Musculoskeletal Disorder Brother         spina bifida-recent dx    C.A.D. Brother     Thyroid Disease Sister     Lipids Sister     Gynecology Sister         fibroids/ s/p hysterectomy    Other - See Comments Sister         hysterectomy, dilation of the throat.    Thyroid Disease Brother     Heart Disease  "Brother         DOUBLE BYPASS    Lipids Brother     Thyroid Disease Sister     Lipids Sister     Other - See Comments Sister         hysterectomy    Gastrointestinal Disease Sister         liver function off    Thyroid Disease Sister     Thyroid Disease Sister     Heart Disease Sister     Cancer Other         thyroid cancer    Cancer - colorectal No family hx of        Social History:  Marital Status:  Single [1]  Social History     Tobacco Use    Smoking status: Never    Smokeless tobacco: Never   Vaping Use    Vaping status: Never Used   Substance Use Topics    Alcohol use: Yes     Comment: rare    Drug use: No        Medications:    benzonatate (TESSALON) 200 MG capsule  amitriptyline (ELAVIL) 50 MG tablet  citalopram (CELEXA) 10 MG tablet  famotidine (PEPCID) 40 MG tablet  ipratropium - albuterol 0.5 mg/2.5 mg/3 mL (DUONEB) 0.5-2.5 (3) MG/3ML neb solution  Multiple Vitamin (MULTIVITAMIN OR)  pantoprazole (PROTONIX) 40 MG EC tablet  phenazopyridine (AZO URINARY PAIN RELIEF) 95 MG tablet  sucralfate (CARAFATE) 1 GM tablet  VENTOLIN  (90 Base) MCG/ACT inhaler          Review of Systems   Constitutional:         Near syncope after a coughing spell   HENT: Negative.     Eyes: Negative.    Respiratory: Negative.     Cardiovascular: Negative.    Gastrointestinal: Negative.    Endocrine: Negative.    Genitourinary: Negative.    Musculoskeletal: Negative.    Skin: Negative.    Allergic/Immunologic: Negative.    Neurological: Negative.    Hematological: Negative.    Psychiatric/Behavioral: Negative.     All other systems reviewed and are negative.      Physical Exam   BP: 98/64  Pulse: 95  Temp: 98.4  F (36.9  C)  Resp: 16  Height: 154.9 cm (5' 1\")  Weight: 68.9 kg (152 lb)  SpO2: 98 %      Physical Exam  Constitutional:       General: She is not in acute distress.     Appearance: Normal appearance. She is not ill-appearing, toxic-appearing or diaphoretic.   HENT:      Head: Normocephalic and atraumatic.      Nose: " "Nose normal.      Mouth/Throat:      Mouth: Mucous membranes are moist.   Eyes:      Extraocular Movements: Extraocular movements intact.      Pupils: Pupils are equal, round, and reactive to light.   Cardiovascular:      Rate and Rhythm: Normal rate and regular rhythm.   Pulmonary:      Effort: Pulmonary effort is normal. No respiratory distress.      Breath sounds: No stridor. No wheezing, rhonchi or rales.   Chest:      Chest wall: No tenderness.   Abdominal:      General: Abdomen is flat.   Musculoskeletal:      Cervical back: Normal range of motion and neck supple.   Skin:     Coloration: Skin is not jaundiced or pale.      Findings: No bruising, erythema, lesion or rash.   Neurological:      General: No focal deficit present.      Mental Status: She is alert and oriented to person, place, and time.      Cranial Nerves: No cranial nerve deficit.      Sensory: No sensory deficit.      Motor: No weakness.      Coordination: Coordination normal.      Gait: Gait normal.      Deep Tendon Reflexes: Reflexes normal.   Psychiatric:         Mood and Affect: Mood normal.         Behavior: Behavior normal.         Thought Content: Thought content normal.         Judgment: Judgment normal.         ED Course        Procedures              EKG Interpretation:        Critical Care time:  none             ED medications: none      ED Vitals;  Vitals:    08/16/24 2027 08/16/24 2032 08/16/24 2100 08/16/24 2330   BP: 98/64 (!) 142/73 109/74 (!) 133/107   Pulse: 95 91 82 80   Resp: 16   18   Temp:  98.4  F (36.9  C)     TempSrc:  Oral     SpO2: 98% 99% 98% 97%   Weight: 68.9 kg (152 lb)      Height: 1.549 m (5' 1\")         ED  labs and imaging:  Results for orders placed or performed during the hospital encounter of 08/16/24   Chest XR,  PA & LAT     Status: None    Narrative    EXAM: XR CHEST 2 VIEWS  LOCATION: [facility]  DATE: 8/16/2024    INDICATION: 4 day history of URI and cough with near syncope. " Hx of mitral valve disease.  Evaluate for pneumonia vs other acute cardiopulmonary process  COMPARISON: Chest radiograph 8/20/2022      Impression    IMPRESSION: No focal consolidation, pleural effusion or pneumothorax. Cardiomediastinal silhouette is unremarkable.   Symptomatic Influenza A/B, RSV, & SARS-CoV2 PCR (COVID-19) Nose     Status: Normal    Specimen: Nose; Swab   Result Value Ref Range    Influenza A PCR Negative Negative    Influenza B PCR Negative Negative    RSV PCR Negative Negative    SARS CoV2 PCR Negative Negative    Narrative    Testing was performed using the Xpert Xpress CoV2/Flu/RSV Assay on the Cepheid GeneXpert Instrument. This test should be ordered for the detection of SARS-CoV-2, influenza, and RSV viruses in individuals who meet clinical and/or epidemiological criteria. Test performance is unknown in asymptomatic patients. This test is for in vitro diagnostic use under the FDA EUA for laboratories certified under CLIA to perform high or moderate complexity testing. This test has not been FDA cleared or approved. A negative result does not rule out the presence of PCR inhibitors in the specimen or target RNA in concentration below the limit of detection for the assay. If only one viral target is positive but coinfection with multiple targets is suspected, the sample should be re-tested with another FDA cleared, approved, or authorized test, if coinfection would change clinical management. This test was validated by the [facility]. These laboratories are certified under the Clinical Laboratory Improvement Amendments of 1988 (CLIA-88) as qualified to perform high complexity laboratory testing.       Assessments & Plan (with Medical Decision Making)   Assessment Summary and Clinical impression: 64-year-old female presenting with congestion and cough over the last 4 days.  She arrived reporting that she had a near fainting spell due to coughing and that she had placed  herself against the door at home.  On assessment she was pleasant and obvious distress she has a history of mitral valve disease and has been cancer remission for 15 years.  Pressure was 98/64 she was 98% on room air.  Due to URI symptoms preceding her near syncopal spell with posttussive symptoms viral respiratory panel and x-ray imaging was obtained revealing no acute cardiopulmonary process.  Patient felt reassured by her evaluation and expressed comfort: Plan to trial Tessalon Perles to help manage cough with low threshold to return for reassessment for care      ED course and plan:  Reviewed the medical record reviewed office visit on 6/6/23.  Workup was reassuring with a negative viral respiratory panel test and normal chest x-ray.  She was discharged Tessalon Perles with plan to manage her symptoms with low threshold to return for reevaluation especially if she develops new symptoms of concern including but not limited to fever, hemoptysis, palpitations, chest pain at rest or dyspnea on exertion.  Patient expressed comfort with the plan of care.      Disclaimer: This note consists of symbols derived from keyboarding, dictation and/or voice recognition software. As a result, there may be errors in the script that have gone undetected. Please consider this when interpreting information found in this chart.   I have reviewed the nursing notes.    I have reviewed the findings, diagnosis, plan and need for follow up with the patient.           Medical Decision Making  The patient's presentation was of high complexity (no syncope, cough congestion URI symptoms ).    The patient's evaluation involved:  ordering and/or review of 2 test(s) in this encounter (XR imaging viral respiratory panel testing)    The patient's management necessitated high risk (medication management follow-up/disposition planning).        Discharge Medication List as of 8/16/2024 11:27 PM        START taking these medications    Details    benzonatate (TESSALON) 200 MG capsule Take 1 capsule (200 mg) by mouth 3 times daily as needed for cough, Disp-15 capsule, R-0, E-Prescribe             Final diagnoses:   Acute cough - over the last 4 days   Near syncope - During a coughing spell       8/16/2024   [facility] EMERGENCY DEPT       Matthew Vazquez MD  08/17/24 [id number]

## 2024-08-21 DIAGNOSIS — F33.1 MODERATE RECURRENT MAJOR DEPRESSION (H): ICD-10-CM

## 2024-08-21 DIAGNOSIS — F41.1 GENERALIZED ANXIETY DISORDER: ICD-10-CM

## 2024-08-22 RX ORDER — CITALOPRAM HYDROBROMIDE 10 MG/1
20 TABLET ORAL DAILY
Qty: 180 TABLET | Refills: 0 | Status: SHIPPED | OUTPATIENT
Start: 2024-08-22

## 2024-10-08 ENCOUNTER — OFFICE VISIT (OUTPATIENT)
Dept: FAMILY MEDICINE | Facility: CLINIC | Age: 64
End: 2024-10-08
Payer: COMMERCIAL

## 2024-10-08 ENCOUNTER — ORDERS ONLY (AUTO-RELEASED) (OUTPATIENT)
Dept: FAMILY MEDICINE | Facility: CLINIC | Age: 64
End: 2024-10-08

## 2024-10-08 VITALS
WEIGHT: 157.8 LBS | DIASTOLIC BLOOD PRESSURE: 75 MMHG | OXYGEN SATURATION: 96 % | BODY MASS INDEX: 29.79 KG/M2 | HEIGHT: 61 IN | SYSTOLIC BLOOD PRESSURE: 118 MMHG | RESPIRATION RATE: 16 BRPM | TEMPERATURE: 97.8 F | HEART RATE: 83 BPM

## 2024-10-08 DIAGNOSIS — Z12.11 SCREEN FOR COLON CANCER: ICD-10-CM

## 2024-10-08 DIAGNOSIS — G40.909 SEIZURE DISORDER (H): ICD-10-CM

## 2024-10-08 DIAGNOSIS — K21.9 LARYNGOPHARYNGEAL REFLUX: ICD-10-CM

## 2024-10-08 DIAGNOSIS — R13.10 DYSPHAGIA, UNSPECIFIED TYPE: ICD-10-CM

## 2024-10-08 DIAGNOSIS — J06.9 VIRAL URI WITH COUGH: ICD-10-CM

## 2024-10-08 DIAGNOSIS — J45.40 MODERATE PERSISTENT ASTHMA WITHOUT COMPLICATION: ICD-10-CM

## 2024-10-08 DIAGNOSIS — F41.1 GENERALIZED ANXIETY DISORDER: ICD-10-CM

## 2024-10-08 DIAGNOSIS — M67.472 GANGLION CYST OF LEFT FOOT: ICD-10-CM

## 2024-10-08 DIAGNOSIS — G47.00 INSOMNIA, UNSPECIFIED TYPE: ICD-10-CM

## 2024-10-08 DIAGNOSIS — F33.1 MODERATE RECURRENT MAJOR DEPRESSION (H): Primary | ICD-10-CM

## 2024-10-08 DIAGNOSIS — G89.29 CHRONIC BILATERAL LOW BACK PAIN WITHOUT SCIATICA: ICD-10-CM

## 2024-10-08 DIAGNOSIS — Z23 NEED FOR IMMUNIZATION AGAINST INFLUENZA: ICD-10-CM

## 2024-10-08 DIAGNOSIS — J38.3 LESION OF VOCAL FOLD: ICD-10-CM

## 2024-10-08 DIAGNOSIS — Z29.11 NEED FOR VACCINATION AGAINST RESPIRATORY SYNCYTIAL VIRUS: ICD-10-CM

## 2024-10-08 DIAGNOSIS — M54.50 CHRONIC BILATERAL LOW BACK PAIN WITHOUT SCIATICA: ICD-10-CM

## 2024-10-08 DIAGNOSIS — R49.0 MUSCLE TENSION DYSPHONIA: ICD-10-CM

## 2024-10-08 DIAGNOSIS — K21.9 GASTROESOPHAGEAL REFLUX DISEASE, UNSPECIFIED WHETHER ESOPHAGITIS PRESENT: ICD-10-CM

## 2024-10-08 PROCEDURE — G0008 ADMIN INFLUENZA VIRUS VAC: HCPCS | Performed by: FAMILY MEDICINE

## 2024-10-08 PROCEDURE — 99214 OFFICE O/P EST MOD 30 MIN: CPT | Mod: 25 | Performed by: FAMILY MEDICINE

## 2024-10-08 PROCEDURE — 90673 RIV3 VACCINE NO PRESERV IM: CPT | Performed by: FAMILY MEDICINE

## 2024-10-08 RX ORDER — FAMOTIDINE 40 MG/1
40 TABLET, FILM COATED ORAL 2 TIMES DAILY PRN
Qty: 180 TABLET | Refills: 3 | Status: SHIPPED | OUTPATIENT
Start: 2024-10-08

## 2024-10-08 RX ORDER — BENZONATATE 100 MG/1
100 CAPSULE ORAL 3 TIMES DAILY PRN
Qty: 20 CAPSULE | Refills: 0 | Status: SHIPPED | OUTPATIENT
Start: 2024-10-08

## 2024-10-08 RX ORDER — CITALOPRAM HYDROBROMIDE 10 MG/1
20 TABLET ORAL DAILY
Qty: 180 TABLET | Refills: 3 | Status: SHIPPED | OUTPATIENT
Start: 2024-10-08

## 2024-10-08 RX ORDER — ACETAMINOPHEN 500 MG
500-1000 TABLET ORAL EVERY 6 HOURS PRN
COMMUNITY

## 2024-10-08 RX ORDER — AMITRIPTYLINE HYDROCHLORIDE 50 MG/1
75 TABLET ORAL AT BEDTIME
Qty: 135 TABLET | Refills: 3 | Status: SHIPPED | OUTPATIENT
Start: 2024-10-08

## 2024-10-08 RX ORDER — FAMOTIDINE 40 MG/1
40 TABLET, FILM COATED ORAL
Qty: 90 TABLET | Refills: 2 | Status: SHIPPED | OUTPATIENT
Start: 2024-10-09 | End: 2024-10-08

## 2024-10-08 RX ORDER — PSYLLIUM HUSK 0.4 G
CAPSULE ORAL
COMMUNITY

## 2024-10-08 RX ORDER — ALBUTEROL SULFATE 90 UG/1
2 INHALANT RESPIRATORY (INHALATION) EVERY 4 HOURS PRN
Qty: 18 G | Refills: 3 | Status: SHIPPED | OUTPATIENT
Start: 2024-10-08

## 2024-10-08 RX ORDER — CALCIUM CARBONATE/VITAMIN D3 600 MG-10
1 TABLET ORAL 2 TIMES DAILY
COMMUNITY

## 2024-10-08 RX ORDER — PANTOPRAZOLE SODIUM 40 MG/1
40 TABLET, DELAYED RELEASE ORAL DAILY
Qty: 90 TABLET | Refills: 0 | Status: SHIPPED | OUTPATIENT
Start: 2024-10-08

## 2024-10-08 ASSESSMENT — ANXIETY QUESTIONNAIRES
3. WORRYING TOO MUCH ABOUT DIFFERENT THINGS: NOT AT ALL
GAD7 TOTAL SCORE: 1
6. BECOMING EASILY ANNOYED OR IRRITABLE: SEVERAL DAYS
GAD7 TOTAL SCORE: 1
5. BEING SO RESTLESS THAT IT IS HARD TO SIT STILL: NOT AT ALL
4. TROUBLE RELAXING: NOT AT ALL
2. NOT BEING ABLE TO STOP OR CONTROL WORRYING: NOT AT ALL
7. FEELING AFRAID AS IF SOMETHING AWFUL MIGHT HAPPEN: NOT AT ALL
7. FEELING AFRAID AS IF SOMETHING AWFUL MIGHT HAPPEN: NOT AT ALL
GAD7 TOTAL SCORE: 1
8. IF YOU CHECKED OFF ANY PROBLEMS, HOW DIFFICULT HAVE THESE MADE IT FOR YOU TO DO YOUR WORK, TAKE CARE OF THINGS AT HOME, OR GET ALONG WITH OTHER PEOPLE?: SOMEWHAT DIFFICULT
1. FEELING NERVOUS, ANXIOUS, OR ON EDGE: NOT AT ALL
IF YOU CHECKED OFF ANY PROBLEMS ON THIS QUESTIONNAIRE, HOW DIFFICULT HAVE THESE PROBLEMS MADE IT FOR YOU TO DO YOUR WORK, TAKE CARE OF THINGS AT HOME, OR GET ALONG WITH OTHER PEOPLE: SOMEWHAT DIFFICULT

## 2024-10-08 ASSESSMENT — PATIENT HEALTH QUESTIONNAIRE - PHQ9
SUM OF ALL RESPONSES TO PHQ QUESTIONS 1-9: 3
SUM OF ALL RESPONSES TO PHQ QUESTIONS 1-9: 3
10. IF YOU CHECKED OFF ANY PROBLEMS, HOW DIFFICULT HAVE THESE PROBLEMS MADE IT FOR YOU TO DO YOUR WORK, TAKE CARE OF THINGS AT HOME, OR GET ALONG WITH OTHER PEOPLE: SOMEWHAT DIFFICULT

## 2024-10-08 ASSESSMENT — ASTHMA QUESTIONNAIRES
QUESTION_1 LAST FOUR WEEKS HOW MUCH OF THE TIME DID YOUR ASTHMA KEEP YOU FROM GETTING AS MUCH DONE AT WORK, SCHOOL OR AT HOME: ALL OF THE TIME
EMERGENCY_ROOM_LAST_YEAR_TOTAL: ONE
QUESTION_4 LAST FOUR WEEKS HOW OFTEN HAVE YOU USED YOUR RESCUE INHALER OR NEBULIZER MEDICATION (SUCH AS ALBUTEROL): ONCE A WEEK OR LESS
ACT_TOTALSCORE: 16
QUESTION_3 LAST FOUR WEEKS HOW OFTEN DID YOUR ASTHMA SYMPTOMS (WHEEZING, COUGHING, SHORTNESS OF BREATH, CHEST TIGHTNESS OR PAIN) WAKE YOU UP AT NIGHT OR EARLIER THAN USUAL IN THE MORNING: NOT AT ALL
QUESTION_5 LAST FOUR WEEKS HOW WOULD YOU RATE YOUR ASTHMA CONTROL: WELL CONTROLLED
QUESTION_2 LAST FOUR WEEKS HOW OFTEN HAVE YOU HAD SHORTNESS OF BREATH: ONCE A DAY
ACT_TOTALSCORE: 16

## 2024-10-08 ASSESSMENT — ENCOUNTER SYMPTOMS
NERVOUS/ANXIOUS: 1
BACK PAIN: 1

## 2024-10-08 ASSESSMENT — PAIN SCALES - GENERAL: PAINLEVEL: MILD PAIN (3)

## 2024-10-08 NOTE — PROGRESS NOTES
Assessment & Plan         Moderate recurrent major depression (H)  Stable on current medication regimen  - citalopram (CELEXA) 10 MG tablet; Take 2 tablets (20 mg) by mouth daily.    Generalized anxiety disorder  Stable on current medication regimen  - citalopram (CELEXA) 10 MG tablet; Take 2 tablets (20 mg) by mouth daily.    Seizure disorder (H)  Stable - on no medication management    Insomnia, unspecified type  Stable on current medication regimen  - amitriptyline (ELAVIL) 50 MG tablet; Take 1.5 tablets (75 mg) by mouth at bedtime.    Moderate persistent asthma without complication  Stable on current medication regimen, ACT not in goal due to current illness and needing inhaler more often.  - albuterol (VENTOLIN HFA) 108 (90 Base) MCG/ACT inhaler; Inhale 2 puffs into the lungs every 4 hours as needed for shortness of breath, wheezing or cough.    Viral URI with cough  Improving.   - Mild persistent cough present that was being improved by tessalon (medication refilled).  - benzonatate (TESSALON) 100 MG capsule; Take 1 capsule (100 mg) by mouth 3 times daily as needed for cough.    Ganglion cyst of left foot  Worsening   - Suspect ganglion cyst on left big toe.   - Referral sent to Orthopedics to evaluate.  - Orthopedic  Referral; Future    Muscle tension dysphonia  Stable on current medication regimen  - pantoprazole (PROTONIX) 40 MG EC tablet; Take 1 tablet (40 mg) by mouth daily.  - famotidine (PEPCID) 40 MG tablet; Take 1 tablet (40 mg) by mouth 2 times daily as needed for heartburn.    Laryngopharyngeal reflux  Stable on current medication regimen  - pantoprazole (PROTONIX) 40 MG EC tablet; Take 1 tablet (40 mg) by mouth daily.  - famotidine (PEPCID) 40 MG tablet; Take 1 tablet (40 mg) by mouth 2 times daily as needed for heartburn.    Gastroesophageal reflux disease, unspecified whether esophagitis present  Stable on current medication regimen  - pantoprazole (PROTONIX) 40 MG EC tablet; Take 1  "tablet (40 mg) by mouth daily.  - famotidine (PEPCID) 40 MG tablet; Take 1 tablet (40 mg) by mouth 2 times daily as needed for heartburn.    Lesion of vocal fold  Stable on current medication regimen  - pantoprazole (PROTONIX) 40 MG EC tablet; Take 1 tablet (40 mg) by mouth daily.  - famotidine (PEPCID) 40 MG tablet; Take 1 tablet (40 mg) by mouth 2 times daily as needed for heartburn.    Dysphagia, unspecified type  Stable on current medication regimen  - pantoprazole (PROTONIX) 40 MG EC tablet; Take 1 tablet (40 mg) by mouth daily.  - famotidine (PEPCID) 40 MG tablet; Take 1 tablet (40 mg) by mouth 2 times daily as needed for heartburn.    Need for immunization against influenza  Received vaccine in clinic  - INFLUENZA VACCINE TRIVALENT(FLUBLOK)    Need for vaccination against respiratory syncytial virus  Plan to receive vaccination from pharmacy      Chronic bilateral low back pain without sciatica  Worsening   - Patient has tried PT in the past   - Referral sent to spine orthopedics for further evaluation.  - Spine  Referral; Future    Screen for colon cancer  -Plan for cologuard screening  - COLOGUARD([employer]); Future    BMI  Estimated body mass index is 29.82 kg/m  as calculated from the following:    Height as of this encounter: 1.549 m (5' 1\").    Weight as of this encounter: 71.6 kg (157 lb 12.8 oz).   Weight management plan: Discussed healthy diet and exercise guidelines    Eloise Galloway is a 64 year old, presenting for the following health issues:  Anxiety, Depression, and Back Pain      10/8/2024     8:20 AM   Additional Questions   Roomed by VOLODYMYR Gurrola CMA     Via the Health Maintenance questionnaire, the patient has reported the following services have been completed -Mammogram: [facility] 2024-05-02, this information has not been sent to the abstraction team.  History of Present Illness       Back Pain:  She presents for follow up of back pain. Patient's back pain is a chronic " problem.  Location of back pain:  Right lower back, left lower back, right side of neck, left side of neck, left shoulder and other  Description of back pain: dull ache, sharp and stabbing  Back pain spreads: right buttocks, left buttocks and right knee    Since patient first noticed back pain, pain is: always present, but gets better and worse  Does back pain interfere with her job:  Not applicable       Mental Health Follow-up:  Patient presents to follow-up on Depression & Anxiety.Patient's depression since last visit has been:  Good  The patient is having other symptoms associated with depression.  Patient's anxiety since last visit has been:  Better  The patient is not having other symptoms associated with anxiety.  Any significant life events: No  Patient is feeling anxious or having panic attacks.  Patient has no concerns about alcohol or drug use.    Hyperlipidemia:  She presents for follow up of hyperlipidemia.   She is not taking medication to lower cholesterol. She is not having myalgia or other side effects to statin medications.    She eats 2-3 servings of fruits and vegetables daily.She consumes 2 sweetened beverage(s) daily.She exercises with enough effort to increase her heart rate 10 to 19 minutes per day.  She exercises with enough effort to increase her heart rate 5 days per week.   She is taking medications regularly.     Anxiety and depression   doing well, but feeling seasonal decrease in mood. Noticing a lack of energy.     GERD  - Pantoprozole (T, Ths, Sat, Sun)  alternating with famotidine (MWF). This is working well but sometimes still having symptoms of GERD (cough/acid reflux). Raising HOD at night and when stationary. Not doing restricted diet (tired GF and lactose free). Overall well controlled.     Asthma  - Cold a few weeks ago when to ER with near syncopal episode. Tessalon pearls working well for sough suppression. Still having frequent cough. Used Albuterol inhaler to help manage  symptoms (last use 2 weeks ago).     Lower Back pain  - Patient as had for extended period of time. Has done physical therapy in the past. Reports pain is primarily lower back but occasionally worse on right hip radiating to right knee.       Left Big Toe Pain  - Patient report recent hard lumps on left big toe that are painful when rubbed. The pain has been worsening.     Depression and Anxiety   How are you doing with your depression since your last visit? No change  How are you doing with your anxiety since your last visit?  No change  Are you having other symptoms that might be associated with depression or anxiety? No  Have you had a significant life event? No   Do you have any concerns with your use of alcohol or other drugs? No    Social History     Tobacco Use    Smoking status: Never    Smokeless tobacco: Never   Vaping Use    Vaping status: Never Used   Substance Use Topics    Alcohol use: Yes     Comment: rare    Drug use: No         4/5/2022     9:58 AM 6/28/2023    10:22 AM 10/8/2024     8:01 AM   PHQ   PHQ-9 Total Score 9 3 3   Q9: Thoughts of better off dead/self-harm past 2 weeks Not at all Not at all Not at all         4/5/2022     8:53 AM 4/5/2022     9:58 AM 10/8/2024     8:17 AM   CLAY-7 SCORE   Total Score 7 (mild anxiety)  1 (minimal anxiety)   Total Score 7 8 1     Asthma Follow-Up    Was ACT completed today?  Yes        10/8/2024     8:20 AM   ACT Total Scores   ACT TOTAL SCORE (Goal Greater than or Equal to 20) 16   In the past 12 months, how many times did you visit the emergency room for your asthma without being admitted to the hospital? 1   In the past 12 months, how many times were you hospitalized overnight because of your asthma? 0       How many days per week do you miss taking your asthma controller medication?  I do not have an asthma controller medication  Please describe any recent triggers for your asthma: upper respiratory infections  Have you had any Emergency Room Visits,  Urgent Care Visits, or Hospital Admissions since your last office visit?  Yes  Number of ER or Urgent Care visits for asthma: 0  How many servings of fruits and vegetables do you eat daily?  2-3  On average, how many sweetened beverages do you drink each day (Examples: soda, juice, sweet tea, etc.  Do NOT count diet or artificially sweetened beverages)?   1  How many days per week do you exercise enough to make your heart beat faster? 5  How many minutes a day do you exercise enough to make your heart beat faster? 20 - 29  How many days per week do you miss taking your medication? 0 - does not take daily medication      Review of Systems  Constitutional, neuro, ENT, endocrine, pulmonary, cardiac, gastrointestinal, genitourinary, musculoskeletal, integument and psychiatric systems are negative, except as otherwise noted.      Objective    LMP 10/17/2008   There is no height or weight on file to calculate BMI.  Physical Exam   GENERAL: alert and no distress  EYES: Eyes grossly normal to inspection, PERRL and conjunctivae and sclerae normal  HENT: ear canals and TM's normal, nose and mouth without ulcers or lesions  NECK: no adenopathy, no asymmetry, masses, or scars  RESP: lungs clear to auscultation - no rales, rhonchi or wheezes  CV: regular rates and rhythm, normal S1 S2, no S3 or S4, no click or rub, Moderate murmur heard best over the Mitral, peripheral pulses strong, and no peripheral edema  ABDOMEN: soft, nontender, no hepatosplenomegaly, no masses and bowel sounds normal  MS: tenderness to palpation of sacrum, SI joint, and bilateral tensor fasciae  SKIN: Two pea sized, firm, well circumscribed, freely mobile masses on the left greater toe.   NEURO: Normal strength and tone, mentation intact and speech normal  PSYCH: mentation appears normal, affect normal/bright    Crystal Ruff DNP Student       Signed Electronically by: Russell Barrientos MD  I was present with this student. I edited this note to  reflect my history, physical, assessment and plan.

## 2024-10-08 NOTE — PATIENT INSTRUCTIONS
Plan RSV and prevnar 20 imm at the pharmacy    Today flu shot    Schedule a wellness visit     Pelvic tilts and side steps

## 2024-10-09 ENCOUNTER — PATIENT OUTREACH (OUTPATIENT)
Dept: CARE COORDINATION | Facility: CLINIC | Age: 64
End: 2024-10-09
Payer: COMMERCIAL

## 2024-10-11 ENCOUNTER — PATIENT OUTREACH (OUTPATIENT)
Dept: CARE COORDINATION | Facility: CLINIC | Age: 64
End: 2024-10-11
Payer: COMMERCIAL

## 2024-10-21 ENCOUNTER — OFFICE VISIT (OUTPATIENT)
Dept: PODIATRY | Facility: CLINIC | Age: 64
End: 2024-10-21
Attending: FAMILY MEDICINE
Payer: COMMERCIAL

## 2024-10-21 VITALS
WEIGHT: 157 LBS | DIASTOLIC BLOOD PRESSURE: 85 MMHG | SYSTOLIC BLOOD PRESSURE: 122 MMHG | BODY MASS INDEX: 29.64 KG/M2 | HEIGHT: 61 IN | OXYGEN SATURATION: 98 % | HEART RATE: 86 BPM

## 2024-10-21 DIAGNOSIS — M67.40 MUCOID CYST, JOINT: Primary | ICD-10-CM

## 2024-10-21 DIAGNOSIS — M67.472 GANGLION CYST OF LEFT FOOT: ICD-10-CM

## 2024-10-21 PROCEDURE — 99204 OFFICE O/P NEW MOD 45 MIN: CPT | Performed by: PODIATRIST

## 2024-10-21 ASSESSMENT — PAIN SCALES - GENERAL: PAINLEVEL: MODERATE PAIN (4)

## 2024-10-21 NOTE — PROGRESS NOTES
PATIENT HISTORY:  Brittany Barajas is a 64 year old female who presents to clinic in consultation at the request of  Russell Barrientos M.D. with a chief complaint of cyst.  The patient is seen by themselves.  The patient relates the pain is primarily located around the top of the great left toe.  Reports insidious onset without acute precipitating event.  The patient relates that the symptoms have been going on for several month(s).  The patient has previously tried warm foot soaks with little relief.  The patient is retired.  Any previous notes and studies that pertain to the patient's condition were reviewed.    Pertinent medical, surgical and family history was reviewed in the [facility] chart.    Past Medical History:   Past Medical History:   Diagnosis Date    Allergic rhinitis, cause unspecified     Breast cancer (H)     Depressive disorder, not elsewhere classified     Esophageal reflux     Intramural leiomyoma of uterus     Mitral valve disorders(424.0)     MVD    Other affections of shoulder region, not elsewhere classified     Left shoulder impingement    Other convulsions     Seizures    Other diseases of trachea and bronchus, not elsewhere classified     Papanicolaou smear of cervix with low grade squamous intraepithelial lesion (LGSIL) 11/2009    Pt refused colpo or further f/u    Plantar fascial fibromatosis        Medications:   Current Outpatient Medications:     acetaminophen (TYLENOL) 500 MG tablet, Take 500-1,000 mg by mouth every 6 hours as needed for mild pain., Disp: , Rfl:     amitriptyline (ELAVIL) 50 MG tablet, Take 1.5 tablets (75 mg) by mouth at bedtime., Disp: 135 tablet, Rfl: 3    calcium carbonate-vitamin D (CALTRATE) 600-10 MG-MCG per tablet, Take 1 tablet by mouth 2 times daily., Disp: , Rfl:     citalopram (CELEXA) 10 MG tablet, Take 2 tablets (20 mg) by mouth daily., Disp: 180 tablet, Rfl: 3    famotidine (PEPCID) 40 MG tablet, Take 1 tablet (40 mg) by mouth 2 times daily as needed  "for heartburn., Disp: 180 tablet, Rfl: 3    melatonin 5 MG CAPS, Take by mouth., Disp: , Rfl:     Multiple Vitamin (MULTIVITAMIN OR), Take 1 tablet by mouth daily, Disp: , Rfl:     pantoprazole (PROTONIX) 40 MG EC tablet, Take 1 tablet (40 mg) by mouth daily., Disp: 90 tablet, Rfl: 0    phenazopyridine (AZO URINARY PAIN RELIEF) 95 MG tablet, Take 190 mg by mouth 3 times daily, Disp: , Rfl:     Psyllium (METAMUCIL) 0.36 g CAPS, Take by mouth., Disp: , Rfl:     albuterol (VENTOLIN HFA) 108 (90 Base) MCG/ACT inhaler, Inhale 2 puffs into the lungs every 4 hours as needed for shortness of breath, wheezing or cough. (Patient not taking: Reported on 10/21/2024), Disp: 18 g, Rfl: 3    benzonatate (TESSALON) 100 MG capsule, Take 1 capsule (100 mg) by mouth 3 times daily as needed for cough. (Patient not taking: Reported on 10/21/2024), Disp: 20 capsule, Rfl: 0    ipratropium - albuterol 0.5 mg/2.5 mg/3 mL (DUONEB) 0.5-2.5 (3) MG/3ML neb solution, Take 1 vial (3 mLs) by nebulization every 6 hours as needed for shortness of breath / dyspnea or wheezing (Patient not taking: Reported on 10/8/2024), Disp: 90 mL, Rfl: 0    sucralfate (CARAFATE) 1 GM tablet, Take 1 tablet (1 g) by mouth 4 times daily as needed (Heartburn) (Patient not taking: Reported on 10/21/2024), Disp: 30 tablet, Rfl: 1     Allergies:    Allergies   Allergen Reactions    Docetaxel Other (See Comments)     Chest tightness, black out       Vitals: /85   Pulse 86   Ht 1.549 m (5' 1\")   Wt 71.2 kg (157 lb)   LMP 10/17/2008   SpO2 98%   BMI 29.66 kg/m    BMI= Body mass index is 29.66 kg/m .    LOWER EXTREMITY PHYSICAL EXAM    Dermatologic: Noted clear mucoid cyst formation located on the dorsal aspect of the hallux interphalangeal joint on the left foot.  Skin is intact to left lower extremity without significant lesions, rash or abrasion.        Vascular: DP & PT pulses are intact & regular on the left.   CFT and skin temperature is normal to the left " lower extremity.     Neurologic: Lower extremity sensation is intact to light touch.  No evidence of weakness in the left lower extremity.        Musculoskeletal: Patient is ambulatory without assistive device or brace.  No gross ankle deformity noted.  No foot or ankle joint effusion is noted.       ASSESSMENT / PLAN:     ICD-10-CM    1. Mucoid cyst, joint  M67.40 Case Request: EXCISION, MUCOID CYST, LEFT GREAT TOE    left great toe      2. Ganglion cyst of left foot  M67.472 Orthopedic  Referral          The nature of the patient s condition was discussed at length.  I discussed with patient details of procedure(s), possible risks and complications, alternative treatment options and post-operative course detailed below.  Patient is aware that surgery is elective and can be avoided if desired.  Likely surgical procedures include surgical excision of mucoid cyst lesion from the great toe on the left foot.  The patient was educated today about risks associated with surgery.  Risks of surgery include, but are not limited to: infection, painful scar, nerve injury, numbness, stiffness,   need for repeat surgery, recurrence of condition, ongoing pain, delayed wound healing, blood clot in the legs or lungs, amputation or other unforeseen side effects from undergoing surgery.        The patient was instructed to not smoke or use nicotine patches before and after the surgery as this could result in poor outcomes due to slower healing potentially.  Risks of DVT/PE were discussed in relation to anticipated level of immobilization, inactivity, injury, surgery, medications and personal risk factors.  Perioperative education was provided regarding signs and symptoms of a blood clot.  The patient was encouraged to be vigilant regarding symptoms and pursue risk reduction measures.  Based on patient history, procedure and post-operative plan, risk outweighs benefit of chemical prophylaxis therefore mechanical prophylaxis  was encouraged.  Lower extremity range of motion exercises are encouraged.   Postoperative pain regimens were discussed in great detail the patient.  The risks and benefits of non-narcotic and narcotic pain medications, as well as synergistic agents was also discussed.  The patient will be prescribed Oxycodone for more severe breakthrough pain not relieved by scheduled Tylenol.  The patient was also encouraged to rest, elevate and ice postoperatively to help with swelling and pain control.  The patient was in agreement with this plan.  The patient understands that they would need to remain protected weightbearing with use of a postop shoe post-operatively.The recovery process was discussed including impact to work, walking, shoes and daily activities.  We discussed that the patient should anticipate up to 12 months for maximum recovery after surgery.  There is moderate risk involved.        After detailed discussed patient wishes to continue with the proposed surgical intervention.  The procedure will be performed under  a local block for anesthesia.   Consents will be reviewed and signed on the day of surgery.      Brittany verbalized agreement with and understanding of the rational for the diagnosis and treatment plan.  All questions were answered to best of my ability and the patient's satisfaction. The patient was advised to contact the clinic with any questions that may arise after the clinic visit.      Disclaimer: This note consists of symbols derived from keyboarding, dictation and/or voice recognition software. As a result, there may be errors in the script that have gone undetected. Please consider this when interpreting information found in this chart.       GÓMEZ Banerjee D.P.M., MATILDE.F.A.S.

## 2024-10-21 NOTE — NURSING NOTE
"Chief Complaint   Patient presents with    Cyst     Great left toe       Initial /85   Pulse 86   Ht 1.549 m (5' 1\")   Wt 71.2 kg (157 lb)   LMP 10/17/2008   BMI 29.66 kg/m   Estimated body mass index is 29.66 kg/m  as calculated from the following:    Height as of this encounter: 1.549 m (5' 1\").    Weight as of this encounter: 71.2 kg (157 lb).  Medications and allergies reviewed.      Lisha OCHOA MA    "

## 2024-10-21 NOTE — PATIENT INSTRUCTIONS
Surgery Scheduling   You have elected to proceed with surgery for surgical excision of a mucoid cyst, left great toe.  Dr. Banerjee performs his surgeries on Tuesdays at [facility].   To schedule your surgery date please call [phone].  If no one answers, please leave a message with a good time for our staff to call you back.    - Please have a date in mind for your surgery, you can feel free to leave that date on the message, and we will schedule and call back to confirm.   - You can expect to receive a call back the same day or on the next business day from Dr. Banerjee s team to assist in the scheduling.   We will assist to schedule the date of your surgery.    The time will be determined a few days ahead of time.    You can expect a call from Same Day Surgery 2-3 days ahead of time with specific instructions for what time to arrive at the hospital as well as any other preparations you should take prior to surgery.  You may need to obtain a pre-operative physical from a primary medical provider.    This must be done within 30 days of your surgery date.  We will also schedule your first post-operative appointment for a bandage and wound check for the Monday following your surgery at the [place] location.  You may be non-weight bearing for a period of up to 6 weeks.  Options for this include: (Please indicate which you would prefer so we can provide you with an order and instructions)  Crutches  Walker  Roll-a-bout knee walker.  If you will need paperwork filled out for your employer you may drop those off at the clinic directly or you may have those faxed to us at [phone].  Please indicate on the form the date you would like the LA to begin if it will not be your surgery date.    The forms are typically filled out for up to 12 weeks, however you may be cleared to return prior to that time depending on your individual healing and job requirements.    GETTING READY FOR YOUR  SURGERY    ONE-THREE WEEKS BEFORE  1. See your Family Doctor or Primary Care Provider for a Preoperative History and Physical.    2. Please see pre-surgical medications below to which medications need to be stopped before surgery and when.    SAME DAY SURGERY PATIENTS  1. You will need a family member of friend to drive you home. If you do not have one the surgery will be cancelled or rescheduled.  2. You will need a responsible adult to stay with you that night after the surgery.       We will ask this person to listen to some instructions before you leave the hospital.    DAY BEFORE SURGERY  1. DO NOT EAT OR DRINK ANYTHING AFTER MIDNIGHT THE NIGHT  BEFORE YOUR SURGERY!   2. DO NOT DRINK ALCOHOL.  3. Do not take over the counter drugs.  4. Some people need to have blood tests at the hospital. If you need blood tests, we will tell you in advance.  5. Take medications as directed by your doctor. You may take these with a small amount of water.  6. Do not chew gum, chew tobacco, or suck on hard candy the day of surgery.  7. Bring your insurance cards, a list of your medicines and co-pays you might need. Leave jewelry and other valuables at home.      PRESURGICAL MEDICATIONS:  Certain prescription, over-the-counter, and herbal medications interfere with healing after an operation. The main concern relates to medications that increase bleeding at the surgical site. Excess blood under the incision results in poor wound healing, excess pain, increased scarring, and a higher risk of infection.    Some medications slow the healing process of bone. Medications can also interfere with the anesthesia drugs that keep you asleep during the operation. It is important to ensure that these medications are out of your system prior to the operation. The list below details a number of medications that are of concern. Pay special attention to how long you should avoid these medications before your operation. Please note that this list  is not complete. You should ask your surgeon or pharmacist if you are uncertain about other medications. Any herbal supplement not listed should be discontinued at least one week prior to surgery.    Ozempic/Trulicity/Mounjaro/Rybelsus: no injections one week prior to surgery.  These medications slow the emptying of contents of your stomach which receive anesthesia without risk of aspiration.    Aspirin: Hold for one week prior to surgery and restart the day after surgery. This over the counter medication promotes bleeding.    Motrin / Ibuprofen / Aleve / Advil / NSAIDS:  Stop one week prior to surgery. These medications affect bleeding and may cause delay in bone healing. Avoid taking these medications for six weeks after bone surgeries. Other procedures may allow you to restart sooner than 6 weeks after surgery.    Coumadin / Plavix: Your primary care provider will manage Coumadin in relation to surgery. Coumadin may result in excessive bleeding and may be adjusted before and after surgery.    Enbriel: Stop two weeks prior to surgery and restart two weeks after surgery. This medication can effect soft tissue healing and increases the risk of infection.    Remicade: Stop 8-12 weeks before surgery and restart two weeks after surgery. This medication can affect soft tissue healing and increases the risk of infection.    Humira: Stop 4 weeks before surgery and restart two weeks after surgery. This medication can affect soft tissue healing and increases the risk of infection.    Methotrexate: Stop one dose prior to surgery. This medication will be restarted when the wound appears to be healing well. Please ask your surgeon about restarting this medication when you are being seen in the office for wound checks.    Kava: Stop at least one day prior to surgery and may restart one day after surgery. Kava may increase the sedative effect of anesthetics that are given during the operation. Kava can also increase bleeding at  the surgical site.    Ephedra (ma ley): Stop at least one day prior to surgery and may restart one day after surgery.  Ephedra may increase the risk of heart attack and stroke. This medication can also increase bleeding at the surgical site.    [place]'s Wort: Stop at least five days before surgery and may restart one day after surgery. Frederic's wort may diminish the effects of several drugs that are given during surgery.    Ginseng: Stop at least one week prior to surgery and may restart one day after surgery.  Ginseng lowers blood sugar and may increase bleeding at the surgical site.    Ginkgo: Stop 36 hours before surgery and may restart one day after surgery. Ginkgo may increase bleeding at the surgical site.    Garlic: Stop at least one week prior to surgery and may restart one day after. Garlic may increase bleeding at the surgery site.    Valerian: Do a slow steady decrease in your daily dose over a period of 2-3 weeks before surgery to decrease the chance of withdrawal symptoms. Valerian may increase the sedative effect of anesthetics given during the operation.    Echinacea: There is no data on stopping echinacea prior to surgery. This medication though can be associated with allergic reactions and suppression of your immune system.    Vitamin E, Omega-3, Flax, Fish Oil, Glucosamine and Chondroitin: Stop 2 weeks prior to surgery and may restart one day after. These herbal medications can increase risk of bleeding at surgical site.

## 2024-10-21 NOTE — LETTER
10/21/2024      Brittany Barajas  [address]  [place] [address]      Dear Colleague,    Thank you for referring your patient, Brittany Barajas, to the [facility] [place]. Please see a copy of my visit note below.    PATIENT HISTORY:  Brittany Barajas is a 64 year old female who presents to clinic in consultation at the request of  Russell Barrientos M.D. with a chief complaint of cyst.  The patient is seen by themselves.  The patient relates the pain is primarily located around the top of the great left toe.  Reports insidious onset without acute precipitating event.  The patient relates that the symptoms have been going on for several month(s).  The patient has previously tried warm foot soaks with little relief.  The patient is retired.  Any previous notes and studies that pertain to the patient's condition were reviewed.    Pertinent medical, surgical and family history was reviewed in the [facility] chart.    Past Medical History:   Past Medical History:   Diagnosis Date     Allergic rhinitis, cause unspecified      Breast cancer (H)      Depressive disorder, not elsewhere classified      Esophageal reflux      Intramural leiomyoma of uterus      Mitral valve disorders(424.0)     MVD     Other affections of shoulder region, not elsewhere classified     Left shoulder impingement     Other convulsions     Seizures     Other diseases of trachea and bronchus, not elsewhere classified      Papanicolaou smear of cervix with low grade squamous intraepithelial lesion (LGSIL) 11/2009    Pt refused colpo or further f/u     Plantar fascial fibromatosis        Medications:   Current Outpatient Medications:      acetaminophen (TYLENOL) 500 MG tablet, Take 500-1,000 mg by mouth every 6 hours as needed for mild pain., Disp: , Rfl:      amitriptyline (ELAVIL) 50 MG tablet, Take 1.5 tablets (75 mg) by mouth at bedtime., Disp: 135 tablet, Rfl: 3     calcium carbonate-vitamin D  "(CALTRATE) 600-10 MG-MCG per tablet, Take 1 tablet by mouth 2 times daily., Disp: , Rfl:      citalopram (CELEXA) 10 MG tablet, Take 2 tablets (20 mg) by mouth daily., Disp: 180 tablet, Rfl: 3     famotidine (PEPCID) 40 MG tablet, Take 1 tablet (40 mg) by mouth 2 times daily as needed for heartburn., Disp: 180 tablet, Rfl: 3     melatonin 5 MG CAPS, Take by mouth., Disp: , Rfl:      Multiple Vitamin (MULTIVITAMIN OR), Take 1 tablet by mouth daily, Disp: , Rfl:      pantoprazole (PROTONIX) 40 MG EC tablet, Take 1 tablet (40 mg) by mouth daily., Disp: 90 tablet, Rfl: 0     phenazopyridine (AZO URINARY PAIN RELIEF) 95 MG tablet, Take 190 mg by mouth 3 times daily, Disp: , Rfl:      Psyllium (METAMUCIL) 0.36 g CAPS, Take by mouth., Disp: , Rfl:      albuterol (VENTOLIN HFA) 108 (90 Base) MCG/ACT inhaler, Inhale 2 puffs into the lungs every 4 hours as needed for shortness of breath, wheezing or cough. (Patient not taking: Reported on 10/21/2024), Disp: 18 g, Rfl: 3     benzonatate (TESSALON) 100 MG capsule, Take 1 capsule (100 mg) by mouth 3 times daily as needed for cough. (Patient not taking: Reported on 10/21/2024), Disp: 20 capsule, Rfl: 0     ipratropium - albuterol 0.5 mg/2.5 mg/3 mL (DUONEB) 0.5-2.5 (3) MG/3ML neb solution, Take 1 vial (3 mLs) by nebulization every 6 hours as needed for shortness of breath / dyspnea or wheezing (Patient not taking: Reported on 10/8/2024), Disp: 90 mL, Rfl: 0     sucralfate (CARAFATE) 1 GM tablet, Take 1 tablet (1 g) by mouth 4 times daily as needed (Heartburn) (Patient not taking: Reported on 10/21/2024), Disp: 30 tablet, Rfl: 1     Allergies:    Allergies   Allergen Reactions     Docetaxel Other (See Comments)     Chest tightness, black out       Vitals: /85   Pulse 86   Ht 1.549 m (5' 1\")   Wt 71.2 kg (157 lb)   LMP 10/17/2008   SpO2 98%   BMI 29.66 kg/m    BMI= Body mass index is 29.66 kg/m .    LOWER EXTREMITY PHYSICAL EXAM    Dermatologic: Noted clear mucoid cyst " formation located on the dorsal aspect of the hallux interphalangeal joint on the left foot.  Skin is intact to left lower extremity without significant lesions, rash or abrasion.        Vascular: DP & PT pulses are intact & regular on the left.   CFT and skin temperature is normal to the left lower extremity.     Neurologic: Lower extremity sensation is intact to light touch.  No evidence of weakness in the left lower extremity.        Musculoskeletal: Patient is ambulatory without assistive device or brace.  No gross ankle deformity noted.  No foot or ankle joint effusion is noted.       ASSESSMENT / PLAN:     ICD-10-CM    1. Mucoid cyst, joint  M67.40 Case Request: EXCISION, MUCOID CYST, LEFT GREAT TOE    left great toe      2. Ganglion cyst of left foot  M67.472 Orthopedic  Referral          The nature of the patient s condition was discussed at length.  I discussed with patient details of procedure(s), possible risks and complications, alternative treatment options and post-operative course detailed below.  Patient is aware that surgery is elective and can be avoided if desired.  Likely surgical procedures include surgical excision of mucoid cyst lesion from the great toe on the left foot.  The patient was educated today about risks associated with surgery.  Risks of surgery include, but are not limited to: infection, painful scar, nerve injury, numbness, stiffness,   need for repeat surgery, recurrence of condition, ongoing pain, delayed wound healing, blood clot in the legs or lungs, amputation or other unforeseen side effects from undergoing surgery.        The patient was instructed to not smoke or use nicotine patches before and after the surgery as this could result in poor outcomes due to slower healing potentially.  Risks of DVT/PE were discussed in relation to anticipated level of immobilization, inactivity, injury, surgery, medications and personal risk factors.  Perioperative education was  provided regarding signs and symptoms of a blood clot.  The patient was encouraged to be vigilant regarding symptoms and pursue risk reduction measures.  Based on patient history, procedure and post-operative plan, risk outweighs benefit of chemical prophylaxis therefore mechanical prophylaxis was encouraged.  Lower extremity range of motion exercises are encouraged.   Postoperative pain regimens were discussed in great detail the patient.  The risks and benefits of non-narcotic and narcotic pain medications, as well as synergistic agents was also discussed.  The patient will be prescribed Oxycodone for more severe breakthrough pain not relieved by scheduled Tylenol.  The patient was also encouraged to rest, elevate and ice postoperatively to help with swelling and pain control.  The patient was in agreement with this plan.  The patient understands that they would need to remain protected weightbearing with use of a postop shoe post-operatively.The recovery process was discussed including impact to work, walking, shoes and daily activities.  We discussed that the patient should anticipate up to 12 months for maximum recovery after surgery.  There is moderate risk involved.        After detailed discussed patient wishes to continue with the proposed surgical intervention.  The procedure will be performed under  a local block for anesthesia.   Consents will be reviewed and signed on the day of surgery.      Brittany verbalized agreement with and understanding of the rational for the diagnosis and treatment plan.  All questions were answered to best of my ability and the patient's satisfaction. The patient was advised to contact the clinic with any questions that may arise after the clinic visit.      Disclaimer: This note consists of symbols derived from keyboarding, dictation and/or voice recognition software. As a result, there may be errors in the script that have gone undetected. Please consider this when  interpreting information found in this chart.       GÓMEZ Banerjee D.P.M., FAIME.F.A.S.      Again, thank you for allowing me to participate in the care of your patient.        Sincerely,        Morgan Banerjee DPM

## 2024-11-04 LAB — NONINV COLON CA DNA+OCC BLD SCRN STL QL: NEGATIVE

## 2024-11-05 RX ORDER — OMEGA-3 FATTY ACIDS/FISH OIL 300-1000MG
CAPSULE ORAL
COMMUNITY

## 2024-11-05 ASSESSMENT — ANXIETY QUESTIONNAIRES: GAD7 TOTAL SCORE: 7

## 2024-11-12 ENCOUNTER — HOSPITAL ENCOUNTER (OUTPATIENT)
Facility: CLINIC | Age: 64
Discharge: HOME | End: 2024-11-12
Attending: PODIATRIST | Admitting: PODIATRIST
Payer: COMMERCIAL

## 2024-11-12 VITALS
DIASTOLIC BLOOD PRESSURE: 71 MMHG | WEIGHT: 157 LBS | OXYGEN SATURATION: 95 % | HEART RATE: 82 BPM | TEMPERATURE: 98.4 F | SYSTOLIC BLOOD PRESSURE: 142 MMHG | BODY MASS INDEX: 29.64 KG/M2 | RESPIRATION RATE: 16 BRPM | HEIGHT: 61 IN

## 2024-11-12 DIAGNOSIS — M67.40 MUCOID CYST OF JOINT: Primary | ICD-10-CM

## 2024-11-12 PROCEDURE — 28092 REMOVAL OF TOE LESIONS: CPT | Mod: TA | Performed by: PODIATRIST

## 2024-11-12 PROCEDURE — 710N000012 HC RECOVERY PHASE 2, PER MINUTE: Performed by: PODIATRIST

## 2024-11-12 PROCEDURE — 250N000011 HC RX IP 250 OP 636: Performed by: PODIATRIST

## 2024-11-12 PROCEDURE — 250N000009 HC RX 250: Performed by: PODIATRIST

## 2024-11-12 PROCEDURE — 88304 TISSUE EXAM BY PATHOLOGIST: CPT | Mod: TC | Performed by: PODIATRIST

## 2024-11-12 PROCEDURE — 360N000075 HC SURGERY LEVEL 2, PER MIN: Performed by: PODIATRIST

## 2024-11-12 PROCEDURE — 272N000001 HC OR GENERAL SUPPLY STERILE: Performed by: PODIATRIST

## 2024-11-12 PROCEDURE — 999N000141 HC STATISTIC PRE-PROCEDURE NURSING ASSESSMENT: Performed by: PODIATRIST

## 2024-11-12 RX ORDER — AMOXICILLIN 250 MG
1-2 CAPSULE ORAL 2 TIMES DAILY
Qty: 30 TABLET | Refills: 0 | Status: SHIPPED | OUTPATIENT
Start: 2024-11-12

## 2024-11-12 RX ORDER — ACETAMINOPHEN 325 MG/1
650 TABLET ORAL EVERY 4 HOURS PRN
Qty: 50 TABLET | Refills: 0 | Status: SHIPPED | OUTPATIENT
Start: 2024-11-12

## 2024-11-12 RX ORDER — BUPIVACAINE HYDROCHLORIDE 5 MG/ML
INJECTION, SOLUTION PERINEURAL PRN
Status: DISCONTINUED | OUTPATIENT
Start: 2024-11-12 | End: 2024-11-12 | Stop reason: HOSPADM

## 2024-11-12 RX ORDER — ONDANSETRON 4 MG/1
4 TABLET, ORALLY DISINTEGRATING ORAL
Status: DISCONTINUED | OUTPATIENT
Start: 2024-11-12 | End: 2024-11-12 | Stop reason: HOSPADM

## 2024-11-12 RX ORDER — LIDOCAINE HYDROCHLORIDE 10 MG/ML
INJECTION, SOLUTION INFILTRATION; PERINEURAL PRN
Status: DISCONTINUED | OUTPATIENT
Start: 2024-11-12 | End: 2024-11-12 | Stop reason: HOSPADM

## 2024-11-12 RX ORDER — CEFAZOLIN SODIUM/WATER 2 G/20 ML
2 SYRINGE (ML) INTRAVENOUS
Status: DISCONTINUED | OUTPATIENT
Start: 2024-11-12 | End: 2024-11-12 | Stop reason: HOSPADM

## 2024-11-12 RX ORDER — OXYCODONE HYDROCHLORIDE 5 MG/1
5 TABLET ORAL EVERY 4 HOURS PRN
Qty: 16 TABLET | Refills: 0 | Status: SHIPPED | OUTPATIENT
Start: 2024-11-12 | End: 2024-11-18

## 2024-11-12 RX ORDER — OXYCODONE HYDROCHLORIDE 5 MG/1
5 TABLET ORAL
Status: DISCONTINUED | OUTPATIENT
Start: 2024-11-12 | End: 2024-11-12 | Stop reason: HOSPADM

## 2024-11-12 RX ORDER — CEFAZOLIN SODIUM/WATER 2 G/20 ML
2 SYRINGE (ML) INTRAVENOUS SEE ADMIN INSTRUCTIONS
Status: DISCONTINUED | OUTPATIENT
Start: 2024-11-12 | End: 2024-11-12 | Stop reason: HOSPADM

## 2024-11-12 ASSESSMENT — ACTIVITIES OF DAILY LIVING (ADL)
ADLS_ACUITY_SCORE: 0

## 2024-11-12 NOTE — OR NURSING
Discharge instruction reviewed with patient, fitted for postop shoe, questions answered. Patient will  meds on the way home

## 2024-11-12 NOTE — BRIEF OP NOTE
[facility]    Brief Operative Note    Pre-operative diagnosis: Mucoid cyst, joint [M67.40]  Post-operative diagnosis Same as pre-operative diagnosis    Procedure: EXCISION, MUCOID CYST, LEFT GREAT TOE, Left - Foot    Surgeon: Surgeons and Role:     * Morgan Banerjee DPM - Primary  Anesthesia: Local   Estimated Blood Loss: Less than 10 ml    Drains: None  Specimens:   ID Type Source Tests Collected by Time Destination   1 : mucoid cyst Tissue Toe, Left SURGICAL PATHOLOGY EXAM Morgan Banerjee DPM 11/12/2024  1:42 PM      Findings:   Mucoid cyst, left great toe.  Complications: None.  Implants: * No implants in log *

## 2024-11-12 NOTE — DISCHARGE INSTRUCTIONS
Same Day Surgery Discharge Instructions  Special Precautions After Surgery - Adult    It is not unusual to feel lightheaded or faint, up to 24 hours after surgery or while taking pain medication.  If you have these symptoms; sit for a few minutes before standing and have someone assist you when getting up.  You should rest and relax for the next 24 hours and must have someone stay with you for at least 24 hours after your discharge.  DO NOT DRIVE any vehicle or operate mechanical equipment for 24 hours following the end of your surgery.  DO NOT DRIVE while taking narcotic pain medications that have been prescribed by your physician.  If you had a limb operated on, you must be able to use it fully to drive.  DO NOT drink alcoholic beverages for 24 hours following surgery or while taking prescription pain medication.  Drink clear liquids (apple juice, ginger ale, broth, 7-Up, etc.).  Progress to your regular diet as you feel able.  Any questions call your physician and do not make important decisions for 24 hours.    Nausea and Vomiting: Nausea and vomiting can occur any time after receiving anesthesia. If you experience nausea and vomiting we encourage you to move to a clear liquid diet and advance your diet as tolerated. If nausea and vomiting do not improve within 12 hours please call the surgeon or present to the Emergency department.     Break-through Bleeding: If your experience bleeding from your surgical site apply pressure and additional dressing per nurse instruction. For simple problems such as a saturated dressing, you may need to reinforce the dressing with more gauze and tape and put slight pressure on the site. If bleeding does not subside contact the surgeon or present to the Emergency Department.    Post-op Infection: If you develop a fever of 100.4 or greater, have pus like drainage, redness, swelling or severe pain at the surgical site not alleviated with pain medications; please  contact the surgeon or present to the Emergency Department.     Medications:  Tylenol may start anytime  Ibuprofen may start anytime  __________________________________________________________________________________________________________________________________  IMPORTANT NUMBERS:    [facility] [place]  [phone], [phone]  Pharmacy:  [phone]  Same Day Surgery:  [phone], for general post-op questions call Monday - Thursday until 8:30 p.m., Fridays until 6:00 p.m.   Mental Health Mobile Crisis line: [phone]                                                                      [facility], podiatry:  [phone]

## 2024-11-12 NOTE — OP NOTE
PREOPERATIVE DIAGNOSIS:   1.  Mucoid cyst, great toe, left foot.     POSTOPERATIVE DIAGNOSIS:   1. Mucoid cyst, great toe, left foot.     PROCEDURE:   1. Surgical excision of mucoid cyst, great toe, left foot.     PATHOLOGY:   1. Mucoid cyst, great toe, left.     ANESTHESIA: Local block to the left foot.     ESTIMATED BLOOD LOSS: Less than 10 mL     INDICATIONS FOR PROCEDURE: The patient has been seen and evaluated in clinic for the above-mentioned diagnoses.  They have failed to respond to nonoperative care measures and/or surgical care for condition was indicated.  They have elected to proceed as recommended/indicated with surgical care after a thorough discussion of the associated pros, cons, risks and benefits of the operations as well as the postoperative course and details.  All associated questions were answered.  Verbal and written form informed consent was obtained.  Please see additional information within the clinical notes.    PROCEDURE IN DETAIL: Patient was seen and evaluated in the preoperative holding area.  The surgical site was marked.  The consent was signed.  The H&P was updated/reviewed.  Patient was transported from the preoperative holding area to the surgical suite.  The patient was placed on the operative table.  Anesthesia was obtained and antibiotics were administered via IV.  The operative extremity was prepped and draped sterilely.  Then, a timeout was performed to identify the proper patient, surgical site and the procedures to be performed.  Local anesthetic was infiltrated about the operative margins for regional blockade utilizing a one-to-one mixture of 1% lidocaine plain and 0.5% Marcaine plain with approximately 6 cc of the mixture utilized.  The left great toe was exsanguinated with a rubber toe tourniquet.     The procedure began with a full thickness elliptical incision around the mucoid cyst over the dorsal aspect of the left hallux.  The cyst was resected in toto and sent to  pathology.  The specimen measured approximately 1 cm x 0.5 cm.      Following this, thorough irrigation of the surgical sites was conducted.  Layered, anatomic closure completed with 4-0 nylon suture with careful apposition of the underlying fascial tissue and skin for primary healing.  The tourniquet was deflated and prompt hyperemic response was noted to all five digits of the left foot.  The wound was dressed with sterile Jumpstart dressing with saline gel, 4 x 4s, ABD pad, cast padding and an Ace wrap.     COMPLICATIONS: No direct complications encountered throughout the case.    The patient tolerated the procedure & anesthesia well.  They were transported from the operative suite to the postoperative holding area.  The patient was given postoperative orders as well as specific postoperative instructions which were reviewed by the nursing staff.  Orders were placed for weightbearing status/activity, postoperative oral pain management, DVT prophylaxis measures both with mechanical and medicinal measures reviewed.  Splint/dressing care measures were reviewed as well as appropriate cryotherapy measures and nutrition.  Postoperative follow-up to be conducted in the next 6 - 10 days for outpatient clinical follow-up in the Orthopedic clinic at [facility] and [facility] in [facility].  If concerns or questions arise or develop they will contact our clinic and postoperative contact numbers provided.  Case details and post-operative care requirements reviewed with family/support present today.  Additionally, a detailed postoperative instruction sheet was provided to the patient and family.  All additional questions were answered postoperatively.     Post Operative Plan:    1. Activity: protected weightbearing on the surgical foot/ankle  2. Assistive Devices: Crutches and/or knee scooter arranged  3. Pain Management: IV + PO pain management  4. Dressing Care: Leaving dressing clean, dry and  intact until post op appointment.  Clinical contact direct information provided.  5. DVT prevention: Knee exercises and ankle pump exercises reviewed.    6. Infection prevention: Pre-op IV antibiotics completed.  7. Disposition: Able to discharge to home with observation by patient's family or friend for 12 hours.  8. Clinical Follow-up: As arranged from clinic in 6 - 10 days post op.      MARILEE EARL DPM, FACFAS

## 2024-11-13 ENCOUNTER — NURSE TRIAGE (OUTPATIENT)
Dept: NURSING | Facility: CLINIC | Age: 64
End: 2024-11-13

## 2024-11-13 ENCOUNTER — APPOINTMENT (OUTPATIENT)
Dept: PODIATRY | Facility: CLINIC | Age: 64
End: 2024-11-13
Payer: COMMERCIAL

## 2024-11-13 DIAGNOSIS — M67.40 MUCOID CYST, JOINT: Primary | ICD-10-CM

## 2024-11-13 PROCEDURE — 99024 POSTOP FOLLOW-UP VISIT: CPT | Performed by: PODIATRIST

## 2024-11-13 NOTE — TELEPHONE ENCOUNTER
Called and spoke with patient. Informed her to come in Today a Dr. Banerjee will get that dressing changed

## 2024-11-13 NOTE — TELEPHONE ENCOUNTER
"Information only. Red flag triage. Hx 11/12/24 EXCISION, MUCOID CYST, LEFT GREAT TOE  Morgan Banerjee. Patient calls to report bleeding 5\"  x 3.25 \" yesterday. Now dry but feels moist. Pain 2/10. Please call pt  [phone].      ABRAHAM Smith tele triage  Post procedure  problems  pg [phone].  "

## 2024-11-13 NOTE — LETTER
11/13/2024      Brittany Barajas  [address]  [place] [address]      Dear Colleague,    Thank you for referring your patient, Brittany Barajas, to the [facility] [place]. Please see a copy of my visit note below.    Brittany presents to the office s/p one week surgical excision of mucoid cyst of the left foot .  The patient relates keeping the bandages clean, dry and intact.  The patient relates good compliance with postoperative instructions.  The patient denies any severe pain, fevers, chills, nausea or vomiting.  The patient denies having any calf swelling or tenderness.            Physical Exam:    The patient appears to be in no distress and in good spirits.  The bandages appear clean, dry and intact with no strikethrough noted.   Neurovascular status unchanged with < 3 sec capillary refill to all digits.  No evidence of allodynia.  Noted mild to moderate edema to the operative site on the left foot.  Sutures are intact and the skin is well coapted with no erythema or drainage noted.  No pain on palpation, erythema or noted calor over the back of the calf.         Assessment:     ICD-10-CM    1. Mucoid cyst, joint  M67.40           Plan:  Sutures remain intact.  A sterile dressing was reapplied.  The patient was instructed to continue   protected weightbearing to the left foot.  The patient is to keep the dressings clean, dry and intact and to continue with elevation of the left foot.  To decrease the risk of developing a blood clot, the patient was instructed to continue with performing leg lifts and knee bends throughout the day to help keep blood moving.  The patient was instructed that if they notice any calf pain, swelling, or shortness of breath, they should to the nearest ER or Urgent Care to be evaluated for a blood clot.  The patient will return to the office in one week for suture removal.      Brittany verbalized agreement with and understanding of  the rational for the diagnosis and treatment plan.  All questions were answered to best of my ability and the patient's satisfaction. The patient was advised to contact the clinic with any questions that may arise after the clinic visit.      Disclaimer: This note consists of symbols derived from keyboarding, dictation and/or voice recognition software. As a result, there may be errors in the script that have gone undetected. Please consider this when interpreting information found in this chart.       GÓMEZ Banerjee D.P.M., F.A.C.F.A.S.      Again, thank you for allowing me to participate in the care of your patient.        Sincerely,        Morgan Banerjee DPM

## 2024-11-15 LAB
PATH REPORT.COMMENTS IMP SPEC: NORMAL
PATH REPORT.COMMENTS IMP SPEC: NORMAL
PATH REPORT.FINAL DX SPEC: NORMAL
PATH REPORT.GROSS SPEC: NORMAL
PATH REPORT.MICROSCOPIC SPEC OTHER STN: NORMAL
PATH REPORT.RELEVANT HX SPEC: NORMAL
PHOTO IMAGE: NORMAL

## 2024-11-15 PROCEDURE — 88304 TISSUE EXAM BY PATHOLOGIST: CPT | Mod: 26 | Performed by: PATHOLOGY

## 2024-11-18 ENCOUNTER — OFFICE VISIT (OUTPATIENT)
Dept: PODIATRY | Facility: CLINIC | Age: 64
End: 2024-11-18
Payer: COMMERCIAL

## 2024-11-18 VITALS
HEART RATE: 88 BPM | HEIGHT: 61 IN | BODY MASS INDEX: 29.64 KG/M2 | DIASTOLIC BLOOD PRESSURE: 84 MMHG | WEIGHT: 157 LBS | SYSTOLIC BLOOD PRESSURE: 118 MMHG

## 2024-11-18 DIAGNOSIS — Z98.890 STATUS POST LEFT FOOT SURGERY: ICD-10-CM

## 2024-11-18 DIAGNOSIS — M67.40 MUCOID CYST, JOINT: Primary | ICD-10-CM

## 2024-11-18 PROCEDURE — 99024 POSTOP FOLLOW-UP VISIT: CPT | Performed by: PODIATRIST

## 2024-11-18 NOTE — PROGRESS NOTES
"Brittany presents to the office s/p one week surgical excision of mucoid cyst of the left foot .  The patient relates keeping the bandages clean, dry and intact.  The patient relates good compliance with postoperative instructions.  The patient denies any severe pain, fevers, chills, nausea or vomiting.  The patient denies having any calf swelling or tenderness.    /84   Pulse 88   Ht 1.549 m (5' 1\")   Wt 71.2 kg (157 lb)   LMP 10/17/2008   BMI 29.66 kg/m         Physical Exam:    The patient appears to be in no distress and in good spirits.  The bandages appear clean, dry and intact with no strikethrough noted.   Neurovascular status unchanged with < 3 sec capillary refill to all digits.  No evidence of allodynia.  Noted mild to moderate edema to the operative site on the left foot.  Sutures are intact and the skin is well coapted with no erythema or drainage noted.  No pain on palpation, erythema or noted calor over the back of the calf.         Assessment:     ICD-10-CM    1. Mucoid cyst, joint  M67.40       2. Status post left foot surgery  Z98.890           Plan:  Sutures remain intact.  A sterile dressing was reapplied.  The patient was instructed to continue   protected weightbearing to the left foot.  The patient is to keep the dressings clean, dry and intact and to continue with elevation of the left foot.  To decrease the risk of developing a blood clot, the patient was instructed to continue with performing leg lifts and knee bends throughout the day to help keep blood moving.  The patient was instructed that if they notice any calf pain, swelling, or shortness of breath, they should to the nearest ER or Urgent Care to be evaluated for a blood clot.  The patient will return to the office in one week for suture removal.      Brittany verbalized agreement with and understanding of the rational for the diagnosis and treatment plan.  All questions were answered to best of my ability and the patient's " satisfaction. The patient was advised to contact the clinic with any questions that may arise after the clinic visit.      Disclaimer: This note consists of symbols derived from keyboarding, dictation and/or voice recognition software. As a result, there may be errors in the script that have gone undetected. Please consider this when interpreting information found in this chart.       GÓMEZ Banerjee D.P.M., MATILDE.F.SANDYS.

## 2024-11-18 NOTE — LETTER
"11/18/2024      Brittany Barajas  [address]  [place] [facility] [place] [facility] [address]      Dear Colleague,    Thank you for referring your patient, Brittany Barajas, to the [facility] [place]. Please see a copy of my visit note below.    Brittany presents to the office s/p one week surgical excision of mucoid cyst of the left foot .  The patient relates keeping the bandages clean, dry and intact.  The patient relates good compliance with postoperative instructions.  The patient denies any severe pain, fevers, chills, nausea or vomiting.  The patient denies having any calf swelling or tenderness.    /84   Pulse 88   Ht 1.549 m (5' 1\")   Wt 71.2 kg (157 lb)   LMP 10/17/2008   BMI 29.66 kg/m         Physical Exam:    The patient appears to be in no distress and in good spirits.  The bandages appear clean, dry and intact with no strikethrough noted.   Neurovascular status unchanged with < 3 sec capillary refill to all digits.  No evidence of allodynia.  Noted mild to moderate edema to the operative site on the left foot.  Sutures are intact and the skin is well coapted with no erythema or drainage noted.  No pain on palpation, erythema or noted calor over the back of the calf.         Assessment:     ICD-10-CM    1. Mucoid cyst, joint  M67.40       2. Status post left foot surgery  Z98.890           Plan:  Sutures remain intact.  A sterile dressing was reapplied.  The patient was instructed to continue   protected weightbearing to the left foot.  The patient is to keep the dressings clean, dry and intact and to continue with elevation of the left foot.  To decrease the risk of developing a blood clot, the patient was instructed to continue with performing leg lifts and knee bends throughout the day to help keep blood moving.  The patient was instructed that if they notice any calf pain, swelling, or shortness of breath, they should to the nearest ER or Urgent Care to be evaluated " for a blood clot.  The patient will return to the office in one week for suture removal.      Brittany verbalized agreement with and understanding of the rational for the diagnosis and treatment plan.  All questions were answered to best of my ability and the patient's satisfaction. The patient was advised to contact the clinic with any questions that may arise after the clinic visit.      Disclaimer: This note consists of symbols derived from keyboarding, dictation and/or voice recognition software. As a result, there may be errors in the script that have gone undetected. Please consider this when interpreting information found in this chart.       GÓMEZ Banerjee D.P.M., F.A.C.F.A.S.      Again, thank you for allowing me to participate in the care of your patient.        Sincerely,        Morgan Banerjee DPM

## 2024-11-18 NOTE — NURSING NOTE
"Chief Complaint   Patient presents with    Surgical Followup     Left foot       Initial /84   Pulse 88   Ht 1.549 m (5' 1\")   Wt 71.2 kg (157 lb)   LMP 10/17/2008   BMI 29.66 kg/m   Estimated body mass index is 29.66 kg/m  as calculated from the following:    Height as of this encounter: 1.549 m (5' 1\").    Weight as of this encounter: 71.2 kg (157 lb).  Medications and allergies reviewed.      Lisha OCHOA MA    "

## 2024-11-27 ENCOUNTER — OFFICE VISIT (OUTPATIENT)
Dept: PODIATRY | Facility: CLINIC | Age: 64
End: 2024-11-27
Payer: COMMERCIAL

## 2024-11-27 VITALS
BODY MASS INDEX: 29.64 KG/M2 | DIASTOLIC BLOOD PRESSURE: 79 MMHG | SYSTOLIC BLOOD PRESSURE: 139 MMHG | HEIGHT: 61 IN | WEIGHT: 157 LBS | HEART RATE: 94 BPM

## 2024-11-27 DIAGNOSIS — Z98.890 STATUS POST LEFT FOOT SURGERY: ICD-10-CM

## 2024-11-27 DIAGNOSIS — M67.40 MUCOID CYST, JOINT: Primary | ICD-10-CM

## 2024-11-27 PROCEDURE — 99024 POSTOP FOLLOW-UP VISIT: CPT | Performed by: PODIATRIST

## 2024-11-27 NOTE — LETTER
"11/27/2024      Brittany Barajas  [address]  [place] [facility] [address]      Dear Colleague,    Thank you for referring your patient, Brittany Barajas, to the [facility] [place]. Please see a copy of my visit note below.    Brittany presents to the office s/p 2 weeks surgical excision of mucoid cyst of the left foot .  The patient relates keeping the bandages clean, dry and intact.  The patient relates good compliance with postoperative instructions.  The patient denies any severe pain, fevers, chills, nausea or vomiting.  The patient denies having any calf swelling or tenderness.    /79   Pulse 94   Ht 1.549 m (5' 1\")   Wt 71.2 kg (157 lb)   LMP 10/17/2008   BMI 29.66 kg/m         Physical Exam:    The patient appears to be in no distress and in good spirits.  The bandages appear clean, dry and intact with no strikethrough noted.   Neurovascular status unchanged with < 3 sec capillary refill to all digits.  No evidence of allodynia.  Noted mild to moderate edema to the operative site on the left foot.  Sutures are intact and the skin is well coapted with no erythema or drainage noted.  No pain on palpation, erythema or noted calor over the back of the calf.         Assessment:     ICD-10-CM    1. Mucoid cyst, joint  M67.40       2. Status post left foot surgery  Z98.890           Plan:  Sutures were removed and bandage applied.  The patient may return to the office if any problems arise.    Brittany verbalized agreement with and understanding of the rational for the diagnosis and treatment plan.  All questions were answered to best of my ability and the patient's satisfaction. The patient was advised to contact the clinic with any questions that may arise after the clinic visit.      Disclaimer: This note consists of symbols derived from keyboarding, dictation and/or voice recognition software. As a result, there may be errors in the script that have gone " undetected. Please consider this when interpreting information found in this chart.       GÓMEZ Banerjee D.P.M., F.CLAIRE.C.F.A.S.      Again, thank you for allowing me to participate in the care of your patient.        Sincerely,        Morgan Banerjee DPM

## 2024-11-27 NOTE — PROGRESS NOTES
"Brittany presents to the office s/p 2 weeks surgical excision of mucoid cyst of the left foot .  The patient relates keeping the bandages clean, dry and intact.  The patient relates good compliance with postoperative instructions.  The patient denies any severe pain, fevers, chills, nausea or vomiting.  The patient denies having any calf swelling or tenderness.    /79   Pulse 94   Ht 1.549 m (5' 1\")   Wt 71.2 kg (157 lb)   LMP 10/17/2008   BMI 29.66 kg/m         Physical Exam:    The patient appears to be in no distress and in good spirits.  The bandages appear clean, dry and intact with no strikethrough noted.   Neurovascular status unchanged with < 3 sec capillary refill to all digits.  No evidence of allodynia.  Noted mild to moderate edema to the operative site on the left foot.  Sutures are intact and the skin is well coapted with no erythema or drainage noted.  No pain on palpation, erythema or noted calor over the back of the calf.         Assessment:     ICD-10-CM    1. Mucoid cyst, joint  M67.40       2. Status post left foot surgery  Z98.890           Plan:  Sutures were removed and bandage applied.  The patient may return to the office if any problems arise.    Brittany verbalized agreement with and understanding of the rational for the diagnosis and treatment plan.  All questions were answered to best of my ability and the patient's satisfaction. The patient was advised to contact the clinic with any questions that may arise after the clinic visit.      Disclaimer: This note consists of symbols derived from keyboarding, dictation and/or voice recognition software. As a result, there may be errors in the script that have gone undetected. Please consider this when interpreting information found in this chart.       GÓMEZ Banerjee D.P.M., F.CLAIRE.C.F.A.S.    "

## 2024-11-27 NOTE — NURSING NOTE
"Chief Complaint   Patient presents with    Suture Removal     Left foot- no concerns       Initial /79   Pulse 94   Ht 1.549 m (5' 1\")   Wt 71.2 kg (157 lb)   LMP 10/17/2008   BMI 29.66 kg/m   Estimated body mass index is 29.66 kg/m  as calculated from the following:    Height as of this encounter: 1.549 m (5' 1\").    Weight as of this encounter: 71.2 kg (157 lb).  Medications and allergies reviewed.      Lisha OCHOA MA    "

## 2024-12-14 NOTE — PROGRESS NOTES
Brittany presents to the office s/p one week surgical excision of mucoid cyst of the left foot .  The patient relates keeping the bandages clean, dry and intact.  The patient relates good compliance with postoperative instructions.  The patient denies any severe pain, fevers, chills, nausea or vomiting.  The patient denies having any calf swelling or tenderness.            Physical Exam:    The patient appears to be in no distress and in good spirits.  The bandages appear clean, dry and intact with no strikethrough noted.   Neurovascular status unchanged with < 3 sec capillary refill to all digits.  No evidence of allodynia.  Noted mild to moderate edema to the operative site on the left foot.  Sutures are intact and the skin is well coapted with no erythema or drainage noted.  No pain on palpation, erythema or noted calor over the back of the calf.         Assessment:     ICD-10-CM    1. Mucoid cyst, joint  M67.40           Plan:  Sutures remain intact.  A sterile dressing was reapplied.  The patient was instructed to continue   protected weightbearing to the left foot.  The patient is to keep the dressings clean, dry and intact and to continue with elevation of the left foot.  To decrease the risk of developing a blood clot, the patient was instructed to continue with performing leg lifts and knee bends throughout the day to help keep blood moving.  The patient was instructed that if they notice any calf pain, swelling, or shortness of breath, they should to the nearest ER or Urgent Care to be evaluated for a blood clot.  The patient will return to the office in one week for suture removal.      Brittany verbalized agreement with and understanding of the rational for the diagnosis and treatment plan.  All questions were answered to best of my ability and the patient's satisfaction. The patient was advised to contact the clinic with any questions that may arise after the clinic visit.      Disclaimer: This note  consists of symbols derived from keyboarding, dictation and/or voice recognition software. As a result, there may be errors in the script that have gone undetected. Please consider this when interpreting information found in this chart.       GÓMEZ Banerjee D.P.M., MATILDE.F.A.S.

## 2025-06-25 ENCOUNTER — TELEPHONE (OUTPATIENT)
Dept: FAMILY MEDICINE | Facility: CLINIC | Age: 65
End: 2025-06-25
Payer: COMMERCIAL

## 2025-06-25 NOTE — TELEPHONE ENCOUNTER
Patient Quality Outreach    Patient is due for the following:   Physical Annual Wellness Visit    Action(s) Taken:   Schedule a Annual Wellness Visit    Type of outreach:    Sent [employer] message.    Questions for provider review:    None         Mary Jane Corona  Chart routed to None.

## 2025-06-26 NOTE — TELEPHONE ENCOUNTER
Medication Question or Refill    Contacts       Type Contact Phone/Fax    01/24/2023 01:17 PM CST Phone (Incoming) Laverne Barajas (Self) [phone] (H)          What medication are you calling about (include dose and sig)?: Sertraline    Controlled Substance Agreement on file:   CSA -- Patient Level:    CSA: None found at the patient level.       Who prescribed the medication?: Dr. Barrientos    Do you need a refill? No, but if she increases med dosage will need it earlier    When did you use the medication last? Daily    Patient offered an appointment? No    Do you have any questions or concerns?  No    Preferred Pharmacy:   [facility] [address]  [address] [address]  Phone: [phone] Fax: [phone] Alternate Fax: [phone], [phone]      Could we send this information to you in [facility] or would you prefer to receive a phone call?:   Patient would prefer a phone call   Okay to leave a detailed message?: Yes at Home number on file [phone] (home)       S/p Flexeril without relief.  12/2022 X-ray of the lumbosacral spine Right lateral spurs at T9 to T11. Spondylolisthesis of L4/5. Severe narrowing of L5/S1 and facet arthritis of L4/5 and L5/S1.  MRI 3/23 lumbar DDD Multiple HNP/ Disc osteophyte complexes contributing to mild canal and moderate foraminal narrowing.  Has seen pain management for injection therapy and RFA  Bilateral weakness and aching down into her legs.   10/2023 EMG NCS with Dr. 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  Continue to work on weight loss  Continue methocarbamol as needed for back pain.  Continue Celebrex  Continue follow-up with Dr. Tsai pain management in [place]  She could discuss tramadol, Lyrica, or gabapentin trial with her pain management specialist  She has right trochanteric bursa tenderness and does not increased bilateral hip pain. This could be coming from her back. She declined steroid injection for bursa. Topicals would be okay to try.

## 2025-08-18 ENCOUNTER — PATIENT OUTREACH (OUTPATIENT)
Dept: CARE COORDINATION | Facility: CLINIC | Age: 65
End: 2025-08-18
Payer: COMMERCIAL

## (undated) DEVICE — GLOVE BIOGEL PI MICRO INDICATOR UNDERGLOVE SZ 8.0 48980

## (undated) DEVICE — DRAPE STOCKINETTE IMPERVIOUS 08" LATEX 1586

## (undated) DEVICE — PREP CHLORAPREP 26ML TINTED ORANGE  260815

## (undated) DEVICE — SU ETHILON 4-0 PS-2 18" 1667G

## (undated) DEVICE — SYR 10ML LL W/O NDL

## (undated) DEVICE — DRAPE EXTREMITY W/ARMBOARD 29405

## (undated) DEVICE — DRSG GAUZE 4X4" TRAY

## (undated) DEVICE — NDL 25GA 1.5" 305127

## (undated) DEVICE — GOWN XXLG REINFORCED 9071EL

## (undated) DEVICE — SOL WATER IRRIG 1000ML BOTTLE 07139-09

## (undated) DEVICE — DRSG XEROFORM 1X8"

## (undated) DEVICE — CUFF TOURN 18IN STRL DISP

## (undated) DEVICE — CAST PADDING 4" STERILE 9044S

## (undated) DEVICE — SUCTION MANIFOLD NEPTUNE 2 SYS 1 PORT 702-025-000

## (undated) DEVICE — PACK EXTREMITY LATEX FREE SOP32HFFCS

## (undated) DEVICE — DRAPE SHEET REV FOLD 3/4 9349

## (undated) DEVICE — GLOVE BIOGEL PI MICRO SZ 8.0 48580

## (undated) DEVICE — SOL NACL 0.9% IRRIG 1000ML BOTTLE 07138-09

## (undated) DEVICE — BNDG ELASTIC 4"X5YDS UNSTERILE 6611-40

## (undated) DEVICE — PREP PAD ALCOHOL 6818

## (undated) DEVICE — DRSG ABDOMINAL 07 1/2X8" 7197D

## (undated) DEVICE — NDL 18GA 1.5" 305196

## (undated) RX ORDER — LIDOCAINE HYDROCHLORIDE 10 MG/ML
INJECTION, SOLUTION INFILTRATION; PERINEURAL
Status: DISPENSED
Start: 2024-11-12

## (undated) RX ORDER — LIDOCAINE HYDROCHLORIDE 10 MG/ML
INJECTION, SOLUTION EPIDURAL; INFILTRATION; INTRACAUDAL; PERINEURAL
Status: DISPENSED
Start: 2018-10-22

## (undated) RX ORDER — BUPIVACAINE HYDROCHLORIDE 5 MG/ML
INJECTION, SOLUTION EPIDURAL; INTRACAUDAL
Status: DISPENSED
Start: 2024-11-12

## (undated) RX ORDER — GINSENG 100 MG
CAPSULE ORAL
Status: DISPENSED
Start: 2024-11-12